# Patient Record
Sex: FEMALE | Race: BLACK OR AFRICAN AMERICAN | Employment: FULL TIME | URBAN - METROPOLITAN AREA
[De-identification: names, ages, dates, MRNs, and addresses within clinical notes are randomized per-mention and may not be internally consistent; named-entity substitution may affect disease eponyms.]

---

## 2017-05-09 ENCOUNTER — HOSPITAL ENCOUNTER (EMERGENCY)
Age: 32
Discharge: LEFT BEFORE DISCHARGE | End: 2017-05-09
Attending: EMERGENCY MEDICINE
Payer: COMMERCIAL

## 2017-05-09 VITALS
HEART RATE: 105 BPM | OXYGEN SATURATION: 98 % | WEIGHT: 200 LBS | HEIGHT: 62 IN | RESPIRATION RATE: 18 BRPM | DIASTOLIC BLOOD PRESSURE: 92 MMHG | TEMPERATURE: 98.6 F | BODY MASS INDEX: 36.8 KG/M2 | SYSTOLIC BLOOD PRESSURE: 153 MMHG

## 2017-05-09 DIAGNOSIS — M54.50 ACUTE RIGHT-SIDED LOW BACK PAIN WITHOUT SCIATICA: Primary | ICD-10-CM

## 2017-05-09 PROCEDURE — 99281 EMR DPT VST MAYX REQ PHY/QHP: CPT | Performed by: EMERGENCY MEDICINE

## 2017-05-09 NOTE — ED PROVIDER NOTES
HPI Comments: Patient is a  29-year-old female who states that a few days ago she was constipated. She was seen at an urgent care clinic and prescribed some lactulose. She has been taking it for 2 days and now having bowel movements. She reports episodes of sharp pain in her right lower back and into the right abdomen. She denies nausea or vomiting. She denies any fall, injury, or trauma. She states she had a normal menstrual cycle one month ago she denies any vaginal bleeding or discharge. Patient is a 29 y.o. female presenting with back pain. The history is provided by the patient. Back Pain    This is a new problem. The current episode started more than 2 days ago. The problem occurs daily. Patient reports not work related injury. The symptoms are aggravated by certain positions. Pertinent negatives include no fever, no abdominal pain, no bladder incontinence, no dysuria, no paresthesias and no paresis. Past Medical History:   Diagnosis Date    Ill-defined condition     herpes       Past Surgical History:   Procedure Laterality Date    HX OTHER SURGICAL      pilonidal cyst         No family history on file. Social History     Social History    Marital status: SINGLE     Spouse name: N/A    Number of children: N/A    Years of education: N/A     Occupational History    Not on file. Social History Main Topics    Smoking status: Never Smoker    Smokeless tobacco: Not on file    Alcohol use Yes      Comment: occasional    Drug use: No    Sexual activity: Yes     Partners: Male     Other Topics Concern    Not on file     Social History Narrative         ALLERGIES: Review of patient's allergies indicates no known allergies. Review of Systems   Constitutional: Negative. Negative for fever. HENT: Negative. Eyes: Negative. Respiratory: Negative. Cardiovascular: Negative. Gastrointestinal: Negative. Negative for abdominal pain. Endocrine: Negative.     Genitourinary: Negative for bladder incontinence and dysuria. Musculoskeletal: Positive for back pain. Neurological: Negative. Negative for paresthesias. Vitals:    05/09/17 0016   BP: (!) 153/92   Pulse: (!) 105   Resp: 18   Temp: 98.6 °F (37 °C)   SpO2: 98%   Weight: 90.7 kg (200 lb)   Height: 5' 2\" (1.575 m)            Physical Exam   Constitutional: She is oriented to person, place, and time. She appears well-developed and well-nourished. HENT:   Head: Normocephalic. Eyes: Pupils are equal, round, and reactive to light. Neck: Normal range of motion. Neck supple. Cardiovascular: Normal rate and regular rhythm. Pulmonary/Chest: Effort normal and breath sounds normal.   Abdominal: Soft. There is no tenderness. There is no rebound. Musculoskeletal: Normal range of motion. Neurological: She is alert and oriented to person, place, and time. She has normal reflexes. Skin: Skin is warm and dry. Nursing note and vitals reviewed. MDM  Number of Diagnoses or Management Options  Diagnosis management comments: 82-year-old female with several days of constipation now having bowel movements after taking lactulose. States she is having episodes of pain in her right back and right abdomen for a few days unrelieved with over-the-counter pain medications. Differential diagnosis includes gallbladder disease, appendicitis, colitis, colonic spasm, constipation, urinary tract infection, pregnancy, kidney stone       Amount and/or Complexity of Data Reviewed  Clinical lab tests: reviewed      ED Course   1:24 AM  Nurse went to the room to get the year patient's urine specimen for testing and she has eloped. Her evaluation was not completed and she did not receive formal  Discharge paperwork.     Procedures

## 2017-05-09 NOTE — ED NOTES
Went into patient room to collect urine to find that the patient eloped. Both bathrooms were checked at this time. MD mendez.

## 2017-05-16 ENCOUNTER — APPOINTMENT (OUTPATIENT)
Dept: CT IMAGING | Age: 32
DRG: 853 | End: 2017-05-16
Attending: EMERGENCY MEDICINE
Payer: COMMERCIAL

## 2017-05-16 ENCOUNTER — APPOINTMENT (OUTPATIENT)
Dept: GENERAL RADIOLOGY | Age: 32
DRG: 853 | End: 2017-05-16
Attending: EMERGENCY MEDICINE
Payer: COMMERCIAL

## 2017-05-16 ENCOUNTER — HOSPITAL ENCOUNTER (INPATIENT)
Age: 32
LOS: 20 days | Discharge: NURSING, REHAB OR LONG-TERM CARE | DRG: 853 | End: 2017-06-05
Attending: EMERGENCY MEDICINE | Admitting: INTERNAL MEDICINE
Payer: COMMERCIAL

## 2017-05-16 DIAGNOSIS — N17.9 ACUTE RENAL FAILURE, UNSPECIFIED ACUTE RENAL FAILURE TYPE (HCC): ICD-10-CM

## 2017-05-16 DIAGNOSIS — E87.20 LACTIC ACIDOSIS: ICD-10-CM

## 2017-05-16 DIAGNOSIS — K61.1 PERIRECTAL ABSCESS: ICD-10-CM

## 2017-05-16 DIAGNOSIS — R65.10 SIRS (SYSTEMIC INFLAMMATORY RESPONSE SYNDROME) (HCC): ICD-10-CM

## 2017-05-16 DIAGNOSIS — B35.6 TINEA CRURIS: ICD-10-CM

## 2017-05-16 DIAGNOSIS — K85.20 ALCOHOL-INDUCED ACUTE PANCREATITIS WITHOUT INFECTION OR NECROSIS: ICD-10-CM

## 2017-05-16 DIAGNOSIS — M79.89 NECROTIZING SOFT TISSUE INFECTION: ICD-10-CM

## 2017-05-16 DIAGNOSIS — C49.6: ICD-10-CM

## 2017-05-16 DIAGNOSIS — R65.21 SEPTIC SHOCK (HCC): ICD-10-CM

## 2017-05-16 DIAGNOSIS — D72.829 LEUKOCYTOSIS, UNSPECIFIED TYPE: ICD-10-CM

## 2017-05-16 DIAGNOSIS — K85.90 ACUTE PANCREATITIS, UNSPECIFIED COMPLICATION STATUS, UNSPECIFIED PANCREATITIS TYPE: ICD-10-CM

## 2017-05-16 DIAGNOSIS — A41.9 SEPTIC SHOCK (HCC): ICD-10-CM

## 2017-05-16 DIAGNOSIS — A60.04 HERPES SIMPLEX VULVOVAGINITIS: ICD-10-CM

## 2017-05-16 DIAGNOSIS — E13.10 DIABETIC KETOACIDOSIS WITHOUT COMA ASSOCIATED WITH OTHER SPECIFIED DIABETES MELLITUS (HCC): Primary | ICD-10-CM

## 2017-05-16 PROBLEM — E11.10 DKA (DIABETIC KETOACIDOSES): Status: ACTIVE | Noted: 2017-05-16

## 2017-05-16 PROBLEM — E87.5 HYPERKALEMIA: Status: ACTIVE | Noted: 2017-05-16

## 2017-05-16 PROBLEM — A74.9 CHLAMYDIA INFECTION: Status: ACTIVE | Noted: 2017-05-16

## 2017-05-16 PROBLEM — E87.1 HYPONATREMIA: Status: ACTIVE | Noted: 2017-05-16

## 2017-05-16 LAB
ADMINISTERED INITIALS, ADMINIT: NORMAL
ADMINISTERED INITIALS, ADMINIT: NORMAL
ALBUMIN SERPL BCP-MCNC: 1.3 G/DL (ref 3.5–5)
ALBUMIN/GLOB SERPL: 0.2 {RATIO} (ref 1.2–3.5)
ALP SERPL-CCNC: 224 U/L (ref 50–136)
ALT SERPL-CCNC: 49 U/L (ref 12–65)
ANION GAP BLD CALC-SCNC: 22 MMOL/L (ref 7–16)
ANION GAP BLD CALC-SCNC: 27 MMOL/L (ref 7–16)
ARTERIAL PATENCY WRIST A: POSITIVE
AST SERPL W P-5'-P-CCNC: 178 U/L (ref 15–37)
BACTERIA SPEC CULT: NORMAL
BASE DEFICIT BLDA-SCNC: 17.2 MMOL/L (ref 0–2)
BASOPHILS NFR BLD MANUAL: 1 % (ref 0–2)
BDY SITE: ABNORMAL
BILIRUB SERPL-MCNC: 0.6 MG/DL (ref 0.2–1.1)
BLASTS NFR BLD: 1 %
BUN SERPL-MCNC: 54 MG/DL (ref 6–23)
BUN SERPL-MCNC: 56 MG/DL (ref 6–23)
CALCIUM SERPL-MCNC: 7.3 MG/DL (ref 8.3–10.4)
CALCIUM SERPL-MCNC: 8.6 MG/DL (ref 8.3–10.4)
CHLORIDE SERPL-SCNC: 79 MMOL/L (ref 98–107)
CHLORIDE SERPL-SCNC: 94 MMOL/L (ref 98–107)
CK MB CFR SERPL CALC: 0.3 %
CK MB SERPL-MCNC: 15.6 NG/ML (ref 0.5–3.6)
CK SERPL-CCNC: 4621 U/L (ref 21–215)
CO2 SERPL-SCNC: 11 MMOL/L (ref 21–32)
CO2 SERPL-SCNC: 9 MMOL/L (ref 21–32)
COHGB MFR BLD: 0.3 % (ref 0.5–1.5)
CREAT SERPL-MCNC: 2.08 MG/DL (ref 0.6–1)
CREAT SERPL-MCNC: 2.51 MG/DL (ref 0.6–1)
D50 ADMINISTERED, D50ADM: 0 ML
D50 ADMINISTERED, D50ADM: 0 ML
D50 ORDER, D50ORD: 0 ML
D50 ORDER, D50ORD: 0 ML
DIFFERENTIAL METHOD BLD: ABNORMAL
DO-HGB BLD-MCNC: 3 % (ref 0–5)
ERYTHROCYTE [DISTWIDTH] IN BLOOD BY AUTOMATED COUNT: 17.4 % (ref 11.9–14.6)
EST. AVERAGE GLUCOSE BLD GHB EST-MCNC: 355 MG/DL
ETHANOL SERPL-MCNC: <3 MG/DL
GLOBULIN SER CALC-MCNC: 6.1 G/DL (ref 2.3–3.5)
GLSCOM COMMENTS: NORMAL
GLSCOM COMMENTS: NORMAL
GLUCOSE BLD STRIP.AUTO-MCNC: >600 MG/DL (ref 65–100)
GLUCOSE SERPL-MCNC: 765 MG/DL (ref 65–100)
GLUCOSE SERPL-MCNC: 890 MG/DL (ref 65–100)
GLUCOSE SERPL-MCNC: >500 MG/DL (ref 65–100)
GLUCOSE, GLC: 601 MG/DL
GLUCOSE, GLC: 601 MG/DL
HBA1C MFR BLD: 14 % (ref 4.8–6)
HCG SERPL QL: NEGATIVE
HCO3 BLDA-SCNC: 9 MMOL/L (ref 22–26)
HCT VFR BLD AUTO: 43.2 % (ref 35.8–46.3)
HGB BLD-MCNC: 10.8 G/DL (ref 11.7–15.4)
HGB BLDMV-MCNC: 10.2 GM/DL (ref 11.7–15)
HIGH TARGET, HITG: 180 MG/DL
HIGH TARGET, HITG: 180 MG/DL
INSULIN ADMINSTERED, INSADM: 10.8 UNITS/HOUR
INSULIN ADMINSTERED, INSADM: 16.2 UNITS/HOUR
INSULIN ORDER, INSORD: 10.8 UNITS/HOUR
INSULIN ORDER, INSORD: 16.2 UNITS/HOUR
LACTATE BLD-SCNC: 5.8 MMOL/L (ref 0.5–1.9)
LACTATE BLD-SCNC: 7.4 MMOL/L (ref 0.5–1.9)
LACTATE SERPL-SCNC: 4.9 MMOL/L (ref 0.4–2)
LACTATE SERPL-SCNC: 7.1 MMOL/L (ref 0.4–2)
LIPASE SERPL-CCNC: 5440 U/L (ref 73–393)
LIPASE SERPL-CCNC: >1500 U/L (ref 73–393)
LOW TARGET, LOT: 140 MG/DL
LOW TARGET, LOT: 140 MG/DL
LYMPHOCYTES NFR BLD MANUAL: 8 % (ref 16–44)
MAGNESIUM SERPL-MCNC: 2.5 MG/DL (ref 1.8–2.4)
MAGNESIUM SERPL-MCNC: 3 MG/DL (ref 1.8–2.4)
MCH RBC QN AUTO: 28.4 PG (ref 26.1–32.9)
MCHC RBC AUTO-ENTMCNC: 25 G/DL (ref 31.4–35)
MCV RBC AUTO: 113.7 FL (ref 79.6–97.8)
METHGB MFR BLD: 0.4 % (ref 0–1.5)
MINUTES UNTIL NEXT BG, NBG: 60 MIN
MINUTES UNTIL NEXT BG, NBG: 60 MIN
MONOCYTES NFR BLD MANUAL: 4 % (ref 3–9)
MULTIPLIER, MUL: 0.02
MULTIPLIER, MUL: 0.03
MYELOCYTES NFR BLD MANUAL: 1 %
NEUTS SEG NFR BLD MANUAL: 84 % (ref 47–75)
ORDER INITIALS, ORDINIT: NORMAL
ORDER INITIALS, ORDINIT: NORMAL
OXYHGB MFR BLDA: 96 % (ref 94–97)
PCO2 BLDA: 22 MMHG (ref 35–45)
PH BLDA: 7.22 [PH] (ref 7.35–7.45)
PHOSPHATE SERPL-MCNC: 5.5 MG/DL (ref 2.5–4.5)
PHOSPHATE SERPL-MCNC: 6.9 MG/DL (ref 2.5–4.5)
PLATELET # BLD AUTO: 473 K/UL (ref 150–450)
PLATELET COMMENTS,PCOM: ADEQUATE
PMV BLD AUTO: 13.4 FL (ref 10.8–14.1)
PO2 BLDA: 103 MMHG (ref 75–100)
POTASSIUM SERPL-SCNC: 5.2 MMOL/L (ref 3.5–5.1)
POTASSIUM SERPL-SCNC: 6 MMOL/L (ref 3.5–5.1)
PROCALCITONIN SERPL-MCNC: 78.7 NG/ML
PROMYELOCYTES NFR BLD MANUAL: 1 %
PROT SERPL-MCNC: 7.4 G/DL (ref 6.3–8.2)
RBC # BLD AUTO: 3.8 M/UL (ref 4.05–5.25)
RBC MORPH BLD: ABNORMAL
SAO2 % BLD: 97 % (ref 92–98.5)
SERVICE CMNT-IMP: ABNORMAL
SERVICE CMNT-IMP: NORMAL
SODIUM SERPL-SCNC: 115 MMOL/L (ref 136–145)
SODIUM SERPL-SCNC: 127 MMOL/L (ref 136–145)
VENTILATION MODE VENT: ABNORMAL
WBC # BLD AUTO: 45.6 K/UL (ref 4.3–11.1)
WBC MORPH BLD: ABNORMAL

## 2017-05-16 PROCEDURE — 82803 BLOOD GASES ANY COMBINATION: CPT

## 2017-05-16 PROCEDURE — 74011250636 HC RX REV CODE- 250/636: Performed by: INTERNAL MEDICINE

## 2017-05-16 PROCEDURE — 80053 COMPREHEN METABOLIC PANEL: CPT | Performed by: EMERGENCY MEDICINE

## 2017-05-16 PROCEDURE — 83036 HEMOGLOBIN GLYCOSYLATED A1C: CPT | Performed by: EMERGENCY MEDICINE

## 2017-05-16 PROCEDURE — 74176 CT ABD & PELVIS W/O CONTRAST: CPT

## 2017-05-16 PROCEDURE — 87040 BLOOD CULTURE FOR BACTERIA: CPT | Performed by: EMERGENCY MEDICINE

## 2017-05-16 PROCEDURE — 83605 ASSAY OF LACTIC ACID: CPT | Performed by: INTERNAL MEDICINE

## 2017-05-16 PROCEDURE — 84100 ASSAY OF PHOSPHORUS: CPT | Performed by: INTERNAL MEDICINE

## 2017-05-16 PROCEDURE — 80307 DRUG TEST PRSMV CHEM ANLYZR: CPT | Performed by: INTERNAL MEDICINE

## 2017-05-16 PROCEDURE — 74011250637 HC RX REV CODE- 250/637: Performed by: INTERNAL MEDICINE

## 2017-05-16 PROCEDURE — C1751 CATH, INF, PER/CENT/MIDLINE: HCPCS

## 2017-05-16 PROCEDURE — 36600 WITHDRAWAL OF ARTERIAL BLOOD: CPT

## 2017-05-16 PROCEDURE — 83735 ASSAY OF MAGNESIUM: CPT | Performed by: EMERGENCY MEDICINE

## 2017-05-16 PROCEDURE — 96361 HYDRATE IV INFUSION ADD-ON: CPT | Performed by: EMERGENCY MEDICINE

## 2017-05-16 PROCEDURE — 51702 INSERT TEMP BLADDER CATH: CPT | Performed by: EMERGENCY MEDICINE

## 2017-05-16 PROCEDURE — 74011250636 HC RX REV CODE- 250/636: Performed by: EMERGENCY MEDICINE

## 2017-05-16 PROCEDURE — 84100 ASSAY OF PHOSPHORUS: CPT | Performed by: EMERGENCY MEDICINE

## 2017-05-16 PROCEDURE — 83605 ASSAY OF LACTIC ACID: CPT

## 2017-05-16 PROCEDURE — 74011000258 HC RX REV CODE- 258: Performed by: EMERGENCY MEDICINE

## 2017-05-16 PROCEDURE — 65610000001 HC ROOM ICU GENERAL

## 2017-05-16 PROCEDURE — 80048 BASIC METABOLIC PNL TOTAL CA: CPT | Performed by: INTERNAL MEDICINE

## 2017-05-16 PROCEDURE — 84703 CHORIONIC GONADOTROPIN ASSAY: CPT | Performed by: EMERGENCY MEDICINE

## 2017-05-16 PROCEDURE — 36556 INSERT NON-TUNNEL CV CATH: CPT | Performed by: INTERNAL MEDICINE

## 2017-05-16 PROCEDURE — 87641 MR-STAPH DNA AMP PROBE: CPT | Performed by: INTERNAL MEDICINE

## 2017-05-16 PROCEDURE — 82550 ASSAY OF CK (CPK): CPT | Performed by: EMERGENCY MEDICINE

## 2017-05-16 PROCEDURE — 71010 XR CHEST PORT: CPT

## 2017-05-16 PROCEDURE — 83735 ASSAY OF MAGNESIUM: CPT | Performed by: INTERNAL MEDICINE

## 2017-05-16 PROCEDURE — 96375 TX/PRO/DX INJ NEW DRUG ADDON: CPT | Performed by: EMERGENCY MEDICINE

## 2017-05-16 PROCEDURE — 96365 THER/PROPH/DIAG IV INF INIT: CPT | Performed by: EMERGENCY MEDICINE

## 2017-05-16 PROCEDURE — 77030005515 HC CATH URETH FOL14 BARD -B

## 2017-05-16 PROCEDURE — 83690 ASSAY OF LIPASE: CPT | Performed by: EMERGENCY MEDICINE

## 2017-05-16 PROCEDURE — 99285 EMERGENCY DEPT VISIT HI MDM: CPT | Performed by: EMERGENCY MEDICINE

## 2017-05-16 PROCEDURE — 82962 GLUCOSE BLOOD TEST: CPT

## 2017-05-16 PROCEDURE — 93005 ELECTROCARDIOGRAM TRACING: CPT | Performed by: EMERGENCY MEDICINE

## 2017-05-16 PROCEDURE — 99223 1ST HOSP IP/OBS HIGH 75: CPT | Performed by: INTERNAL MEDICINE

## 2017-05-16 PROCEDURE — 84145 PROCALCITONIN (PCT): CPT | Performed by: EMERGENCY MEDICINE

## 2017-05-16 PROCEDURE — 82947 ASSAY GLUCOSE BLOOD QUANT: CPT

## 2017-05-16 PROCEDURE — 36415 COLL VENOUS BLD VENIPUNCTURE: CPT | Performed by: INTERNAL MEDICINE

## 2017-05-16 PROCEDURE — 85025 COMPLETE CBC W/AUTO DIFF WBC: CPT | Performed by: EMERGENCY MEDICINE

## 2017-05-16 PROCEDURE — 87205 SMEAR GRAM STAIN: CPT | Performed by: EMERGENCY MEDICINE

## 2017-05-16 PROCEDURE — 96367 TX/PROPH/DG ADDL SEQ IV INF: CPT | Performed by: EMERGENCY MEDICINE

## 2017-05-16 PROCEDURE — 74011636637 HC RX REV CODE- 636/637: Performed by: EMERGENCY MEDICINE

## 2017-05-16 PROCEDURE — 83690 ASSAY OF LIPASE: CPT | Performed by: INTERNAL MEDICINE

## 2017-05-16 RX ORDER — PROCHLORPERAZINE EDISYLATE 5 MG/ML
10 INJECTION INTRAMUSCULAR; INTRAVENOUS
Status: COMPLETED | OUTPATIENT
Start: 2017-05-16 | End: 2017-05-16

## 2017-05-16 RX ORDER — SODIUM CHLORIDE 0.9 % (FLUSH) 0.9 %
5-10 SYRINGE (ML) INJECTION AS NEEDED
Status: DISCONTINUED | OUTPATIENT
Start: 2017-05-16 | End: 2017-06-05 | Stop reason: HOSPADM

## 2017-05-16 RX ORDER — DEXTROSE 50 % IN WATER (D50W) INTRAVENOUS SYRINGE
25-50 AS NEEDED
Status: DISCONTINUED | OUTPATIENT
Start: 2017-05-16 | End: 2017-06-05 | Stop reason: HOSPADM

## 2017-05-16 RX ORDER — DOXYCYCLINE 100 MG/1
100 CAPSULE ORAL EVERY 12 HOURS
Status: DISCONTINUED | OUTPATIENT
Start: 2017-05-16 | End: 2017-05-17 | Stop reason: SDUPTHER

## 2017-05-16 RX ORDER — ONDANSETRON 2 MG/ML
4 INJECTION INTRAMUSCULAR; INTRAVENOUS
Status: DISCONTINUED | OUTPATIENT
Start: 2017-05-16 | End: 2017-06-05 | Stop reason: HOSPADM

## 2017-05-16 RX ORDER — HYDROCODONE BITARTRATE AND ACETAMINOPHEN 5; 325 MG/1; MG/1
2 TABLET ORAL
Status: DISCONTINUED | OUTPATIENT
Start: 2017-05-16 | End: 2017-06-03 | Stop reason: ALTCHOICE

## 2017-05-16 RX ORDER — VANCOMYCIN/0.9 % SOD CHLORIDE 1.5G/250ML
1500 PLASTIC BAG, INJECTION (ML) INTRAVENOUS EVERY 24 HOURS
Status: DISCONTINUED | OUTPATIENT
Start: 2017-05-17 | End: 2017-05-17

## 2017-05-16 RX ORDER — DEXTROSE 40 %
15 GEL (GRAM) ORAL AS NEEDED
Status: DISCONTINUED | OUTPATIENT
Start: 2017-05-16 | End: 2017-06-05 | Stop reason: HOSPADM

## 2017-05-16 RX ORDER — BISACODYL 5 MG
5 TABLET, DELAYED RELEASE (ENTERIC COATED) ORAL DAILY PRN
Status: DISCONTINUED | OUTPATIENT
Start: 2017-05-16 | End: 2017-06-05 | Stop reason: HOSPADM

## 2017-05-16 RX ORDER — HEPARIN SODIUM 5000 [USP'U]/ML
5000 INJECTION, SOLUTION INTRAVENOUS; SUBCUTANEOUS EVERY 8 HOURS
Status: DISCONTINUED | OUTPATIENT
Start: 2017-05-16 | End: 2017-06-05 | Stop reason: HOSPADM

## 2017-05-16 RX ORDER — SODIUM CHLORIDE 0.9 % (FLUSH) 0.9 %
5-10 SYRINGE (ML) INJECTION EVERY 8 HOURS
Status: DISCONTINUED | OUTPATIENT
Start: 2017-05-16 | End: 2017-05-19

## 2017-05-16 RX ORDER — ONDANSETRON 2 MG/ML
4 INJECTION INTRAMUSCULAR; INTRAVENOUS
Status: COMPLETED | OUTPATIENT
Start: 2017-05-16 | End: 2017-05-16

## 2017-05-16 RX ORDER — MORPHINE SULFATE 4 MG/ML
4 INJECTION, SOLUTION INTRAMUSCULAR; INTRAVENOUS
Status: COMPLETED | OUTPATIENT
Start: 2017-05-16 | End: 2017-05-16

## 2017-05-16 RX ORDER — SODIUM CHLORIDE 9 MG/ML
150 INJECTION, SOLUTION INTRAVENOUS CONTINUOUS
Status: DISCONTINUED | OUTPATIENT
Start: 2017-05-16 | End: 2017-05-17

## 2017-05-16 RX ORDER — SODIUM CHLORIDE, SODIUM LACTATE, POTASSIUM CHLORIDE, CALCIUM CHLORIDE 600; 310; 30; 20 MG/100ML; MG/100ML; MG/100ML; MG/100ML
150 INJECTION, SOLUTION INTRAVENOUS CONTINUOUS
Status: DISCONTINUED | OUTPATIENT
Start: 2017-05-16 | End: 2017-05-16

## 2017-05-16 RX ORDER — ACETAMINOPHEN 325 MG/1
650 TABLET ORAL
Status: DISCONTINUED | OUTPATIENT
Start: 2017-05-16 | End: 2017-06-05 | Stop reason: HOSPADM

## 2017-05-16 RX ORDER — VANCOMYCIN/0.9 % SOD CHLORIDE 1.5G/250ML
1500 PLASTIC BAG, INJECTION (ML) INTRAVENOUS ONCE
Status: COMPLETED | OUTPATIENT
Start: 2017-05-16 | End: 2017-05-16

## 2017-05-16 RX ADMIN — ONDANSETRON 4 MG: 2 INJECTION INTRAMUSCULAR; INTRAVENOUS at 16:20

## 2017-05-16 RX ADMIN — SODIUM CHLORIDE 150 ML/HR: 900 INJECTION, SOLUTION INTRAVENOUS at 20:10

## 2017-05-16 RX ADMIN — HEPARIN SODIUM 5000 UNITS: 5000 INJECTION, SOLUTION INTRAVENOUS; SUBCUTANEOUS at 22:07

## 2017-05-16 RX ADMIN — VANCOMYCIN HYDROCHLORIDE 1500 MG: 10 INJECTION, POWDER, LYOPHILIZED, FOR SOLUTION INTRAVENOUS at 20:10

## 2017-05-16 RX ADMIN — PIPERACILLIN SODIUM,TAZOBACTAM SODIUM 4.5 G: 4; .5 INJECTION, POWDER, FOR SOLUTION INTRAVENOUS at 17:08

## 2017-05-16 RX ADMIN — SODIUM CHLORIDE 1000 ML: 900 INJECTION, SOLUTION INTRAVENOUS at 18:04

## 2017-05-16 RX ADMIN — SODIUM CHLORIDE 1000 ML: 900 INJECTION, SOLUTION INTRAVENOUS at 20:10

## 2017-05-16 RX ADMIN — MORPHINE SULFATE 4 MG: 4 INJECTION, SOLUTION INTRAMUSCULAR; INTRAVENOUS at 16:45

## 2017-05-16 RX ADMIN — SODIUM CHLORIDE 1000 ML: 900 INJECTION, SOLUTION INTRAVENOUS at 17:47

## 2017-05-16 RX ADMIN — Medication 10 ML: at 22:01

## 2017-05-16 RX ADMIN — SODIUM CHLORIDE 10 UNITS/HR: 900 INJECTION, SOLUTION INTRAVENOUS at 17:25

## 2017-05-16 RX ADMIN — DOXYCYCLINE HYCLATE 100 MG: 100 CAPSULE ORAL at 21:58

## 2017-05-16 RX ADMIN — SODIUM CHLORIDE 1000 ML: 900 INJECTION, SOLUTION INTRAVENOUS at 17:27

## 2017-05-16 RX ADMIN — SODIUM CHLORIDE 1000 ML: 900 INJECTION, SOLUTION INTRAVENOUS at 15:27

## 2017-05-16 RX ADMIN — PROCHLORPERAZINE EDISYLATE 10 MG: 5 INJECTION INTRAMUSCULAR; INTRAVENOUS at 17:46

## 2017-05-16 NOTE — ED NOTES
TRANSFER - OUT REPORT:    Verbal report given to [facility] SANDRA RODRIGUEZ RN(name) on Katt Rodriguez  being transferred to ICU(unit) for routine progression of care       Report consisted of patients Situation, Background, Assessment and   Recommendations(SBAR). Information from the following report(s) SBAR, ED Summary, [facility] and Recent Results was reviewed with the receiving nurse. Lines:   Peripheral IV 05/16/17 Left Antecubital (Active)   Site Assessment Clean, dry, & intact 5/16/2017  7:30 PM   Phlebitis Assessment 0 5/16/2017  7:30 PM   Infiltration Assessment 0 5/16/2017  7:30 PM   Dressing Status Clean, dry, & intact 5/16/2017  7:30 PM       Peripheral IV 05/16/17 Right Antecubital (Active)   Site Assessment Clean, dry, & intact 5/16/2017  7:31 PM   Phlebitis Assessment 0 5/16/2017  7:31 PM   Infiltration Assessment 0 5/16/2017  7:31 PM   Dressing Status Clean, dry, & intact 5/16/2017  7:31 PM        Opportunity for questions and clarification was provided.       Patient transported with:   Registered Nurse

## 2017-05-16 NOTE — H&P
HOSPITALIST H&P/CONSULT  NAME:  Linh Xiong   Age:  29 y.o.  :   1985   MRN:   [id number]  PCP: Birdie Pallas, MD  Consulting MD:  Treatment Team: Primary Nurse: Jefferson Babcock; Primary Nurse: Carlota Reza RN  HPI:   32yo F with no significant PMH presented with worsening Rt Flank pain, nausea, multiple episodes of vomiting and weakness. She was seen here in ER 1 week ago with back pain and diagnosed with Sciatica and given NSAIDs which she took without much relief. Yesterday her pain worsened and she took extra pain meds with about 4 glasses of Wine per . She then started having vomiting and abd discomfort that continued today and she came to ER. Found to be in DKA in ER with Na 115, K 6.0, LA 7.4, Bicarb 9, Ph 7.22, Cr 2.51 and Ck > 4000, WBC >40k, Hgb 10.8 with elevated LFTs Per  they took a trip to [place] by car and pt has been complaining for rt flank pain since they returned last week. Also she was seen 3 days ago at GYN office and blood work in care everywhere showing +ve serology for HSV 1& 2 as well as Chlamydia. Per GYN note she had worrisome excoriated lesions around her vagina/Vulva. She was started on Insulin gtt and Abx, cultures were sent in ER and Hospitalist asked to admit. Pt is slepy but arousable, denies abd pain or nausea, still has pain in her Rt Flank. No SOB, cough, fever, CP, diarrhea reported. Denies having her periods at this time. 10 point ROS done and is negative except as noted in HPI. Past Medical History:   Diagnosis Date    Ill-defined condition     herpes      Past Surgical History:   Procedure Laterality Date    HX OTHER SURGICAL      pilonidal cyst      Prior to Admission Medications   Prescriptions Last Dose Informant Patient Reported? Taking?   acyclovir (ZOVIRAX) 200 mg capsule   No No   Sig: Take 4 Caps by mouth two (2) times a day. Facility-Administered Medications: None     Home meds reconciled.   No Known Allergies   Social History   Substance Use Topics    Smoking status: Never Smoker    Smokeless tobacco: Not on file    Alcohol use Yes      Comment: occasional      History reviewed. No pertinent family history. There is no immunization history for the selected administration types on file for this patient. Objective:     Visit Vitals    /52    Pulse (!) 129    Temp 98 °F (36.7 °C)    Resp 18    Ht 5' 2\" (1.575 m)    Wt 90.7 kg (200 lb)    LMP 2017    SpO2 100%    BMI 36.58 kg/m2      Temp (24hrs), Av °F (36.7 °C), Min:98 °F (36.7 °C), Max:98 °F (36.7 °C)    Oxygen Therapy  O2 Sat (%): 100 % (17)  Pulse via Oximetry: 129 beats per minute (17 190)  O2 Device: Room air (17 1454)  Physical Exam:  General:    Drowsy, cooperative, moderate distress   Head:   NCAT. No obvious deformity  Nose:  Nares normal. No drainage  Lungs:   CTABL. No wheezing/rhonchi/rales  Heart:   RRR. No m/r/g. tachycardia  Abdomen:   S/nt/nd. Bowel sounds normal.   Extremities: No cyanosis. :     Large area of excoriations around her vagina and perineum. Neurologic: Moves all extremities.   no gross focal deficits      Data Review:   Recent Results (from the past 24 hour(s))   CBC WITH AUTOMATED DIFF    Collection Time: 17  3:10 PM   Result Value Ref Range    WBC 45.6 (H) 4.3 - 11.1 K/uL    RBC 3.80 (L) 4.05 - 5.25 M/uL    HGB 10.8 (L) 11.7 - 15.4 g/dL    HCT 43.2 35.8 - 46.3 %    .7 (H) 79.6 - 97.8 FL    MCH 28.4 26.1 - 32.9 PG    MCHC 25.0 (L) 31.4 - 35.0 g/dL    RDW 17.4 (H) 11.9 - 14.6 %    PLATELET 672 (H) 718 - 450 K/uL    MPV 13.4 10.8 - 14.1 FL    NEUTROPHILS 84 (H) 47 - 75 %    LYMPHOCYTES 8 (L) 16 - 44 %    MONOCYTES 4 3 - 9 %    BASOPHILS 1 0 - 2 %    MYELOCYTES 1 %    PROMYELOCYTES 1 %    BLASTS 1 %    RBC COMMENTS SLIGHT  ANISOCYTOSIS + POIKILOCYTOSIS        WBC COMMENTS OCCASIONAL      PLATELET COMMENTS ADEQUATE      DF AUTOMATED     LIPASE    Collection Time: 17 4:25 PM   Result Value Ref Range    Lipase >1500 (H) 73 - 393 U/L   MAGNESIUM    Collection Time: 05/16/17  4:25 PM   Result Value Ref Range    Magnesium 3.0 (H) 1.8 - 2.4 mg/dL   METABOLIC PANEL, COMPREHENSIVE    Collection Time: 05/16/17  4:25 PM   Result Value Ref Range    Sodium 115 (LL) 136 - 145 mmol/L    Potassium 6.0 (H) 3.5 - 5.1 mmol/L    Chloride 79 (L) 98 - 107 mmol/L    CO2 9 (LL) 21 - 32 mmol/L    Anion gap 27 (H) 7 - 16 mmol/L    Glucose >500 (HH) 65 - 100 mg/dL    BUN 54 (H) 6 - 23 MG/DL    Creatinine 2.51 (H) 0.6 - 1.0 MG/DL    GFR est AA 29 (L) >60 ml/min/1.73m2    GFR est non-AA 24 (L) >60 ml/min/1.73m2    Calcium 8.6 8.3 - 10.4 MG/DL    Bilirubin, total 0.6 0.2 - 1.1 MG/DL    ALT (SGPT) 49 12 - 65 U/L    AST (SGOT) 178 (H) 15 - 37 U/L    Alk.  phosphatase 224 (H) 50 - 136 U/L    Protein, total 7.4 6.3 - 8.2 g/dL    Albumin 1.3 (L) 3.5 - 5.0 g/dL    Globulin 6.1 (H) 2.3 - 3.5 g/dL    A-G Ratio 0.2 (L) 1.2 - 3.5     POC LACTIC ACID    Collection Time: 05/16/17  4:58 PM   Result Value Ref Range    Lactic Acid (POC) 7.4 (H) 0.5 - 1.9 mmol/L   CK WITH MB    Collection Time: 05/16/17  5:36 PM   Result Value Ref Range    CK 4621 (H) 21 - 215 U/L    CK - MB 15.6 (H) 0.5 - 3.6 ng/ml    CK-MB Index 0.3 <2.5 %   PROCALCITONIN    Collection Time: 05/16/17  5:36 PM   Result Value Ref Range    Procalcitonin 78.7 ng/mL   PHOSPHORUS    Collection Time: 05/16/17  5:36 PM   Result Value Ref Range    Phosphorus 6.9 (H) 2.5 - 4.5 MG/DL   BLOOD GAS, ARTERIAL    Collection Time: 05/16/17  5:40 PM   Result Value Ref Range    pH 7.22 (LL) 7.35 - 7.45      PCO2 22 (L) 35.0 - 45.0 mmHg    PO2 103 (H) 75.0 - 100.0 mmHg    BICARBONATE 9 (L) 22.0 - 26.0 mmol/L    BASE DEFICIT 17.2 (H) 0 - 2 mmol/L    TOTAL HEMOGLOBIN 10.2 (L) 11.7 - 15.0 GM/DL    O2 SAT 97 92.0 - 98.5 %    ARTERIAL O2 HGB 96.0 94.0 - 97.0 %    CARBOXYHEMOGLOBIN 0.3 (L) 0.5 - 1.5 %    METHEMOGLOBIN 0.4 0.0 - 1.5 %    DEOXYHEMOGLOBIN 3 0.0 - 5.0 %    SITE LR ALLENS TEST POSITIVE      MODE RA     Respiratory comment: Dr Arianna Johnson at 5 16 2017 5 46 24 PM. Read back. GLUCOSE, POC    Collection Time: 05/16/17  6:43 PM   Result Value Ref Range    Glucose (POC) >600 (HH) 65 - 100 mg/dL     Imaging /Procedures /Studies:  I personally reviewed all labs, imaging, and other studies this admission:  CXR Results  (Last 48 hours)               05/16/17 1802  XR CHEST PORT Final result    Impression:  Impression: Unremarkable portable chest radiograph                           Narrative:  Portable chest:        History: Shortness of breath, fatigue x1 week       Comparison: None       Findings: A single view of the chest was obtained at 1748 hours. The patient is   slightly rotated towards the left. The cardiac and mediastinal silhouette are normal in size and configuration. The   lungs and pleural spaces are clear. The pulmonary vascularity is within normal   limits. CT Results  (Last 48 hours)    None          Assessment and Plan: Active Hospital Problems    Diagnosis Date Noted    DKA (diabetic ketoacidoses) ([facility] [address] 05/16/2017    Acute alcoholic pancreatitis 83/22/8816    Hyponatremia 05/16/2017     Pseudohyponatremia      Hyperkalemia 05/16/2017    Acute renal failure (ARF) ([facility] [address] 05/16/2017    Rhabdomyosarcoma of flank ([facility] [address] 05/16/2017    Sepsis ([facility] [address] 05/16/2017    Chlamydia infection 05/16/2017    Herpes simplex vulvovaginitis 05/16/2017       PLAN  · Admit to inpt ICU  · DKA: Started on Insulin gtt glucose stabilizer with q4h BMP. S/p 3lns Bolus, c/w NS infusion. Will need d5 wit KCL once BS < 200   · Pancreatitis: Trend Lipase, LFTs, NPO, IV Fluids, prn zofran and pain meds. · Pseudohyponatremia: due to Hyperglycemia, Trend Na  · NALDO: Likely pre renal and recent NSAID use in the setting of Rhabdo. On IVFluids, Pino placed, trend Cr. · Rhabdomyolysis: Trend CK on IVFluids  · Sepsis with +ve Chlamydia serology and HSV serology.  Will start On Vanc, Zosyn and Doxy. Consult ID and GYN. FEN:  NPO, IV Fluids  DVT ppx:  hsq  Code status: full code   Estimated LOS:  2-3 days  Risk assessment:  High risk due to DKA, Sepsis, NALDO and rhabdo. Plan of care discussed with: patient and  at bedside    Critical care time spent in admission process was 45 mins.     Signed By: Manuel Umanzor MD     May 16, 2017

## 2017-05-16 NOTE — ED NOTES
Patient reports she has been feeling sick for over a week now, today she began vomiting and with nausea. Also complains of back pain with difficulty walking.

## 2017-05-16 NOTE — ED PROVIDER NOTES
HPI Comments: Patient presents to the ER complaining of right lower back pain, body aches and fatigue. Patient states symptoms have been present for over a week. She was seen here initially for symptoms and diagnosed with sciatica. States she was prescribed ibuprofen without much relief. She reports since then she has started developing nausea and vomiting. Denies any fevers or chills    Patient is a 29 y.o. female presenting with back pain. The history is provided by the patient. Back Pain    This is a recurrent problem. The current episode started more than 1 week ago. The problem has not changed since onset. The pain is associated with no known injury. The pain is present in the right side. The quality of the pain is described as stabbing. Radiates to: right hip. The pain is at a severity of 5/10. The pain is moderate. Pertinent negatives include no fever, no numbness, no weight loss, no abdominal pain, no abdominal swelling, no bowel incontinence, no dysuria, no pelvic pain, no paresthesias and no paresis. She has tried nothing for the symptoms. Past Medical History:   Diagnosis Date    Ill-defined condition     herpes       Past Surgical History:   Procedure Laterality Date    HX OTHER SURGICAL      pilonidal cyst         History reviewed. No pertinent family history. Social History     Social History    Marital status: SINGLE     Spouse name: N/A    Number of children: N/A    Years of education: N/A     Occupational History    Not on file. Social History Main Topics    Smoking status: Never Smoker    Smokeless tobacco: Not on file    Alcohol use Yes      Comment: occasional    Drug use: No    Sexual activity: Yes     Partners: Male     Other Topics Concern    Not on file     Social History Narrative         ALLERGIES: Review of patient's allergies indicates no known allergies. Review of Systems   Constitutional: Negative for diaphoresis, fever and weight loss.    HENT: Negative for congestion, dental problem, trouble swallowing and voice change. Eyes: Negative for photophobia, redness and visual disturbance. Respiratory: Negative for chest tightness and shortness of breath. Cardiovascular: Negative for leg swelling. Gastrointestinal: Positive for nausea and vomiting. Negative for abdominal pain and bowel incontinence. Endocrine: Negative for polydipsia, polyphagia and polyuria. Genitourinary: Negative for dysuria, frequency and pelvic pain. Musculoskeletal: Positive for back pain. Negative for joint swelling, myalgias and neck pain. Skin: Negative for pallor. Allergic/Immunologic: Negative for food allergies and immunocompromised state. Neurological: Negative for syncope, speech difficulty, numbness and paresthesias. Hematological: Negative for adenopathy. Does not bruise/bleed easily. Psychiatric/Behavioral: Negative for behavioral problems and confusion. All other systems reviewed and are negative. Vitals:    05/16/17 1454 05/16/17 1459 05/16/17 1500   BP:   (!) 195/156   Pulse: (!) 108     Resp: 18     Temp: 98 °F (36.7 °C)     SpO2:  93% 100%   Weight: 90.7 kg (200 lb)     Height: 5' 2\" (1.575 m)              Physical Exam   Constitutional: She is oriented to person, place, and time. She appears well-developed and well-nourished. She has a sickly appearance. HENT:   Head: Normocephalic and atraumatic. Mouth/Throat: Oropharynx is clear and moist.   Eyes: Conjunctivae and EOM are normal. Pupils are equal, round, and reactive to light. No scleral icterus. Neck: Normal range of motion. Neck supple. No JVD present. No tracheal deviation present. No thyromegaly present. Cardiovascular: Regular rhythm, normal heart sounds and intact distal pulses. Tachycardia present. Exam reveals no gallop and no friction rub. No murmur heard. Pulmonary/Chest: Effort normal and breath sounds normal. No respiratory distress. She has no wheezes. Abdominal: Soft. Bowel sounds are normal. She exhibits no distension. There is no tenderness. Musculoskeletal: Normal range of motion. She exhibits no edema, tenderness or deformity. Back:    Neurological: She is alert and oriented to person, place, and time. She has normal reflexes. No cranial nerve deficit. Coordination normal.   Skin: Skin is warm and dry. No rash noted. No erythema. Nursing note and vitals reviewed. MDM  Number of Diagnoses or Management Options  Diagnosis management comments: DDx: Sciatica/Kidney stone/ Pyelonephritis    4:53 PM  WBC is 45K  Awaiting metabolic panel    1:98 PM  Lipase greater than 1500, also appears to be in DKA.   Once again no abdominal tenderness on exam  Blood cultures, abx and insulin gtt started       Amount and/or Complexity of Data Reviewed  Clinical lab tests: ordered and reviewed  Tests in the radiology section of CPT®: ordered and reviewed    Risk of Complications, Morbidity, and/or Mortality  Presenting problems: high  Diagnostic procedures: high  Management options: high    Critical Care  Total time providing critical care:  minutes (Critical Care Time 90 Minutes: Excluding all billable procedures, time spent at the bedside directing patients resuscitation, updating family, and coordinating care with consultants  )    Patient Progress  Patient progress: stable    ED Course       Procedures

## 2017-05-17 ENCOUNTER — APPOINTMENT (OUTPATIENT)
Dept: CT IMAGING | Age: 32
DRG: 853 | End: 2017-05-17
Attending: INTERNAL MEDICINE
Payer: COMMERCIAL

## 2017-05-17 ENCOUNTER — APPOINTMENT (OUTPATIENT)
Dept: GENERAL RADIOLOGY | Age: 32
DRG: 853 | End: 2017-05-17
Attending: INTERNAL MEDICINE
Payer: COMMERCIAL

## 2017-05-17 PROBLEM — R65.21 SEPTIC SHOCK (HCC): Status: ACTIVE | Noted: 2017-05-17

## 2017-05-17 PROBLEM — A41.9 SEPTIC SHOCK (HCC): Status: ACTIVE | Noted: 2017-05-17

## 2017-05-17 PROBLEM — R19.00 PELVIC MASS: Status: ACTIVE | Noted: 2017-05-17

## 2017-05-17 LAB
ADMINISTERED INITIALS, ADMINIT: NORMAL
ALBUMIN SERPL BCP-MCNC: 0.6 G/DL (ref 3.5–5)
ALBUMIN/GLOB SERPL: 0.2 {RATIO} (ref 1.2–3.5)
ALP SERPL-CCNC: 90 U/L (ref 50–136)
ALT SERPL-CCNC: 29 U/L (ref 12–65)
AMPHET UR QL SCN: NEGATIVE
ANION GAP BLD CALC-SCNC: 12 MMOL/L (ref 7–16)
ANION GAP BLD CALC-SCNC: 13 MMOL/L (ref 7–16)
ANION GAP BLD CALC-SCNC: 15 MMOL/L (ref 7–16)
ANION GAP BLD CALC-SCNC: 19 MMOL/L (ref 7–16)
AST SERPL W P-5'-P-CCNC: 82 U/L (ref 15–37)
ATRIAL RATE: 134 BPM
BARBITURATES UR QL SCN: NEGATIVE
BENZODIAZ UR QL: NEGATIVE
BILIRUB DIRECT SERPL-MCNC: 0.1 MG/DL
BILIRUB SERPL-MCNC: 0.2 MG/DL (ref 0.2–1.1)
BUN SERPL-MCNC: 43 MG/DL (ref 6–23)
BUN SERPL-MCNC: 45 MG/DL (ref 6–23)
BUN SERPL-MCNC: 55 MG/DL (ref 6–23)
BUN SERPL-MCNC: 58 MG/DL (ref 6–23)
CA-I BLD-MCNC: 4.48 MG/DL (ref 4–5.2)
CALCIUM SERPL-MCNC: 5.6 MG/DL (ref 8.3–10.4)
CALCIUM SERPL-MCNC: 7.2 MG/DL (ref 8.3–10.4)
CALCIUM SERPL-MCNC: 7.3 MG/DL (ref 8.3–10.4)
CALCIUM SERPL-MCNC: 7.6 MG/DL (ref 8.3–10.4)
CALCULATED P AXIS, ECG09: 68 DEGREES
CALCULATED R AXIS, ECG10: 28 DEGREES
CALCULATED T AXIS, ECG11: 16 DEGREES
CANNABINOIDS UR QL SCN: NEGATIVE
CHLORIDE SERPL-SCNC: 105 MMOL/L (ref 98–107)
CHLORIDE SERPL-SCNC: 108 MMOL/L (ref 98–107)
CHLORIDE SERPL-SCNC: 115 MMOL/L (ref 98–107)
CHLORIDE SERPL-SCNC: 120 MMOL/L (ref 98–107)
CHOLEST SERPL-MCNC: <50 MG/DL
CK SERPL-CCNC: 2538 U/L (ref 21–215)
CO2 SERPL-SCNC: 11 MMOL/L (ref 21–32)
CO2 SERPL-SCNC: 15 MMOL/L (ref 21–32)
CO2 SERPL-SCNC: 16 MMOL/L (ref 21–32)
CO2 SERPL-SCNC: 18 MMOL/L (ref 21–32)
COCAINE UR QL SCN: NEGATIVE
CREAT SERPL-MCNC: 0.79 MG/DL (ref 0.6–1)
CREAT SERPL-MCNC: 0.93 MG/DL (ref 0.6–1)
CREAT SERPL-MCNC: 1.59 MG/DL (ref 0.6–1)
CREAT SERPL-MCNC: 1.65 MG/DL (ref 0.6–1)
D50 ADMINISTERED, D50ADM: 0 ML
D50 ORDER, D50ORD: 0 ML
DIAGNOSIS, 93000: NORMAL
DIFFERENTIAL METHOD BLD: ABNORMAL
ERYTHROCYTE [DISTWIDTH] IN BLOOD BY AUTOMATED COUNT: 13.7 % (ref 11.9–14.6)
FERRITIN SERPL-MCNC: 1727 NG/ML (ref 8–388)
FOLATE SERPL-MCNC: 1.9 NG/ML (ref 3.1–17.5)
GLOBULIN SER CALC-MCNC: 3 G/DL (ref 2.3–3.5)
GLSCOM COMMENTS: NORMAL
GLUCOSE BLD STRIP.AUTO-MCNC: 156 MG/DL (ref 65–100)
GLUCOSE BLD STRIP.AUTO-MCNC: 179 MG/DL (ref 65–100)
GLUCOSE BLD STRIP.AUTO-MCNC: 180 MG/DL (ref 65–100)
GLUCOSE BLD STRIP.AUTO-MCNC: 199 MG/DL (ref 65–100)
GLUCOSE BLD STRIP.AUTO-MCNC: 222 MG/DL (ref 65–100)
GLUCOSE BLD STRIP.AUTO-MCNC: 227 MG/DL (ref 65–100)
GLUCOSE BLD STRIP.AUTO-MCNC: 289 MG/DL (ref 65–100)
GLUCOSE BLD STRIP.AUTO-MCNC: 299 MG/DL (ref 65–100)
GLUCOSE BLD STRIP.AUTO-MCNC: 359 MG/DL (ref 65–100)
GLUCOSE BLD STRIP.AUTO-MCNC: 360 MG/DL (ref 65–100)
GLUCOSE BLD STRIP.AUTO-MCNC: 367 MG/DL (ref 65–100)
GLUCOSE BLD STRIP.AUTO-MCNC: 458 MG/DL (ref 65–100)
GLUCOSE BLD STRIP.AUTO-MCNC: 578 MG/DL (ref 65–100)
GLUCOSE SERPL-MCNC: 172 MG/DL (ref 65–100)
GLUCOSE SERPL-MCNC: 291 MG/DL (ref 65–100)
GLUCOSE SERPL-MCNC: 330 MG/DL (ref 65–100)
GLUCOSE SERPL-MCNC: 424 MG/DL (ref 65–100)
GLUCOSE, GLC: 156 MG/DL
GLUCOSE, GLC: 179 MG/DL
GLUCOSE, GLC: 199 MG/DL
GLUCOSE, GLC: 222 MG/DL
GLUCOSE, GLC: 227 MG/DL
GLUCOSE, GLC: 289 MG/DL
GLUCOSE, GLC: 359 MG/DL
GLUCOSE, GLC: 458 MG/DL
GLUCOSE, GLC: 578 MG/DL
HCT VFR BLD AUTO: 22.7 % (ref 35.8–46.3)
HDLC SERPL-MCNC: 8 MG/DL (ref 40–60)
HDLC SERPL: ABNORMAL {RATIO}
HGB BLD-MCNC: 8.2 G/DL (ref 11.7–15.4)
HIGH TARGET, HITG: 180 MG/DL
INSULIN ADMINSTERED, INSADM: 10 UNITS/HOUR
INSULIN ADMINSTERED, INSADM: 11.1 UNITS/HOUR
INSULIN ADMINSTERED, INSADM: 11.3 UNITS/HOUR
INSULIN ADMINSTERED, INSADM: 11.5 UNITS/HOUR
INSULIN ADMINSTERED, INSADM: 12 UNITS/HOUR
INSULIN ADMINSTERED, INSADM: 15.9 UNITS/HOUR
INSULIN ADMINSTERED, INSADM: 20.7 UNITS/HOUR
INSULIN ADMINSTERED, INSADM: 7.7 UNITS/HOUR
INSULIN ADMINSTERED, INSADM: 9.5 UNITS/HOUR
INSULIN ORDER, INSORD: 10 UNITS/HOUR
INSULIN ORDER, INSORD: 11.1 UNITS/HOUR
INSULIN ORDER, INSORD: 11.3 UNITS/HOUR
INSULIN ORDER, INSORD: 11.5 UNITS/HOUR
INSULIN ORDER, INSORD: 12 UNITS/HOUR
INSULIN ORDER, INSORD: 15.9 UNITS/HOUR
INSULIN ORDER, INSORD: 20.7 UNITS/HOUR
INSULIN ORDER, INSORD: 7.7 UNITS/HOUR
INSULIN ORDER, INSORD: 9.5 UNITS/HOUR
IRON SATN MFR SERPL: 46 %
IRON SERPL-MCNC: 30 UG/DL (ref 35–150)
IRON SERPL-MCNC: 46 UG/DL (ref 35–150)
LACTATE SERPL-SCNC: 2 MMOL/L (ref 0.4–2)
LACTATE SERPL-SCNC: 2.1 MMOL/L (ref 0.4–2)
LACTATE SERPL-SCNC: 2.2 MMOL/L (ref 0.4–2)
LACTATE SERPL-SCNC: 2.5 MMOL/L (ref 0.4–2)
LACTATE SERPL-SCNC: 2.8 MMOL/L (ref 0.4–2)
LACTATE SERPL-SCNC: 3.9 MMOL/L (ref 0.4–2)
LACTATE SERPL-SCNC: 4.8 MMOL/L (ref 0.4–2)
LACTATE SERPL-SCNC: 5.4 MMOL/L (ref 0.4–2)
LDLC SERPL CALC-MCNC: ABNORMAL MG/DL
LIPASE SERPL-CCNC: 1877 U/L (ref 73–393)
LIPID PROFILE,FLP: ABNORMAL
LOW TARGET, LOT: 140 MG/DL
LYMPHOCYTES # BLD: 2.6 K/UL (ref 0.5–4.6)
LYMPHOCYTES NFR BLD MANUAL: 8 % (ref 16–44)
MAGNESIUM SERPL-MCNC: 1.5 MG/DL (ref 1.8–2.4)
MAGNESIUM SERPL-MCNC: 2.1 MG/DL (ref 1.8–2.4)
MAGNESIUM SERPL-MCNC: 2.1 MG/DL (ref 1.8–2.4)
MAGNESIUM SERPL-MCNC: 2.3 MG/DL (ref 1.8–2.4)
MCH RBC QN AUTO: 27.5 PG (ref 26.1–32.9)
MCHC RBC AUTO-ENTMCNC: 36.1 G/DL (ref 31.4–35)
MCV RBC AUTO: 76.2 FL (ref 79.6–97.8)
METAMYELOCYTES NFR BLD MANUAL: 1 %
METHADONE UR QL: NEGATIVE
MINUTES UNTIL NEXT BG, NBG: 60 MIN
MONOCYTES # BLD: 1.6 K/UL (ref 0.1–1.3)
MONOCYTES NFR BLD MANUAL: 5 % (ref 3–9)
MULTIPLIER, MUL: 0.04
MULTIPLIER, MUL: 0.05
MULTIPLIER, MUL: 0.06
MULTIPLIER, MUL: 0.07
MULTIPLIER, MUL: 0.08
MYELOCYTES NFR BLD MANUAL: 1 %
NEUTS SEG # BLD: 28.5 K/UL (ref 1.7–8.2)
NEUTS SEG NFR BLD MANUAL: 85 % (ref 47–75)
OPIATES UR QL: POSITIVE
ORDER INITIALS, ORDINIT: NORMAL
P-R INTERVAL, ECG05: 112 MS
PCP UR QL: NEGATIVE
PHOSPHATE SERPL-MCNC: 2 MG/DL (ref 2.5–4.5)
PLATELET # BLD AUTO: 304 K/UL (ref 150–450)
PLATELET COMMENTS,PCOM: ADEQUATE
PMV BLD AUTO: 9.6 FL (ref 10.8–14.1)
POTASSIUM SERPL-SCNC: 3.5 MMOL/L (ref 3.5–5.1)
POTASSIUM SERPL-SCNC: 4.2 MMOL/L (ref 3.5–5.1)
POTASSIUM SERPL-SCNC: 4.3 MMOL/L (ref 3.5–5.1)
POTASSIUM SERPL-SCNC: 4.4 MMOL/L (ref 3.5–5.1)
PROT SERPL-MCNC: 3.6 G/DL (ref 6.3–8.2)
Q-T INTERVAL, ECG07: 314 MS
QRS DURATION, ECG06: 80 MS
QTC CALCULATION (BEZET), ECG08: 468 MS
RBC # BLD AUTO: 2.98 M/UL (ref 4.05–5.25)
RBC MORPH BLD: ABNORMAL
SODIUM SERPL-SCNC: 136 MMOL/L (ref 136–145)
SODIUM SERPL-SCNC: 139 MMOL/L (ref 136–145)
SODIUM SERPL-SCNC: 145 MMOL/L (ref 136–145)
SODIUM SERPL-SCNC: 147 MMOL/L (ref 136–145)
TIBC SERPL-MCNC: 99 UG/DL (ref 250–450)
TRIGL SERPL-MCNC: 138 MG/DL (ref 35–150)
VENTRICULAR RATE, ECG03: 134 BPM
VIT B12 SERPL-MCNC: 1849 PG/ML (ref 193–986)
VLDLC SERPL CALC-MCNC: 27.6 MG/DL (ref 6–23)
WBC # BLD AUTO: 32.7 K/UL (ref 4.3–11.1)
WBC MORPH BLD: ABNORMAL

## 2017-05-17 PROCEDURE — B548ZZA ULTRASONOGRAPHY OF SUPERIOR VENA CAVA, GUIDANCE: ICD-10-PCS | Performed by: INTERNAL MEDICINE

## 2017-05-17 PROCEDURE — 80076 HEPATIC FUNCTION PANEL: CPT | Performed by: INTERNAL MEDICINE

## 2017-05-17 PROCEDURE — 65610000001 HC ROOM ICU GENERAL

## 2017-05-17 PROCEDURE — 82746 ASSAY OF FOLIC ACID SERUM: CPT | Performed by: INTERNAL MEDICINE

## 2017-05-17 PROCEDURE — 77030019605

## 2017-05-17 PROCEDURE — 82962 GLUCOSE BLOOD TEST: CPT

## 2017-05-17 PROCEDURE — 77030013794 HC KT TRNSDUC BLD EDWD -B

## 2017-05-17 PROCEDURE — 83540 ASSAY OF IRON: CPT | Performed by: INTERNAL MEDICINE

## 2017-05-17 PROCEDURE — 74011636637 HC RX REV CODE- 636/637: Performed by: EMERGENCY MEDICINE

## 2017-05-17 PROCEDURE — 83735 ASSAY OF MAGNESIUM: CPT | Performed by: INTERNAL MEDICINE

## 2017-05-17 PROCEDURE — 82550 ASSAY OF CK (CPK): CPT | Performed by: INTERNAL MEDICINE

## 2017-05-17 PROCEDURE — 83605 ASSAY OF LACTIC ACID: CPT | Performed by: INTERNAL MEDICINE

## 2017-05-17 PROCEDURE — 83735 ASSAY OF MAGNESIUM: CPT | Performed by: HOSPITALIST

## 2017-05-17 PROCEDURE — 82607 VITAMIN B-12: CPT | Performed by: INTERNAL MEDICINE

## 2017-05-17 PROCEDURE — 80048 BASIC METABOLIC PNL TOTAL CA: CPT | Performed by: HOSPITALIST

## 2017-05-17 PROCEDURE — 87255 GENET VIRUS ISOLATE HSV: CPT | Performed by: INTERNAL MEDICINE

## 2017-05-17 PROCEDURE — 74011250636 HC RX REV CODE- 250/636: Performed by: INTERNAL MEDICINE

## 2017-05-17 PROCEDURE — 74011636637 HC RX REV CODE- 636/637: Performed by: INTERNAL MEDICINE

## 2017-05-17 PROCEDURE — 80048 BASIC METABOLIC PNL TOTAL CA: CPT | Performed by: INTERNAL MEDICINE

## 2017-05-17 PROCEDURE — 71010 XR CHEST PORT: CPT

## 2017-05-17 PROCEDURE — 02HV33Z INSERTION OF INFUSION DEVICE INTO SUPERIOR VENA CAVA, PERCUTANEOUS APPROACH: ICD-10-PCS | Performed by: INTERNAL MEDICINE

## 2017-05-17 PROCEDURE — 74011250636 HC RX REV CODE- 250/636: Performed by: OBSTETRICS & GYNECOLOGY

## 2017-05-17 PROCEDURE — 84100 ASSAY OF PHOSPHORUS: CPT | Performed by: HOSPITALIST

## 2017-05-17 PROCEDURE — 85025 COMPLETE CBC W/AUTO DIFF WBC: CPT | Performed by: INTERNAL MEDICINE

## 2017-05-17 PROCEDURE — 74011636637 HC RX REV CODE- 636/637: Performed by: HOSPITALIST

## 2017-05-17 PROCEDURE — 80307 DRUG TEST PRSMV CHEM ANLYZR: CPT | Performed by: INTERNAL MEDICINE

## 2017-05-17 PROCEDURE — 82330 ASSAY OF CALCIUM: CPT | Performed by: INTERNAL MEDICINE

## 2017-05-17 PROCEDURE — 74011000258 HC RX REV CODE- 258: Performed by: INTERNAL MEDICINE

## 2017-05-17 PROCEDURE — 83690 ASSAY OF LIPASE: CPT | Performed by: INTERNAL MEDICINE

## 2017-05-17 PROCEDURE — 80061 LIPID PANEL: CPT | Performed by: INTERNAL MEDICINE

## 2017-05-17 PROCEDURE — 82728 ASSAY OF FERRITIN: CPT | Performed by: INTERNAL MEDICINE

## 2017-05-17 PROCEDURE — 71260 CT THORAX DX C+: CPT

## 2017-05-17 PROCEDURE — 74011000258 HC RX REV CODE- 258: Performed by: NURSE PRACTITIONER

## 2017-05-17 PROCEDURE — 74011250636 HC RX REV CODE- 250/636: Performed by: NURSE PRACTITIONER

## 2017-05-17 PROCEDURE — 74011000258 HC RX REV CODE- 258: Performed by: EMERGENCY MEDICINE

## 2017-05-17 PROCEDURE — 36592 COLLECT BLOOD FROM PICC: CPT

## 2017-05-17 PROCEDURE — 74011000250 HC RX REV CODE- 250: Performed by: INTERNAL MEDICINE

## 2017-05-17 PROCEDURE — 74011250637 HC RX REV CODE- 250/637: Performed by: INTERNAL MEDICINE

## 2017-05-17 PROCEDURE — 74011636320 HC RX REV CODE- 636/320: Performed by: INTERNAL MEDICINE

## 2017-05-17 RX ORDER — FLUCONAZOLE 2 MG/ML
200 INJECTION, SOLUTION INTRAVENOUS EVERY 24 HOURS
Status: COMPLETED | OUTPATIENT
Start: 2017-05-17 | End: 2017-05-22

## 2017-05-17 RX ORDER — DEXTROSE MONOHYDRATE AND SODIUM CHLORIDE 5; .9 G/100ML; G/100ML
150 INJECTION, SOLUTION INTRAVENOUS CONTINUOUS
Status: DISCONTINUED | OUTPATIENT
Start: 2017-05-17 | End: 2017-05-17

## 2017-05-17 RX ORDER — SODIUM CHLORIDE 0.9 % (FLUSH) 0.9 %
10 SYRINGE (ML) INJECTION
Status: COMPLETED | OUTPATIENT
Start: 2017-05-17 | End: 2017-05-17

## 2017-05-17 RX ORDER — MORPHINE SULFATE 2 MG/ML
2 INJECTION, SOLUTION INTRAMUSCULAR; INTRAVENOUS
Status: DISCONTINUED | OUTPATIENT
Start: 2017-05-17 | End: 2017-05-19 | Stop reason: DRUGHIGH

## 2017-05-17 RX ORDER — INSULIN GLARGINE 100 [IU]/ML
20 INJECTION, SOLUTION SUBCUTANEOUS 2 TIMES DAILY
Status: DISCONTINUED | OUTPATIENT
Start: 2017-05-18 | End: 2017-05-18

## 2017-05-17 RX ORDER — INSULIN GLARGINE 100 [IU]/ML
20 INJECTION, SOLUTION SUBCUTANEOUS ONCE
Status: COMPLETED | OUTPATIENT
Start: 2017-05-17 | End: 2017-05-17

## 2017-05-17 RX ORDER — SODIUM CHLORIDE 9 MG/ML
150 INJECTION, SOLUTION INTRAVENOUS CONTINUOUS
Status: DISCONTINUED | OUTPATIENT
Start: 2017-05-17 | End: 2017-05-18

## 2017-05-17 RX ORDER — INSULIN LISPRO 100 [IU]/ML
INJECTION, SOLUTION INTRAVENOUS; SUBCUTANEOUS EVERY 4 HOURS
Status: DISCONTINUED | OUTPATIENT
Start: 2017-05-17 | End: 2017-05-18

## 2017-05-17 RX ORDER — INSULIN GLARGINE 100 [IU]/ML
10 INJECTION, SOLUTION SUBCUTANEOUS DAILY
Status: DISCONTINUED | OUTPATIENT
Start: 2017-05-17 | End: 2017-05-17

## 2017-05-17 RX ADMIN — Medication 2 MG: at 10:16

## 2017-05-17 RX ADMIN — INSULIN LISPRO 15 UNITS: 100 INJECTION, SOLUTION INTRAVENOUS; SUBCUTANEOUS at 20:38

## 2017-05-17 RX ADMIN — Medication 10 ML: at 13:46

## 2017-05-17 RX ADMIN — PIPERACILLIN SODIUM,TAZOBACTAM SODIUM 3.38 G: 3; .375 INJECTION, POWDER, FOR SOLUTION INTRAVENOUS at 00:28

## 2017-05-17 RX ADMIN — ONDANSETRON 4 MG: 2 INJECTION INTRAMUSCULAR; INTRAVENOUS at 08:24

## 2017-05-17 RX ADMIN — IOPAMIDOL 100 ML: 755 INJECTION, SOLUTION INTRAVENOUS at 18:28

## 2017-05-17 RX ADMIN — PIPERACILLIN SODIUM,TAZOBACTAM SODIUM 3.38 G: 3; .375 INJECTION, POWDER, FOR SOLUTION INTRAVENOUS at 08:23

## 2017-05-17 RX ADMIN — PIPERACILLIN SODIUM,TAZOBACTAM SODIUM 3.38 G: 3; .375 INJECTION, POWDER, FOR SOLUTION INTRAVENOUS at 15:59

## 2017-05-17 RX ADMIN — ACYCLOVIR SODIUM 500 MG: 50 INJECTION, SOLUTION INTRAVENOUS at 13:45

## 2017-05-17 RX ADMIN — ONDANSETRON 4 MG: 2 INJECTION INTRAMUSCULAR; INTRAVENOUS at 01:13

## 2017-05-17 RX ADMIN — ACETAMINOPHEN 650 MG: 325 TABLET, FILM COATED ORAL at 20:49

## 2017-05-17 RX ADMIN — HEPARIN SODIUM 5000 UNITS: 5000 INJECTION, SOLUTION INTRAVENOUS; SUBCUTANEOUS at 15:50

## 2017-05-17 RX ADMIN — INSULIN GLARGINE 10 UNITS: 100 INJECTION, SOLUTION SUBCUTANEOUS at 10:38

## 2017-05-17 RX ADMIN — DOXYCYCLINE 100 MG: 100 INJECTION, POWDER, LYOPHILIZED, FOR SOLUTION INTRAVENOUS at 11:48

## 2017-05-17 RX ADMIN — Medication 10 ML: at 05:49

## 2017-05-17 RX ADMIN — Medication 10 ML: at 18:29

## 2017-05-17 RX ADMIN — INSULIN LISPRO 6 UNITS: 100 INJECTION, SOLUTION INTRAVENOUS; SUBCUTANEOUS at 16:06

## 2017-05-17 RX ADMIN — INSULIN GLARGINE 20 UNITS: 100 INJECTION, SOLUTION SUBCUTANEOUS at 21:27

## 2017-05-17 RX ADMIN — INSULIN LISPRO 2 UNITS: 100 INJECTION, SOLUTION INTRAVENOUS; SUBCUTANEOUS at 10:39

## 2017-05-17 RX ADMIN — HEPARIN SODIUM 5000 UNITS: 5000 INJECTION, SOLUTION INTRAVENOUS; SUBCUTANEOUS at 05:49

## 2017-05-17 RX ADMIN — ACYCLOVIR SODIUM 500 MG: 50 INJECTION, SOLUTION INTRAVENOUS at 22:17

## 2017-05-17 RX ADMIN — HEPARIN SODIUM 5000 UNITS: 5000 INJECTION, SOLUTION INTRAVENOUS; SUBCUTANEOUS at 22:18

## 2017-05-17 RX ADMIN — SODIUM CHLORIDE 1000 ML: 900 INJECTION, SOLUTION INTRAVENOUS at 00:43

## 2017-05-17 RX ADMIN — Medication 2 MG: at 17:57

## 2017-05-17 RX ADMIN — SODIUM CHLORIDE 150 ML/HR: 900 INJECTION, SOLUTION INTRAVENOUS at 04:34

## 2017-05-17 RX ADMIN — SODIUM CHLORIDE 150 ML/HR: 900 INJECTION, SOLUTION INTRAVENOUS at 13:20

## 2017-05-17 RX ADMIN — SODIUM CHLORIDE 100 ML: 900 INJECTION, SOLUTION INTRAVENOUS at 18:29

## 2017-05-17 RX ADMIN — CALCIUM GLUCONATE 1 G: 94 INJECTION, SOLUTION INTRAVENOUS at 18:46

## 2017-05-17 RX ADMIN — SODIUM CHLORIDE 150 ML/HR: 900 INJECTION, SOLUTION INTRAVENOUS at 19:39

## 2017-05-17 RX ADMIN — Medication 2 MG: at 13:20

## 2017-05-17 RX ADMIN — FLUCONAZOLE 200 MG: 2 INJECTION INTRAVENOUS at 15:50

## 2017-05-17 RX ADMIN — Medication 10 ML: at 22:14

## 2017-05-17 RX ADMIN — SODIUM CHLORIDE 1000 ML: 900 INJECTION, SOLUTION INTRAVENOUS at 03:00

## 2017-05-17 RX ADMIN — DOXYCYCLINE 100 MG: 100 INJECTION, POWDER, LYOPHILIZED, FOR SOLUTION INTRAVENOUS at 22:16

## 2017-05-17 RX ADMIN — DEXTROSE MONOHYDRATE AND SODIUM CHLORIDE 100 ML/HR: 5; .9 INJECTION, SOLUTION INTRAVENOUS at 05:19

## 2017-05-17 RX ADMIN — INSULIN LISPRO 10 UNITS: 100 INJECTION, SOLUTION INTRAVENOUS; SUBCUTANEOUS at 23:02

## 2017-05-17 RX ADMIN — ONDANSETRON 4 MG: 2 INJECTION INTRAMUSCULAR; INTRAVENOUS at 20:37

## 2017-05-17 NOTE — PROGRESS NOTES
Spiritual Care visit. Initial visit. Patient did not respond, she lay prone on bed.  spoke with a young man who identified himself as her . He was optimistic for her recovery. Spiritual Care Assessment/Progress Notes    Thompson Matson [id number]  [id number]    1985  29 y.o.  female    Patient Telephone Number: [phone] (home)   Restorationist Affiliation: [facility]   Language: English   Extended Emergency Contact Information  Primary Emergency Contact: Ja[address] [place] Debby Final  Mobile Phone: [phone]  Relation: None  Secondary Emergency Contact: Shani Other  Address: [address] [place] [address],[address], [place] [address] [address] [facility] Phone: [phone]  Mobile Phone: [phone]  Relation: Spouse   Patient Active Problem List    Diagnosis Date Noted    Septic shock ([address] 05/17/2017    Pelvic mass 05/17/2017    DKA (diabetic ketoacidoses) ([address] 05/16/2017    Acute alcoholic pancreatitis 17/91/8036    Hyponatremia 05/16/2017    Hyperkalemia 05/16/2017    Acute renal failure (ARF) ([address] 05/16/2017    Rhabdomyosarcoma of flank ([address] 05/16/2017    Sepsis ([facility] [address] 05/16/2017    Chlamydia infection 05/16/2017    Herpes simplex vulvovaginitis 05/16/2017        Date: 5/17/2017       Level of Restorationist/Spiritual Activity:  []         Involved in javad tradition/spiritual practice    []         Not involved in javad tradition/spiritual practice  []         Spiritually oriented    []         Claims no spiritual orientation    []         seeking spiritual identity  []         Feels alienated from Religion practice/tradition  []         Feels angry about Religion practice/tradition  [x]         Spirituality/Religion tradition IS a resource for coping at this time.   []         Not able to assess due to medical condition    Services Provided Today:  []         crisis intervention    []         reading Scriptures  [x]         spiritual assessment    [x] prayer  []         empathic listening/emotional support  []         rites and rituals (cite in comments)  []         life review     []         Oriental orthodox support  []         theological development   []         advocacy  []         ethical dialog     []         blessing  []         bereavement support    [x]         support to family  []         anticipatory grief support   []         help with AMD  []         spiritual guidance    []         meditation      Spiritual Care Needs  []         Emotional Support  []         Spiritual/Moravian Care  []         Loss/Adjustment  []         Advocacy/Referral                /Ethics  []         No needs expressed at               this time  []         Other: (note in               comments)  [address]  []         Follow up visits with               pt/family  []         Provide materials  []         Schedule sacraments  []         Contact Community               Clergy  [x]         Follow up as needed  []         Other: (note in               comments)     Comments: Spiritual Assessment     Advance Directives  Legal Next of Kin remains as decision maker. Category/Code Status Full. Family Support  Yes.   Spiritual Support  [facility]   Visit by Cherri Jara M.Ed., Th.B. ,Staff

## 2017-05-17 NOTE — PROGRESS NOTES
Spoke with Dr. Toño Pedraza via telephone. Discussed consult/patient situation and need for a pelvic exam. MD stated will exam patient this afternoon.

## 2017-05-17 NOTE — PROGRESS NOTES
Dr. Carlee Duverney at bedside. Updated on BMP, anion gap closed. Orders to give 10 units of lantus and transition off insulin gtt to Humalog SS. Fluids changed from D5NS to [email or web address].

## 2017-05-17 NOTE — PROCEDURES
The pt. Was placed supine  The left neck was prepped and drapped. 1% lidocaine was injected. With ultrasound the left ij was accessed with 18 gauge needle.   A quad lumen catheter was then placed using Seldinger technique  Good blood return

## 2017-05-17 NOTE — DIABETES MGMT
Patient admitted with DKA. Patient's blood glucose 890 on admission. HgA1c 14 (eA). Na 115, K+6.0, LA 7.4, Bicarb 9, Ph 7.22, Cr 2.51 and Ck > 4000, WBC >40k, Hgb 10.8 with elevated LFTs. Patient has a pelvic mass. Awaiting lab results for today. Patient was placed on glucostabilizer and an insulin drip. Patient's last blood glucose was 180. Patient has since transitioned to a regimen of Lantus 10 units and Humalog SSI. Patient is too ill for teaching per primary nurse. Will follow along and provide teaching as patient condition allows.

## 2017-05-17 NOTE — PROGRESS NOTES
Pharmacokinetic Consult to Pharmacist    Kimberly Bledsoe is a 29 y.o. female being treated for sepsis with vancomycin and zosyn. Also on Doxycycline for STD. Height: 5' 2\" (157.5 cm)  Weight: 89.6 kg (197 lb 8.5 oz)  Lab Results   Component Value Date/Time    BUN 56 05/16/2017 08:50 PM    Creatinine 2.08 05/16/2017 08:50 PM    WBC 45.6 05/16/2017 03:10 PM    Procalcitonin 78.7 05/16/2017 05:36 PM      Estimated Creatinine Clearance: 41.1 mL/min (based on Cr of 2.08). CULTURES:  5/16: BCx: pending   Nasal swab: negative    Day 1 of vancomycin. Goal trough is 15-20. Vancomycin dose initiated at 1.5g q 24h. Will continue to follow patient.       Thank you,  Rody Booker, PharmD  Clinical Pharmacist

## 2017-05-17 NOTE — PROGRESS NOTES
Dr. Richard Li called about critical calcium level of 5.6. Orders to draw ionized calcium, give 1g calcium gluconate. Reviewed progress notes and recommendations from physician consults today.  Creatinine now 0.79 orders received to  get stat CT of chest, abdomen, and pelvis with contrast.

## 2017-05-17 NOTE — PROGRESS NOTES
LEAP[facility] PROTOCOL NOTE    Anju Speaker  5/17/2017    The patient is currently in the critical care setting managed by Dr. Torrey Gross, Dr. Tonya Albert, Dr. Thomas Vogel with DKA and  R gluteal pain mass. The patient's chart is reviewed and the patient is discussed with the staff. Patient is currently hemodynamically stable. Patient has no needs identified for Intensivist management in the critical care setting at this time. Please notify us if can be of assistance. No charge billed to the patient. Thank you.     Jerome Sabillon MD

## 2017-05-17 NOTE — PROGRESS NOTES
Pt arrived from Er and skin assessment is as follows; pt has tatoos on upper chest, scar to left thigh, bottom is excoriated and has lesions around her perinium, herpes lesions on vaginal area and severe excoriation to labia with brown mucous discharge present.    Skin dual assessed with Isac Redd RN

## 2017-05-17 NOTE — CONSULTS
Gynecology Consult/ History and Physical    Ms. Sridhar Melgar is a 29 y.o.   OB History     No data available      Consulted by Hospitalist Dr Devika Mancilla  to see pt re HSV, Gluteal rash and pelvic mass  Pt states she came to the ER recently with right gluteal pain and was diagnosed with sciatica and discharged, in the interim she was seen by regular GYN for and exam on 5/12/17- completely normal except for non tender gluteal rash, no pelvic pain on bimanual exam, however cultures indicated + chlamydia and HSV1,2 IGG very positive with known history of HSV. Pt states that this rash was not painful and did not feel like her typical HSV outbreaks. She represented to ER with right gluteal pain, (pt denies she had abdominal pain) after pain in right gluteal region  worsened on car ride to [place]. Pt has had pilonidal cyst I and D 4 years ago  Pt states she refuses pelvic exam but has allowed me to exam buttocks  Pt states otherwise normal gyn history with regular periods  Pt states not in any pain now, is lying flat on her stomach because pressure to the gluteus illicits pain   Date Performed Result   PAP 5/12 + chlamydia   Mammogram     Colonoscopy na    Dexa na          No obstetric history on file.         GYN History             Past Medical History:  Past Medical History:   Diagnosis Date    Ill-defined condition     herpes     Past Surgical History:  Past Surgical History:   Procedure Laterality Date    HX OTHER SURGICAL      pilonidal cyst       Allergies:   No Known Allergies    Medication History:  Current Facility-Administered Medications   Medication Dose Route Frequency Provider Last Rate Last Dose    insulin glargine (LANTUS) injection 10 Units  10 Units SubCUTAneous DAILY Jenna Ramos MD   10 Units at 05/17/17 1038    insulin lispro (HUMALOG) injection   SubCUTAneous Q4H Jenna Ramos MD   2 Units at 05/17/17 1039    morphine injection 2 mg  2 mg IntraVENous Q4H PRN Jenna Ramos MD   2 mg at 05/17/17 1320    acyclovir (ZOVIRAX) 500 mg (10mg/kg) in 0.9% sodium chloride 100 mL IVPB  500 mg IntraVENous Q8H Leo Mercado NP        doxycycline (VIBRAMYCIN) 100 mg in 0.9% sodium chloride (MBP/ADV) 100 mL  100 mg IntraVENous Q12H Kun Marie  mL/hr at 05/17/17 1148 100 mg at 05/17/17 1148    0.9% sodium chloride infusion  150 mL/hr IntraVENous CONTINUOUS Imelda Gale  mL/hr at 05/17/17 1320 150 mL/hr at 05/17/17 1320    insulin regular (Pam Cable R, HUMULIN R) 100 Units in 0.9% sodium chloride 100 mL infusion  1-10 Units/hr IntraVENous TITRATE Zeny Harp MD   Stopped at 05/17/17 1045    sodium chloride (NS) flush 5-10 mL  5-10 mL IntraVENous Q8H Imelda Gale MD   10 mL at 05/17/17 0549    sodium chloride (NS) flush 5-10 mL  5-10 mL IntraVENous PRN Imelda Gale MD        dextrose 40% (GLUTOSE) oral gel 1 Tube  15 g Oral PRN Imelda Gale MD        glucagon (GLUCAGEN) injection 1 mg  1 mg IntraMUSCular PRN Imelda Gale MD        dextrose (D50W) injection syrg 12.5-25 g  25-50 mL IntraVENous PRN Imelda Gale MD        acetaminophen (TYLENOL) tablet 650 mg  650 mg Oral Q4H PRN Imelda Gale MD        ondansetron (ZOFRAN) injection 4 mg  4 mg IntraVENous Q4H PRN Imelda Gale MD   4 mg at 05/17/17 0824    bisacodyl (DULCOLAX) tablet 5 mg  5 mg Oral DAILY PRN Imelda Gale MD        heparin (porcine) injection 5,000 Units  5,000 Units SubCUTAneous Sebastian Hurst MD   5,000 Units at 05/17/17 0549    HYDROcodone-acetaminophen (NORCO) 5-325 mg per tablet 2 Tab  2 Tab Oral Q6H PRN Imelda Gale MD        piperacillin-tazobactam (ZOSYN) 3.375 g in 0.9% sodium chloride (MBP/ADV) 100 mL  3.375 g IntraVENous Sebastian Hurst MD 25 mL/hr at 05/17/17 0823 3.375 g at 05/17/17 9717       Social History:  Social History     Social History    Marital status: SINGLE     Spouse name: N/A    Number of children: N/A    Years of education: N/A     Occupational History    Not on file.      Social History Main Topics  Smoking status: Never Smoker    Smokeless tobacco: Not on file    Alcohol use Yes      Comment: occasional    Drug use: No    Sexual activity: Yes     Partners: Male     Other Topics Concern    Not on file     Social History Narrative       Family History:  History reviewed. No pertinent family history. Review of Systems - General ROS: negative except for that discussed in HPI, again- denies abdominal pain or dyspareunia, does not feel like the rash she has feels like previous HSV outbreaks      ROS:  Feeling nauseated, appears sickly. No dyspnea or chest pain on exertion. No abdominal pain, change in bowel habits, black or bloody stools. No urinary tract symptoms. No neurological complaints. Objective:     Visit Vitals    /48    Pulse (!) 128    Temp 98.6 °F (37 °C)    Resp (!) 36    Ht 5' 2\" (1.575 m)    Wt 197 lb 8.5 oz (89.6 kg)    LMP 04/12/2017    SpO2 100%    BMI 36.13 kg/m2     Urine:No results found for this or any previous visit. The patient appears sickly,  Somnolent, oriented x 3, in mild distress    Abdomen: Only palpation of pt from side as she refuses to roll over from the prone position- denies any pain. Extremities show no edema, normal peripheral pulses. Neurological is normal, no focal findings. BREAST EXAM: patient declines to have breast exam    PELVIC EXAM: External genitalia is examined in prone position, erythema, induration and confluence of sloughing skin from gluteal fold to introitus, no distinct ulcers, area is tender but not exquisitely tender as in the case of acute HSV, Cloudy gray discharge weeping from wound? Pt refuses to open legs to examine, ?  Severe tinea crura atypical vs HSV, 4 cm pressure type ulcer- non tender on right mid buttocks- does not appear infected  CT SCAN- reviewed CT scan with radiologist, mass appears to not be continguous with tube ovary or uterus and appears to be pushing these structures anteriorly originating from gluteus muscle, content of mass consistent with blood- ? Hematoma of gluteus muscle? Content does not suggest infectious content ie does not look like pus. H    Assessment/Plan:   1. History of HSV- gluteal rash with sloughing skin no definitive herpes ulcers and pain associated with rash is not consistent with recurrent HSV however with recent + HSV would continue to treat per ID, more consistent with severe yeast of gluteal fold- will add 200 mg diflucan IV until rash subsides  2 chlamydia- treated appropriately with azithromycin  3.  Pelvic mass- mass appears to be blood filled not consistent with pus- pt also not complaining of pelvic pain- all of this suggest not a TOA or PID- would agree with radiology that MRI would help in diagnosis as mass does not appear infectious or malignant  Will await MRI results and if non gyn in origin will sign off    Juan Foster, DO

## 2017-05-17 NOTE — PROGRESS NOTES
Hospitalist Progress Note    2017  Admit Date: 2017  3:14 PM   NAME: Zina Mendoza   :  1985   MRN:  [id number]   Attending: Dinora Roman MD  PCP:  Diamond Natarajan MD    SUBJECTIVE:   34yo F with no significant PMH presented with worsening Rt Flank pain, nausea, multiple episodes of vomiting and weakness. She was seen here in ER 1 week ago with back pain and diagnosed with Sciatica and given NSAIDs which she took without much relief. Yesterday her pain worsened and she took extra pain meds with about 4 glasses of Wine per . She then started having vomiting and abd discomfort that continued today and she came to ER. Found to be in DKA in ER with Na 115, K 6.0, LA 7.4, Bicarb 9, Ph 7.22, Cr 2.51 and Ck > 4000, WBC >40k, Hgb 10.8 with elevated LFTs. CT abdomen showed Pelvic mass concerning for malignancy. : DKA resolved, AG closed, Has had pain in her rt lower back, Vomited X 3. Labs improving. Persistently sinus tach above 120. Nursing notes and chart reviewed. Review of Systems negative with exception of pertinent positives noted above. PHYSICAL EXAM     Visit Vitals    /56    Pulse (!) 135    Temp 98.6 °F (37 °C)    Resp 17    Ht 5' 2\" (1.575 m)    Wt 89.6 kg (197 lb 8.5 oz)    LMP 2017    SpO2 100%    BMI 36.13 kg/m2      Temp (24hrs), Av.3 °F (36.8 °C), Min:98 °F (36.7 °C), Max:98.8 °F (37.1 °C)    Oxygen Therapy  O2 Sat (%): 100 % (17 0816)  Pulse via Oximetry: 134 beats per minute (17 0816)  O2 Device: Room air (175)    Intake/Output Summary (Last 24 hours) at 17 1011  Last data filed at 17 0716   Gross per 24 hour   Intake          2098.12 ml   Output             1200 ml   Net           898.12 ml       General: Mild distress. Alert.    Lungs:  CTABL. Heart:  RRR, no murmur, rub, or gallop, tachycardia  Abdomen: Soft, non-distended, non-tender, +bs  Extremities: No cyanosis or clubbing.   Neurologic:  No focal deficits. Moves all extremities. Musculoskeletal: Tender Rt lower back  :   Large area of excoriations around her vagina and perineum. LABS AND STUDIES:  Personally reviewed all labs, meds, and studies for past 24hrs. 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 · Uncontrolled DM: A1C is 14, will get diabetes educator for new diagnosis, Will need insulin upon discharge. · Pelvic Mass: d/w IR, will get CT with contrast as it would help in getting the mass biopsied as well. Start IV Morphine  · NALDO: Improving, will c/w IVF as she will get contrast.   · Sepsis: Could be 2/2 PID, she has +ve serology for STDs. Blood and urine Cx pending. C/w VAnc and zosyn for now, on Doxy for Chlamydia, ID consulted. LA trending down, check procal in am.  · Vaginal Lesions with Suspected PID: GYN consulted for pelvic exam.   · Rhabdomyolysis: Improving, on IVF  · Acute Pancreatitis: Improving lipase down trending, still vomiting, keep NPO, trend LFTs. · Anemia: Likely chronic, Send Iron panel, B12/Folate. · Hyponatremia and Hyperkalemia: Resolved. Dispo: Monitor in ICU. Has central line and may need pressors. High risk given DKA, Sepsis, Pelvic Mass, NALDO and Rhabdo. DVT ppx:  hsq  Discussed plan with pt and  who is in agreement. All questions answered. Critical care time spent was 40 mins.      Signed By: Carley Zazueta MD     May 17, 2017

## 2017-05-17 NOTE — CONSULTS
Infectious Disease Consult    Today's Date: 5/17/2017   Admit Date: 5/16/2017    Impression:   · Large pelvic mass, will need bx. · Chlamydia, possible PID  · Genital HSV infection  · DKA (A1C 14)  · Pancreatitis  · Rhabdomyolysis   · NALDO    Plan:   ·  Stop Vancomycin. Continue Zosyn/Doxycycline and start IV acyclovir  · GYN consulted to perform pelvic exam  · Hopefully, CT A/P W contrast to better evaluate pelvic mass and further bx. Unsure if this is abscess vs malignancy     Anti-infectives:     Inpat Anti-Infectives     Start     Ordered Stop    05/17/17 2000  vancomycin (VANCOCIN) 1500 mg in  ml infusion  1,500 mg,   IntraVENous,   EVERY 24 HOURS      05/16/17 2251 --    05/17/17 0000  piperacillin-tazobactam (ZOSYN) 3.375 g in 0.9% sodium chloride (MBP/ADV) 100 mL  3.375 g,   IntraVENous,   EVERY 8 HOURS      05/16/17 1958 --    05/16/17 2100  doxycycline (VIBRAMYCIN) capsule 100 mg  100 mg,   Oral,   EVERY 12 HOURS      05/16/17 1906 --        Zithromax 2g po x 1 dose  Subjective:   Date of Consultation:  May 17, 2017  Referring Physician: Tasha Flynn    Patient is a 29 y.o. female admitted on 5/16/17 for DKA, R flank pain, nausea/vomiting, and weakness. In ED, found to have lactic acidosis, NALDO along with rhabdomyolysis, leukocytosis, and pancreatitis. Patient recently traveled from [place] with her  via car. Back pain started during ride. She presented to GYN office on 5/12/17. Chlamydia +, Hepatitis/HIV NR, + HSV 1 and 2. GYN notes external vagina/perinuem is excoriated, yeast vs resolving HSV lesions. Zithromax 2g oral x 1 dose given. -Once admitted, insulin gtt started and she was started on Vanc/Zosyn/Doxcycline. Ct A/P wo Contrast showing large pelvic mass with apparent extension into R gluteal muscles. Vaginal lesion + HSV 1, wet prep negative. She remains afebrile, leukocytosis trending downwards. Ck, lipase, and creatinine have all improved in last 12 hours with hydration. BCx2 NGTD.   in room, patient remains lethargic and continues to sleep during consult. HPI derived from . Reports R buttock/perineum pain for ~8 days so uncomfortable she was \"drinking wine during day and taking pain medications. \" Denies fever, chills, sweats, diarrhea, abdominal pain, SOB. 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 No pertinent family history. Social History   Substance Use Topics    Smoking status: Never Smoker    Smokeless tobacco: Not on file    Alcohol use Yes      Comment: occasional     Past Surgical History:   Procedure Laterality Date    HX OTHER SURGICAL      pilonidal cyst      Prior to Admission medications    Medication Sig Start Date End Date Taking? Authorizing Provider   acyclovir (ZOVIRAX) 200 mg capsule Take 4 Caps by mouth two (2) times a day. 16   Eduardo Dixon MD       No Known Allergies     Review of Systems:  A comprehensive review of systems was negative except for that written in the History of Present Illness. Objective:     Visit Vitals    /56    Pulse (!) 135    Temp 98.6 °F (37 °C)    Resp 17    Ht 5' 2\" (1.575 m)    Wt 89.6 kg (197 lb 8.5 oz)    SpO2 100%    BMI 36.13 kg/m2     Temp (24hrs), Av.3 °F (36.8 °C), Min:98 °F (36.7 °C), Max:98.8 °F (37.1 °C)       Lines:  Central Venous Catheter:            Physical Exam:    General:  lethargic, well noursished, well developed, appears stated age   Eyes:  Sclera anicteric. Pupils equally round and reactive to light.    Mouth/Throat: Mucous membranes normal, oral pharynx clear   Neck: Supple   Lungs:   Clear to auscultation bilaterally, good effort   CV:  Regular rate and rhythm,no murmur, click, rub or gallop   Abdomen:   Soft, non-tender. bowel sounds normal. non-distended   Extremities: No cyanosis or edema   Skin: Skin color, texture, turgor normal. no acute rash or lesions   Lymph nodes: Cervical and supraclavicular normal   Musculoskeletal: No swelling or deformity   Lines/Devices:  Intact, no erythema, drainage or tenderness   :   Excoriation to tamiko gluteal folds and around anus.  Unable to visualize vaginal/inguinal areas       Data Review:     CBC:  Recent Labs      05/17/17   0330  05/16/17   1510   WBC  32.7*  45.6*   GRANS  85*  84*   MONOS  5  4   ANEU  28.5*   --    ABL  2.6   --    HGB  8.2*  10.8*   HCT  22.7*  43.2   PLT  304  473*       BMP:  Recent Labs      05/17/17   0330  05/17/17   0145  05/16/17   2300  05/16/17   2050   CREA  1.59*  1.65*   --   2.08*   BUN  58*  55*   --   56*   NA  139  136   --   127*   K  4.2  4.3   --   5.2*   CL  108*  105   --   94*   CO2  16*  18*   --   11*   AGAP  15  13   --   22*   GLU  291*  424*  765*  890*       LFTS:  Recent Labs      05/16/17   1625   TBILI  0.6   ALT  49   SGOT  178*   AP  224*   TP  7.4   ALB  1.3*       Microbiology:     All Micro Results     Procedure Component Value Units Date/Time    MRSA SCREEN - PCR (NASAL) [[id number]] Collected:  05/16/17 2025    Order Status:  Completed Specimen:  Nasal from Nasal Swab Updated:  05/16/17 2157     Special Requests: NO SPECIAL REQUESTS        Culture result:         MRSA target DNA is not detected (presumptive not colonized with MRSA)    CULTURE, BLOOD [[id number]] Collected:  05/16/17 1736    Order Status:  Completed Specimen:  Blood from Blood Updated:  05/16/17 1806    CULTURE, BLOOD [[id number]] Collected:  05/16/17 1730    Order Status:  Completed Specimen:  Blood from Blood Updated:  05/16/17 1806          Imaging:     CT ABD PELV WO CONT (Final result) Result time: 05/16/17 20:07:05     Final result     Impression:     IMPRESSION:  Large incompletely characterized pelvic mass with apparent  involvement/extension into the right gluteal muscles. The findings presumably  represent a neoplastic process. Pelvic MRI may be beneficial for further  delineation as it relates to other pelvic anatomy.  Preferably this would be done  with IV contrast.         Signed By: Heaven Hopkins NP     May 17, 2017

## 2017-05-17 NOTE — PROGRESS NOTES
Spoke with Dr. Lucie Murillo hospitalist regarding blood sugar and that it had reached 228 and clarified orders for dextrose 5 and ns infusion per protocol. Also updated that patient received a central line by Dr. Brittany Amos for iv access and the results of the ct abdomen and pelvis.   No other orders given

## 2017-05-17 NOTE — PROGRESS NOTES
CVP attached to distal port for monitoring. CVP measured at 2 initially but is now reading 7.   Will continue to monitor

## 2017-05-17 NOTE — PROGRESS NOTES
Pt bp has dropped and mean has been in the upper 50's. Per Dr. Helen Valencia we were able to give ivf boluses as needed to increase bp.

## 2017-05-17 NOTE — H&P
Date of Surgery Update:  Radha Hardy was seen and examined. History and physical has been reviewed. The patient has been examined.  There have been no significant clinical changes since the completion of the originally dated History and Physical.    Signed By: Alfredo Garrido MD     May 17, 2017 12:20 AM

## 2017-05-17 NOTE — CONSULTS
CONSULT NOTE    Sheffield Lakesvitlana Arroyo    5/17/2017    Date of Admission:  5/16/2017    The patient's chart is reviewed and the patient is discussed with the staff. Subjective:     Patient is a 29 y.o.  female seen and evaluated as [employer] consult . She presented with worsening Rt Flank pain, nausea, multiple episodes of vomiting and weakness. She was seen here in ER 1 week ago with back pain and diagnosed with Sciatica and given NSAIDs which she took without much relief. On 5/15  her pain worsened and she took extra pain meds with about 4 glasses of Wine per . She then started having vomiting and abd discomfort and was brought to the er where she was. Found to be in DKA in ER with Na 115, K 6.0, LA 7.4, Bicarb 9, Ph 7.22, Cr 2.51 and Ck > 4000, WBC >40k, Hgb 10.8 with elevated LFTs  She was admitted to icu and has been on insulin drip with glucose stablelizer. She has only 1 iv and nurses are unable to start another or draw blood. She currently moans in response to questions  Review of Systems  Review of systems not obtained due to patient factors. 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 Taking?   acyclovir (ZOVIRAX) 200 mg capsule   No No   Sig: Take 4 Caps by mouth two (2) times a day.       Facility-Administered Medications: None       Past Medical History:   Diagnosis Date    Ill-defined condition     herpes     Past Surgical History:   Procedure Laterality Date    HX OTHER SURGICAL      pilonidal cyst     Social History Social History    Marital status: SINGLE     Spouse name: N/A    Number of children: N/A    Years of education: N/A     Occupational History    Not on file. Social History Main Topics    Smoking status: Never Smoker    Smokeless tobacco: Not on file    Alcohol use Yes      Comment: occasional    Drug use: No    Sexual activity: Yes     Partners: Male     Other Topics Concern    Not on file     Social History Narrative     History reviewed. No pertinent family history.   No Known Allergies    Current Facility-Administered Medications   Medication Dose Route Frequency    insulin regular (NOVOLIN R, HUMULIN R) 100 Units in 0.9% sodium chloride 100 mL infusion  1-10 Units/hr IntraVENous TITRATE    sodium chloride (NS) flush 5-10 mL  5-10 mL IntraVENous Q8H    sodium chloride (NS) flush 5-10 mL  5-10 mL IntraVENous PRN    dextrose 40% (GLUTOSE) oral gel 1 Tube  15 g Oral PRN    glucagon (GLUCAGEN) injection 1 mg  1 mg IntraMUSCular PRN    dextrose (D50W) injection syrg 12.5-25 g  25-50 mL IntraVENous PRN    acetaminophen (TYLENOL) tablet 650 mg  650 mg Oral Q4H PRN    ondansetron (ZOFRAN) injection 4 mg  4 mg IntraVENous Q4H PRN    bisacodyl (DULCOLAX) tablet 5 mg  5 mg Oral DAILY PRN    heparin (porcine) injection 5,000 Units  5,000 Units SubCUTAneous Q8H    doxycycline (VIBRAMYCIN) capsule 100 mg  100 mg Oral Q12H    HYDROcodone-acetaminophen (NORCO) 5-325 mg per tablet 2 Tab  2 Tab Oral Q6H PRN    0.9% sodium chloride infusion  150 mL/hr IntraVENous CONTINUOUS    piperacillin-tazobactam (ZOSYN) 3.375 g in 0.9% sodium chloride (MBP/ADV) 100 mL  3.375 g IntraVENous Q8H    vancomycin (VANCOCIN) 1500 mg in  ml infusion  1,500 mg IntraVENous Q24H         Objective:     Vitals:    05/16/17 2045 05/16/17 2100 05/16/17 2200 05/16/17 2220   BP:       Pulse:  (!) 121 (!) 129 (!) 127   Resp:  (!) 31 (!) 34 (!) 38   Temp: 98 °F (36.7 °C)      SpO2:  100% 98% 99%   Weight: 197 lb 8.5 oz (89.6 kg)      Height:           PHYSICAL EXAM     Constitutional:  the patient is well developed and in no acute distress  EENMT:  Sclera clear, pupils equal, oral mucosa moist  Respiratory:  clear  Cardiovascular:  RRR without M,G,R  Gastrointestinal: soft and non-tender; with positive bowel sounds. Musculoskeletal: warm without cyanosis. There is no lower leg edema.   Skin:  no jaundice or rashes, no wounds   Neurologic: no gross neuro deficits     Psychiatric:  Lethargic , moans to questions    Chest X-ray:        Recent Labs      05/16/17   1510   WBC  45.6*   HGB  10.8*   HCT  43.2   PLT  473*     Recent Labs      05/16/17   2300 05/16/17 2050 05/16/17   1736  05/16/17   1625   NA   --   127*   --   115*   K   --   5.2*   --   6.0*   CL   --   94*   --   79*   GLU  765*  890*   --   >500*   CO2   --   11*   --   9*   BUN   --   56*   --   54*   CREA   --   2.08*   --   2.51*   MG   --   2.5*   --   3.0*   PHOS   --   5.5*  6.9*   --    CA   --   7.3*   --   8.6   ALB   --    --    --   1.3*   TBILI   --    --    --   0.6   ALT   --    --    --   49   SGOT   --    --    --   178*     Recent Labs      05/16/17   1740   PH  7.22*   PCO2  22*   PO2  103*   HCO3  9*     Recent Labs      05/16/17 2300 05/16/17   2050   LAC  4.9*  7.1*       Assessment:  (Medical Decision Making)     Hospital Problems  Date Reviewed: 5/7/2017          Codes Class Noted POA    Septic shock (HCC) ICD-10-CM: A41.9, R65.21  ICD-9-CM: 038.9, 785.52, 995.92  5/17/2017 Unknown        * (Principal)DKA (diabetic ketoacidoses) (HCC) ICD-10-CM: E13.10  ICD-9-CM: 250.10  5/16/2017 Yes        Acute alcoholic pancreatitis Y-26-CT: K85.20  ICD-9-CM: 577.0  5/16/2017 Yes        Hyponatremia ICD-10-CM: E87.1  ICD-9-CM: 276.1  5/16/2017 Yes    Overview Signed 5/16/2017  7:00 PM by Zafar Gross MD     Pseudohyponatremia             Hyperkalemia ICD-10-CM: E87.5  ICD-9-CM: 276.7  5/16/2017 Yes        Acute renal failure (ARF) (HCC) ICD-10-CM: N17.9  ICD-9-CM: 584.9  5/16/2017 Yes        Rhabdomyosarcoma of flank ([facility] ICD-10-CM: C49.6  ICD-9-CM: 171.7  5/16/2017 Yes        Sepsis ([facility] ICD-10-CM: A41.9  ICD-9-CM: 038.9, 995.91  5/16/2017 Yes        Chlamydia infection ICD-10-CM: A74.9  ICD-9-CM: 079.98  5/16/2017 Yes        Herpes simplex vulvovaginitis ICD-10-CM: A60.04  ICD-9-CM: 054.11  5/16/2017 Yes              Plan:  (Medical Decision Making)   1   Place central line  2   Iv antibx-zosyn and vanc  3   Iv fluids  4   May need pressors  --    More than 50% of the time documented was spent in face-to-face contact with the patient and in the care of the patient on the floor/unit where the patient is located. Thank you very much for this referral.  We appreciate the opportunity to participate in this patient's care. Will follow along with above stated plan.     Mayelin Grey MD

## 2017-05-18 ENCOUNTER — APPOINTMENT (OUTPATIENT)
Dept: CT IMAGING | Age: 32
DRG: 853 | End: 2017-05-18
Attending: INTERNAL MEDICINE
Payer: COMMERCIAL

## 2017-05-18 LAB
ADMINISTERED INITIALS, ADMINIT: NORMAL
ALBUMIN SERPL BCP-MCNC: 0.9 G/DL (ref 3.5–5)
ALBUMIN/GLOB SERPL: 0.2 {RATIO} (ref 1.2–3.5)
ALP SERPL-CCNC: 108 U/L (ref 50–136)
ALT SERPL-CCNC: 35 U/L (ref 12–65)
ANION GAP BLD CALC-SCNC: 13 MMOL/L (ref 7–16)
APTT PPP: 31.9 SEC (ref 23.5–31.7)
AST SERPL W P-5'-P-CCNC: 63 U/L (ref 15–37)
BILIRUB SERPL-MCNC: 0.3 MG/DL (ref 0.2–1.1)
BUN SERPL-MCNC: 35 MG/DL (ref 6–23)
CALCIUM SERPL-MCNC: 7.5 MG/DL (ref 8.3–10.4)
CHLORIDE SERPL-SCNC: 119 MMOL/L (ref 98–107)
CK SERPL-CCNC: 530 U/L (ref 21–215)
CO2 SERPL-SCNC: 16 MMOL/L (ref 21–32)
CREAT SERPL-MCNC: 0.84 MG/DL (ref 0.6–1)
D50 ADMINISTERED, D50ADM: 0 ML
D50 ORDER, D50ORD: 0 ML
DIFFERENTIAL METHOD BLD: ABNORMAL
EOSINOPHIL # BLD: 0.4 K/UL (ref 0–0.8)
EOSINOPHIL NFR BLD MANUAL: 1 % (ref 1–8)
ERYTHROCYTE [DISTWIDTH] IN BLOOD BY AUTOMATED COUNT: 14.1 % (ref 11.9–14.6)
GLOBULIN SER CALC-MCNC: 4.6 G/DL (ref 2.3–3.5)
GLSCOM COMMENTS: NORMAL
GLUCOSE BLD STRIP.AUTO-MCNC: 203 MG/DL (ref 65–100)
GLUCOSE BLD STRIP.AUTO-MCNC: 205 MG/DL (ref 65–100)
GLUCOSE BLD STRIP.AUTO-MCNC: 220 MG/DL (ref 65–100)
GLUCOSE BLD STRIP.AUTO-MCNC: 237 MG/DL (ref 65–100)
GLUCOSE BLD STRIP.AUTO-MCNC: 254 MG/DL (ref 65–100)
GLUCOSE BLD STRIP.AUTO-MCNC: 270 MG/DL (ref 65–100)
GLUCOSE BLD STRIP.AUTO-MCNC: 283 MG/DL (ref 65–100)
GLUCOSE BLD STRIP.AUTO-MCNC: 286 MG/DL (ref 65–100)
GLUCOSE BLD STRIP.AUTO-MCNC: 288 MG/DL (ref 65–100)
GLUCOSE BLD STRIP.AUTO-MCNC: 294 MG/DL (ref 65–100)
GLUCOSE BLD STRIP.AUTO-MCNC: 316 MG/DL (ref 65–100)
GLUCOSE BLD STRIP.AUTO-MCNC: 328 MG/DL (ref 65–100)
GLUCOSE BLD STRIP.AUTO-MCNC: 339 MG/DL (ref 65–100)
GLUCOSE SERPL-MCNC: 216 MG/DL (ref 65–100)
GLUCOSE, GLC: 203 MG/DL
GLUCOSE, GLC: 237 MG/DL
GLUCOSE, GLC: 254 MG/DL
GLUCOSE, GLC: 283 MG/DL
GLUCOSE, GLC: 288 MG/DL
GLUCOSE, GLC: 316 MG/DL
HCT VFR BLD AUTO: 17.5 % (ref 35.8–46.3)
HCT VFR BLD AUTO: 17.6 % (ref 35.8–46.3)
HCT VFR BLD AUTO: 18 % (ref 35.8–46.3)
HCT VFR BLD AUTO: 24.1 % (ref 35.8–46.3)
HGB BLD-MCNC: 6.1 G/DL (ref 11.7–15.4)
HGB BLD-MCNC: 6.3 G/DL (ref 11.7–15.4)
HGB BLD-MCNC: 6.5 G/DL (ref 11.7–15.4)
HGB BLD-MCNC: 8.5 G/DL (ref 11.7–15.4)
HIGH TARGET, HITG: 180 MG/DL
INR PPP: 1.2 (ref 0.9–1.2)
INSULIN ADMINSTERED, INSADM: 10 UNITS/HOUR
INSULIN ADMINSTERED, INSADM: 10.6 UNITS/HOUR
INSULIN ADMINSTERED, INSADM: 5.1 UNITS/HOUR
INSULIN ADMINSTERED, INSADM: 6.8 UNITS/HOUR
INSULIN ADMINSTERED, INSADM: 8.9 UNITS/HOUR
INSULIN ADMINSTERED, INSADM: 9.7 UNITS/HOUR
INSULIN ORDER, INSORD: 10 UNITS/HOUR
INSULIN ORDER, INSORD: 10.6 UNITS/HOUR
INSULIN ORDER, INSORD: 5.1 UNITS/HOUR
INSULIN ORDER, INSORD: 6.8 UNITS/HOUR
INSULIN ORDER, INSORD: 8.9 UNITS/HOUR
INSULIN ORDER, INSORD: 9.7 UNITS/HOUR
LIPASE SERPL-CCNC: 285 U/L (ref 73–393)
LOW TARGET, LOT: 140 MG/DL
LYMPHOCYTES # BLD: 5.2 K/UL (ref 0.5–4.6)
LYMPHOCYTES NFR BLD MANUAL: 14 % (ref 16–44)
MAGNESIUM SERPL-MCNC: 2.3 MG/DL (ref 1.8–2.4)
MCH RBC QN AUTO: 27.7 PG (ref 26.1–32.9)
MCHC RBC AUTO-ENTMCNC: 36.1 G/DL (ref 31.4–35)
MCV RBC AUTO: 76.6 FL (ref 79.6–97.8)
MINUTES UNTIL NEXT BG, NBG: 60 MIN
MONOCYTES # BLD: 2.2 K/UL (ref 0.1–1.3)
MONOCYTES NFR BLD MANUAL: 6 % (ref 3–9)
MULTIPLIER, MUL: 0.02
MULTIPLIER, MUL: 0.03
MULTIPLIER, MUL: 0.04
MULTIPLIER, MUL: 0.05
MULTIPLIER, MUL: 0.06
MULTIPLIER, MUL: 0.07
MYELOCYTES NFR BLD MANUAL: 1 %
NEUTS SEG # BLD: 29.2 K/UL (ref 1.7–8.2)
NEUTS SEG NFR BLD MANUAL: 77 % (ref 47–75)
ORDER INITIALS, ORDINIT: NORMAL
PLATELET # BLD AUTO: 253 K/UL (ref 150–450)
PLATELET COMMENTS,PCOM: ADEQUATE
PMV BLD AUTO: 9.7 FL (ref 10.8–14.1)
POTASSIUM SERPL-SCNC: 3.7 MMOL/L (ref 3.5–5.1)
PROCALCITONIN SERPL-MCNC: 27.2 NG/ML
PROMYELOCYTES NFR BLD MANUAL: 1 %
PROT SERPL-MCNC: 5.5 G/DL (ref 6.3–8.2)
PROTHROMBIN TIME: 13.2 SEC (ref 9.6–12)
RBC # BLD AUTO: 2.35 M/UL (ref 4.05–5.25)
RBC MORPH BLD: ABNORMAL
SODIUM SERPL-SCNC: 148 MMOL/L (ref 136–145)
WBC # BLD AUTO: 37 K/UL (ref 4.3–11.1)
WBC MORPH BLD: ABNORMAL

## 2017-05-18 PROCEDURE — 86923 COMPATIBILITY TEST ELECTRIC: CPT | Performed by: INTERNAL MEDICINE

## 2017-05-18 PROCEDURE — 36592 COLLECT BLOOD FROM PICC: CPT

## 2017-05-18 PROCEDURE — P9016 RBC LEUKOCYTES REDUCED: HCPCS | Performed by: INTERNAL MEDICINE

## 2017-05-18 PROCEDURE — 87077 CULTURE AEROBIC IDENTIFY: CPT | Performed by: INTERNAL MEDICINE

## 2017-05-18 PROCEDURE — 65610000001 HC ROOM ICU GENERAL

## 2017-05-18 PROCEDURE — 85018 HEMOGLOBIN: CPT | Performed by: INTERNAL MEDICINE

## 2017-05-18 PROCEDURE — 74011250636 HC RX REV CODE- 250/636: Performed by: NURSE PRACTITIONER

## 2017-05-18 PROCEDURE — 80053 COMPREHEN METABOLIC PANEL: CPT | Performed by: INTERNAL MEDICINE

## 2017-05-18 PROCEDURE — 74011000250 HC RX REV CODE- 250: Performed by: INTERNAL MEDICINE

## 2017-05-18 PROCEDURE — 74011000258 HC RX REV CODE- 258: Performed by: INTERNAL MEDICINE

## 2017-05-18 PROCEDURE — 84145 PROCALCITONIN (PCT): CPT | Performed by: INTERNAL MEDICINE

## 2017-05-18 PROCEDURE — 74011000258 HC RX REV CODE- 258: Performed by: HOSPITALIST

## 2017-05-18 PROCEDURE — 74011250636 HC RX REV CODE- 250/636: Performed by: INTERNAL MEDICINE

## 2017-05-18 PROCEDURE — 74011000250 HC RX REV CODE- 250: Performed by: HOSPITALIST

## 2017-05-18 PROCEDURE — 74011250636 HC RX REV CODE- 250/636

## 2017-05-18 PROCEDURE — 0W9J0ZZ DRAINAGE OF PELVIC CAVITY, OPEN APPROACH: ICD-10-PCS | Performed by: RADIOLOGY

## 2017-05-18 PROCEDURE — 83690 ASSAY OF LIPASE: CPT | Performed by: INTERNAL MEDICINE

## 2017-05-18 PROCEDURE — 74011250636 HC RX REV CODE- 250/636: Performed by: OBSTETRICS & GYNECOLOGY

## 2017-05-18 PROCEDURE — 85730 THROMBOPLASTIN TIME PARTIAL: CPT | Performed by: RADIOLOGY

## 2017-05-18 PROCEDURE — 74011250636 HC RX REV CODE- 250/636: Performed by: HOSPITALIST

## 2017-05-18 PROCEDURE — 85025 COMPLETE CBC W/AUTO DIFF WBC: CPT | Performed by: INTERNAL MEDICINE

## 2017-05-18 PROCEDURE — 86900 BLOOD TYPING SEROLOGIC ABO: CPT | Performed by: INTERNAL MEDICINE

## 2017-05-18 PROCEDURE — 87102 FUNGUS ISOLATION CULTURE: CPT | Performed by: INTERNAL MEDICINE

## 2017-05-18 PROCEDURE — 82550 ASSAY OF CK (CPK): CPT | Performed by: INTERNAL MEDICINE

## 2017-05-18 PROCEDURE — 99152 MOD SED SAME PHYS/QHP 5/>YRS: CPT

## 2017-05-18 PROCEDURE — 86592 SYPHILIS TEST NON-TREP QUAL: CPT | Performed by: NURSE PRACTITIONER

## 2017-05-18 PROCEDURE — 74011636637 HC RX REV CODE- 636/637: Performed by: EMERGENCY MEDICINE

## 2017-05-18 PROCEDURE — 83605 ASSAY OF LACTIC ACID: CPT | Performed by: HOSPITALIST

## 2017-05-18 PROCEDURE — 83735 ASSAY OF MAGNESIUM: CPT | Performed by: INTERNAL MEDICINE

## 2017-05-18 PROCEDURE — 74011000258 HC RX REV CODE- 258: Performed by: NURSE PRACTITIONER

## 2017-05-18 PROCEDURE — 74011250636 HC RX REV CODE- 250/636: Performed by: RADIOLOGY

## 2017-05-18 PROCEDURE — 36430 TRANSFUSION BLD/BLD COMPNT: CPT

## 2017-05-18 PROCEDURE — 82962 GLUCOSE BLOOD TEST: CPT

## 2017-05-18 PROCEDURE — 74011000250 HC RX REV CODE- 250: Performed by: RADIOLOGY

## 2017-05-18 PROCEDURE — 85610 PROTHROMBIN TIME: CPT | Performed by: RADIOLOGY

## 2017-05-18 PROCEDURE — 74011250637 HC RX REV CODE- 250/637: Performed by: INTERNAL MEDICINE

## 2017-05-18 PROCEDURE — 74011000258 HC RX REV CODE- 258: Performed by: EMERGENCY MEDICINE

## 2017-05-18 PROCEDURE — 77030013131 HC IV BLD ST ICUM -A

## 2017-05-18 PROCEDURE — 87205 SMEAR GRAM STAIN: CPT | Performed by: INTERNAL MEDICINE

## 2017-05-18 PROCEDURE — 74011636637 HC RX REV CODE- 636/637: Performed by: INTERNAL MEDICINE

## 2017-05-18 PROCEDURE — C1729 CATH, DRAINAGE: HCPCS

## 2017-05-18 RX ORDER — LIDOCAINE HYDROCHLORIDE 20 MG/ML
40-120 INJECTION, SOLUTION INFILTRATION; PERINEURAL ONCE
Status: COMPLETED | OUTPATIENT
Start: 2017-05-18 | End: 2017-05-18

## 2017-05-18 RX ORDER — SODIUM CHLORIDE 9 MG/ML
250 INJECTION, SOLUTION INTRAVENOUS AS NEEDED
Status: DISCONTINUED | OUTPATIENT
Start: 2017-05-18 | End: 2017-06-01

## 2017-05-18 RX ORDER — MIDAZOLAM HYDROCHLORIDE 1 MG/ML
.25-2 INJECTION, SOLUTION INTRAMUSCULAR; INTRAVENOUS
Status: DISCONTINUED | OUTPATIENT
Start: 2017-05-18 | End: 2017-05-19 | Stop reason: HOSPADM

## 2017-05-18 RX ORDER — INSULIN LISPRO 100 [IU]/ML
INJECTION, SOLUTION INTRAVENOUS; SUBCUTANEOUS
Status: DISCONTINUED | OUTPATIENT
Start: 2017-05-18 | End: 2017-05-18

## 2017-05-18 RX ORDER — DEXTROSE MONOHYDRATE AND SODIUM CHLORIDE 5; .9 G/100ML; G/100ML
150 INJECTION, SOLUTION INTRAVENOUS CONTINUOUS
Status: DISCONTINUED | OUTPATIENT
Start: 2017-05-18 | End: 2017-05-18

## 2017-05-18 RX ORDER — FENTANYL CITRATE 50 UG/ML
12.5-1 INJECTION, SOLUTION INTRAMUSCULAR; INTRAVENOUS
Status: DISCONTINUED | OUTPATIENT
Start: 2017-05-18 | End: 2017-05-19 | Stop reason: HOSPADM

## 2017-05-18 RX ORDER — SODIUM CHLORIDE 450 MG/100ML
100 INJECTION, SOLUTION INTRAVENOUS CONTINUOUS
Status: DISCONTINUED | OUTPATIENT
Start: 2017-05-18 | End: 2017-05-24

## 2017-05-18 RX ORDER — DEXTROSE, SODIUM CHLORIDE, AND POTASSIUM CHLORIDE 5; .45; .3 G/100ML; G/100ML; G/100ML
INJECTION INTRAVENOUS CONTINUOUS
Status: DISCONTINUED | OUTPATIENT
Start: 2017-05-18 | End: 2017-05-18

## 2017-05-18 RX ORDER — INSULIN LISPRO 100 [IU]/ML
INJECTION, SOLUTION INTRAVENOUS; SUBCUTANEOUS
Status: DISCONTINUED | OUTPATIENT
Start: 2017-05-18 | End: 2017-05-19

## 2017-05-18 RX ORDER — INSULIN GLARGINE 100 [IU]/ML
20 INJECTION, SOLUTION SUBCUTANEOUS DAILY
Status: DISCONTINUED | OUTPATIENT
Start: 2017-05-18 | End: 2017-05-20

## 2017-05-18 RX ORDER — DIPHENHYDRAMINE HYDROCHLORIDE 50 MG/ML
12.5-5 INJECTION, SOLUTION INTRAMUSCULAR; INTRAVENOUS ONCE
Status: COMPLETED | OUTPATIENT
Start: 2017-05-18 | End: 2017-05-18

## 2017-05-18 RX ADMIN — DEXTROSE MONOHYDRATE AND SODIUM CHLORIDE 150 ML/HR: 5; .9 INJECTION, SOLUTION INTRAVENOUS at 06:44

## 2017-05-18 RX ADMIN — LIDOCAINE HYDROCHLORIDE 70 MG: 20 INJECTION, SOLUTION INFILTRATION; PERINEURAL at 16:39

## 2017-05-18 RX ADMIN — HEPARIN SODIUM 5000 UNITS: 5000 INJECTION, SOLUTION INTRAVENOUS; SUBCUTANEOUS at 22:04

## 2017-05-18 RX ADMIN — DEXTROSE MONOHYDRATE, SODIUM CHLORIDE, AND POTASSIUM CHLORIDE: 50; 4.5; 2.98 INJECTION, SOLUTION INTRAVENOUS at 08:42

## 2017-05-18 RX ADMIN — DOXYCYCLINE 100 MG: 100 INJECTION, POWDER, LYOPHILIZED, FOR SOLUTION INTRAVENOUS at 22:46

## 2017-05-18 RX ADMIN — INSULIN LISPRO 4 UNITS: 100 INJECTION, SOLUTION INTRAVENOUS; SUBCUTANEOUS at 08:41

## 2017-05-18 RX ADMIN — SODIUM CHLORIDE 1000 ML: 900 INJECTION, SOLUTION INTRAVENOUS at 00:09

## 2017-05-18 RX ADMIN — INSULIN LISPRO 8 UNITS: 100 INJECTION, SOLUTION INTRAVENOUS; SUBCUTANEOUS at 14:34

## 2017-05-18 RX ADMIN — SODIUM CHLORIDE 100 ML/HR: 450 INJECTION, SOLUTION INTRAVENOUS at 15:22

## 2017-05-18 RX ADMIN — Medication 10 ML: at 22:04

## 2017-05-18 RX ADMIN — INSULIN GLARGINE 20 UNITS: 100 INJECTION, SOLUTION SUBCUTANEOUS at 08:42

## 2017-05-18 RX ADMIN — DIPHENHYDRAMINE HYDROCHLORIDE 50 MG: 50 INJECTION, SOLUTION INTRAMUSCULAR; INTRAVENOUS at 16:25

## 2017-05-18 RX ADMIN — INSULIN LISPRO 9 UNITS: 100 INJECTION, SOLUTION INTRAVENOUS; SUBCUTANEOUS at 23:31

## 2017-05-18 RX ADMIN — SODIUM CHLORIDE 5.1 UNITS/HR: 900 INJECTION, SOLUTION INTRAVENOUS at 01:08

## 2017-05-18 RX ADMIN — Medication 10 ML: at 13:29

## 2017-05-18 RX ADMIN — MIDAZOLAM HYDROCHLORIDE 1 MG: 1 INJECTION, SOLUTION INTRAMUSCULAR; INTRAVENOUS at 16:30

## 2017-05-18 RX ADMIN — MIDAZOLAM HYDROCHLORIDE 1 MG: 1 INJECTION, SOLUTION INTRAMUSCULAR; INTRAVENOUS at 16:25

## 2017-05-18 RX ADMIN — INSULIN LISPRO 6 UNITS: 100 INJECTION, SOLUTION INTRAVENOUS; SUBCUTANEOUS at 11:15

## 2017-05-18 RX ADMIN — SODIUM BICARBONATE 1 ML: 0.2 INJECTION, SOLUTION INTRAVENOUS at 16:20

## 2017-05-18 RX ADMIN — HYDROCODONE BITARTRATE AND ACETAMINOPHEN 2 TABLET: 5; 325 TABLET ORAL at 22:04

## 2017-05-18 RX ADMIN — Medication 10 ML: at 05:53

## 2017-05-18 RX ADMIN — INSULIN LISPRO 12 UNITS: 100 INJECTION, SOLUTION INTRAVENOUS; SUBCUTANEOUS at 20:52

## 2017-05-18 RX ADMIN — MIDAZOLAM HYDROCHLORIDE 1 MG: 1 INJECTION, SOLUTION INTRAMUSCULAR; INTRAVENOUS at 16:20

## 2017-05-18 RX ADMIN — FLUCONAZOLE 200 MG: 2 INJECTION INTRAVENOUS at 14:43

## 2017-05-18 RX ADMIN — FENTANYL CITRATE 50 MCG: 50 INJECTION, SOLUTION INTRAMUSCULAR; INTRAVENOUS at 16:26

## 2017-05-18 RX ADMIN — PIPERACILLIN SODIUM,TAZOBACTAM SODIUM 3.38 G: 3; .375 INJECTION, POWDER, FOR SOLUTION INTRAVENOUS at 23:10

## 2017-05-18 RX ADMIN — INSULIN LISPRO 6 UNITS: 100 INJECTION, SOLUTION INTRAVENOUS; SUBCUTANEOUS at 17:28

## 2017-05-18 RX ADMIN — ACYCLOVIR SODIUM 500 MG: 50 INJECTION, SOLUTION INTRAVENOUS at 05:53

## 2017-05-18 RX ADMIN — MIDAZOLAM HYDROCHLORIDE 1 MG: 1 INJECTION, SOLUTION INTRAMUSCULAR; INTRAVENOUS at 16:37

## 2017-05-18 RX ADMIN — SODIUM CHLORIDE 12.5 MG: 9 INJECTION INTRAMUSCULAR; INTRAVENOUS; SUBCUTANEOUS at 00:24

## 2017-05-18 RX ADMIN — FENTANYL CITRATE 50 MCG: 50 INJECTION, SOLUTION INTRAMUSCULAR; INTRAVENOUS at 16:20

## 2017-05-18 RX ADMIN — PIPERACILLIN SODIUM,TAZOBACTAM SODIUM 3.38 G: 3; .375 INJECTION, POWDER, FOR SOLUTION INTRAVENOUS at 08:48

## 2017-05-18 RX ADMIN — PIPERACILLIN SODIUM,TAZOBACTAM SODIUM 3.38 G: 3; .375 INJECTION, POWDER, FOR SOLUTION INTRAVENOUS at 17:23

## 2017-05-18 RX ADMIN — FENTANYL CITRATE 50 MCG: 50 INJECTION, SOLUTION INTRAMUSCULAR; INTRAVENOUS at 16:38

## 2017-05-18 RX ADMIN — ACYCLOVIR SODIUM 500 MG: 50 INJECTION, SOLUTION INTRAVENOUS at 13:29

## 2017-05-18 RX ADMIN — HEPARIN SODIUM 5000 UNITS: 5000 INJECTION, SOLUTION INTRAVENOUS; SUBCUTANEOUS at 05:53

## 2017-05-18 RX ADMIN — DOXYCYCLINE 100 MG: 100 INJECTION, POWDER, LYOPHILIZED, FOR SOLUTION INTRAVENOUS at 11:15

## 2017-05-18 RX ADMIN — PIPERACILLIN SODIUM,TAZOBACTAM SODIUM 3.38 G: 3; .375 INJECTION, POWDER, FOR SOLUTION INTRAVENOUS at 00:24

## 2017-05-18 RX ADMIN — FENTANYL CITRATE 50 MCG: 50 INJECTION, SOLUTION INTRAMUSCULAR; INTRAVENOUS at 16:30

## 2017-05-18 NOTE — INTERDISCIPLINARY ROUNDS
Interdisciplinary team rounds were held 5/18/2017 with the following team members:Nursing, Nurse Practitioner, Nutrition, Palliative Care, Pastoral Care, Pharmacy, Physical Therapy, Physician, Respiratory Therapy, Wound Care and Clinical Coordinator and the patient. Plan of care discussed. See clinical pathway and/or care plan for interventions and desired outcomes.

## 2017-05-18 NOTE — PROCEDURES
Interventional Radiology Brief Procedure Note    Patient: Zina Mendoza MRN: [id number]  SSN: [id number]    YOB: 1985  Age: 29 y.o. Sex: female      Date of Procedure: 5/18/2017    Pre-Procedure Diagnosis: Araceli-rectal abscess w right gluteal extension. Post-Procedure Diagnosis: SAME    Procedure(s): Image Guided Drain Placement    Brief Description of Procedure: 10 [place] APD    Performed By: Frank Pennington MD     Assistants: None    Anesthesia: Moderate Sedation    Estimated Blood Loss: Less than 10ml    Specimens: Thin, dark malodorous fluid    Implants: All Purpose Drain    Findings: 20 cc purulent material.      Complications: None    Recommendations: Suction bulb. Flush q shift. Follow Up: Abscessogram in IR in 2-3 weeks.       Signed By: Frank Pennington MD     May 18, 2017

## 2017-05-18 NOTE — PROGRESS NOTES
Per Dr. Delvis Montenegro will modify pt's sliding scale insulin to resistant sensitivity at this time.

## 2017-05-18 NOTE — PROGRESS NOTES
TRANSFER - OUT REPORT:    Verbal report given to Josephine Chavez RN on Margaret Vazquez  being transferred to  for routine progression of care       Report consisted of patients Situation, Background, Assessment and   Recommendations(SBAR). Information from the following report(s) SBAR, Procedure Summary, Intake/Output and MAR was reviewed with the receiving nurse. Lines:   Quad Lumen 05/16/17 Left Subclavian (Active)   Central Line Being Utilized Yes 5/18/2017  7:25 AM   Criteria for Appropriate Use Hemodynamically unstable, requiring monitoring lines, vasopressors, or volume resuscitation 5/18/2017  7:25 AM   Site Assessment Clean, dry, & intact 5/18/2017  7:25 AM   Infiltration Assessment 0 5/18/2017  7:25 AM   Affected Extremity/Extremities Color distal to insertion site pink (or appropriate for race) 5/18/2017  7:25 AM   Date of Last Dressing Change 05/17/17 5/18/2017 11:15 AM   Dressing Status Clean, dry, & intact 5/18/2017  7:25 AM   Dressing Type Disk with Chlorhexadine gluconate (CHG); Transparent 5/18/2017  7:25 AM   Action Taken Zeroed/Rezeroed 5/18/2017  7:25 AM   Proximal Hub Color/Line Status White; Infusing 5/18/2017  7:25 AM   Positive Blood Return (Medial Site) Yes 5/18/2017  7:25 AM   Medial 1 Hub Color/Line Status Ace Nipple; Infusing 5/18/2017  7:25 AM   Positive Blood Return (Lateral Site) Yes 5/18/2017  7:25 AM   Medial 2 Hub Color/Line Status Blue; Infusing 5/18/2017  7:25 AM   Positive Blood Return (Site #3) Yes 5/18/2017  7:25 AM   Distal Hub Color/Line Status Brown;Capped; Patent 5/18/2017  7:25 AM   Positive Blood Return (Site #4) Yes 5/18/2017  7:25 AM   Alcohol Cap Used No 5/18/2017  7:25 AM       Peripheral IV 05/16/17 Left Antecubital (Active)   Site Assessment Clean, dry, & intact 5/18/2017  7:30 AM   Phlebitis Assessment 0 5/18/2017  7:30 AM   Infiltration Assessment 0 5/18/2017  7:30 AM   Dressing Status Clean, dry, & intact 5/18/2017  7:30 AM   Dressing Type Tape;Transparent 5/18/2017  7:30 AM   Hub Color/Line Status Blue;Capped 5/18/2017  7:30 AM   Alcohol Cap Used No 5/18/2017  7:30 AM        Opportunity for questions and clarification was provided.       Patient transported with:   Monitor  Registered Nurse

## 2017-05-18 NOTE — PROGRESS NOTES
TRANSFER - OUT REPORT:    Verbal report given to Toni Driscoll RN(name) on Linden Nishant  being transferred to ICU 3112(unit) for routine progression of care       Report consisted of patients Situation, Background, Assessment and   Recommendations(SBAR). Information from the following report(s) SBAR, Kardex, Procedure Summary, Intake/Output, MAR and Cardiac Rhythm ST 125BPM was reviewed with the receiving nurse. Lines:   Quad Lumen 05/16/17 Left Subclavian (Active)   Central Line Being Utilized Yes 5/18/2017  7:25 AM   Criteria for Appropriate Use Hemodynamically unstable, requiring monitoring lines, vasopressors, or volume resuscitation 5/18/2017  7:25 AM   Site Assessment Clean, dry, & intact 5/18/2017  7:25 AM   Infiltration Assessment 0 5/18/2017  7:25 AM   Affected Extremity/Extremities Color distal to insertion site pink (or appropriate for race) 5/18/2017  7:25 AM   Date of Last Dressing Change 05/17/17 5/18/2017 11:15 AM   Dressing Status Clean, dry, & intact 5/18/2017  7:25 AM   Dressing Type Disk with Chlorhexadine gluconate (CHG); Transparent 5/18/2017  7:25 AM   Action Taken Zeroed/Rezeroed 5/18/2017  7:25 AM   Proximal Hub Color/Line Status White; Infusing 5/18/2017  7:25 AM   Positive Blood Return (Medial Site) Yes 5/18/2017  7:25 AM   Medial 1 Hub Color/Line Status Hosmer Pepin; Infusing 5/18/2017  7:25 AM   Positive Blood Return (Lateral Site) Yes 5/18/2017  7:25 AM   Medial 2 Hub Color/Line Status Blue; Infusing 5/18/2017  7:25 AM   Positive Blood Return (Site #3) Yes 5/18/2017  7:25 AM   Distal Hub Color/Line Status Brown;Capped; Patent 5/18/2017  7:25 AM   Positive Blood Return (Site #4) Yes 5/18/2017  7:25 AM   Alcohol Cap Used No 5/18/2017  7:25 AM       Peripheral IV 05/16/17 Left Antecubital (Active)   Site Assessment Clean, dry, & intact 5/18/2017  7:30 AM   Phlebitis Assessment 0 5/18/2017  7:30 AM   Infiltration Assessment 0 5/18/2017  7:30 AM   Dressing Status Clean, dry, & intact 5/18/2017  7:30 AM   Dressing Type Tape;Transparent 5/18/2017  7:30 AM   Hub Color/Line Status Blue;Capped 5/18/2017  7:30 AM   Alcohol Cap Used No 5/18/2017  7:30 AM        Opportunity for questions and clarification was provided.       Patient transported with:   Monitor  Registered Nurse

## 2017-05-18 NOTE — PROGRESS NOTES
Jodi Fajardo does not know anyone by pt's 's name. Pt states it's [employer] [facility] [place] now. Called and they said pt's  is not there. Explained to pt that her hgb is lower than this AM and pt continuing to attempt to contact others on handheld phone to talk about blood transfusion.

## 2017-05-18 NOTE — PROGRESS NOTES
TRANSFER - IN REPORT:    Verbal report received from [address], RN on Sahara Lee  being received from IR for routine progression of care      Report consisted of patients Situation, Background, Assessment and   Recommendations(SBAR). Information from the following report(s) Procedure Summary was reviewed with the receiving nurse. Opportunity for questions and clarification was provided. Assessment completed upon patients arrival to unit and care assumed.

## 2017-05-18 NOTE — DIABETES MGMT
Patient's blood glucose ranged 156-367 after insulin drip stopped yesterday with patient receiving Lantus 30 units and Humalog 33 units. Patient was restarted on insulin drip which was discontinued this morning. Patient has transitioned to SQ insulin regimen of Lantus 20 units every day and Humalog SSI. Most recent blood glucose 294. Patient's Hgb is lower at 6.5 today. Patient has agreed to PRBC transfusion. Patient also to have drain placed for pelvic mass. Patient was too drowsy for teaching. Family at bedside. Left \"Survival Skills for Diabetes Management\" at bedside for patient to review at her convenience. Family reports there is a strong family history for diabetes. Plan to provide teaching as patient condition allows.

## 2017-05-18 NOTE — PROGRESS NOTES
Infectious Disease Consult    Today's Date: 2017   Admit Date: 2017    Impression:   · Large pelvic abscess; awaiting drainage  · Anaerobic GPR bacteremia  · Chlamydia cervicitis  · Genital HSV  · Severe inguinal yeast infection  · DKA (A1C 14)  · Pancreatitis  · NALDO    After a better exam today, this area is her groin does look like severe yeast infection. Plan:   · Continue Zosyn/Doxycycline  · I will discontinue acyclovir unless the HSV culture is positive  · Continue fluconazole  · Check RPR  · IR drainage of pelvic abscess today    Anti-infectives:     Piperacillin/tazobactam  Doxycycline  Fluconazole  Acyclovir    Subjective:   Date of Consultation:  May 18, 2017  Referring Physician: Dahlia Harley    Ms. Alma Young is lying on her back today. She still has a lot of perineal and perianal pain. She struggles with the expression of symptoms and doesn't really voice that pain isolates to her pelvis. Patient Active Problem List   Diagnosis Code    DKA (diabetic ketoacidoses) ([address] E13.10    Acute alcoholic pancreatitis W37.55    Hyponatremia E87.1    Hyperkalemia E87.5    [facility] failure [facility]F) (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 Objective:     Visit Vitals    /41    Pulse (!) 130    Temp 98.2 °F (36.8 °C)    Resp 22    Ht 5' 2\" (1.575 m)    Wt 89.6 kg (197 lb 8.5 oz)    SpO2 100%    BMI 36.13 kg/m2     Temp (24hrs), Av.3 °F (37.4 °C), Min:98.2 °F (36.8 °C), Max:100.5 °F (38.1 °C)       Lines:  Central Venous Catheter:            Physical Exam:    General:  lethargic, well nourished, well developed, appears stated age   Eyes:  Sclera anicteric.  Pupils equally round and reactive to light. Mouth/Throat: Mucous membranes normal, oral pharynx clear   Neck: Supple   Lungs:   Clear to auscultation bilaterally, good effort   CV:  Regular rate and rhythm,no murmur, click, rub or gallop   Abdomen:   Soft, non-tender.  bowel sounds normal. non-distended   Extremities: No cyanosis or edema   Skin: Skin color, texture, turgor normal. no acute rash or lesions   Lymph nodes: Cervical and supraclavicular normal   Musculoskeletal: No swelling or deformity   Lines/Devices:  Intact, no erythema, drainage or tenderness   : Yeast with fissures in bilateral inguinal intertriginous areas       Data Review:     CBC:  Recent Labs      05/18/17   1042  05/18/17   0745  05/18/17   0536  05/17/17   0330  05/16/17   1510   WBC   --    --   37.0*  32.7*  45.6*   GRANS   --    --   77*  85*  84*   MONOS   --    --   6  5  4   EOS   --    --   1   --    --    ANEU   --    --   29.2*  28.5*   --    ABL   --    --   5.2*  2.6   --    HGB  6.3*  6.1*  6.5*  8.2*  10.8*   HCT  17.6*  17.5*  18.0*  22.7*  43.2   PLT   --    --   253  304  473*       BMP:  Recent Labs      05/18/17   0536  05/17/17   2300  05/17/17   1145   CREA  0.84  0.93  0.79   BUN  35*  45*  43*   NA  148*  145  147*   K  3.7  4.4  3.5   CL  119*  115*  120*   CO2  16*  11*  15*   AGAP  13  19*  12   GLU  216*  330*  172*       LFTS:  Recent Labs      05/18/17   0536  05/17/17   1145  05/16/17   1625   TBILI  0.3  0.2  0.6   ALT  35  29  49   SGOT  63*  82*  178*   AP  108  90  224*   TP  5.5*  3.6*  7.4   ALB  0.9*  0.6*  1.3*       Microbiology:     All Micro Results     Procedure Component Value Units Date/Time    CULTURE, BLOOD [[id number]] Collected:  05/16/17 1730    Order Status:  Completed Specimen:  Blood from Blood Updated:  05/18/17 1035     Special Requests: LEFT ANTECUBITAL        GRAM STAIN GRAM POSITIVE RODS         ANAEROBIC BOTTLE POSITIVE               RESULTS VERIFIED, PHONED TO AND READ BACK BY  RUSS Sam Ojeda RN @ [id number] BY SP       Culture result:         CULTURE IN PROGRESS,FURTHER UPDATES TO FOLLOW    CULTURE, BLOOD [[id number]] Collected:  05/16/17 1736    Order Status:  Completed Specimen:  Blood from Blood Updated:  05/18/17 1017     Special Requests: RIGHT ANTECUBITAL        Culture result: NO GROWTH 2 DAYS       CULTURE, HSV W/ TYPING [[id number]] Collected:  05/17/17 1710    Order Status:  Completed Updated:  05/17/17 1722    CULTURE, HSV W/ TYPING [[id number]]     Order Status:  Canceled     MRSA SCREEN - PCR (NASAL) [[id number]] Collected:  05/16/17 2025    Order Status:  Completed Specimen:  Nasal from Nasal Swab Updated:  05/16/17 2157     Special Requests: NO SPECIAL REQUESTS        Culture result:         MRSA target DNA is not detected (presumptive not colonized with MRSA)          Imaging:     CT ABD PELV WO CONT (Final result) Result time: 05/16/17 20:07:05     Final result     Impression:     IMPRESSION:  Large incompletely characterized pelvic mass with apparent  involvement/extension into the right gluteal muscles. The findings presumably  represent a neoplastic process. Pelvic MRI may be beneficial for further  delineation as it relates to other pelvic anatomy. Preferably this would be done  with IV contrast.     CT abd/pelv with contrast (5/17/17)  IMPRESSION: Indeterminate perirectal mass with extension of heterogeneous,  low-attenuation soft tissue into the right buttock an asymmetric edema and  muscular swelling and the right hemipelvis. Right inguinal adenopathy is  present. This may be a perirectal abscess or necrotic tumor with extrapelvic  extension.     Signed By: Myrna Hooker MD     May 18, 2017

## 2017-05-18 NOTE — PROGRESS NOTES
Called numbers in chart for pt's mother and .  number does not work and a woman picked up the other number and said she was not Raymond Carte her mother. Pt states to call Edy Nicole to get in contact with her  at this time.

## 2017-05-18 NOTE — PROGRESS NOTES
Patient transferred to bed and transported back to [facility] [phone] for follow up care  And drain management. Patient tolerating procedures well.

## 2017-05-18 NOTE — PROGRESS NOTES
Pt hgb low this AM, explained to pt. Wants RN to call her mother about receiving blood. Called by night shift RN and voicemail left at this time for her to call the hospital back. Will continue to monitor.

## 2017-05-18 NOTE — PROGRESS NOTES
2030 sqbs 367. Dr. Sandro Hall notified. Orders received for 20 units lantus now and 15 units humalog. Additional order received to modify lantus order to 20 units BID.    2300 repeat sqbs 360. VSS. Dr. Sandro Hall notified, reviewed recent temperature elevation, current antibiotics, and BP trends. Orders received to administer 10 units humalog per SSI order and re-check BMP, mag, and phosphorous labs.

## 2017-05-18 NOTE — CONSULTS
Department of Interventional Radiology  [phone]        Consult Note     Patient: Sandie Dodge MRN: [id number]  SSN: [id number]    YOB: 1985  Age: 29 y.o. Sex: female      Referring Physician: Dr Salome Rodríguez Date: 5/18/2017     Subjective:     Chief Complaint: Dr. Ayla Delacruz    History of Present Illness: Sandie Dodge is a 29 y.o. female who is seen in consultation for Pelvic/Retroperitoneal/Gluteal mass/hematoma/abscess. I have reviewed the CT scans, EMR, and spoken with the patient and her mother. Leukocytosis. CT 5/16/17:  High density within an ill defined fluid collection could represent hemorrhage/hematoma. CT 5/17/17:  I believe that there is an abscess, originally latha-rectal in origin, that has crossed tissue planes and now involves the soft tissues of the right buttocks. There is no definite fallopian tube/uterus involvement. Of course, other diagnoses include a large hematoma or mass. Past Medical History:   Diagnosis Date    Ill-defined condition     herpes     Past Surgical History:   Procedure Laterality Date    HX OTHER SURGICAL      pilonidal cyst      History reviewed. No pertinent family history.   Social History   Substance Use Topics    Smoking status: Never Smoker    Smokeless tobacco: Not on file    Alcohol use Yes      Comment: occasional      No Known Allergies  Current Facility-Administered Medications   Medication Dose Route Frequency    promethazine (PHENERGAN) with saline injection 12.5 mg  12.5 mg IntraVENous Q6H PRN    dextrose 5% - 0.45% NaCl with KCl 40 mEq/L infusion   IntraVENous CONTINUOUS    insulin glargine (LANTUS) injection 20 Units  20 Units SubCUTAneous DAILY    insulin lispro (HUMALOG) injection   SubCUTAneous Q3H    0.9% sodium chloride infusion 250 mL  250 mL IntraVENous PRN    morphine injection 2 mg  2 mg IntraVENous Q4H PRN    acyclovir (ZOVIRAX) 500 mg (10mg/kg) in 0.9% sodium chloride 100 mL IVPB  500 mg IntraVENous Q8H    doxycycline (VIBRAMYCIN) 100 mg in 0.9% sodium chloride (MBP/ADV) 100 mL  100 mg IntraVENous Q12H    fluconazole (DIFLUCAN) 200mg/100 mL IVPB (premix)  200 mg IntraVENous Q24H    insulin regular (NOVOLIN R, HUMULIN R) 100 Units in 0.9% sodium chloride 100 mL infusion  1-10 Units/hr IntraVENous TITRATE    sodium chloride (NS) flush 5-10 mL  5-10 mL IntraVENous Q8H    sodium chloride (NS) flush 5-10 mL  5-10 mL IntraVENous PRN    dextrose 40% (GLUTOSE) oral gel 1 Tube  15 g Oral PRN    glucagon (GLUCAGEN) injection 1 mg  1 mg IntraMUSCular PRN    dextrose (D50W) injection syrg 12.5-25 g  25-50 mL IntraVENous PRN    acetaminophen (TYLENOL) tablet 650 mg  650 mg Oral Q4H PRN    ondansetron (ZOFRAN) injection 4 mg  4 mg IntraVENous Q4H PRN    bisacodyl (DULCOLAX) tablet 5 mg  5 mg Oral DAILY PRN    heparin (porcine) injection 5,000 Units  5,000 Units SubCUTAneous Q8H    HYDROcodone-acetaminophen (NORCO) 5-325 mg per tablet 2 Tab  2 Tab Oral Q6H PRN    piperacillin-tazobactam (ZOSYN) 3.375 g in 0.9% sodium chloride (MBP/ADV) 100 mL  3.375 g IntraVENous Q8H      Objective:     Physical Exam:  Visit Vitals    /41    Pulse (!) 130    Temp 98.2 °F (36.8 °C)    Resp 22    Ht 5' 2\" (1.575 m)    Wt 89.6 kg (197 lb 8.5 oz)    LMP 04/12/2017    SpO2 100%    BMI 36.13 kg/m2      Lab/Data Review:  BMP:   Lab Results   Component Value Date/Time     (H) 05/18/2017 05:36 AM    K 3.7 05/18/2017 05:36 AM     (H) 05/18/2017 05:36 AM    CO2 16 (L) 05/18/2017 05:36 AM    AGAP 13 05/18/2017 05:36 AM     (H) 05/18/2017 05:36 AM    BUN 35 (H) 05/18/2017 05:36 AM    CREA 0.84 05/18/2017 05:36 AM    GFRAA >60 05/18/2017 05:36 AM    GFRNA >60 05/18/2017 05:36 AM     CMP:   Lab Results   Component Value Date/Time     (H) 05/18/2017 05:36 AM    K 3.7 05/18/2017 05:36 AM     (H) 05/18/2017 05:36 AM    CO2 16 (L) 05/18/2017 05:36 AM    AGAP 13 05/18/2017 05:36 AM    GLU 216 (H) 05/18/2017 05:36 AM    BUN 35 (H) 05/18/2017 05:36 AM    CREA 0.84 05/18/2017 05:36 AM    GFRAA >60 05/18/2017 05:36 AM    GFRNA >60 05/18/2017 05:36 AM    CA 7.5 (L) 05/18/2017 05:36 AM    MG 2.3 05/18/2017 05:36 AM    PHOS 2.0 (L) 05/17/2017 11:00 PM    ALB 0.9 (L) 05/18/2017 05:36 AM    TP 5.5 (L) 05/18/2017 05:36 AM    GLOB 4.6 (H) 05/18/2017 05:36 AM    AGRAT 0.2 (L) 05/18/2017 05:36 AM    SGOT 63 (H) 05/18/2017 05:36 AM    ALT 35 05/18/2017 05:36 AM     CBC:   Lab Results   Component Value Date/Time    WBC 37.0 (H) 05/18/2017 05:36 AM    HGB 6.1 (LL) 05/18/2017 07:45 AM    HCT 17.5 (LL) 05/18/2017 07:45 AM     05/18/2017 05:36 AM     COAGS: No results found for: APTT, PTP, INR      Assessment/Plan:     Principal Problem:    DKA (diabetic ketoacidoses) ([address] (5/16/2017)    Active Problems:    Acute alcoholic pancreatitis (9/32/0510)      Hyponatremia (5/16/2017)      Overview: Pseudohyponatremia      Hyperkalemia (5/16/2017)      Acute renal failure (ARF) ([address] (5/16/2017)      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 Leukocytosis. Right buttock/flank pain. Recommend:  General Surgery consultation. Plan:  CT guided aspiration/drain placement; biopsy if solid mass is encountered.       Junior Lao MD

## 2017-05-18 NOTE — PROGRESS NOTES
Spoke with Dr. Arthur Mi, radiologist concerning last night's abd CT. States that he believes pt could have an abscess or tumor and unsure how treatment team will want to manage.  Will update hospitalist.

## 2017-05-18 NOTE — PROGRESS NOTES
Hospitalist Progress Note    2017  Admit Date: 2017  3:14 PM   NAME: Tanner Casarez   :  1985   MRN:  [id number]   Attending: Yas Brannon MD  PCP:  Roosevelt Hillman MD    SUBJECTIVE:   32yo F with no significant PMH presented with worsening Rt Flank pain, nausea, multiple episodes of vomiting and weakness. She was seen here in ER 1 week ago with back pain and diagnosed with Sciatica and given NSAIDs which she took without much relief. Yesterday her pain worsened and she took extra pain meds with about 4 glasses of Wine per . She then started having vomiting and abd discomfort that continued today and she came to ER. Found to be in DKA in ER with Na 115, K 6.0, LA 7.4, Bicarb 9, Ph 7.22, Cr 2.51 and Ck > 4000, WBC >40k, Hgb 10.8 with elevated LFTs. CT abdomen showed Pelvic mass concerning for malignancy. : Opened Gap again, required Insulin gtt overnight and now off of it, AG closed again, Hgb dropped to 6.1. Persistently sinus tach above 120. Feels better though. Nursing notes and chart reviewed. Review of Systems negative with exception of pertinent positives noted above. PHYSICAL EXAM     Visit Vitals    /43    Pulse (!) 130    Temp 98.6 °F (37 °C)    Resp 29    Ht 5' 2\" (1.575 m)    Wt 89.6 kg (197 lb 8.5 oz)    LMP 2017    SpO2 100%    BMI 36.13 kg/m2      Temp (24hrs), Av.5 °F (37.5 °C), Min:98.6 °F (37 °C), Max:100.5 °F (38.1 °C)    Oxygen Therapy  O2 Sat (%): 100 % (17 0700)  Pulse via Oximetry: 131 beats per minute (17 0700)  O2 Device: Room air (17 2330)    Intake/Output Summary (Last 24 hours) at 17 0746  Last data filed at 17 0603   Gross per 24 hour   Intake           2957.5 ml   Output             2175 ml   Net            782.5 ml       General: Mild distress. Alert.    Lungs:  CTABL.    Heart:  RRR, no murmur, rub, or gallop, tachycardia  Abdomen: Soft, non-distended, non-tender, +bs  Extremities: No cyanosis or clubbing. Neurologic:  No focal deficits. Moves all extremities. Musculoskeletal: Tender Rt lower back  :   Large area of excoriations around her vagina and perineum. LABS AND STUDIES:  Personally reviewed all labs, meds, and studies for past 24hrs. ASSESSMENT      Active Hospital Problems    Diagnosis Date Noted    Septic shock ([address] 05/17/2017    Pelvic mass 05/17/2017    DKA (diabetic ketoacidoses) ([address] 05/16/2017    Acute alcoholic pancreatitis 60/20/7163    Hyponatremia 05/16/2017     Pseudohyponatremia      Hyperkalemia 05/16/2017    Acute renal failure (ARF) ([address] 05/16/2017    Rhabdomyosarcoma of flank ([facility] 05/16/2017    Sepsis ([facility] 05/16/2017    Chlamydia infection 05/16/2017    Herpes simplex vulvovaginitis 05/16/2017           PLAN:    · DKA: Resolved, AG closed, Increase lantus to 20U, ISS q3h and DC Insulin gtt. · Uncontrolled DM: A1C is 14, will get diabetes educator for new diagnosis, Will need insulin upon discharge. · Pelvic Mass/Abscess: Will d/w IR about hematoma vs Abscess, on IV Morphine  · NALDO: Resolved, will c/w IVF   · Sepsis: Could be 2/2 ? PID (she has +ve serology for STDs). Blood and urine Cx pending. C/w VAnc and zosyn for now, on Doxy for Chlamydia, ID consulted and started on Acyclovir, Gyn started Fluconazole. LA normalized and procal down trending. · Vaginal Lesions with Suspected PID: GYN consulted. Appreciate recs  · Rhabdomyolysis: Improving, on IVF  · Acute Pancreatitis: Improving lipase down trending, still vomiting, wants to try oral feeds. · Anemia: Acute likely 2/2 hematoma. Transfuse 2 U PRBCs. · Hyponatremia and Hyperkalemia: Resolved. Dispo: Monitor in ICU. Has central line and may need pressors. High risk given DKA, Sepsis, Pelvic Mass, NALDO and Rhabdo. DVT ppx:  hsq  Discussed plan with pt and  who is in agreement. All questions answered. Critical care time spent was 35 mins.      Signed By: Puma Polanco MD May 18, 2017

## 2017-05-18 NOTE — PROGRESS NOTES
Pt glucose 205. Per hospitalist will stop insulin gtt now and check glucose q3hrs and cover with sliding scale. Last gap is 13. Rechecking H&H as well.

## 2017-05-18 NOTE — PROGRESS NOTES
Pt's glucose risen since this AM. Now 339. Pt has D5 1/2NS with 40K gtt currently. Spoke with Dr. Juan Daniel Tnioco. Orders to D/C current gtt and start 0.45NS gtt @ 100ml/hr. Pt currently in IR. Spoke with Haydee Starr RN, will order and send bag of fluid now. Will recheck glucose upon return from IR and re-call Dr. Juan Daniel Tinoco.

## 2017-05-18 NOTE — CONSULTS
[facility]  H&P/Consult Note    Sameera Marie  MRN: [id number]  NAB:[birth year]  Age:32 y.o.    HPI: Sameera Marie is a 29 y.o.  female who presented to the ED on 5/16 with c/o worsening R flank pain, N/V and lethargy. She reports feeling poorly for 4-5 days prior. She was evaluated 2x at Urgent Care and by her GYN. She was found to have +HSV and Chlamydia for which she was treated. She continued to feel poorly and came to the ED. She reports that she has noted increased latha-rectal pain and pressure over the past week. Possibly had some drainage but she is not sure. Associated constipation -- LBM was PTA (she can't remember date) -- denies painful defication, rectal bleeding or melena. Reports urinating normally. On arrival to ED she was found to be septic with DKA (BS >600), Rhabdo (CK >4000), NALDO (Cr 2.51) and pancreatitis (Lipase >5000). WBCs 45K. Lactic 7.4. PCT 78.7. She was admitted by the Hospitalist to the ICU for the above. She underwent CTAP which revealed: Large incompletely characterized pelvic mass with apparent involvement/extension into the right gluteal muscles. GYN was consulted for evaluation and felt that this was not her typical HSV and that she had yeast. ID was also consulted. She has been receiving IV acyclovir, doxy, diflucan and zosyn. Labs have started to improve. She was taken for repeat CTCAP yesterday evening which reported a perirectal mass -- abscess vs tumor. General Surgery was consulted for evaluation. Of note: Hgb today down to 6.1 and she is to be given PRBCs. CT abdomen and pelvis without contrast -- 05/16/2017  History: Moderate abdominal pain, elevated lipase. Nausea and vomiting x1 day  Technique: Helically acquired images were obtained from the upper abdomen to the  ischial tuberosities reconstructed at 5 mmmm intervals without oral or IV  contrast. Intravenous contrast was not administered due to elevated creatinine.   Coronal reformatted images were submitted. Radiation dose reduction techniques were used for this study: Our CT scanners  use one or all of the following: Automated exposure control, adjustment of the  mA and/or kVp according to patient's size, iterative reconstruction. Comparison: None  CT abdomen: There is mild respiratory motion artifact occurring throughout the  exam. The liver, spleen, pancreas, adrenal glands and kidneys are grossly  unremarkable on this noncontrast exam as well as allowing for motion artifact. No adenopathy or ascites is present. There is mild to moderate subcutaneous  stranding within the right lower flank posteriorly. There is no bowel  obstruction. CT PELVIS: There is a complex pelvic mass like structure with the margins are  ill-defined in the absence of oral and IV contrast. This is felt to measure at  least 7.4 x 7.6 cm. A Pino catheter is present within a collapsed urinary  bladder. There is abnormal soft tissue density extending into the right ischio  rectal fossa. There is asymmetric enlargement of the right gluteal muscles. No  definite free fluid is present. Several small right inguinal lymph nodes are  present. IMPRESSION: Large incompletely characterized pelvic mass with apparent  involvement/extension into the right gluteal muscles. The findings presumably  represent a neoplastic process. Pelvic MRI may be beneficial for further  delineation as it relates to other pelvic anatomy. Preferably this would be done  with IV contrast.    CT CHEST ABDOMEN AND PELVIS WITH CONTRAST -- 5/17/2017  HISTORY: Critical calcium level of 5.6. Pelvic mass with gluteal involvement  TECHNIQUE: The patient received oral contrast and 100 mL Isovue-370 nonionic IV  contrast. Axial images were obtained through the chest, abdomen and pelvis. Coronal reformatted images were generated.  All CT scans at this facility used  dose modulation, interactive reconstruction and/or weight based dosing when  appropriate to reduce radiation dose to as low as reasonably achievable. COMPARISON: May 16, 2017  FINDINGS:  CHEST: There is no thoracic adenopathy. There is no lobar consolidation, pleural  effusions or pulmonary masses. ABDOMEN: The gallbladder, liver, pancreas, spleen, adrenal glands, and kidneys  are normal in appearance. PELVIS: The uterus is present. A Pino balloon is present within a collapsed  bladder. There is a poorly defined right perirectal low attenuation collection  or mass measuring 6.6 cm AP by approximately 5.3 cm transverse (image 100). This  extends cephalad along the lateral aspect of the uterus and along the right  lateral wall of the bladder. An incompletely characterized low-attenuation mass  along the left fundal portion of the uterus (image 88) may be a exophytic  fibroid. This is 2.5 cm in diameter. Right inguinal adenopathy is present and there is a heterogeneous lobulated  structure extending into the fat of the right buttock (image 124) which is  continuous with the perirectal lesion. In addition there is asymmetric edema in  the soft tissues of the right hemipelvis and right gluteus muscles which are  asymmetrically enlarged. There is edema throughout the soft tissues of the right  buttock and right hemipelvis. There are no aggressive osseous changes or  erosive/lytic osseous lesions. IMPRESSION: Indeterminate perirectal mass with extension of heterogeneous,  low-attenuation soft tissue into the right buttock an asymmetric edema and  muscular swelling and the right hemipelvis. Right inguinal adenopathy is  present. This may be a perirectal abscess or necrotic tumor with extrapelvic  extension.     Past Medical History:   Diagnosis Date    Ill-defined condition     herpes     Past Surgical History:   Procedure Laterality Date    HX OTHER SURGICAL      pilonidal cyst     Current Facility-Administered Medications   Medication Dose Route Frequency    promethazine (PHENERGAN) with saline injection 12.5 mg  12.5 mg IntraVENous Q6H PRN    dextrose 5% - 0.45% NaCl with KCl 40 mEq/L infusion   IntraVENous CONTINUOUS    insulin glargine (LANTUS) injection 20 Units  20 Units SubCUTAneous DAILY    insulin lispro (HUMALOG) injection   SubCUTAneous Q3H    0.9% sodium chloride infusion 250 mL  250 mL IntraVENous PRN    morphine injection 2 mg  2 mg IntraVENous Q4H PRN    acyclovir (ZOVIRAX) 500 mg (10mg/kg) in 0.9% sodium chloride 100 mL IVPB  500 mg IntraVENous Q8H    doxycycline (VIBRAMYCIN) 100 mg in 0.9% sodium chloride (MBP/ADV) 100 mL  100 mg IntraVENous Q12H    fluconazole (DIFLUCAN) 200mg/100 mL IVPB (premix)  200 mg IntraVENous Q24H    insulin regular (NOVOLIN R, HUMULIN R) 100 Units in 0.9% sodium chloride 100 mL infusion  1-10 Units/hr IntraVENous TITRATE    sodium chloride (NS) flush 5-10 mL  5-10 mL IntraVENous Q8H    sodium chloride (NS) flush 5-10 mL  5-10 mL IntraVENous PRN    dextrose 40% (GLUTOSE) oral gel 1 Tube  15 g Oral PRN    glucagon (GLUCAGEN) injection 1 mg  1 mg IntraMUSCular PRN    dextrose (D50W) injection syrg 12.5-25 g  25-50 mL IntraVENous PRN    acetaminophen (TYLENOL) tablet 650 mg  650 mg Oral Q4H PRN    ondansetron (ZOFRAN) injection 4 mg  4 mg IntraVENous Q4H PRN    bisacodyl (DULCOLAX) tablet 5 mg  5 mg Oral DAILY PRN    heparin (porcine) injection 5,000 Units  5,000 Units SubCUTAneous Q8H    HYDROcodone-acetaminophen (NORCO) 5-325 mg per tablet 2 Tab  2 Tab Oral Q6H PRN    piperacillin-tazobactam (ZOSYN) 3.375 g in 0.9% sodium chloride (MBP/ADV) 100 mL  3.375 g IntraVENous Q8H     Review of patient's allergies indicates no known allergies.   Social History     Social History    Marital status: SINGLE     Spouse name: N/A    Number of children: N/A    Years of education: N/A     Social History Main Topics    Smoking status: Never Smoker    Smokeless tobacco: None    Alcohol use Yes      Comment: occasional    Drug use: No    Sexual activity: Yes     Partners: Male     Other Topics Concern    None     Social History Narrative     History   Smoking Status    Never Smoker   Smokeless Tobacco    Not on file     History reviewed. No pertinent family history. ROS: The patient has no difficulty with chest pain or shortness of breath. +fever, no chills or sweats. Comprehensive review of systems was otherwise unremarkable except as noted above. Physical Exam:   Visit Vitals    /41    Pulse (!) 130    Temp 98.2 °F (36.8 °C)    Resp 22    Ht 5' 2\" (1.575 m)    Wt 89.6 kg (197 lb 8.5 oz)    LMP 04/12/2017    SpO2 100%    BMI 36.13 kg/m2     Constitutional: Alert, oriented, cooperative patient; appears stated age, ill-appearing   Eyes: Sclera are clear. EOMs intact  ENMT: No external lesions, gross hearing normal; no obvious neck masses, no ear or lip lesions, nares normal  CV: RRR, S1S2, ST -  on monitor. Normal perfusion, pulses palpable  Resp: No JVD. Breathing is non-labored; no audible wheezing. BBS clear, on RA  GI: Obese, soft and non-distended, +BS, non-tender, no R/G     : Pino -- patent -- clear urine  Musculoskeletal: Unremarkable with normal function. No embolic signs or cyanosis.    Neuro: Alert, oriented; moves all 4; no focal deficits  Psychiatric: Appropriate affect and mood, no memory impairment, calm  Genitalia: Declined examination  Rectal: Declined examination    Recent vitals (if inpt):  Patient Vitals for the past 24 hrs:   BP Temp Pulse Resp SpO2   05/18/17 0900 104/41 - (!) 130 22 100 %   05/18/17 0831 - - (!) 130 (!) 7 100 %   05/18/17 0830 105/42 - - - -   05/18/17 0800 100/42 - (!) 131 (!) 0 100 %   05/18/17 0745 104/45 - (!) 131 (!) 5 100 %   05/18/17 0730 101/41 98.2 °F (36.8 °C) (!) 132 23 100 %   05/18/17 0700 101/43 - (!) 130 29 100 %   05/18/17 0630 111/43 - - - -   05/18/17 0600 101/48 - (!) 131 (!) 42 -   05/18/17 0530 (!) 81/64 - (!) 131 (!) 36 100 %   05/18/17 0500 105/48 - (!) 130 (!) 44 100 %   05/18/17 0451 - 98.6 °F (37 °C) - - -   05/18/17 0430 113/44 - (!) 126 (!) 6 100 %   05/18/17 0329 100/44 - (!) 131 15 100 %   05/18/17 0259 104/44 - (!) 130 12 100 %   05/18/17 0229 105/43 - (!) 131 10 100 %   05/18/17 0159 106/50 - (!) 128 (!) 0 100 %   05/18/17 0129 98/51 - (!) 128 14 100 %   05/18/17 0059 92/43 - (!) 129 (!) 4 100 %   05/18/17 0029 97/40 - (!) 133 17 100 %   05/17/17 2359 114/40 99 °F (37.2 °C) (!) 136 (!) 34 100 %   05/17/17 2329 (!) 105/39 - (!) 133 26 100 %   05/17/17 2259 111/42 - (!) 137 13 100 %   05/17/17 2229 117/43 - (!) 130 16 100 %   05/17/17 2212 107/43 - - - -   05/17/17 2200 - 99.2 °F (37.3 °C) - - -   05/17/17 2159 99/43 - (!) 138 21 100 %   05/17/17 2129 102/40 - (!) 137 (!) 36 100 %   05/17/17 2059 103/47 - (!) 138 (!) 39 100 %   05/17/17 2042 111/41 (!) 100.5 °F (38.1 °C) (!) 137 30 100 %   05/17/17 2029 111/41 - (!) 137 (!) 31 100 %   05/17/17 1959 100/46 - (!) 133 24 100 %   05/17/17 1939 90/42 - (!) 134 (!) 32 100 %   05/17/17 1902 108/48 - (!) 130 (!) 33 96 %   05/17/17 1842 103/49 - (!) 133 25 100 %   05/17/17 1700 107/46 - (!) 140 (!) 35 100 %   05/17/17 1632 114/50 100.2 °F (37.9 °C) (!) 135 (!) 36 99 %       Labs:  Recent Labs      05/18/17   1042   05/18/17   0536   05/17/17   1145   05/16/17   1740   WBC   --    --   37.0*   --    --    < >   --    HGB  6.3*   < >  6.5*   --    --    < >   --    PLT   --    --   253   --    --    < >   --    NA   --    --   148*   < >  147*   < >   --    K   --    --   3.7   < >  3.5   < >   --    CL   --    --   119*   < >  120*   < >   --    CO2   --    --   16*   < >  15*   < >   --    BUN   --    --   35*   < >  43*   < >   --    CREA   --    --   0.84   < >  0.79   < >   --    GLU   --    --   216*   < >  172*   < >   --    TBILI   --    --   0.3   --   0.2   --    --    CBIL   --    --    --    --   0.1   --    --    SGOT   --    --   63*   --   82*   --    --    ALT   --    --   35   --   29   --    --    AP   -- --   108   --   90   --    --    LPSE   --    --   285   --    --    < >   --    PCO2   --    --    --    --    --    --   22*   PO2   --    --    --    --    --    --   103*   HCO3   --    --    --    --    --    --   9*    < > = values in this interval not displayed. Lab Results   Component Value Date/Time    WBC 37.0 05/18/2017 05:36 AM    HGB 6.3 05/18/2017 10:42 AM    PLATELET 560 33/73/6125 05:36 AM    Sodium 148 05/18/2017 05:36 AM    Potassium 3.7 05/18/2017 05:36 AM    Chloride 119 05/18/2017 05:36 AM    CO2 16 05/18/2017 05:36 AM    BUN 35 05/18/2017 05:36 AM    Creatinine 0.84 05/18/2017 05:36 AM    Glucose 216 05/18/2017 05:36 AM    Bilirubin, total 0.3 05/18/2017 05:36 AM    Bilirubin, direct 0.1 05/17/2017 11:45 AM    AST (SGOT) 63 05/18/2017 05:36 AM    ALT (SGPT) 35 05/18/2017 05:36 AM    Alk.  phosphatase 108 05/18/2017 05:36 AM    Lipase 285 05/18/2017 05:36 AM        Admission date (for inpatients): 5/16/2017   * No surgery found *  * No surgery found *    ASSESSMENT/PLAN:  Problem List  Date Reviewed: 5/17/2017          Codes Class Noted    Septic shock ([email or web address] ICD-10-CM: A41.9, R65.21  ICD-9-CM: 038.9, 785.52, 995.92  5/17/2017        Pelvic mass ICD-10-CM: R19.00  ICD-9-CM: 789.30  5/17/2017        * (Principal)DKA (diabetic ketoacidoses) ([facility] [email or web address].) ICD-10-CM: E13.10  ICD-9-CM: 250.10  5/16/2017        Acute alcoholic pancreatitis THIEN-22-BI: K85.20  ICD-9-CM: 577.0  5/16/2017        Hyponatremia ICD-10-CM: E87.1  ICD-9-CM: 276.1  5/16/2017    Overview Signed 5/16/2017  7:00 PM by Artie Calvo MD     Pseudohyponatremia             Hyperkalemia ICD-10-CM: E87.5  ICD-9-CM: 276.7  5/16/2017        Acute renal failure (ARF) ([facility] [email or web address].) ICD-10-CM: N17.9  ICD-9-CM: 584.9  5/16/2017        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  ICD-9-CM: 171.7  5/16/2017        Sepsis ([facility] [email or web address].) ICD-10-CM: A41.9  ICD-9-CM: 038.9, 995.91  5/16/2017        Chlamydia infection ICD-10-CM: A74.9  ICD-9-CM: 079.98  5/16/2017 Herpes simplex vulvovaginitis ICD-10-CM: A60.04  ICD-9-CM: 054.11  5/16/2017            Principal Problem:    DKA (diabetic ketoacidoses) ([address] (5/16/2017)    Active Problems:    Acute alcoholic pancreatitis (2/04/5479)      Hyponatremia (5/16/2017)      Overview: Pseudohyponatremia      Hyperkalemia (5/16/2017)      Acute renal failure (ARF) ([address] (5/16/2017)      Rhabdomyosarcoma of flank ([address] (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 Emily Santos -- he will come evaluate her later today  May need premedication prior to exam due to severe pain and she is declining pelvic/rectal/vaginal exams at this time      Signed:  Hung Germain, NP-C

## 2017-05-18 NOTE — PROGRESS NOTES
Report received from Saad, [facility]. Pt's lab data came back, hospitalist paged. Orders received to restart glucostabilizer and insulin gtt. NS bolus and more nausea medicine. Pt does not speak much, very weak. Sinus tach, hypotensive some.  at bedside. Will continue to monitor and treat.

## 2017-05-18 NOTE — PROGRESS NOTES
Reviewed CT with contrast with Dr. Haas Cons. Mass / abscess contiguous with gluteal fold potentially originating from ano-rectal area. Suggest general surgery involvement.

## 2017-05-18 NOTE — PROGRESS NOTES
Hgb low, notifying MD momentarily. Asked pt if she is willing to have a blood transfusion. She states that she wants to talk to a family member first. I attempted to call her  and her mother. I received voicemail for both of them and left a message.

## 2017-05-18 NOTE — PROGRESS NOTES
Pt returned from IR, in NAD, VSS. Family brought back to see pt at this time then left and will be back later. Pt has ALEXANDER bulb to R buttock, draining cloudy discharge type drainage. 1st unit of PRBC completed, will begin 2nd unit now. SCBS checked upon arrival, has decreased from 390's to 286 since fluids switched. Due for sliding scale insulin @ 1800 for coverage. Follow up appointment for drain removal has been placed in pt's hard chart outside room.

## 2017-05-18 NOTE — PROGRESS NOTES
Spoke with Dr. Giovani Hernandez about radiology conversation, at bedside with pt.  is here now. All parties agree to blood transfusion. Verbal orders for type and screen followed by 2 units PRBC. Blood consent signed now. SCBS now 220, covering with sliding scale humalog and 20 units lantus at this time.

## 2017-05-19 ENCOUNTER — ANESTHESIA (OUTPATIENT)
Dept: SURGERY | Age: 32
DRG: 853 | End: 2017-05-19
Payer: COMMERCIAL

## 2017-05-19 ENCOUNTER — ANESTHESIA EVENT (OUTPATIENT)
Dept: SURGERY | Age: 32
DRG: 853 | End: 2017-05-19
Payer: COMMERCIAL

## 2017-05-19 ENCOUNTER — APPOINTMENT (OUTPATIENT)
Dept: MRI IMAGING | Age: 32
DRG: 853 | End: 2017-05-19
Attending: COLON & RECTAL SURGERY
Payer: COMMERCIAL

## 2017-05-19 LAB
ALBUMIN SERPL BCP-MCNC: 1 G/DL (ref 3.5–5)
ALBUMIN/GLOB SERPL: 0.2 {RATIO} (ref 1.2–3.5)
ALP SERPL-CCNC: 136 U/L (ref 50–136)
ALT SERPL-CCNC: 32 U/L (ref 12–65)
ANION GAP BLD CALC-SCNC: 11 MMOL/L (ref 7–16)
APPEARANCE UR: ABNORMAL
AST SERPL W P-5'-P-CCNC: 50 U/L (ref 15–37)
BACTERIA URNS QL MICRO: ABNORMAL /HPF
BILIRUB SERPL-MCNC: 0.4 MG/DL (ref 0.2–1.1)
BILIRUB UR QL: NEGATIVE
BUN SERPL-MCNC: 23 MG/DL (ref 6–23)
CALCIUM SERPL-MCNC: 7.9 MG/DL (ref 8.3–10.4)
CASTS URNS QL MICRO: 0 /LPF
CHLORIDE SERPL-SCNC: 119 MMOL/L (ref 98–107)
CK SERPL-CCNC: 406 U/L (ref 21–215)
CO2 SERPL-SCNC: 19 MMOL/L (ref 21–32)
COLOR UR: YELLOW
CREAT SERPL-MCNC: 0.66 MG/DL (ref 0.6–1)
CRYSTALS URNS QL MICRO: 0 /LPF
DIFFERENTIAL METHOD BLD: ABNORMAL
EPI CELLS #/AREA URNS HPF: ABNORMAL /HPF
ERYTHROCYTE [DISTWIDTH] IN BLOOD BY AUTOMATED COUNT: 15 % (ref 11.9–14.6)
GLOBULIN SER CALC-MCNC: 5 G/DL (ref 2.3–3.5)
GLUCOSE BLD STRIP.AUTO-MCNC: 137 MG/DL (ref 65–100)
GLUCOSE BLD STRIP.AUTO-MCNC: 160 MG/DL (ref 65–100)
GLUCOSE BLD STRIP.AUTO-MCNC: 165 MG/DL (ref 65–100)
GLUCOSE BLD STRIP.AUTO-MCNC: 171 MG/DL (ref 65–100)
GLUCOSE BLD STRIP.AUTO-MCNC: 207 MG/DL (ref 65–100)
GLUCOSE SERPL-MCNC: 202 MG/DL (ref 65–100)
GLUCOSE UR STRIP.AUTO-MCNC: NEGATIVE MG/DL
HCG UR QL: NEGATIVE
HCT VFR BLD AUTO: 23.8 % (ref 35.8–46.3)
HCT VFR BLD AUTO: 23.9 % (ref 35.8–46.3)
HCT VFR BLD AUTO: 24.7 % (ref 35.8–46.3)
HGB BLD-MCNC: 8.4 G/DL (ref 11.7–15.4)
HGB BLD-MCNC: 8.5 G/DL (ref 11.7–15.4)
HGB BLD-MCNC: 8.8 G/DL (ref 11.7–15.4)
HGB UR QL STRIP: ABNORMAL
KETONES UR QL STRIP.AUTO: ABNORMAL MG/DL
LACTATE SERPL-SCNC: 2.3 MMOL/L (ref 0.4–2)
LACTATE SERPL-SCNC: 2.5 MMOL/L (ref 0.4–2)
LEUKOCYTE ESTERASE UR QL STRIP.AUTO: NEGATIVE
LIPASE SERPL-CCNC: 227 U/L (ref 73–393)
LYMPHOCYTES # BLD: 4 K/UL (ref 0.5–4.6)
LYMPHOCYTES NFR BLD MANUAL: 11 % (ref 16–44)
MCH RBC QN AUTO: 28.4 PG (ref 26.1–32.9)
MCHC RBC AUTO-ENTMCNC: 35.7 G/DL (ref 31.4–35)
MCV RBC AUTO: 79.6 FL (ref 79.6–97.8)
METAMYELOCYTES NFR BLD MANUAL: 1 %
MONOCYTES # BLD: 0.7 K/UL (ref 0.1–1.3)
MONOCYTES NFR BLD MANUAL: 2 % (ref 3–9)
MUCOUS THREADS URNS QL MICRO: 0 /LPF
MYELOCYTES NFR BLD MANUAL: 1 %
NEUTS SEG # BLD: 31.8 K/UL (ref 1.7–8.2)
NEUTS SEG NFR BLD MANUAL: 85 % (ref 47–75)
NITRITE UR QL STRIP.AUTO: NEGATIVE
NRBC BLD-RTO: 1 PER 100 WBC
OTHER OBSERVATIONS,UCOM: ABNORMAL
PH UR STRIP: 6 [PH] (ref 5–9)
PLATELET # BLD AUTO: 184 K/UL (ref 150–450)
PLATELET COMMENTS,PCOM: ADEQUATE
PMV BLD AUTO: 9.6 FL (ref 10.8–14.1)
POTASSIUM SERPL-SCNC: 3.8 MMOL/L (ref 3.5–5.1)
PROT SERPL-MCNC: 6 G/DL (ref 6.3–8.2)
PROT UR STRIP-MCNC: 100 MG/DL
RBC # BLD AUTO: 2.99 M/UL (ref 4.05–5.25)
RBC #/AREA URNS HPF: >100 /HPF
RBC MORPH BLD: ABNORMAL
RBC MORPH BLD: ABNORMAL
SODIUM SERPL-SCNC: 149 MMOL/L (ref 136–145)
SP GR UR REFRACTOMETRY: 1.04 (ref 1–1.02)
UROBILINOGEN UR QL STRIP.AUTO: 1 EU/DL (ref 0.2–1)
WBC # BLD AUTO: 36.5 K/UL (ref 4.3–11.1)
WBC MORPH BLD: ABNORMAL
WBC URNS QL MICRO: ABNORMAL /HPF
YEAST URNS QL MICRO: ABNORMAL

## 2017-05-19 PROCEDURE — 77030026045 HC HNDPC IRR PULS STRY -C: Performed by: COLON & RECTAL SURGERY

## 2017-05-19 PROCEDURE — 80053 COMPREHEN METABOLIC PANEL: CPT | Performed by: INTERNAL MEDICINE

## 2017-05-19 PROCEDURE — 87081 CULTURE SCREEN ONLY: CPT | Performed by: INTERNAL MEDICINE

## 2017-05-19 PROCEDURE — 88305 TISSUE EXAM BY PATHOLOGIST: CPT | Performed by: COLON & RECTAL SURGERY

## 2017-05-19 PROCEDURE — 83690 ASSAY OF LIPASE: CPT | Performed by: INTERNAL MEDICINE

## 2017-05-19 PROCEDURE — 74011250636 HC RX REV CODE- 250/636

## 2017-05-19 PROCEDURE — 76010000153 HC OR TIME 1.5 TO 2 HR: Performed by: COLON & RECTAL SURGERY

## 2017-05-19 PROCEDURE — 74011000250 HC RX REV CODE- 250: Performed by: INTERNAL MEDICINE

## 2017-05-19 PROCEDURE — 87176 TISSUE HOMOGENIZATION CULTR: CPT | Performed by: INTERNAL MEDICINE

## 2017-05-19 PROCEDURE — 36592 COLLECT BLOOD FROM PICC: CPT

## 2017-05-19 PROCEDURE — 74011000258 HC RX REV CODE- 258: Performed by: INTERNAL MEDICINE

## 2017-05-19 PROCEDURE — 77030008703 HC TU ET UNCUF COVD -A: Performed by: ANESTHESIOLOGY

## 2017-05-19 PROCEDURE — 87116 MYCOBACTERIA CULTURE: CPT | Performed by: INTERNAL MEDICINE

## 2017-05-19 PROCEDURE — 77030020782 HC GWN BAIR PAWS FLX 3M -B: Performed by: ANESTHESIOLOGY

## 2017-05-19 PROCEDURE — 87102 FUNGUS ISOLATION CULTURE: CPT | Performed by: INTERNAL MEDICINE

## 2017-05-19 PROCEDURE — 65610000001 HC ROOM ICU GENERAL

## 2017-05-19 PROCEDURE — 74011250636 HC RX REV CODE- 250/636: Performed by: INTERNAL MEDICINE

## 2017-05-19 PROCEDURE — 82962 GLUCOSE BLOOD TEST: CPT

## 2017-05-19 PROCEDURE — 77030018836 HC SOL IRR NACL ICUM -A: Performed by: COLON & RECTAL SURGERY

## 2017-05-19 PROCEDURE — 74011636637 HC RX REV CODE- 636/637: Performed by: INTERNAL MEDICINE

## 2017-05-19 PROCEDURE — 87205 SMEAR GRAM STAIN: CPT | Performed by: INTERNAL MEDICINE

## 2017-05-19 PROCEDURE — 87110 CHLAMYDIA CULTURE: CPT | Performed by: INTERNAL MEDICINE

## 2017-05-19 PROCEDURE — 81025 URINE PREGNANCY TEST: CPT

## 2017-05-19 PROCEDURE — 81001 URINALYSIS AUTO W/SCOPE: CPT | Performed by: INTERNAL MEDICINE

## 2017-05-19 PROCEDURE — 77030008477 HC STYL SATN SLP COVD -A: Performed by: ANESTHESIOLOGY

## 2017-05-19 PROCEDURE — A9577 INJ MULTIHANCE: HCPCS | Performed by: INTERNAL MEDICINE

## 2017-05-19 PROCEDURE — 85018 HEMOGLOBIN: CPT | Performed by: INTERNAL MEDICINE

## 2017-05-19 PROCEDURE — 76060000035 HC ANESTHESIA 2 TO 2.5 HR: Performed by: COLON & RECTAL SURGERY

## 2017-05-19 PROCEDURE — 74011250636 HC RX REV CODE- 250/636: Performed by: COLON & RECTAL SURGERY

## 2017-05-19 PROCEDURE — 82550 ASSAY OF CK (CPK): CPT | Performed by: INTERNAL MEDICINE

## 2017-05-19 PROCEDURE — 74011000250 HC RX REV CODE- 250

## 2017-05-19 PROCEDURE — 87040 BLOOD CULTURE FOR BACTERIA: CPT | Performed by: INTERNAL MEDICINE

## 2017-05-19 PROCEDURE — 72197 MRI PELVIS W/O & W/DYE: CPT

## 2017-05-19 PROCEDURE — 87491 CHLMYD TRACH DNA AMP PROBE: CPT | Performed by: INTERNAL MEDICINE

## 2017-05-19 PROCEDURE — 0DJD8ZZ INSPECTION OF LOWER INTESTINAL TRACT, VIA NATURAL OR ARTIFICIAL OPENING ENDOSCOPIC: ICD-10-PCS | Performed by: COLON & RECTAL SURGERY

## 2017-05-19 PROCEDURE — 85025 COMPLETE CBC W/AUTO DIFF WBC: CPT | Performed by: INTERNAL MEDICINE

## 2017-05-19 PROCEDURE — 83605 ASSAY OF LACTIC ACID: CPT | Performed by: INTERNAL MEDICINE

## 2017-05-19 PROCEDURE — 74011250636 HC RX REV CODE- 250/636: Performed by: OBSTETRICS & GYNECOLOGY

## 2017-05-19 PROCEDURE — 77030011640 HC PAD GRND REM COVD -A: Performed by: COLON & RECTAL SURGERY

## 2017-05-19 PROCEDURE — 87106 FUNGI IDENTIFICATION YEAST: CPT | Performed by: INTERNAL MEDICINE

## 2017-05-19 PROCEDURE — 87077 CULTURE AEROBIC IDENTIFY: CPT | Performed by: INTERNAL MEDICINE

## 2017-05-19 PROCEDURE — 74011250636 HC RX REV CODE- 250/636: Performed by: ANESTHESIOLOGY

## 2017-05-19 PROCEDURE — 87086 URINE CULTURE/COLONY COUNT: CPT | Performed by: INTERNAL MEDICINE

## 2017-05-19 PROCEDURE — 0KBN0ZZ EXCISION OF RIGHT HIP MUSCLE, OPEN APPROACH: ICD-10-PCS | Performed by: COLON & RECTAL SURGERY

## 2017-05-19 PROCEDURE — 81015 MICROSCOPIC EXAM OF URINE: CPT | Performed by: INTERNAL MEDICINE

## 2017-05-19 PROCEDURE — 87076 CULTURE ANAEROBE IDENT EACH: CPT | Performed by: INTERNAL MEDICINE

## 2017-05-19 PROCEDURE — 87186 SC STD MICRODIL/AGAR DIL: CPT | Performed by: INTERNAL MEDICINE

## 2017-05-19 PROCEDURE — 74011000250 HC RX REV CODE- 250: Performed by: COLON & RECTAL SURGERY

## 2017-05-19 PROCEDURE — 36415 COLL VENOUS BLD VENIPUNCTURE: CPT | Performed by: INTERNAL MEDICINE

## 2017-05-19 PROCEDURE — 87075 CULTR BACTERIA EXCEPT BLOOD: CPT | Performed by: INTERNAL MEDICINE

## 2017-05-19 PROCEDURE — 76210000016 HC OR PH I REC 1 TO 1.5 HR: Performed by: COLON & RECTAL SURGERY

## 2017-05-19 RX ORDER — MORPHINE SULFATE 10 MG/ML
5 INJECTION, SOLUTION INTRAMUSCULAR; INTRAVENOUS
Status: DISCONTINUED | OUTPATIENT
Start: 2017-05-19 | End: 2017-06-05 | Stop reason: HOSPADM

## 2017-05-19 RX ORDER — FENTANYL CITRATE 50 UG/ML
INJECTION, SOLUTION INTRAMUSCULAR; INTRAVENOUS AS NEEDED
Status: DISCONTINUED | OUTPATIENT
Start: 2017-05-19 | End: 2017-05-19 | Stop reason: HOSPADM

## 2017-05-19 RX ORDER — DEXAMETHASONE SODIUM PHOSPHATE 4 MG/ML
INJECTION, SOLUTION INTRA-ARTICULAR; INTRALESIONAL; INTRAMUSCULAR; INTRAVENOUS; SOFT TISSUE AS NEEDED
Status: DISCONTINUED | OUTPATIENT
Start: 2017-05-19 | End: 2017-05-19 | Stop reason: HOSPADM

## 2017-05-19 RX ORDER — HYDROMORPHONE HYDROCHLORIDE 2 MG/ML
0.5 INJECTION, SOLUTION INTRAMUSCULAR; INTRAVENOUS; SUBCUTANEOUS
Status: DISCONTINUED | OUTPATIENT
Start: 2017-05-19 | End: 2017-05-19 | Stop reason: CLARIF

## 2017-05-19 RX ORDER — PROPOFOL 10 MG/ML
INJECTION, EMULSION INTRAVENOUS AS NEEDED
Status: DISCONTINUED | OUTPATIENT
Start: 2017-05-19 | End: 2017-05-19 | Stop reason: HOSPADM

## 2017-05-19 RX ORDER — ONDANSETRON 2 MG/ML
INJECTION INTRAMUSCULAR; INTRAVENOUS AS NEEDED
Status: DISCONTINUED | OUTPATIENT
Start: 2017-05-19 | End: 2017-05-19 | Stop reason: HOSPADM

## 2017-05-19 RX ORDER — HYDROMORPHONE HYDROCHLORIDE 2 MG/ML
0.5 INJECTION, SOLUTION INTRAMUSCULAR; INTRAVENOUS; SUBCUTANEOUS
Status: DISCONTINUED | OUTPATIENT
Start: 2017-05-19 | End: 2017-05-19 | Stop reason: HOSPADM

## 2017-05-19 RX ORDER — INSULIN LISPRO 100 [IU]/ML
INJECTION, SOLUTION INTRAVENOUS; SUBCUTANEOUS EVERY 6 HOURS
Status: DISCONTINUED | OUTPATIENT
Start: 2017-05-19 | End: 2017-05-20

## 2017-05-19 RX ORDER — SODIUM CHLORIDE 0.9 % (FLUSH) 0.9 %
10 SYRINGE (ML) INJECTION
Status: COMPLETED | OUTPATIENT
Start: 2017-05-19 | End: 2017-05-19

## 2017-05-19 RX ORDER — SUCCINYLCHOLINE CHLORIDE 20 MG/ML
INJECTION INTRAMUSCULAR; INTRAVENOUS AS NEEDED
Status: DISCONTINUED | OUTPATIENT
Start: 2017-05-19 | End: 2017-05-19 | Stop reason: HOSPADM

## 2017-05-19 RX ORDER — SODIUM CHLORIDE, SODIUM LACTATE, POTASSIUM CHLORIDE, CALCIUM CHLORIDE 600; 310; 30; 20 MG/100ML; MG/100ML; MG/100ML; MG/100ML
25 INJECTION, SOLUTION INTRAVENOUS CONTINUOUS
Status: DISCONTINUED | OUTPATIENT
Start: 2017-05-19 | End: 2017-05-19 | Stop reason: HOSPADM

## 2017-05-19 RX ORDER — ROCURONIUM BROMIDE 10 MG/ML
INJECTION, SOLUTION INTRAVENOUS AS NEEDED
Status: DISCONTINUED | OUTPATIENT
Start: 2017-05-19 | End: 2017-05-19 | Stop reason: HOSPADM

## 2017-05-19 RX ADMIN — HYDROMORPHONE HYDROCHLORIDE 0.5 MG: 2 INJECTION, SOLUTION INTRAMUSCULAR; INTRAVENOUS; SUBCUTANEOUS at 21:24

## 2017-05-19 RX ADMIN — HEPARIN SODIUM 5000 UNITS: 5000 INJECTION, SOLUTION INTRAVENOUS; SUBCUTANEOUS at 14:33

## 2017-05-19 RX ADMIN — INSULIN LISPRO 3 UNITS: 100 INJECTION, SOLUTION INTRAVENOUS; SUBCUTANEOUS at 06:15

## 2017-05-19 RX ADMIN — INSULIN LISPRO 3 UNITS: 100 INJECTION, SOLUTION INTRAVENOUS; SUBCUTANEOUS at 10:03

## 2017-05-19 RX ADMIN — PROPOFOL 50 MG: 10 INJECTION, EMULSION INTRAVENOUS at 20:07

## 2017-05-19 RX ADMIN — ROCURONIUM BROMIDE 5 MG: 10 INJECTION, SOLUTION INTRAVENOUS at 18:48

## 2017-05-19 RX ADMIN — HEPARIN SODIUM 5000 UNITS: 5000 INJECTION, SOLUTION INTRAVENOUS; SUBCUTANEOUS at 22:06

## 2017-05-19 RX ADMIN — FENTANYL CITRATE 100 MCG: 50 INJECTION, SOLUTION INTRAMUSCULAR; INTRAVENOUS at 18:48

## 2017-05-19 RX ADMIN — INSULIN LISPRO 9 UNITS: 100 INJECTION, SOLUTION INTRAVENOUS; SUBCUTANEOUS at 23:58

## 2017-05-19 RX ADMIN — FENTANYL CITRATE 50 MCG: 50 INJECTION, SOLUTION INTRAMUSCULAR; INTRAVENOUS at 19:09

## 2017-05-19 RX ADMIN — FLUCONAZOLE 200 MG: 2 INJECTION INTRAVENOUS at 14:33

## 2017-05-19 RX ADMIN — PIPERACILLIN SODIUM,TAZOBACTAM SODIUM 3.38 G: 3; .375 INJECTION, POWDER, FOR SOLUTION INTRAVENOUS at 23:48

## 2017-05-19 RX ADMIN — HEPARIN SODIUM 5000 UNITS: 5000 INJECTION, SOLUTION INTRAVENOUS; SUBCUTANEOUS at 05:50

## 2017-05-19 RX ADMIN — DEXAMETHASONE SODIUM PHOSPHATE 10 MG: 4 INJECTION, SOLUTION INTRA-ARTICULAR; INTRALESIONAL; INTRAMUSCULAR; INTRAVENOUS; SOFT TISSUE at 19:02

## 2017-05-19 RX ADMIN — Medication 10 ML: at 14:33

## 2017-05-19 RX ADMIN — Medication 10 ML: at 22:02

## 2017-05-19 RX ADMIN — DOXYCYCLINE 100 MG: 100 INJECTION, POWDER, LYOPHILIZED, FOR SOLUTION INTRAVENOUS at 22:06

## 2017-05-19 RX ADMIN — GADOBENATE DIMEGLUMINE 10 ML: 529 INJECTION, SOLUTION INTRAVENOUS at 09:30

## 2017-05-19 RX ADMIN — PIPERACILLIN SODIUM,TAZOBACTAM SODIUM 3.38 G: 3; .375 INJECTION, POWDER, FOR SOLUTION INTRAVENOUS at 16:21

## 2017-05-19 RX ADMIN — DOXYCYCLINE 100 MG: 100 INJECTION, POWDER, LYOPHILIZED, FOR SOLUTION INTRAVENOUS at 11:10

## 2017-05-19 RX ADMIN — SUCCINYLCHOLINE CHLORIDE 140 MG: 20 INJECTION INTRAMUSCULAR; INTRAVENOUS at 18:49

## 2017-05-19 RX ADMIN — SODIUM CHLORIDE 100 ML/HR: 450 INJECTION, SOLUTION INTRAVENOUS at 00:04

## 2017-05-19 RX ADMIN — INSULIN GLARGINE 20 UNITS: 100 INJECTION, SOLUTION SUBCUTANEOUS at 10:03

## 2017-05-19 RX ADMIN — SODIUM CHLORIDE, SODIUM LACTATE, POTASSIUM CHLORIDE, AND CALCIUM CHLORIDE: 600; 310; 30; 20 INJECTION, SOLUTION INTRAVENOUS at 20:10

## 2017-05-19 RX ADMIN — Medication 10 ML: at 09:30

## 2017-05-19 RX ADMIN — ONDANSETRON 4 MG: 2 INJECTION INTRAMUSCULAR; INTRAVENOUS at 19:47

## 2017-05-19 RX ADMIN — PIPERACILLIN SODIUM,TAZOBACTAM SODIUM 3.38 G: 3; .375 INJECTION, POWDER, FOR SOLUTION INTRAVENOUS at 08:00

## 2017-05-19 RX ADMIN — PROPOFOL 150 MG: 10 INJECTION, EMULSION INTRAVENOUS at 18:48

## 2017-05-19 RX ADMIN — INSULIN LISPRO 6 UNITS: 100 INJECTION, SOLUTION INTRAVENOUS; SUBCUTANEOUS at 03:35

## 2017-05-19 RX ADMIN — Medication 10 ML: at 05:50

## 2017-05-19 RX ADMIN — FENTANYL CITRATE 50 MCG: 50 INJECTION, SOLUTION INTRAMUSCULAR; INTRAVENOUS at 19:45

## 2017-05-19 RX ADMIN — SODIUM CHLORIDE 100 ML/HR: 450 INJECTION, SOLUTION INTRAVENOUS at 10:06

## 2017-05-19 RX ADMIN — SODIUM CHLORIDE, SODIUM LACTATE, POTASSIUM CHLORIDE, AND CALCIUM CHLORIDE 25 ML/HR: 600; 310; 30; 20 INJECTION, SOLUTION INTRAVENOUS at 17:51

## 2017-05-19 NOTE — PROGRESS NOTES
Spoke with Dr. Dae Victor regarding patients MRI. Orders to obtain consent for rectal exam with anesthesia with possible abscess drainage. Dr Juan Zaragoza notified of procedure. Dr. Juan Zaragoza stated he would like for patient to remain in ICU after procedure for close monitoring.

## 2017-05-19 NOTE — PROGRESS NOTES
Dr. Jcarlos Henson notified of Lactic Acid of 2.5, orders received to continue current treatment and to recheck 6 hours from now. Order placed.

## 2017-05-19 NOTE — PERIOP NOTES
Consent obtained on the floor, mother signed. On arrival to pre-op pt lethargic, responsive to verbal stimuli and able to confirm plans for procedure. Pt states okay for mother to sign consents.

## 2017-05-19 NOTE — PROGRESS NOTES
Hospitalist Progress Note    2017  Admit Date: 2017  3:14 PM   NAME: Zina Mendoza   :  1985   MRN:  [id number]   Attending: Dinora Roman MD  PCP:  Diamond Natarajan MD    SUBJECTIVE:   34yo F with no significant PMH presented with worsening Rt Flank pain, nausea, multiple episodes of vomiting and weakness. She was seen here in ER 1 week ago with back pain and diagnosed with Sciatica and given NSAIDs which she took without much relief. Yesterday her pain worsened and she took extra pain meds with about 4 glasses of Wine per . She then started having vomiting and abd discomfort that continued today and she came to ER. Found to be in DKA in ER with Na 115, K 6.0, LA 7.4, Bicarb 9, Ph 7.22, Cr 2.51 and Ck > 4000, WBC >40k, Hgb 10.8 with elevated LFTs. CT abdomen showed Pelvic mass concerning for malignancy. : Opened Gap again, required Insulin gtt overnight and now off of it, AG closed again, Hgb dropped to 6.1. Persistently sinus tach above 120. Feels better though. , Feels better, less pain, Sugars better, off insulin gtt, Labs improving. Nursing notes and chart reviewed. Review of Systems negative with exception of pertinent positives noted above.     PHYSICAL EXAM     Visit Vitals    /64    Pulse (!) 118    Temp 98.6 °F (37 °C)    Resp 18    Ht 5' 2\" (1.575 m)    Wt 89.6 kg (197 lb 8.5 oz)    LMP 2017    SpO2 100%    BMI 36.13 kg/m2      Temp (24hrs), Av.1 °F (37.3 °C), Min:98.6 °F (37 °C), Max:99.5 °F (37.5 °C)    Oxygen Therapy  O2 Sat (%): 100 % (17 0602)  Pulse via Oximetry: 118 beats per minute (17 0602)  O2 Device: Room air (17 1943)  O2 Flow Rate (L/min): 3 l/min (17 1545)  ETCO2 (mmHg): 12 mmHg (17 1645)    Intake/Output Summary (Last 24 hours) at 17 1105  Last data filed at 17 0603   Gross per 24 hour   Intake          3905.03 ml   Output             1425 ml   Net          2480.03 ml General: Mild distress. Alert.    Lungs:  CTABL. Heart:  RRR, no murmur, rub, or gallop, tachycardia  Abdomen: Soft, non-distended, non-tender, +bs  Extremities: No cyanosis or clubbing. Neurologic:  No focal deficits. Moves all extremities. Musculoskeletal: Tender Rt lower back  :   Large area of excoriations around her vagina and perineum. LABS AND STUDIES:  Personally reviewed all labs, meds, and studies for past 24hrs. 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 · Pelvic Mass/Abscess: s/p IR drain, going for Surgery tonight, on IV Morphine  · NALDO: Resolved, will c/w IVF   · Sepsis: Likely due to perirectal/Gluteal Abscess vs ? PID (she has +ve serology for STDs). Blood and urine Cx pending. C/w VAnc and zosyn for now, on Doxy for Chlamydia, ID consulted and started on Acyclovir, Gyn started Fluconazole. LA normalized and procal down trending. · Vaginal Lesions with Suspected PID: GYN consulted. Appreciate recs  · Rhabdomyolysis: Improving, on IVF  · Acute Pancreatitis: resolved, start feeds post Op. · Anemia: Acute likely 2/2 hematoma. Transfused 2 U PRBCs. H&H stable. · Hyponatremia and Hyperkalemia: Resolved. Dispo: Monitor in ICU. Has central line and may need pressors. High risk given DKA, Sepsis, Pelvic Mass, NALDO and Rhabdo.      DVT ppx: hsq  Discussed plan with pt and  who is in agreement. All questions answered. Critical care time spent was 35 mins.      Signed By: Kvng Ackerman MD     May 19, 2017

## 2017-05-19 NOTE — INTERDISCIPLINARY ROUNDS
Interdisciplinary team rounds were held 5/19/2017 with the following team members:Care Management, Nursing, Nurse Practitioner, Nutrition, Palliative Care, Pastoral Care, Pharmacy, Physical Therapy, Physician, Respiratory Therapy and Clinical Coordinator and the patient. Plan of care discussed. See clinical pathway and/or care plan for interventions and desired outcomes.

## 2017-05-19 NOTE — PROGRESS NOTES
[facility] OB/GYN    MRI reviewed do not see GYN source of abscess + chlamydia has been treated with current medication, do not know that we have anything to add to patient's care at this time. Please notify if any questions or concerns.

## 2017-05-19 NOTE — PROGRESS NOTES
Reviewed MRI but the official read is not back yet. Intra-pelvic fluid has been drained by IR drain. Drain not seen (?). There remains a pocket in the right gluteus, with extensive soft tissue edema and inflammation. Tracking into right gluteus muscle? I discussed with patient and mother. I recommend EUA with ultrasound evaluation of buttock. Will plan probable I&D of buttock collection, debridement of soft leathery eschar, and exam internally for any pathology. She and mother understand that today's exam may be unrevealing and not answer all of our questions regarding etiology, etc.  Risks include bleeding, damage to sphincter, incomplete treatment of pathology, need for further procedures.     Lida Solorio MD

## 2017-05-19 NOTE — PROGRESS NOTES
Infectious Disease Consult    Today's Date: 2017   Admit Date: 2017    Impression:   · Large pelvic abscess; 20 cc purulent fluid drained 17  · Anaerobic GPR bacteremia  · Chlamydia cervicitis  · Severe inguinal yeast infection  · DKA (A1C 14), resolved  · Pancreatitis, resolved        Plan:   · Continue Zosyn/Doxycycline  · Continue fluconazole  · RPR pending  · Followup abscess culture result  · Followup blood culture ID/sens  · Repeat blood culture today    Anti-infectives:     Piperacillin/tazobactam  Doxycycline  Fluconazole  Acyclovir    Subjective:   Date of Consultation:  May 19, 2017  Referring Physician: Di Franklin    She had drainage of pelvic abscess yesterday. Culture is pending. Pelvic pain is improved. She has no new complaints. Nausea and vomiting has resolved. 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 Objective:     Visit Vitals    /64    Pulse (!) 118    Temp 98.6 °F (37 °C)    Resp 18    Ht 5' 2\" (1.575 m)    Wt 89.6 kg (197 lb 8.5 oz)    SpO2 100%    BMI 36.13 kg/m2     Temp (24hrs), Av.1 °F (37.3 °C), Min:98.6 °F (37 °C), Max:99.5 °F (37.5 °C)       Lines:  Central Venous Catheter:            Physical Exam:    General:  Awake, alert; appears more comfortable today. Eyes:  Sclera anicteric. Pupils equally round and reactive to light.    Mouth/Throat: Mucous membranes normal, oral pharynx clear   Neck: Supple   Lungs:   Clear to auscultation bilaterally, good effort   CV:  Regular rate and rhythm,no murmur, click, rub or gallop   Abdomen:   Soft, non-tender. bowel sounds normal. non-distended; Right posterior pelvis drainage catheter with small serous drainage in bulb syringe   Extremities: No cyanosis or edema   Skin: Skin color, texture, turgor normal. no acute rash or lesions   Lymph nodes: Cervical and supraclavicular normal   Musculoskeletal: No swelling or deformity   Lines/Devices:  Intact, no erythema, drainage or tenderness   : Yeast with fissures in bilateral inguinal intertriginous areas       Data Review:     CBC:  Recent Labs      05/19/17 0325 05/18/17   2045  05/18/17   1042   05/18/17   0536  05/17/17   0330   WBC  36.5*   --    --    --   37.0*  32.7*   GRANS  85*   --    --    --   77*  85*   MONOS  2*   --    --    --   6  5   EOS   --    --    --    --   1   --    ANEU  31.8*   --    --    --   29.2*  28.5*   ABL  4.0   --    --    --   5.2*  2.6   HGB  8.5*  8.5*  6.3*   < >  6.5*  8.2*   HCT  23.8*  24.1*  17.6*   < >  18.0*  22.7*   PLT  184   --    --    --   253  304    < > = values in this interval not displayed.        BMP:  Recent Labs      05/19/17 0325 05/18/17 0536 05/17/17   2300   CREA  0.66  0.84  0.93   BUN  23  35*  45*   NA  149*  148*  145   K  3.8  3.7  4.4   CL  119*  119*  115*   CO2  19*  16*  11*   AGAP  11  13  19*   GLU  202*  216*  330*       LFTS:  Recent Labs      05/19/17 0325 05/18/17   0536 05/17/17   1145   TBILI  0.4  0.3  0.2   ALT  32  35  29   SGOT  50*  63*  82*   AP  136  108  90   TP  6.0*  5.5*  3.6*   ALB  1.0*  0.9*  0.6*       Microbiology:     All Micro Results     Procedure Component Value Units Date/Time    CULTURE, BODY FLUID Ronnald Jong STAIN [[id number]] Collected:  05/18/17 1640    Order Status:  Completed Updated:  05/18/17 1817    FUNGUS CULTURE AND SMEAR [[id number]] Collected:  05/18/17 1640    Order Status:  Completed Updated:  05/18/17 1815    CULTURE, BLOOD [[id number]] Collected:  05/16/17 1736    Order Status:  Completed Specimen:  Blood from Blood Updated:  05/18/17 1313     Special Requests: RIGHT ANTECUBITAL        GRAM STAIN GRAM POSITIVE RODS         ANAEROBIC BOTTLE POSITIVE                 CRITICAL RESULT NOT CALLED DUE TO PREVIOUS NOTIFICATION OF CRITICAL RESULT WITHIN THE LAST 24 HOURS. Culture result:         CULTURE IN PROGRESS,FURTHER UPDATES TO FOLLOW    CULTURE, BLOOD [[id number]] Collected:  05/16/17 1730    Order Status:  Completed Specimen:  Blood from Blood Updated:  05/18/17 1035     Special Requests: LEFT ANTECUBITAL        GRAM STAIN GRAM POSITIVE RODS         ANAEROBIC BOTTLE POSITIVE               RESULTS VERIFIED, PHONED TO AND READ BACK BY  Fazal Amato RN @ [id number] BY SP       Culture result:         CULTURE IN PROGRESS,FURTHER UPDATES TO FOLLOW    CULTURE, HSV W/ TYPING [[id number]] Collected:  05/17/17 1710    Order Status:  Completed Updated:  05/17/17 1722    CULTURE, HSV W/ TYPING [[id number]]     Order Status:  Canceled     MRSA SCREEN - PCR (NASAL) [[id number]] Collected:  05/16/17 2025    Order Status:  Completed Specimen:  Nasal from Nasal Swab Updated:  05/16/17 2157     Special Requests: NO SPECIAL REQUESTS        Culture result:         MRSA target DNA is not detected (presumptive not colonized with MRSA)          Imaging:     CT ABD PELV WO CONT (Final result) Result time: 05/16/17 20:07:05     Final result     Impression:     IMPRESSION:  Large incompletely characterized pelvic mass with apparent  involvement/extension into the right gluteal muscles. The findings presumably  represent a neoplastic process. Pelvic MRI may be beneficial for further  delineation as it relates to other pelvic anatomy.  Preferably this would be done  with IV contrast.     CT abd/pelv with contrast (5/17/17)  IMPRESSION: Indeterminate perirectal mass with extension of heterogeneous,  low-attenuation soft tissue into the right buttock an asymmetric edema and  muscular swelling and the right hemipelvis. Right inguinal adenopathy is  present. This may be a perirectal abscess or necrotic tumor with extrapelvic  extension.     Signed By: Otoniel Ferrara MD     May 19, 2017

## 2017-05-19 NOTE — PROGRESS NOTES
TRANSFER - OUT REPORT:    Verbal report given to RN(name) on Zina Mendoza  being transferred to Pre OP(unit) for routine progression of care       Report consisted of patients Situation, Background, Assessment and   Recommendations(SBAR). Information from the following report(s) SBAR, Kardex, ED Summary, Intake/Output, MAR and Recent Results was reviewed with the receiving nurse. Lines:   Quad Lumen 05/16/17 Left Subclavian (Active)   Central Line Being Utilized Yes 5/19/2017  8:02 AM   Criteria for Appropriate Use Hemodynamically unstable, requiring monitoring lines, vasopressors, or volume resuscitation 5/19/2017  8:02 AM   Site Assessment Clean, dry, & intact 5/19/2017  8:02 AM   Infiltration Assessment 0 5/19/2017  8:02 AM   Affected Extremity/Extremities Color distal to insertion site pink (or appropriate for race); Pulses palpable;Range of motion performed 5/19/2017  8:02 AM   Date of Last Dressing Change 05/17/17 5/19/2017  8:02 AM   Dressing Status Clean, dry, & intact 5/19/2017  8:02 AM   Dressing Type Disk with Chlorhexadine gluconate (CHG); Transparent 5/19/2017  8:02 AM   Action Taken Zeroed/Rezeroed 5/18/2017  7:25 AM   Proximal Hub Color/Line Status White;Patent; Flushed; Infusing 5/19/2017  8:02 AM   Positive Blood Return (Medial Site) Yes 5/19/2017  8:02 AM   Medial 1 Hub Color/Line Status Gray;Patent; Flushed; Infusing 5/19/2017  8:02 AM   Positive Blood Return (Lateral Site) Yes 5/19/2017  8:02 AM   Medial 2 Hub Color/Line Status Blue;Patent; Flushed 5/19/2017  8:02 AM   Positive Blood Return (Site #3) Yes 5/19/2017  8:02 AM   Distal Hub Color/Line Status Brown;Patent; Flushed; Infusing 5/19/2017  8:02 AM   Positive Blood Return (Site #4) Yes 5/19/2017  8:02 AM   Alcohol Cap Used No 5/19/2017  8:02 AM       Peripheral IV 05/16/17 Left Antecubital (Active)   Site Assessment Clean, dry, & intact 5/19/2017  8:02 AM   Phlebitis Assessment 0 5/19/2017  8:02 AM   Infiltration Assessment 0 5/19/2017 8:02 AM   Dressing Status Clean, dry, & intact 5/19/2017  8:02 AM   Dressing Type Transparent;Tape 5/19/2017  8:02 AM   Hub Color/Line Status Patent; Flushed 5/19/2017  8:02 AM   Alcohol Cap Used No 5/19/2017  8:02 AM        Opportunity for questions and clarification was provided.       Patient transported with:   Monitor  Registered Nurse

## 2017-05-19 NOTE — ANESTHESIA PREPROCEDURE EVALUATION
Anesthetic History   No history of anesthetic complications            Review of Systems / Medical History  Patient summary reviewed and pertinent labs reviewed    Pulmonary  Within defined limits                 Neuro/Psych   Within defined limits           Cardiovascular  Within defined limits                Exercise tolerance: >4 METS  Comments: No cardiac history   GI/Hepatic/Renal  Within defined limits              Endo/Other    Diabetes: poorly controlled, using insulin         Other Findings   Comments: DKA resolved  Rhabdomyosarcoma  Sepsis  Chlamydia  Pelvic Mass           Physical Exam    Airway  Mallampati: III  TM Distance: 4 - 6 cm  Neck ROM: normal range of motion        Cardiovascular    Rhythm: regular  Rate: normal         Dental    Dentition: Poor dentition     Pulmonary  Breath sounds clear to auscultation               Abdominal  GI exam deferred       Other Findings            Anesthetic Plan    ASA: 3  Anesthesia type: general          Induction: Intravenous  Anesthetic plan and risks discussed with: Patient

## 2017-05-19 NOTE — PROGRESS NOTES
Problem: Nutrition Deficit  Goal: *Optimize nutritional status  Nutrition  Reason for assessment: NPO/CLQ Day 3. Assessment:   Diet order(s): 5/17 NPO; 5/18 CLQ  Food/Nutrition Patient History:  Pt with DKA, rhabdomyolysis, NALDO, pancreatitis, and sepsis s/p CT 5/17 indicating pelvic mass as necrotic tumor or abscess, s/p CT guided IR drain placed into pelvic fluid collection. RN reports awaiting confirmation from Dr. Alma Espino regarding potential EUA tomorrow pending MRI results today. Pt reports feeling hungry with xerostomia, and has not yet received CLQ meal tray d/t awaiting procedure. Pt and her mother in the room stated that they briefly looked at diabetes education materials left earlier by Diabetes Educator, and that they did not have any questions regarding materials. Anthropometrics:Height: 5' 2\" (157.5 cm),  Weight: 89.6 kg (197 lb 8.5 oz), Source not specified, Body mass index is 36.13 kg/(m^2). BMI class of obesity class II. Macronutrient needs:  EER:  1602-0657 kcal /day (15-20 kcal/kg BW) (expect this should produce wt loss)  EPR:  60-75 grams protein/day (1.2-1.5 grams/kg IBW)  Intake/Comparative Standards: No intake since admission. NPO/CLQ do not provide adequate energy and protein to meet needs. Nutrition Diagnosis: Inadequate oral intake r/t decreased ability to consume sufficient oral intake as evidenced by planned procedures requiring prior NPO status. Intervention:  Meals and snacks: Continue current diet. Advance as tolerated or per MD recommendations. Nutrition Supplement Therapy: Ensure Clear TID while pt remains on CLQ   Coordination of Nutrition Care: AM ICU Interdisciplinary rounds; Collaboration with KEVIN Echeverria  Requested mouth swabs for alleviation of xerostomia. Encouraged pt and her mother to read through diabetes education materials and write down any questions for Diabetes Educator prior to further education attempts next week.  Emphasized need for controlled BG for reduced risks of complications secondary to uncontrolled-DM.      Joey Etienne MS (Dietetic Intern)

## 2017-05-19 NOTE — PERIOP NOTES
TRANSFER - IN REPORT:    Verbal report received from Carol Arteaga RN(name) on Shey Hobbsh  being received from ICU(unit) for ordered procedure      Report consisted of patients Situation, Background, Assessment and   Recommendations(SBAR). Information from the following report(s) SBAR, Kardex, [facility] and Recent Results was reviewed with the receiving nurse. Opportunity for questions and clarification was provided. Assessment completed upon patients arrival to unit and care assumed.

## 2017-05-19 NOTE — DIABETES MGMT
Pt admitted 5/16/17 with DKA, rhabdomyolysis, NALDO, pancreatitis, lactic acidosis, and sepsis is seen by RN BERNARDAE for diabetes education. A1C 14.0. Was initially on insulin gtt per Amado Jose, but is now transitioned to sub Q insulin regimen. S/p image guided drain placement for pelvic abscess yesterday. Receiving antibiotics. Pt and mother at bedside deny any previous h/o hyperglycemia or diabetes, although glucose lab result from [address] in 2014 was 420. Reviewed with pt and mother current insulin regimen of Lantus 20 units daily and Humalog per sliding scale (insulin resistant scale) changed now from q3h to q 6h. Pt does not make eye contact during conversation, but is looking at cell phone. Does not engage in conversation unless directly asked a question. Pt's mother verbalizes understanding. Blood glucose ranged 205-339 yesterday with total of 65 units insulin given (Lantus 20 units; Humalog 45 units) and 165-207 so far today. Pt's mother had been given educational material, \" Survival Skills for Diabetes Management\", but she had taken information home to read. Pt given another copy of educational material, \"Survival Skills for Diabetes Management\", which RN CDE attempted to review with pt. Explained basic physiology of diabetes and described the effects of poor glycemic control on the development of long-term complications such as renal, eye, nerve, and cardiovascular disease. Also explained the relationship between hyperglycemia and infection. Discussed target goals for blood glucose and A1C. Pt is not participating in education at this time, but pt's mother verbalizes understanding. Pt will require glucometer and insulin instruction, but declines to participate in both at this time. Plan is for continued diabetes education next week, as pt is willing to participate. Pt and her mother have no questions at this time. Nursing will continue to monitor blood glucose.

## 2017-05-19 NOTE — PROGRESS NOTES
Andres Patel M.D. Colon and Rectal Surgeon  [address], [address] [place] [address], [facility]   Phone: [phone] Fax: [phone]      Thompson Matson  [id number]    SUBJECTIVE:  29year old female admitted with DKA, rhabdomyolysis, NALDO, pancreatitis, lactic acidosis, leukocytosis, and sepsis. On CT, she was found to have a large pelvic process (mass vs abscess) with tracking into right buttock. She has improved since admit with supportive care, fluid resuscitation, and antibiotics. She had an IR drain placed into her pelvic fluid collection. ROS:  General--no fevers, chills  Respiratory--no shortness of breath, wheezing  Cardiovascular--no chest pain, palpitations  GI--no nausea/emesis. No hematochezia/melena.   No pain    PHYSICAL EXAM:   Patient Vitals for the past 24 hrs:   BP Temp Pulse Resp SpO2   05/19/17 0602 115/64 - (!) 118 - 100 %   05/19/17 0532 108/66 - (!) 118 18 100 %   05/19/17 0502 106/58 - (!) 116 - 100 %   05/19/17 0432 108/56 - (!) 118 - 100 %   05/19/17 0402 106/55 - (!) 120 - 100 %   05/19/17 0332 108/54 - (!) 119 - 100 %   05/19/17 0330 122/59 98.6 °F (37 °C) (!) 121 23 100 %   05/19/17 0303 122/59 - (!) 121 23 100 %   05/19/17 0232 113/57 - (!) 120 25 100 %   05/19/17 0203 130/68 - (!) 119 25 99 %   05/19/17 0132 105/52 - (!) 116 26 99 %   05/19/17 0102 109/57 - (!) 113 20 99 %   05/19/17 0032 105/55 - (!) 122 - 99 %   05/19/17 0031 - - - 16 -   05/19/17 0001 106/51 - (!) 124 - 99 %   05/18/17 2344 103/57 99 °F (37.2 °C) (!) 127 18 100 %   05/18/17 2333 - - - 18 -   05/18/17 2332 103/57 - (!) 127 - 100 %   05/18/17 2301 101/54 - (!) 124 - 100 %   05/18/17 2254 108/55 - (!) 126 - 100 %   05/18/17 2247 108/65 - (!) 126 - 100 %   05/18/17 2232 112/63 - (!) 123 - 100 %   05/18/17 2217 108/57 - (!) 124 - 100 %   05/18/17 2201 115/54 - (!) 124 - 100 %   05/18/17 2147 115/58 - (!) 125 14 100 %   05/18/17 2132 114/57 - (!) 127 - 100 %   05/18/17 2117 131/57 - (!) 128 - 100 %   05/18/17 2116 - - - 21 -   05/18/17 2102 - - - 19 -   05/18/17 2101 112/57 - (!) 125 - 100 %   05/18/17 2047 112/56 - (!) 126 - 100 %   05/18/17 2032 103/54 - (!) 128 - 100 %   05/18/17 2017 110/58 - (!) 124 - 100 %   05/18/17 2001 115/59 - (!) 126 - 100 %   05/18/17 1943 114/59 98.7 °F (37.1 °C) (!) 126 26 100 %   05/18/17 1932 113/58 - (!) 127 - 100 %   05/18/17 1917 116/57 - (!) 126 21 100 %   05/18/17 1901 108/55 - (!) 126 - 100 %   05/18/17 1832 107/52 - (!) 127 (!) 0 100 %   05/18/17 1817 116/55 - (!) 127 (!) 0 100 %   05/18/17 1802 130/73 - (!) 128 22 100 %   05/18/17 1759 112/57 99.3 °F (37.4 °C) (!) 125 9 100 %   05/18/17 1747 111/56 - (!) 126 14 100 %   05/18/17 1741 123/59 99.1 °F (37.3 °C) (!) 127 22 100 %   05/18/17 1736 123/58 - (!) 126 18 100 %   05/18/17 1645 109/57 - (!) 125 20 100 %   05/18/17 1640 111/58 - (!) 125 20 100 %   05/18/17 1635 107/56 - (!) 125 20 100 %   05/18/17 1630 114/59 - (!) 125 20 100 %   05/18/17 1625 118/59 - (!) 129 20 100 %   05/18/17 1620 119/58 - (!) 132 22 100 %   05/18/17 1545 125/55 - (!) 135 22 100 %   05/18/17 1516 111/55 99.1 °F (37.3 °C) (!) 130 22 100 %   05/18/17 1435 111/44 99.5 °F (37.5 °C) (!) 130 28 100 %   05/18/17 1430 111/44 - (!) 130 25 100 %   05/18/17 1409 (!) 107/38 99.3 °F (37.4 °C) (!) 132 16 100 %   05/18/17 1330 108/52 - (!) 130 13 100 %   05/18/17 1230 113/52 - (!) 130 19 100 %   05/18/17 1205 114/52 98.9 °F (37.2 °C) (!) 130 (!) 32 100 %   05/18/17 1130 98/47 - (!) 132 (!) 36 100 %   05/18/17 1100 107/49 - (!) 130 16 100 %   05/18/17 1030 118/53 - (!) 135 (!) 39 100 %   05/18/17 1000 111/44 - (!) 128 25 100 %   05/18/17 0900 104/41 - (!) 130 22 100 %   05/18/17 0831 - - (!) 130 (!) 7 100 %   05/18/17 0830 105/42 - - - -   05/18/17 0800 100/42 - (!) 131 (!) 0 100 %   05/18/17 0745 104/45 - (!) 131 (!) 5 100 %   05/18/17 0730 101/41 98.2 °F (36.8 °C) (!) 132 23 100 %   05/18/17 0700 101/43 - (!) 130 29 100 % General--AAO, NAD, answers all questions, appears comfortable  Abdomen--soft, nontender, nondistended. No peritoneal signs, no rebound, no guarding  Rectal: moving the patient into the LLD position is slow and uncomfortable to her. There is a dry, leathery eschar on the right buttock. It appears stable in size. T here is extensive perianal maceration, moisture, excoriation, and skin breakdown. All the skin is tender to touch, as expected with this appearance. Outside of the leathery eschar, all tissues are viable and there is no evidence of marleni's type process. The left buttock/ischiorectal fossa is benign. There is tenderness over the right side, and some induration. I cannot feel fluctuance. I performed JOSE. JOSE itself is nontender, only manipulation of the perianal skin. I feel no distal rectal masses or lesions. Several solid stool balls present. No blood on my exam finger.       UOP: 600/200/550  ALEXANDER: nr/35/40--brownish thin fluid  BM: no    Recent Labs      05/19/17   0325  05/18/17   2045  05/18/17   1042   05/18/17   0536  05/17/17   0330   WBC  36.5*   --    --    --   37.0*  32.7*   HGB  8.5*  8.5*  6.3*   < >  6.5*  8.2*   HCT  23.8*  24.1*  17.6*   < >  18.0*  22.7*   PLT  184   --    --    --   253  304    < > = values in this interval not displayed.        Recent Labs      05/19/17   0325  05/18/17   0536  05/17/17   2300  05/17/17   1145   05/16/17   2050  05/16/17   1736   NA  149*  148*  145  147*   < >  127*   --    K  3.8  3.7  4.4  3.5   < >  5.2*   --    CL  119*  119*  115*  120*   < >  94*   --    CO2  19*  16*  11*  15*   < >  11*   --    BUN  23  35*  45*  43*   < >  56*   --    CREA  0.66  0.84  0.93  0.79   < >  2.08*   --    TBILI  0.4  0.3   --   0.2   --    --    --    SGOT  50*  63*   --   82*   --    --    --    ALT  32  35   --   29   --    --    --    AP  136  108   --   90   --    --    --    MG   --   2.3  2.3  1.5*   < >  2.5*   --    PHOS   --    --   2.0*   -- --   5.5*  6.9*    < > = values in this interval not displayed. Recent Labs      17   0325  17   0536  17   0330   LPSE  227  285  1877*       Recent Labs      17   1154   PTP  13.2*   INR  1.2   APTT  31.9*     Last lactate 2.3  Last procalcitonin 27.2  Last   RPR pending  Pelvic fluid culture 17--pending  HSV culture 17--pending  Blood cultures 17--GPR x2 bottles  Above labs reviewed by me. [id number]--CT abd/pelvis: \"IMPRESSION: Large incompletely characterized pelvic mass with apparent involvement/extension into the right gluteal muscles. The findings presumably represent a neoplastic process. Pelvic MRI may be beneficial for further delineation as it relates to other pelvic anatomy. Preferably this would be done with IV contrast.\"  17--CT chest/abd/pelvis: \"IMPRESSION: Indeterminate perirectal mass with extension of heterogeneous, low-attenuation soft tissue into the right buttock an asymmetric edema and muscular swelling and the right hemipelvis. Right inguinal adenopathy is present. This may be a perirectal abscess or necrotic tumor with extrapelvic extension. \"  17--CT guided IR drain placement:     ASSESSMENT:   Pelvic mass, possible abscess vs necrotic tumor  Sepsis, due to above  Leukocytosis, due to above  DKA--improved  Rhabdomyolysis--resolved  NALDO--resolved  Pancreatitis--resolved  Anemia--   S/p transfusion 2 units PRBC 17  Hypernatremia  Elevated LFTs  Hypophosphatemia    PLAN:  This remains a very confusing picture. Source of this process remains unclear. GI malignancy would be very uncommon in a young person such as her. By report, she saw her GYN as an outpatient within the last week and did not have suggestion of malignant pathology seen on that exam. Benign process such as abscess would be statistically more likely, but a supralevator abscess of anorectal origin is very uncommon.  Dr Iwona Juan felt that [facility] or [facility] less likely. If this is an abscess, it is also not clear if this is pelvic pathology tracking down into gluteus, or perianal/gluteal process tracking up into the pelvis. Further confusing the picture is that the fluid drained by IR is relatively serous in consistency. It is not thick opaque purulence as I would expect from an anorectal abscess. Follow up pelvic MRI. She may also ultimately benefit from EUA to evaluate anus, rectum, and vagina for possible inciting cause. Continue supportive/conservative care. Will follow.     Maryana Hathaway MD

## 2017-05-20 LAB
ALBUMIN SERPL BCP-MCNC: 0.9 G/DL (ref 3.5–5)
ALBUMIN/GLOB SERPL: 0.2 {RATIO} (ref 1.2–3.5)
ALP SERPL-CCNC: 141 U/L (ref 50–136)
ALT SERPL-CCNC: 30 U/L (ref 12–65)
ANION GAP BLD CALC-SCNC: 11 MMOL/L (ref 7–16)
AST SERPL W P-5'-P-CCNC: 42 U/L (ref 15–37)
BILIRUB SERPL-MCNC: 0.4 MG/DL (ref 0.2–1.1)
BUN SERPL-MCNC: 21 MG/DL (ref 6–23)
CALCIUM SERPL-MCNC: 7.3 MG/DL (ref 8.3–10.4)
CHLORIDE SERPL-SCNC: 116 MMOL/L (ref 98–107)
CK SERPL-CCNC: 361 U/L (ref 21–215)
CO2 SERPL-SCNC: 19 MMOL/L (ref 21–32)
CREAT SERPL-MCNC: 0.69 MG/DL (ref 0.6–1)
DIFFERENTIAL METHOD BLD: ABNORMAL
ERYTHROCYTE [DISTWIDTH] IN BLOOD BY AUTOMATED COUNT: 15.4 % (ref 11.9–14.6)
GLOBULIN SER CALC-MCNC: 4.8 G/DL (ref 2.3–3.5)
GLUCOSE BLD STRIP.AUTO-MCNC: 292 MG/DL (ref 65–100)
GLUCOSE BLD STRIP.AUTO-MCNC: 298 MG/DL (ref 65–100)
GLUCOSE BLD STRIP.AUTO-MCNC: 310 MG/DL (ref 65–100)
GLUCOSE BLD STRIP.AUTO-MCNC: 314 MG/DL (ref 65–100)
GLUCOSE BLD STRIP.AUTO-MCNC: 332 MG/DL (ref 65–100)
GLUCOSE BLD STRIP.AUTO-MCNC: 343 MG/DL (ref 65–100)
GLUCOSE BLD STRIP.AUTO-MCNC: 343 MG/DL (ref 65–100)
GLUCOSE BLD STRIP.AUTO-MCNC: 347 MG/DL (ref 65–100)
GLUCOSE SERPL-MCNC: 318 MG/DL (ref 65–100)
HCT VFR BLD AUTO: 22 % (ref 35.8–46.3)
HCT VFR BLD AUTO: 22.7 % (ref 35.8–46.3)
HCT VFR BLD AUTO: 23.8 % (ref 35.8–46.3)
HGB BLD-MCNC: 7.5 G/DL (ref 11.7–15.4)
HGB BLD-MCNC: 7.8 G/DL (ref 11.7–15.4)
HGB BLD-MCNC: 8.2 G/DL (ref 11.7–15.4)
LYMPHOCYTES # BLD: 1.6 K/UL (ref 0.5–4.6)
LYMPHOCYTES NFR BLD MANUAL: 6 % (ref 16–44)
MAGNESIUM SERPL-MCNC: 2.1 MG/DL (ref 1.8–2.4)
MCH RBC QN AUTO: 28.2 PG (ref 26.1–32.9)
MCHC RBC AUTO-ENTMCNC: 34.5 G/DL (ref 31.4–35)
MCV RBC AUTO: 81.8 FL (ref 79.6–97.8)
MONOCYTES # BLD: 0.5 K/UL (ref 0.1–1.3)
MONOCYTES NFR BLD MANUAL: 2 % (ref 3–9)
NEUTS SEG # BLD: 24.6 K/UL (ref 1.7–8.2)
NEUTS SEG NFR BLD MANUAL: 92 % (ref 47–75)
NRBC BLD-RTO: 2 PER 100 WBC
PHOSPHATE SERPL-MCNC: 2.2 MG/DL (ref 2.5–4.5)
PLATELET # BLD AUTO: 196 K/UL (ref 150–450)
PLATELET COMMENTS,PCOM: ADEQUATE
PMV BLD AUTO: 9.9 FL (ref 10.8–14.1)
POTASSIUM SERPL-SCNC: 4.5 MMOL/L (ref 3.5–5.1)
PROT SERPL-MCNC: 5.7 G/DL (ref 6.3–8.2)
RBC # BLD AUTO: 2.91 M/UL (ref 4.05–5.25)
RBC MORPH BLD: ABNORMAL
RPR SER QL: NONREACTIVE
SODIUM SERPL-SCNC: 146 MMOL/L (ref 136–145)
WBC # BLD AUTO: 26.7 K/UL (ref 4.3–11.1)
WBC MORPH BLD: ABNORMAL

## 2017-05-20 PROCEDURE — 84100 ASSAY OF PHOSPHORUS: CPT | Performed by: COLON & RECTAL SURGERY

## 2017-05-20 PROCEDURE — 85025 COMPLETE CBC W/AUTO DIFF WBC: CPT | Performed by: COLON & RECTAL SURGERY

## 2017-05-20 PROCEDURE — P9047 ALBUMIN (HUMAN), 25%, 50ML: HCPCS | Performed by: INTERNAL MEDICINE

## 2017-05-20 PROCEDURE — 74011250636 HC RX REV CODE- 250/636: Performed by: INTERNAL MEDICINE

## 2017-05-20 PROCEDURE — 74011636637 HC RX REV CODE- 636/637: Performed by: INTERNAL MEDICINE

## 2017-05-20 PROCEDURE — 74011000258 HC RX REV CODE- 258: Performed by: INTERNAL MEDICINE

## 2017-05-20 PROCEDURE — 65660000000 HC RM CCU STEPDOWN

## 2017-05-20 PROCEDURE — 74011250636 HC RX REV CODE- 250/636: Performed by: OBSTETRICS & GYNECOLOGY

## 2017-05-20 PROCEDURE — 82550 ASSAY OF CK (CPK): CPT | Performed by: COLON & RECTAL SURGERY

## 2017-05-20 PROCEDURE — 80053 COMPREHEN METABOLIC PANEL: CPT | Performed by: COLON & RECTAL SURGERY

## 2017-05-20 PROCEDURE — 83735 ASSAY OF MAGNESIUM: CPT | Performed by: COLON & RECTAL SURGERY

## 2017-05-20 PROCEDURE — 85018 HEMOGLOBIN: CPT | Performed by: COLON & RECTAL SURGERY

## 2017-05-20 PROCEDURE — 74011000250 HC RX REV CODE- 250: Performed by: INTERNAL MEDICINE

## 2017-05-20 RX ORDER — INSULIN LISPRO 100 [IU]/ML
INJECTION, SOLUTION INTRAVENOUS; SUBCUTANEOUS EVERY 4 HOURS
Status: DISCONTINUED | OUTPATIENT
Start: 2017-05-20 | End: 2017-05-20

## 2017-05-20 RX ORDER — INSULIN GLARGINE 100 [IU]/ML
30 INJECTION, SOLUTION SUBCUTANEOUS DAILY
Status: DISCONTINUED | OUTPATIENT
Start: 2017-05-21 | End: 2017-05-30

## 2017-05-20 RX ORDER — ALBUMIN HUMAN 250 G/1000ML
25 SOLUTION INTRAVENOUS EVERY 6 HOURS
Status: COMPLETED | OUTPATIENT
Start: 2017-05-20 | End: 2017-05-21

## 2017-05-20 RX ORDER — INSULIN LISPRO 100 [IU]/ML
INJECTION, SOLUTION INTRAVENOUS; SUBCUTANEOUS EVERY 4 HOURS
Status: DISCONTINUED | OUTPATIENT
Start: 2017-05-20 | End: 2017-05-21

## 2017-05-20 RX ORDER — INSULIN GLARGINE 100 [IU]/ML
10 INJECTION, SOLUTION SUBCUTANEOUS ONCE
Status: COMPLETED | OUTPATIENT
Start: 2017-05-20 | End: 2017-05-20

## 2017-05-20 RX ORDER — INSULIN LISPRO 100 [IU]/ML
5 INJECTION, SOLUTION INTRAVENOUS; SUBCUTANEOUS
Status: DISCONTINUED | OUTPATIENT
Start: 2017-05-20 | End: 2017-05-26

## 2017-05-20 RX ORDER — INSULIN LISPRO 100 [IU]/ML
5 INJECTION, SOLUTION INTRAVENOUS; SUBCUTANEOUS
Status: DISCONTINUED | OUTPATIENT
Start: 2017-05-20 | End: 2017-05-20

## 2017-05-20 RX ADMIN — INSULIN LISPRO 12 UNITS: 100 INJECTION, SOLUTION INTRAVENOUS; SUBCUTANEOUS at 06:07

## 2017-05-20 RX ADMIN — INSULIN LISPRO 5 UNITS: 100 INJECTION, SOLUTION INTRAVENOUS; SUBCUTANEOUS at 11:23

## 2017-05-20 RX ADMIN — HEPARIN SODIUM 5000 UNITS: 5000 INJECTION, SOLUTION INTRAVENOUS; SUBCUTANEOUS at 06:07

## 2017-05-20 RX ADMIN — INSULIN GLARGINE 20 UNITS: 100 INJECTION, SOLUTION SUBCUTANEOUS at 08:29

## 2017-05-20 RX ADMIN — INSULIN LISPRO 12 UNITS: 100 INJECTION, SOLUTION INTRAVENOUS; SUBCUTANEOUS at 17:58

## 2017-05-20 RX ADMIN — SODIUM CHLORIDE 100 ML/HR: 450 INJECTION, SOLUTION INTRAVENOUS at 01:25

## 2017-05-20 RX ADMIN — INSULIN LISPRO 12 UNITS: 100 INJECTION, SOLUTION INTRAVENOUS; SUBCUTANEOUS at 12:53

## 2017-05-20 RX ADMIN — INSULIN LISPRO 12 UNITS: 100 INJECTION, SOLUTION INTRAVENOUS; SUBCUTANEOUS at 22:00

## 2017-05-20 RX ADMIN — INSULIN GLARGINE 10 UNITS: 100 INJECTION, SOLUTION SUBCUTANEOUS at 11:23

## 2017-05-20 RX ADMIN — ALBUMIN (HUMAN) 25 G: 0.25 INJECTION, SOLUTION INTRAVENOUS at 17:58

## 2017-05-20 RX ADMIN — HEPARIN SODIUM 5000 UNITS: 5000 INJECTION, SOLUTION INTRAVENOUS; SUBCUTANEOUS at 14:40

## 2017-05-20 RX ADMIN — INSULIN LISPRO 5 UNITS: 100 INJECTION, SOLUTION INTRAVENOUS; SUBCUTANEOUS at 16:22

## 2017-05-20 RX ADMIN — DOXYCYCLINE 100 MG: 100 INJECTION, POWDER, LYOPHILIZED, FOR SOLUTION INTRAVENOUS at 11:29

## 2017-05-20 RX ADMIN — FLUCONAZOLE 200 MG: 2 INJECTION INTRAVENOUS at 14:40

## 2017-05-20 RX ADMIN — PIPERACILLIN SODIUM,TAZOBACTAM SODIUM 3.38 G: 3; .375 INJECTION, POWDER, FOR SOLUTION INTRAVENOUS at 16:21

## 2017-05-20 RX ADMIN — HEPARIN SODIUM 5000 UNITS: 5000 INJECTION, SOLUTION INTRAVENOUS; SUBCUTANEOUS at 22:56

## 2017-05-20 RX ADMIN — ALBUMIN (HUMAN) 25 G: 0.25 INJECTION, SOLUTION INTRAVENOUS at 12:58

## 2017-05-20 RX ADMIN — PIPERACILLIN SODIUM,TAZOBACTAM SODIUM 3.38 G: 3; .375 INJECTION, POWDER, FOR SOLUTION INTRAVENOUS at 08:29

## 2017-05-20 RX ADMIN — SODIUM CHLORIDE 100 ML/HR: 450 INJECTION, SOLUTION INTRAVENOUS at 13:02

## 2017-05-20 NOTE — PROGRESS NOTES
Bedside shift report from [address]. Patient resting on room air. Sinus tach on monitor. Dressing dry and intact. ALXEANDER drain unable to stay charged. Patient complains of no pain at this time. Will continue to monitor.

## 2017-05-20 NOTE — PROGRESS NOTES
Patient returned to [id number] [id number] from PACU. Placed on room air, 02sat 99%, Sinus Tach on monitor. No pain stated. Dressing to R buttock C/D/I, no drainage noted. ALEXANDER drain in place. H&H sent to lab. Family brought to bedside.

## 2017-05-20 NOTE — PROGRESS NOTES
TRANSFER - IN REPORT:    Verbal report received from [facility], RN(name) on Mo Roth  being received from ICU(unit) for routine progression of care      Report consisted of patients Situation, Background, Assessment and   Recommendations(SBAR). Information from the following report(s) SBAR was reviewed with the receiving nurse. Opportunity for questions and clarification was provided. Assessment completed upon patients arrival to unit and care assumed. Report given to Jalen Stinson RN as pt has not arrived to the unit yet.

## 2017-05-20 NOTE — OP NOTES
[address]   OPERATIVE REPORT       Name:  Jackson Hugo   MR#:  [id number]   :  1985   Account #:  [de-identified]   Date of Adm:  2017       PREOPERATIVE DIAGNOSIS: Buttock abscess. POSTOPERATIVE DIAGNOSIS: Necrotizing soft tissue infection of   the buttocks and extraperitoneal pelvis. PROCEDURES PERFORMED:   1. Rectal exam under anesthesia. 2. Incision, drainage, and excisional debridement of necrotizing soft   tissue infection of the buttocks, including skin, soft tissue,   fascia, and muscle. 3. Drainage of pelvic abscess. 4. Rigid proctoscopy. 5. Culture swab of the vagina for gonorrhea and chlamydia. SURGEON: Angel Matamoros MD.    ASSISTANT: None. ANESTHESIA: General.    BLOOD LOSS: 50 mL. SPECIMENS:   1. Right buttock tissue fresh for culture. 2. Right buttock tissue for Pathology. 3. Chlamydia culture from vaginal swab. 4. GC culture from vaginal swab. COMPLICATIONS: None. BRIEF DESCRIPTION OF THE FINDINGS:   Necrotic fat in the right buttock, extending all the way up   into the low pelvic pocket that had been previously drained by   Interventional Radiology. The pocket was full of thick,   gelatinous, black fluid with a foul smell. Things were debrided   back to healthy tissue and irrigated with over 6 liters of   sterile saline with bacitracin. BRIEF HISTORY: Ms. Jose Allen is a 59-year-old female who presented   to the hospital a few days ago complaining of buttock pain. CT   scan showed a complex pelvic process of uncertain etiology. There were several fluid collections. She had a repeat scan   performed with contrast which did not lend itself to further   explanation. She was taken to Interventional Radiology yesterday   for interventional drainage placement into the low pelvic   component fluid collection.  I evaluated her at the request of   the primary team. The picture was unclear in terms of the source   of the infectious process. She did have some physiologic   improvement in the hospital with medical support, but continued   to have significant amount of pain. I therefore recommended exam   under anesthesia and proceed as indicated. The patient was   evaluated in the preoperative holding area. We discussed the   planned operation, risks, benefits, and alternatives. DESCRIPTION OF PROCEDURE: She was brought to the operating room   and general anesthesia was administered. She was placed prone on   the OR table. All pressure points were padded. The buttocks,   vagina, and perineum were prepped and draped in a sterile   fashion. A time-out was performed. I began by sharply excising the yellow, leathery tissue sore on the   right buttock. On doing this, I immediately appreciated that the   subcutaneous fat below this was dead. I therefore continued my   dissection deeper into the right buttocks, realizing that the   majority of the right buttock tissue was involved in this   process and was necrotic. A large, approximately 8-10 cm mass of   dead and necrotic fat was removed. As I did this, I entered a   pocket of black, gelatinous fluid in tissue consistent   with liquefactive necrosis of the tissues. I bluntly debrided this   bluntly using a ring forceps, and also sharply debrided in an excisional   fashion with cautery. Only dead tissue was debrided and I debrided back to viable   tissues. I then reinspected the wound. It seemed to track even deeper   into the buttocks and up towards the pelvis. I gently explored   this with my finger and found myself tracking all the way up   into the extraperitoneal pelvis. I was able to feel and identify   the interventional radiology drain that had been placed   yesterday, confirming that this pocket was connected to the   buttock tissue. I gently probed in the pelvic cavity and could   not find any further tracking outside of this area.     I then copiously irrigated with 6 liters of sterile saline with   bacitracin infusion using a pulse lavage system. This nicely   cleaned up of the necrotic and gray/black tissue and fluid. All of   the necrotic tissue could not be completely removed, but I   debrided back to healthy tissue in 80-90% of the areas. The   deeper component up in the lower pelvis could not be well   visualized from this, but I carefully debrided bluntly with my   finger and with the ring forceps. I reinspected the buttock wound. Hemostasis was good in the   wound. I obtained vaginal cultures for GC and chlamydia, given   her history; these will be sent separately. I performed rigid   proctoscopy to 20 cm. There was yellow stool in the rectum. The   rectal mucosa was viable and healthy. There was no evidence of   necrosis of the rectum. Insufflation of the rectum revealed no   bubbling in the right wound, suggesting there was no connection   between the rectum and this process. Source of this infective   process is still unclear to me. I reinspected the wound one more   time for hemostasis. Gentle debridement of the tissues was again   performed using the ring forceps. The wound was then very   aggressively packed with a Kerlix gauze up into the low pelvic   component, as well as other tracking areas and into the buttock   component as well. Dressings were applied. The patient was placed back supine, awakened, extubated, and   taken to the intensive care unit in stable condition. Sponge,   instrument, and needle counts were correct.         Gildardo Carr MD      [facility] / [place]   D:  05/19/2017   20:53   T:  05/20/2017   12:51   Job #:  [id number]

## 2017-05-20 NOTE — PROGRESS NOTES
Hospitalist Progress Note    2017  Admit Date: 2017  3:14 PM   NAME: Nadine Jacob   :  1985   MRN:  [id number]   Attending: Oma Mistry MD  PCP:  Serena Stanley MD    SUBJECTIVE:   32yo F with no significant PMH presented with worsening Rt Flank pain, nausea, multiple episodes of vomiting and weakness. She was seen here in ER 1 week ago with back pain and diagnosed with Sciatica and given NSAIDs which she took without much relief. Yesterday her pain worsened and she took extra pain meds with about 4 glasses of Wine per . She then started having vomiting and abd discomfort that continued today and she came to ER. Found to be in DKA in ER with Na 115, K 6.0, LA 7.4, Bicarb 9, Ph 7.22, Cr 2.51 and Ck > 4000, WBC >40k, Hgb 10.8 with elevated LFTs. CT abdomen showed Pelvic mass concerning for malignancy. MRI showed anorectal abscess tracking up in the Gluteal region. S/p IR drain placement and Surgical Debridement by Dr. Dae Victor.     : s/p I&D by surgery, feels better, sugars are high, denies pain or SOB. Nursing notes and chart reviewed. Review of Systems negative with exception of pertinent positives noted above. PHYSICAL EXAM     Visit Vitals    /55    Pulse (!) 115    Temp 98.5 °F (36.9 °C)    Resp 25    Ht 5' 2\" (1.575 m)    Wt 89.6 kg (197 lb 8.5 oz)    LMP 2017    SpO2 100%    BMI 36.13 kg/m2      Temp (24hrs), Av.6 °F (37 °C), Min:98.2 °F (36.8 °C), Max:99 °F (37.2 °C)    Oxygen Therapy  O2 Sat (%): 100 % (17 1004)  Pulse via Oximetry: 115 beats per minute (17 1004)  O2 Device: Room air (17 0802)  O2 Flow Rate (L/min): 2 l/min (17 2134)  ETCO2 (mmHg): 12 mmHg (17 1645)    Intake/Output Summary (Last 24 hours) at 17 1025  Last data filed at 17 0559   Gross per 24 hour   Intake             4149 ml   Output             1175 ml   Net             2974 ml       General: Mild distress. Alert.    Lungs:  CTABL. Heart:  RRR, no murmur, rub, or gallop, tachycardia  Abdomen: Soft, non-distended, non-tender, +bs  Extremities: No cyanosis or clubbing. Neurologic:  No focal deficits. Moves all extremities. Musculoskeletal: Tender Rt lower back  :   Large area of excoriations around her vagina and perineum. LABS AND STUDIES:  Personally reviewed all labs, meds, and studies for past 24hrs. 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 · Uncontrolled DM: A1C is 14, has seen diabetes educator for new diagnosis, Will need insulin upon discharge. · Pelvic Mass/Abscess: s/p IR drain, and Surgical I&D, on IV Morphine. MAy need repeat I&D. · NALDO: Resolved, will c/w IVF   · Sepsis: Likely due to perirectal/Gluteal Abscess vs ? PID (she has +ve serology for STDs). Blood and urine Cx pending. C/w VAnc and zosyn for now, on Doxy for Chlamydia, ID consulted and started on Acyclovir, Gyn started Fluconazole. LA normalized and procal down trending. · Vaginal Lesions with Suspected PID: GYN consulted. Appreciate recs  · Rhabdomyolysis: Improving, on IVF  · Acute Pancreatitis: resolved, started feeds post Op. · Anemia: Acute likely 2/2 hematoma. Transfused 2 U PRBCs. H&H stable. · Hyponatremia and Hyperkalemia: Resolved. Dispo: Transfer out of ICU to remote tele. High risk given DKA, Sepsis, Pelvic Mass, NALDO and Rhabdo.      DVT ppx:  hsq  Discussed plan with pt and  who is in agreement. All questions answered. Critical care time spent was 35 mins.      Signed By: Chris Ruiz MD     May 20, 2017

## 2017-05-20 NOTE — PERIOP NOTES
TRANSFER - OUT REPORT:    Verbal report given to González RN(name) on Roni Norwood  being transferred to ICU(unit) for routine post - op       Report consisted of patients Situation, Background, Assessment and   Recommendations(SBAR). Information from the following report(s) SBAR, Kardex, OR Summary, Intake/Output and MAR was reviewed with the receiving nurse. Lines:   Quad Lumen 05/16/17 Left Subclavian (Active)   Central Line Being Utilized Yes 5/19/2017  6:00 PM   Criteria for Appropriate Use Hemodynamically unstable, requiring monitoring lines, vasopressors, or volume resuscitation 5/19/2017  8:02 AM   Site Assessment Clean, dry, & intact 5/19/2017  6:00 PM   Infiltration Assessment 0 5/19/2017  6:00 PM   Affected Extremity/Extremities Color distal to insertion site pink (or appropriate for race); Pulses palpable;Range of motion performed 5/19/2017  8:02 AM   Date of Last Dressing Change 05/17/17 5/19/2017  8:02 AM   Dressing Status Clean, dry, & intact 5/19/2017  6:00 PM   Dressing Type Disk with Chlorhexadine gluconate (CHG) 5/19/2017  6:00 PM   Action Taken Zeroed/Rezeroed 5/18/2017  7:25 AM   Proximal Hub Color/Line Status White;Flushed;Patent 5/19/2017  6:00 PM   Positive Blood Return (Medial Site) Yes 5/19/2017  6:00 PM   Medial 1 Hub Color/Line Status Alleen Uriel; Infusing;Flushed;Patent 5/19/2017  6:00 PM   Positive Blood Return (Lateral Site) Yes 5/19/2017  6:00 PM   Medial 2 Hub Color/Line Status Blue;Patent; Flushed 5/19/2017  6:00 PM   Positive Blood Return (Site #3) Yes 5/19/2017  6:00 PM   Distal Hub Color/Line Status Brown;Patent; Flushed 5/19/2017  6:00 PM   Positive Blood Return (Site #4) Yes 5/19/2017  6:00 PM   Alcohol Cap Used No 5/19/2017  8:02 AM       Peripheral IV 05/16/17 Left Antecubital (Active)   Site Assessment Clean, dry, & intact 5/19/2017  8:02 AM   Phlebitis Assessment 0 5/19/2017  8:02 AM   Infiltration Assessment 0 5/19/2017  8:02 AM   Dressing Status Clean, dry, & intact 5/19/2017  8:02 AM   Dressing Type Transparent;Tape 5/19/2017  8:02 AM   Hub Color/Line Status Patent; Flushed 5/19/2017  8:02 AM   Alcohol Cap Used No 5/19/2017  8:02 AM        Opportunity for questions and clarification was provided. Patient transported with:   O2 @ 2 liters    VTE prophylaxis orders have been written for [place] [facility]. Patient and family given floor number and nurses name. Family updated re: pt status after security code verified.

## 2017-05-20 NOTE — PROGRESS NOTES
Informed in report from PACU that ALEXANDER drain would potentially not stay charged due to surgery. ALEXANDER drain has not stayed charged since patient's return from PACU. Patient has not complained of any pain, however sore with movement. Dressing remains clean, dry, intact. Patient tolerating clear liquid PO intake with no nausea.

## 2017-05-20 NOTE — PROGRESS NOTES
TRANSFER - IN REPORT:    Verbal report received from Dillon forest, RN(name) on Dania Roa  being received from PACU(unit) for routine post - op      Report consisted of patients Situation, Background, Assessment and   Recommendations(SBAR). Information from the following report(s) OR Summary, Intake/Output, MAR, Recent Results and Med Rec Status was reviewed with the receiving nurse. Opportunity for questions and clarification was provided. Assessment completed upon patients arrival to unit and care assumed.

## 2017-05-20 NOTE — PROGRESS NOTES
Shift report received from [facility]. Patient currently off unit for scheduled procedure.

## 2017-05-20 NOTE — ANESTHESIA POSTPROCEDURE EVALUATION
Post-Anesthesia Evaluation and Assessment    Patient: Piyush Lewis MRN: [id number]  SSN: [id number]    YOB: 1985  Age: 29 y.o. Sex: female       Cardiovascular Function/Vital Signs  Visit Vitals    /64    Pulse (!) 119    Temp 37.2 °C (98.9 °F)    Resp 25    Ht 5' 2\" (1.575 m)    Wt 89.6 kg (197 lb 8.5 oz)    SpO2 100%    BMI 36.13 kg/m2       Patient is status post general anesthesia for Procedure(s):  RECTAL EXAM UNDER ANESTHESIA  INCISION AND DRAINAGE OF ABSCESS, DEPRIDEMENT OF RIGHT BUTTOCK  ABSCESS. .    Nausea/Vomiting: None    Postoperative hydration reviewed and adequate. Pain:  Pain Scale 1: Numeric (0 - 10) (05/19/17 1722)  Pain Intensity 1: 0 (05/19/17 1722)   Managed    Neurological Status:   Neuro  Neurologic State: Drowsy; Eyes open to voice; Alert (05/19/17 6270)  Orientation Level: Oriented X4 (05/19/17 0802)  Cognition: Appropriate decision making; Appropriate for age attention/concentration; Appropriate safety awareness; Follows commands (05/19/17 0802)  Speech: Clear (05/19/17 0802)  Assessment L Pupil: Brisk;Round (05/19/17 0802)  Size L Pupil (mm): 3 (05/19/17 0802)  Assessment R Pupil: Brisk;Round (05/19/17 0802)  Size R Pupil (mm): 3 (05/19/17 0802)  LUE Motor Response: Purposeful (05/19/17 0802)  LLE Motor Response: Purposeful (05/19/17 0802)  RUE Motor Response: Purposeful (05/19/17 0802)  RLE Motor Response: Purposeful (05/19/17 0802)   At baseline    Mental Status and Level of Consciousness: Arousable    Pulmonary Status:   O2 Device: Ventimask (05/19/17 2042)   Adequate oxygenation and airway patent    Complications related to anesthesia: None    Post-anesthesia assessment completed.  No concerns    Signed By: Abhay Maria MD     May 19, 2017

## 2017-05-20 NOTE — BRIEF OP NOTE
BRIEF OPERATIVE NOTE    Date of Procedure: 5/19/2017   Preoperative Diagnosis: Abscess  Postoperative Diagnosis: Necrotizing soft tissue infection involving buttocks and extending up into pelvis  Procedure(s):  RECTAL EXAM UNDER ANESTHESIA    INCISION AND DRAINAGE AND DEBRIDEMENT OF NECROTIZING SKIN, SOFT TISSUE, FASCIA, AND MUSCLE  DRAINAGE OF BUTTOCK AND PELVIC ABSCESS  Culture swabs of vagina for GC, chlamydia    Surgeon(s) and Role:     * Marlin Daily MD - Primary         Assistant Staff:     Surgical Staff:  Circ-1: Kayode Brown  Circ-Relief: Mortimer Bellows, RN  Scrub Tech-1: Sushil [facility]  Event Time In   Incision Start 1908   Incision Close 2018     Anesthesia: General   Estimated Blood Loss: 50  Specimens:   ID Type Source Tests Collected by Time Destination   1 : RIGHT BUTTOCK TISSUE Fresh Tissue  Marlin Daily MD 5/19/2017 2009 Pathology   1 : Right Buttock tissue Tissue  CULTURE, ANAEROBIC, CULTURE, TISSUE W GRAM STAIN, GRAM STAIN, CULTURE &amp; SMEAR, AFB, CULTURE, FUNGUS Marlin Daily MD 5/19/2017 1915 Microbiology   2 : Chlamydia culture  VAGINAL SWAB CULTURE, [address] Finn MD 5/19/2017 1955 Microbiology   3 : GC Culture  VAGINAL SWAB [address] CULTURE ONLY Marlin Daily MD 5/19/2017 2002 Microbiology   4 : GC & Chlamydia culture  VAGINAL SWAB CHLAMYDIA / Alpesh Angel MD 5/19/2017 2002 Microbiology      Findings: necrotic fat in right buttock. Tracking up into pocket that had IR drain in place. Thick, black, foul fluid.   Irrigated with >4G  Complications: None intraop  Implants: * No implants in log *

## 2017-05-20 NOTE — PROGRESS NOTES
PLAN:  Care Management per Hospitalist  General Surgery  --Dressing change/ wound assessment by Surgeon 5/21/17  --will follow    ASSESSMENT:  Admit Date: 5/16/2017   1 Day Post-Op  Procedure(s):  RECTAL EXAM UNDER ANESTHESIA  INCISION AND DRAINAGE OF ABSCESS, DEPRIDEMENT OF RIGHT BUTTOCK  ABSCESS. Principal Problem:    DKA (diabetic ketoacidoses) ([address] (5/16/2017)    Active Problems:    Acute alcoholic pancreatitis (2/70/4157)      Hyponatremia (5/16/2017)      Overview: Pseudohyponatremia      Hyperkalemia (5/16/2017)      Acute renal failure (ARF) ([address] (5/16/2017)      Rhabdomyosarcoma of flank ([address] (5/16/2017)      Sepsis ([address] (5/16/2017)      Chlamydia infection (5/16/2017)      Herpes simplex vulvovaginitis (5/16/2017)      Septic shock ([address] (5/17/2017)      Pelvic mass (5/17/2017)         SUBJECTIVE:  Pt resting in bed. No new complaints. Pino patent - 650cc out last 24 hours. AF, VSS. OBJECTIVE:  Constitutional: Alert, cooperative, no acute distress; appears stated age   Visit Vitals    BP 98/55    Pulse (!) 108    Temp 98.5 °F (36.9 °C)    Resp (!) 34    Ht 5' 2\" (1.575 m)    Wt 197 lb 8.5 oz (89.6 kg)    LMP 04/12/2017    SpO2 100%    BMI 36.13 kg/m2     Eyes:Sclera are clear. ENMT: no external lesions gross hearing normal; no obvious neck masses, no ear or lip lesions  CV: RRR. Normal perfusion  Resp: No JVD. Breathing is  non-labored; no audible wheezing. GI: soft, non-tender, non-distended     Skin: Right Buttocks - dressing intact. ALEXANDER drain will not stay charged. Musculoskeletal: unremarkable with normal function. No embolic signs or cyanosis.    Neuro:  Oriented; moves all 4; no focal deficits  Psychiatric: normal affect and mood, no memory impairment      Patient Vitals for the past 24 hrs:   BP Temp Pulse Resp SpO2 Height Weight   05/20/17 1202 98/55 - (!) 108 (!) 34 100 % - -   05/20/17 1004 105/55 - (!) 115 25 100 % - -   05/20/17 0902 106/55 - (!) 113 (!) 32 100 % - - 05/20/17 0802 110/52 98.5 °F (36.9 °C) (!) 116 26 100 % - -   05/20/17 0602 98/52 - (!) 116 - 100 % - -   05/20/17 0502 103/59 - (!) 114 - 100 % - -   05/20/17 0402 103/55 - (!) 115 - 100 % - -   05/20/17 0340 105/52 99 °F (37.2 °C) (!) 118 22 100 % - -   05/20/17 0302 105/52 - (!) 118 - 100 % - -   05/20/17 0206 - - - 21 - - -   05/20/17 0201 100/51 - (!) 122 - 100 % - -   05/20/17 0147 99/54 - (!) 122 23 100 % - -   05/20/17 0132 101/53 - (!) 123 - 100 % - -   05/20/17 0117 102/55 - (!) 124 - 100 % - -   05/20/17 0101 104/54 - (!) 123 - 100 % - -   05/20/17 0047 108/59 - (!) 120 - 100 % - -   05/20/17 0042 - - - 21 - - -   05/20/17 0032 112/56 - (!) 121 19 100 % - -   05/20/17 0017 112/57 - (!) 119 - 100 % - -   05/20/17 0001 108/59 - (!) 122 - 100 % - -   05/19/17 2355 - - - - 100 % - -   05/19/17 2347 111/56 - (!) 122 - 100 % - -   05/19/17 2332 113/55 - (!) 118 - 100 % - -   05/19/17 2317 110/56 - (!) 120 - 100 % - -   05/19/17 2301 116/56 - (!) 119 - 99 % - -   05/19/17 2247 113/57 - (!) 115 - 99 % - -   05/19/17 2232 115/57 - (!) 117 26 100 % - -   05/19/17 2217 117/59 - (!) 119 (!) 33 100 % - -   05/19/17 2201 113/55 - (!) 118 27 100 % - -   05/19/17 2158 119/58 98.4 °F (36.9 °C) (!) 118 25 99 % - -   05/19/17 2134 135/63 98.7 °F (37.1 °C) (!) 120 21 100 % - -   05/19/17 2129 136/63 - (!) 120 - 100 % - -   05/19/17 2122 - - (!) 120 15 100 % - -   05/19/17 2119 146/64 - (!) 119 - 100 % - -   05/19/17 2118 - - (!) 122 25 100 % - -   05/19/17 2114 145/64 - (!) 119 23 100 % - -   05/19/17 2109 141/66 - (!) 120 - 100 % - -   05/19/17 2108 - - (!) 121 27 100 % - -   05/19/17 2105 - - (!) 120 22 100 % - -   05/19/17 2104 138/63 - (!) 121 - 100 % - -   05/19/17 2059 145/63 - (!) 123 - 100 % - -   05/19/17 2054 144/62 - (!) 124 - 100 % - -   05/19/17 2052 - - (!) 124 18 100 % - -   05/19/17 2049 145/64 - (!) 130 23 100 % - -   05/19/17 2044 140/61 - (!) 130 20 100 % - -   05/19/17 2042 154/68 98.9 °F (37.2 °C) (!) 128 24 100 % - -   05/19/17 2041 - - (!) 125 20 - - -   05/19/17 2040 - - (!) 127 - 100 % - -   05/19/17 2039 154/68 - - - - - -   05/19/17 1722 110/55 98.8 °F (37.1 °C) (!) 125 (!) 40 100 % 5' 2\" (1.575 m) 197 lb 8.5 oz (89.6 kg)   05/19/17 1502 101/52 - (!) 119 30 100 % - -   05/19/17 1402 - - (!) 121 30 100 % - -     Labs:  Recent Labs      05/20/17   1215  05/20/17   0345   05/19/17   0325   05/18/17   1154   WBC   --   26.7*   --   36.5*   --    --    HGB  7.8*  8.2*   < >  8.5*   < >   --    PLT   --   196   --   184   --    --    NA   --   146*   --   149*   --    --    K   --   4.5   --   3.8   --    --    CL   --   116*   --   119*   --    --    CO2   --   19*   --   19*   --    --    BUN   --   21   --   23   --    --    CREA   --   0.69   --   0.66   --    --    GLU   --   318*   --   202*   --    --    PTP   --    --    --    --    --   13.2*   INR   --    --    --    --    --   1.2   APTT   --    --    --    --    --   31.9*   TBILI   --   0.4   --   0.4   --    --    SGOT   --   42*   --   50*   --    --    ALT   --   30   --   32   --    --    AP   --   141*   --   136   --    --    LPSE   --    --    --   227   --    --     < > = values in this interval not displayed. Codie Bearden, [facility]    The patient was seen in conjunction with Dr. King Smith who independently evaluated the patient, reviewed the chart and agreed with the assessment and plan. I have seen and examined the patient with the Nurse Practitioner and agree with the above assessment and plan. Kaye Wang.  King Smith MD

## 2017-05-21 LAB
ALBUMIN SERPL BCP-MCNC: 1.6 G/DL (ref 3.5–5)
ALBUMIN/GLOB SERPL: 0.4 {RATIO} (ref 1.2–3.5)
ALP SERPL-CCNC: 152 U/L (ref 50–136)
ALT SERPL-CCNC: 25 U/L (ref 12–65)
ANION GAP BLD CALC-SCNC: 11 MMOL/L (ref 7–16)
AST SERPL W P-5'-P-CCNC: 26 U/L (ref 15–37)
BILIRUB SERPL-MCNC: 0.4 MG/DL (ref 0.2–1.1)
BUN SERPL-MCNC: 16 MG/DL (ref 6–23)
CALCIUM SERPL-MCNC: 7.2 MG/DL (ref 8.3–10.4)
CHLORIDE SERPL-SCNC: 112 MMOL/L (ref 98–107)
CK SERPL-CCNC: 191 U/L (ref 21–215)
CO2 SERPL-SCNC: 22 MMOL/L (ref 21–32)
CREAT SERPL-MCNC: 0.57 MG/DL (ref 0.6–1)
DIFFERENTIAL METHOD BLD: ABNORMAL
ERYTHROCYTE [DISTWIDTH] IN BLOOD BY AUTOMATED COUNT: 15.3 % (ref 11.9–14.6)
GLOBULIN SER CALC-MCNC: 4.4 G/DL (ref 2.3–3.5)
GLUCOSE BLD STRIP.AUTO-MCNC: 104 MG/DL (ref 65–100)
GLUCOSE BLD STRIP.AUTO-MCNC: 126 MG/DL (ref 65–100)
GLUCOSE BLD STRIP.AUTO-MCNC: 132 MG/DL (ref 65–100)
GLUCOSE BLD STRIP.AUTO-MCNC: 133 MG/DL (ref 65–100)
GLUCOSE BLD STRIP.AUTO-MCNC: 133 MG/DL (ref 65–100)
GLUCOSE BLD STRIP.AUTO-MCNC: 155 MG/DL (ref 65–100)
GLUCOSE BLD STRIP.AUTO-MCNC: 166 MG/DL (ref 65–100)
GLUCOSE BLD STRIP.AUTO-MCNC: 256 MG/DL (ref 65–100)
GLUCOSE SERPL-MCNC: 153 MG/DL (ref 65–100)
HAPTOGLOB SERPL-MCNC: 344 MG/DL (ref 30–200)
HCT VFR BLD AUTO: 21 % (ref 35.8–46.3)
HCT VFR BLD AUTO: 21.7 % (ref 35.8–46.3)
HGB BLD-MCNC: 7.1 G/DL (ref 11.7–15.4)
HGB BLD-MCNC: 7.4 G/DL (ref 11.7–15.4)
HGB RETIC QN AUTO: 30 PG (ref 29–35)
HSV SPEC CULT: NORMAL
IMM RETICS NFR: 44.8 % (ref 3–15.9)
LDH SERPL L TO P-CCNC: 286 U/L (ref 100–190)
LYMPHOCYTES # BLD: 2.6 K/UL (ref 0.5–4.6)
LYMPHOCYTES NFR BLD MANUAL: 12 % (ref 16–44)
MCH RBC QN AUTO: 28.4 PG (ref 26.1–32.9)
MCHC RBC AUTO-ENTMCNC: 34.1 G/DL (ref 31.4–35)
MCV RBC AUTO: 83.1 FL (ref 79.6–97.8)
MONOCYTES # BLD: 0.4 K/UL (ref 0.1–1.3)
MONOCYTES NFR BLD MANUAL: 2 % (ref 3–9)
MYELOCYTES NFR BLD MANUAL: 3 %
NEUTS SEG # BLD: 18.9 K/UL (ref 1.7–8.2)
NEUTS SEG NFR BLD MANUAL: 81 % (ref 47–75)
PLATELET # BLD AUTO: 216 K/UL (ref 150–450)
PLATELET COMMENTS,PCOM: ADEQUATE
PMV BLD AUTO: 10.1 FL (ref 10.8–14.1)
POTASSIUM SERPL-SCNC: 3.8 MMOL/L (ref 3.5–5.1)
PROMYELOCYTES NFR BLD MANUAL: 2 %
PROT SERPL-MCNC: 6 G/DL (ref 6.3–8.2)
RBC # BLD AUTO: 2.61 M/UL (ref 4.05–5.25)
RBC MORPH BLD: ABNORMAL
RBC MORPH BLD: ABNORMAL
RETICS # AUTO: 0.09 M/UL (ref 0.02–0.08)
RETICS/RBC NFR AUTO: 3.2 % (ref 0.3–2)
SODIUM SERPL-SCNC: 145 MMOL/L (ref 136–145)
SPECIMEN SOURCE: NORMAL
WBC # BLD AUTO: 21.9 K/UL (ref 4.3–11.1)

## 2017-05-21 PROCEDURE — 74011250636 HC RX REV CODE- 250/636: Performed by: INTERNAL MEDICINE

## 2017-05-21 PROCEDURE — 74011250636 HC RX REV CODE- 250/636: Performed by: COLON & RECTAL SURGERY

## 2017-05-21 PROCEDURE — 74011636637 HC RX REV CODE- 636/637: Performed by: INTERNAL MEDICINE

## 2017-05-21 PROCEDURE — 74011000250 HC RX REV CODE- 250: Performed by: INTERNAL MEDICINE

## 2017-05-21 PROCEDURE — 82550 ASSAY OF CK (CPK): CPT | Performed by: COLON & RECTAL SURGERY

## 2017-05-21 PROCEDURE — 65660000000 HC RM CCU STEPDOWN

## 2017-05-21 PROCEDURE — P9016 RBC LEUKOCYTES REDUCED: HCPCS | Performed by: INTERNAL MEDICINE

## 2017-05-21 PROCEDURE — 83615 LACTATE (LD) (LDH) ENZYME: CPT | Performed by: INTERNAL MEDICINE

## 2017-05-21 PROCEDURE — 82962 GLUCOSE BLOOD TEST: CPT

## 2017-05-21 PROCEDURE — 85025 COMPLETE CBC W/AUTO DIFF WBC: CPT | Performed by: COLON & RECTAL SURGERY

## 2017-05-21 PROCEDURE — 74011250636 HC RX REV CODE- 250/636: Performed by: OBSTETRICS & GYNECOLOGY

## 2017-05-21 PROCEDURE — 77030013131 HC IV BLD ST ICUM -A

## 2017-05-21 PROCEDURE — 85018 HEMOGLOBIN: CPT | Performed by: COLON & RECTAL SURGERY

## 2017-05-21 PROCEDURE — 83010 ASSAY OF HAPTOGLOBIN QUANT: CPT | Performed by: INTERNAL MEDICINE

## 2017-05-21 PROCEDURE — 74011000258 HC RX REV CODE- 258: Performed by: INTERNAL MEDICINE

## 2017-05-21 PROCEDURE — 36430 TRANSFUSION BLD/BLD COMPNT: CPT

## 2017-05-21 PROCEDURE — 85046 RETICYTE/HGB CONCENTRATE: CPT | Performed by: INTERNAL MEDICINE

## 2017-05-21 PROCEDURE — P9047 ALBUMIN (HUMAN), 25%, 50ML: HCPCS | Performed by: INTERNAL MEDICINE

## 2017-05-21 PROCEDURE — 80053 COMPREHEN METABOLIC PANEL: CPT | Performed by: COLON & RECTAL SURGERY

## 2017-05-21 RX ORDER — SODIUM CHLORIDE 9 MG/ML
250 INJECTION, SOLUTION INTRAVENOUS AS NEEDED
Status: DISCONTINUED | OUTPATIENT
Start: 2017-05-21 | End: 2017-05-25

## 2017-05-21 RX ORDER — INSULIN LISPRO 100 [IU]/ML
INJECTION, SOLUTION INTRAVENOUS; SUBCUTANEOUS
Status: DISCONTINUED | OUTPATIENT
Start: 2017-05-21 | End: 2017-06-05 | Stop reason: HOSPADM

## 2017-05-21 RX ADMIN — INSULIN LISPRO 5 UNITS: 100 INJECTION, SOLUTION INTRAVENOUS; SUBCUTANEOUS at 16:30

## 2017-05-21 RX ADMIN — Medication 10 ML: at 14:59

## 2017-05-21 RX ADMIN — INSULIN LISPRO 9 UNITS: 100 INJECTION, SOLUTION INTRAVENOUS; SUBCUTANEOUS at 02:00

## 2017-05-21 RX ADMIN — DOXYCYCLINE 100 MG: 100 INJECTION, POWDER, LYOPHILIZED, FOR SOLUTION INTRAVENOUS at 00:16

## 2017-05-21 RX ADMIN — ALBUMIN (HUMAN) 25 G: 0.25 INJECTION, SOLUTION INTRAVENOUS at 06:46

## 2017-05-21 RX ADMIN — INSULIN LISPRO 3 UNITS: 100 INJECTION, SOLUTION INTRAVENOUS; SUBCUTANEOUS at 06:00

## 2017-05-21 RX ADMIN — DOXYCYCLINE 100 MG: 100 INJECTION, POWDER, LYOPHILIZED, FOR SOLUTION INTRAVENOUS at 11:50

## 2017-05-21 RX ADMIN — SODIUM CHLORIDE 100 ML/HR: 450 INJECTION, SOLUTION INTRAVENOUS at 14:53

## 2017-05-21 RX ADMIN — INSULIN LISPRO 5 UNITS: 100 INJECTION, SOLUTION INTRAVENOUS; SUBCUTANEOUS at 11:49

## 2017-05-21 RX ADMIN — SODIUM CHLORIDE 100 ML/HR: 450 INJECTION, SOLUTION INTRAVENOUS at 00:15

## 2017-05-21 RX ADMIN — Medication 10 ML: at 06:47

## 2017-05-21 RX ADMIN — PIPERACILLIN SODIUM,TAZOBACTAM SODIUM 3.38 G: 3; .375 INJECTION, POWDER, FOR SOLUTION INTRAVENOUS at 17:22

## 2017-05-21 RX ADMIN — PIPERACILLIN SODIUM,TAZOBACTAM SODIUM 3.38 G: 3; .375 INJECTION, POWDER, FOR SOLUTION INTRAVENOUS at 01:05

## 2017-05-21 RX ADMIN — HEPARIN SODIUM 5000 UNITS: 5000 INJECTION, SOLUTION INTRAVENOUS; SUBCUTANEOUS at 14:56

## 2017-05-21 RX ADMIN — ALBUMIN (HUMAN) 25 G: 0.25 INJECTION, SOLUTION INTRAVENOUS at 00:19

## 2017-05-21 RX ADMIN — PIPERACILLIN SODIUM,TAZOBACTAM SODIUM 3.38 G: 3; .375 INJECTION, POWDER, FOR SOLUTION INTRAVENOUS at 08:27

## 2017-05-21 RX ADMIN — FLUCONAZOLE 200 MG: 2 INJECTION INTRAVENOUS at 14:59

## 2017-05-21 RX ADMIN — MORPHINE SULFATE 5 MG: 10 INJECTION INTRAMUSCULAR; INTRAVENOUS; SUBCUTANEOUS at 09:37

## 2017-05-21 RX ADMIN — INSULIN GLARGINE 30 UNITS: 100 INJECTION, SOLUTION SUBCUTANEOUS at 08:20

## 2017-05-21 RX ADMIN — INSULIN LISPRO 5 UNITS: 100 INJECTION, SOLUTION INTRAVENOUS; SUBCUTANEOUS at 08:22

## 2017-05-21 RX ADMIN — Medication 10 ML: at 15:00

## 2017-05-21 RX ADMIN — HEPARIN SODIUM 5000 UNITS: 5000 INJECTION, SOLUTION INTRAVENOUS; SUBCUTANEOUS at 06:45

## 2017-05-21 NOTE — PROGRESS NOTES
Date of Outreach Update:  Lorri Ball was seen and assessed. MEWS Score: 4 (05/20/17 1602)  Vitals:    05/20/17 1502 05/20/17 1602 05/20/17 1955 05/21/17 0011   BP: 99/57 104/61 113/65 115/58   Pulse: (!) 120 (!) 114 (!) 115 (!) 114   Resp: (!) 33 28 28 30   Temp:  98.4 °F (36.9 °C) 98.3 °F (36.8 °C) 98.4 °F (36.9 °C)   SpO2: 100% 100% 99% 98%   Weight:       Height:             Pain Assessment  Pain Intensity 1: 0 (05/20/17 1602)  Pain Location 1: Buttocks  Pain Intervention(s) 1: Medication (see MAR)  Patient Stated Pain Goal: 0      Previous Outreach assessment has been reviewed. There have been no significant clinical changes since the completion of the last dated Outreach assessment. Will continue to follow up per outreach protocol.     Signed By:   Luis Alberto Bush RN    May 21, 2017 1:20 AM

## 2017-05-21 NOTE — PROGRESS NOTES
Hospitalist Progress Note    2017  Admit Date: 2017  3:14 PM   NAME: Zina Mendoza   :  1985   MRN:  [id number]   Attending: Dinora Roman MD  PCP:  Diamond Natarajan MD    SUBJECTIVE:   32yo F with no significant PMH presented with worsening Rt Flank pain, nausea, multiple episodes of vomiting and weakness. She was seen here in ER 1 week ago with back pain and diagnosed with Sciatica and given NSAIDs which she took without much relief. Yesterday her pain worsened and she took extra pain meds with about 4 glasses of Wine per . She then started having vomiting and abd discomfort that continued today and she came to ER. Found to be in DKA in ER with Na 115, K 6.0, LA 7.4, Bicarb 9, Ph 7.22, Cr 2.51 and Ck > 4000, WBC >40k, Hgb 10.8 with elevated LFTs. CT abdomen showed Pelvic mass concerning for malignancy. MRI showed anorectal abscess tracking up in the Gluteal region. S/p IR drain placement and Surgical Debridement by Dr. Thomas Magaña.     : s/p I&D by surgery, feels better, sugars are high, denies pain or SOB. Nursing notes and chart reviewed. Review of Systems negative with exception of pertinent positives noted above.     PHYSICAL EXAM     Visit Vitals    /43 (BP 1 Location: Left arm, BP Patient Position: At rest)    Pulse (!) 118    Temp 98.2 °F (36.8 °C)    Resp 20    Ht 5' 2\" (1.575 m)    Wt 89.6 kg (197 lb 8.5 oz)    LMP 2017    SpO2 99%    BMI 36.13 kg/m2      Temp (24hrs), Av.4 °F (36.9 °C), Min:98.2 °F (36.8 °C), Max:98.5 °F (36.9 °C)    Oxygen Therapy  O2 Sat (%): 99 % (17 1108)  Pulse via Oximetry: 115 beats per minute (17 1602)  O2 Device: Room air (17 0802)  O2 Flow Rate (L/min): 2 l/min (17 2134)  ETCO2 (mmHg): 12 mmHg (17 1645)    Intake/Output Summary (Last 24 hours) at 17 1557  Last data filed at 17 0540   Gross per 24 hour   Intake          1508.33 ml   Output             1575 ml   Net           -66.67 ml General: Mild distress. Alert.    Lungs:  CTABL. Heart:  RRR, no murmur, rub, or gallop, tachycardia  Abdomen: Soft, non-distended, non-tender, +bs  Extremities: No cyanosis or clubbing. Neurologic:  No focal deficits. Moves all extremities. Musculoskeletal: Tender Rt lower back  :   Large area of excoriations around her vagina and perineum. LABS AND STUDIES:  Personally reviewed all labs, meds, and studies for past 24hrs. 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 · Uncontrolled DM: A1C is 14, has seen diabetes educator for new diagnosis, Will need insulin upon discharge. · Pelvic Mass/Abscess: s/p IR drain, and Surgical I&D, on IV Morphine. May need repeat I&D. · NALDO: Resolved, will c/w IVF   · Sepsis: Likely due to perirectal/Gluteal Abscess vs ? PID (she has +ve serology for STDs). Blood Cx -ve and urine Cx drowing yeast 100k, C/w zosyn for now, on Doxy for Chlamydia, ID consulted, Gyn started Fluconazole. LA normalized and procal down trending. · Vaginal Lesions with Suspected PID: GYN consulted. Appreciate recs  · Rhabdomyolysis: Resolved  · Acute Pancreatitis: resolved, started feeds post Op. · Anemia: Acute ? likely 2/2 hematoma. Transfused 2 U PRBCs. H&H slowly drifting down on IV Fluids likely dilutional. Monitor. · Hyponatremia and Hyperkalemia: Resolved. Dispo: Pending improvement.      High risk given DKA, Sepsis, Pelvic Mass, NALDO and Rhabdo. DVT ppx:  hsq  Discussed plan with pt and  who is in agreement. All questions answered.       Signed By: Jany Olivo MD     May 21, 2017

## 2017-05-21 NOTE — PROGRESS NOTES
PL[facility]:  [facility]  --Dressing change/ wound assessment by Dr. Lauren Palacios 5/21/17  --NPO after midnight - possible back to OR tomorrow for wound debridement  --will follow    ASSESSMENT:  Admit Date: 5/16/2017   2 Days Post-Op  Procedure(s):  RECTAL EXAM UNDER ANESTHESIA  INCISION AND DRAINAGE OF ABSCESS, DEPRIDEMENT OF RIGHT BUTTOCK  ABSCESS. Principal Problem:    DKA (diabetic ketoacidoses) ([address] (5/16/2017)    Active Problems:    Acute alcoholic pancreatitis (1/18/7159)      Hyponatremia (5/16/2017)      Overview: Pseudohyponatremia      Hyperkalemia (5/16/2017)      Acute renal failure (ARF) ([address] (5/16/2017)      Rhabdomyosarcoma of flank ([address] (5/16/2017)      Sepsis ([address] (5/16/2017)      Chlamydia infection (5/16/2017)      Herpes simplex vulvovaginitis (5/16/2017)      Septic shock ([address] (5/17/2017)      Pelvic mass (5/17/2017)         SUBJECTIVE:  Pt awake in bed. No new complaints. Pino patent - 1000cc out last 24 hours. AF, VSS. OBJECTIVE:  Constitutional: Alert, cooperative, no acute distress; appears stated age   Visit Vitals    /43 (BP 1 Location: Left arm, BP Patient Position: At rest)    Pulse (!) 118    Temp 98.2 °F (36.8 °C)    Resp 20    Ht 5' 2\" (1.575 m)    Wt 197 lb 8.5 oz (89.6 kg)    LMP 04/12/2017    SpO2 99%    BMI 36.13 kg/m2     Eyes:Sclera are clear. ENMT: no external lesions gross hearing normal; no obvious neck masses, no ear or lip lesions  CV: RRR. Normal perfusion  Resp: No JVD. Breathing is non-labored; no audible wheezing. GI: soft, non-tender, non-distended   Skin: Right Buttocks - dressing changed by Dr. Lauren Palacios. ALEXANDER drain will not stay charged. Musculoskeletal: unremarkable with normal function. No embolic signs or cyanosis.    Neuro: Oriented; moves all 4; no focal deficits  Psychiatric: normal affect and mood, no memory impairment    Patient Vitals for the past 24 hrs:   BP Temp Pulse Resp SpO2   05/21/17 1108 100/43 98.2 °F (36.8 °C) (!) 118 20 99 %   05/21/17 0730 109/60 98.3 °F (36.8 °C) (!) 108 14 100 %   05/21/17 0429 108/59 98.5 °F (36.9 °C) (!) 114 30 100 %   05/21/17 0011 115/58 98.4 °F (36.9 °C) (!) 114 30 98 %   05/20/17 1955 113/65 98.3 °F (36.8 °C) (!) 115 28 99 %     Labs:  Recent Labs      05/21/17   0630   05/19/17   0325   WBC  21.9*   < >  36.5*   HGB  7.4*   < >  8.5*   PLT  216   < >  184   NA  145   < >  149*   K  3.8   < >  3.8   CL  112*   < >  119*   CO2  22   < >  19*   BUN  16   < >  23   CREA  0.57*   < >  0.66   GLU  153*   < >  202*   TBILI  0.4   < >  0.4   SGOT  26   < >  50*   ALT  25   < >  32   AP  152*   < >  136   LPSE   --    --   227    < > = values in this interval not displayed. Sherry Johnson, FNP-BC    The patient was seen in conjunction with Dr. Angela Osei who independently evaluated the patient, reviewed the chart and agreed with the assessment and plan. I have seen and examined the patient with the Nurse Practitioner and agree with the above assessment and plan. Mike Haro.  Angela Osei MD

## 2017-05-21 NOTE — PROGRESS NOTES
[facility] CRITICAL CARE OUTREACH NURSE PROGRESS REPORT      SUBJECTIVE: Called to assess patient secondary to transfer from ICU. MEWS Score: 4 (05/20/17 1602)  Vitals:    05/20/17 1402 05/20/17 1502 05/20/17 1602 05/20/17 1955   BP: 99/51 99/57 104/61 113/65   Pulse: (!) 114 (!) 120 (!) 114 (!) 115   Resp: (!) 37 (!) 33 28 28   Temp:   98.4 °F (36.9 °C) 98.3 °F (36.8 °C)   SpO2: 100% 100% 100% 99%   Weight:       Height:          LAB DATA:    Recent Labs      05/20/17 0345 05/19/17 0325 05/18/17   0536  05/17/17   2300   NA  146*  149*  148*  145   K  4.5  3.8  3.7  4.4   CL  116*  119*  119*  115*   CO2  19*  19*  16*  11*   AGAP  11  11  13  19*   GLU  318*  202*  216*  330*   BUN  21  23  35*  45*   CREA  0.69  0.66  0.84  0.93   GFRAA  >60  >60  >60  >60   GFRNA  >60  >60  >60  >60   CA  7.3*  7.9*  7.5*  7.6*   MG  2.1   --   2.3  2.3   PHOS  2.2*   --    --   2.0*   ALB  0.9*  1.0*  0.9*   --    TP  5.7*  6.0*  5.5*   --    GLOB  4.8*  5.0*  4.6*   --    AGRAT  0.2*  0.2*  0.2*   --    ALT  30  32  35   --         Recent Labs      05/20/17   1215  05/20/17   0345 05/19/17   2215 05/19/17 0325 05/18/17   0536   WBC   --   26.7*   --    --   36.5*   --   37.0*   HGB  7.8*  8.2*  8.8*   < >  8.5*   < >  6.5*   HCT  22.7*  23.8*  24.7*   < >  23.8*   < >  18.0*   PLT   --   196   --    --   184   --   253    < > = values in this interval not displayed. OBJECTIVE: On arrival to room, I found patient to be resting in bed, family at bedside. ASSESSMENT:  Pt resting in bed NAD. Respirations even and unlabored. Pt with no complaints at this time. Pt concerned about having BM with bandages in place. Primary nurse aware and has discussed this with pt. PLAN:  Continue to follow per outreach protocol.

## 2017-05-22 ENCOUNTER — ANESTHESIA EVENT (OUTPATIENT)
Dept: SURGERY | Age: 32
DRG: 853 | End: 2017-05-22
Payer: COMMERCIAL

## 2017-05-22 ENCOUNTER — ANESTHESIA (OUTPATIENT)
Dept: SURGERY | Age: 32
DRG: 853 | End: 2017-05-22
Payer: COMMERCIAL

## 2017-05-22 LAB
ALBUMIN SERPL BCP-MCNC: 1.5 G/DL (ref 3.5–5)
ALBUMIN/GLOB SERPL: 0.4 {RATIO} (ref 1.2–3.5)
ALP SERPL-CCNC: 134 U/L (ref 50–136)
ALT SERPL-CCNC: 26 U/L (ref 12–65)
ANION GAP BLD CALC-SCNC: 11 MMOL/L (ref 7–16)
AST SERPL W P-5'-P-CCNC: 38 U/L (ref 15–37)
BACTERIA SPEC CULT: ABNORMAL
BACTERIA SPEC CULT: ABNORMAL
BILIRUB SERPL-MCNC: 0.5 MG/DL (ref 0.2–1.1)
BUN SERPL-MCNC: 11 MG/DL (ref 6–23)
CALCIUM SERPL-MCNC: 7.3 MG/DL (ref 8.3–10.4)
CHLORIDE SERPL-SCNC: 111 MMOL/L (ref 98–107)
CO2 SERPL-SCNC: 22 MMOL/L (ref 21–32)
CREAT SERPL-MCNC: 0.4 MG/DL (ref 0.6–1)
ERYTHROCYTE [DISTWIDTH] IN BLOOD BY AUTOMATED COUNT: 15.3 % (ref 11.9–14.6)
GLOBULIN SER CALC-MCNC: 3.9 G/DL (ref 2.3–3.5)
GLUCOSE BLD STRIP.AUTO-MCNC: 104 MG/DL (ref 65–100)
GLUCOSE BLD STRIP.AUTO-MCNC: 118 MG/DL (ref 65–100)
GLUCOSE BLD STRIP.AUTO-MCNC: 134 MG/DL (ref 65–100)
GLUCOSE BLD STRIP.AUTO-MCNC: 67 MG/DL (ref 65–100)
GLUCOSE BLD STRIP.AUTO-MCNC: 77 MG/DL (ref 65–100)
GLUCOSE BLD STRIP.AUTO-MCNC: 90 MG/DL (ref 65–100)
GLUCOSE SERPL-MCNC: 115 MG/DL (ref 65–100)
GRAM STN SPEC: ABNORMAL
HCT VFR BLD AUTO: 27 % (ref 35.8–46.3)
HGB BLD-MCNC: 9 G/DL (ref 11.7–15.4)
LACTATE SERPL-SCNC: 1.4 MMOL/L (ref 0.4–2)
MCH RBC QN AUTO: 28.1 PG (ref 26.1–32.9)
MCHC RBC AUTO-ENTMCNC: 33.3 G/DL (ref 31.4–35)
MCV RBC AUTO: 84.4 FL (ref 79.6–97.8)
PHOSPHATE SERPL-MCNC: 2.6 MG/DL (ref 2.5–4.5)
PLATELET # BLD AUTO: 268 K/UL (ref 150–450)
PMV BLD AUTO: 9.7 FL (ref 10.8–14.1)
POTASSIUM SERPL-SCNC: 4 MMOL/L (ref 3.5–5.1)
PROT SERPL-MCNC: 5.4 G/DL (ref 6.3–8.2)
RBC # BLD AUTO: 3.2 M/UL (ref 4.05–5.25)
SERVICE CMNT-IMP: ABNORMAL
SODIUM SERPL-SCNC: 144 MMOL/L (ref 136–145)
WBC # BLD AUTO: 25.7 K/UL (ref 4.3–11.1)

## 2017-05-22 PROCEDURE — 77030008703 HC TU ET UNCUF COVD -A: Performed by: ANESTHESIOLOGY

## 2017-05-22 PROCEDURE — 65660000000 HC RM CCU STEPDOWN

## 2017-05-22 PROCEDURE — 84238 ASSAY NONENDOCRINE RECEPTOR: CPT | Performed by: NURSE PRACTITIONER

## 2017-05-22 PROCEDURE — 74011636637 HC RX REV CODE- 636/637: Performed by: INTERNAL MEDICINE

## 2017-05-22 PROCEDURE — 77030033269 HC SLV COMPR SCD KNE2 CARD -B: Performed by: COLON & RECTAL SURGERY

## 2017-05-22 PROCEDURE — 77030011640 HC PAD GRND REM COVD -A: Performed by: COLON & RECTAL SURGERY

## 2017-05-22 PROCEDURE — 76060000035 HC ANESTHESIA 2 TO 2.5 HR: Performed by: COLON & RECTAL SURGERY

## 2017-05-22 PROCEDURE — 74011000250 HC RX REV CODE- 250

## 2017-05-22 PROCEDURE — 74011000258 HC RX REV CODE- 258: Performed by: INTERNAL MEDICINE

## 2017-05-22 PROCEDURE — 74011250636 HC RX REV CODE- 250/636

## 2017-05-22 PROCEDURE — P9047 ALBUMIN (HUMAN), 25%, 50ML: HCPCS | Performed by: INTERNAL MEDICINE

## 2017-05-22 PROCEDURE — 74011000250 HC RX REV CODE- 250: Performed by: INTERNAL MEDICINE

## 2017-05-22 PROCEDURE — 85027 COMPLETE CBC AUTOMATED: CPT | Performed by: INTERNAL MEDICINE

## 2017-05-22 PROCEDURE — 0KBN0ZZ EXCISION OF RIGHT HIP MUSCLE, OPEN APPROACH: ICD-10-PCS | Performed by: COLON & RECTAL SURGERY

## 2017-05-22 PROCEDURE — 82962 GLUCOSE BLOOD TEST: CPT

## 2017-05-22 PROCEDURE — 83605 ASSAY OF LACTIC ACID: CPT | Performed by: INTERNAL MEDICINE

## 2017-05-22 PROCEDURE — 77030008477 HC STYL SATN SLP COVD -A: Performed by: ANESTHESIOLOGY

## 2017-05-22 PROCEDURE — 80053 COMPREHEN METABOLIC PANEL: CPT | Performed by: COLON & RECTAL SURGERY

## 2017-05-22 PROCEDURE — 76210000006 HC OR PH I REC 0.5 TO 1 HR: Performed by: COLON & RECTAL SURGERY

## 2017-05-22 PROCEDURE — 74011250636 HC RX REV CODE- 250/636: Performed by: INTERNAL MEDICINE

## 2017-05-22 PROCEDURE — 77030018836 HC SOL IRR NACL ICUM -A: Performed by: COLON & RECTAL SURGERY

## 2017-05-22 PROCEDURE — 36415 COLL VENOUS BLD VENIPUNCTURE: CPT | Performed by: COLON & RECTAL SURGERY

## 2017-05-22 PROCEDURE — 76010000153 HC OR TIME 1.5 TO 2 HR: Performed by: COLON & RECTAL SURGERY

## 2017-05-22 PROCEDURE — 84100 ASSAY OF PHOSPHORUS: CPT | Performed by: COLON & RECTAL SURGERY

## 2017-05-22 RX ORDER — ONDANSETRON 2 MG/ML
INJECTION INTRAMUSCULAR; INTRAVENOUS AS NEEDED
Status: DISCONTINUED | OUTPATIENT
Start: 2017-05-22 | End: 2017-05-22 | Stop reason: HOSPADM

## 2017-05-22 RX ORDER — SODIUM CHLORIDE, SODIUM LACTATE, POTASSIUM CHLORIDE, CALCIUM CHLORIDE 600; 310; 30; 20 MG/100ML; MG/100ML; MG/100ML; MG/100ML
INJECTION, SOLUTION INTRAVENOUS
Status: DISCONTINUED | OUTPATIENT
Start: 2017-05-22 | End: 2017-05-22 | Stop reason: HOSPADM

## 2017-05-22 RX ORDER — PROPOFOL 10 MG/ML
INJECTION, EMULSION INTRAVENOUS AS NEEDED
Status: DISCONTINUED | OUTPATIENT
Start: 2017-05-22 | End: 2017-05-22 | Stop reason: HOSPADM

## 2017-05-22 RX ORDER — FENTANYL CITRATE 50 UG/ML
INJECTION, SOLUTION INTRAMUSCULAR; INTRAVENOUS AS NEEDED
Status: DISCONTINUED | OUTPATIENT
Start: 2017-05-22 | End: 2017-05-22 | Stop reason: HOSPADM

## 2017-05-22 RX ORDER — SUCCINYLCHOLINE CHLORIDE 20 MG/ML
INJECTION INTRAMUSCULAR; INTRAVENOUS AS NEEDED
Status: DISCONTINUED | OUTPATIENT
Start: 2017-05-22 | End: 2017-05-22 | Stop reason: HOSPADM

## 2017-05-22 RX ORDER — DEXAMETHASONE SODIUM PHOSPHATE 4 MG/ML
INJECTION, SOLUTION INTRA-ARTICULAR; INTRALESIONAL; INTRAMUSCULAR; INTRAVENOUS; SOFT TISSUE AS NEEDED
Status: DISCONTINUED | OUTPATIENT
Start: 2017-05-22 | End: 2017-05-22 | Stop reason: HOSPADM

## 2017-05-22 RX ORDER — ROCURONIUM BROMIDE 10 MG/ML
INJECTION, SOLUTION INTRAVENOUS AS NEEDED
Status: DISCONTINUED | OUTPATIENT
Start: 2017-05-22 | End: 2017-05-22 | Stop reason: HOSPADM

## 2017-05-22 RX ORDER — FLUCONAZOLE 100 MG/1
400 TABLET ORAL DAILY
Status: DISCONTINUED | OUTPATIENT
Start: 2017-05-23 | End: 2017-06-02

## 2017-05-22 RX ORDER — ALBUMIN HUMAN 250 G/1000ML
25 SOLUTION INTRAVENOUS EVERY 6 HOURS
Status: DISPENSED | OUTPATIENT
Start: 2017-05-22 | End: 2017-05-23

## 2017-05-22 RX ORDER — LIDOCAINE HYDROCHLORIDE 20 MG/ML
INJECTION, SOLUTION EPIDURAL; INFILTRATION; INTRACAUDAL; PERINEURAL AS NEEDED
Status: DISCONTINUED | OUTPATIENT
Start: 2017-05-22 | End: 2017-05-22 | Stop reason: HOSPADM

## 2017-05-22 RX ADMIN — SUCCINYLCHOLINE CHLORIDE 160 MG: 20 INJECTION INTRAMUSCULAR; INTRAVENOUS at 18:16

## 2017-05-22 RX ADMIN — SODIUM CHLORIDE, SODIUM LACTATE, POTASSIUM CHLORIDE, CALCIUM CHLORIDE: 600; 310; 30; 20 INJECTION, SOLUTION INTRAVENOUS at 18:08

## 2017-05-22 RX ADMIN — PIPERACILLIN SODIUM,TAZOBACTAM SODIUM 3.38 G: 3; .375 INJECTION, POWDER, FOR SOLUTION INTRAVENOUS at 18:00

## 2017-05-22 RX ADMIN — FLUCONAZOLE 200 MG: 2 INJECTION INTRAVENOUS at 14:45

## 2017-05-22 RX ADMIN — DOXYCYCLINE 100 MG: 100 INJECTION, POWDER, LYOPHILIZED, FOR SOLUTION INTRAVENOUS at 00:07

## 2017-05-22 RX ADMIN — ONDANSETRON 4 MG: 2 INJECTION INTRAMUSCULAR; INTRAVENOUS at 19:42

## 2017-05-22 RX ADMIN — FENTANYL CITRATE 100 MCG: 50 INJECTION, SOLUTION INTRAMUSCULAR; INTRAVENOUS at 18:14

## 2017-05-22 RX ADMIN — PROPOFOL 200 MG: 10 INJECTION, EMULSION INTRAVENOUS at 18:15

## 2017-05-22 RX ADMIN — ALBUMIN (HUMAN) 25 G: 0.25 INJECTION, SOLUTION INTRAVENOUS at 22:36

## 2017-05-22 RX ADMIN — DEXAMETHASONE SODIUM PHOSPHATE 10 MG: 4 INJECTION, SOLUTION INTRA-ARTICULAR; INTRALESIONAL; INTRAMUSCULAR; INTRAVENOUS; SOFT TISSUE at 18:30

## 2017-05-22 RX ADMIN — DEXTROSE 12.25 G: 50 INJECTION, SOLUTION INTRAVENOUS at 18:01

## 2017-05-22 RX ADMIN — SODIUM CHLORIDE, SODIUM LACTATE, POTASSIUM CHLORIDE, CALCIUM CHLORIDE: 600; 310; 30; 20 INJECTION, SOLUTION INTRAVENOUS at 19:35

## 2017-05-22 RX ADMIN — PIPERACILLIN SODIUM,TAZOBACTAM SODIUM 3.38 G: 3; .375 INJECTION, POWDER, FOR SOLUTION INTRAVENOUS at 01:10

## 2017-05-22 RX ADMIN — LIDOCAINE HYDROCHLORIDE 100 MG: 20 INJECTION, SOLUTION EPIDURAL; INFILTRATION; INTRACAUDAL; PERINEURAL at 18:15

## 2017-05-22 RX ADMIN — DOXYCYCLINE 100 MG: 100 INJECTION, POWDER, LYOPHILIZED, FOR SOLUTION INTRAVENOUS at 11:12

## 2017-05-22 RX ADMIN — SODIUM CHLORIDE 100 ML/HR: 450 INJECTION, SOLUTION INTRAVENOUS at 14:25

## 2017-05-22 RX ADMIN — SODIUM CHLORIDE 100 ML/HR: 450 INJECTION, SOLUTION INTRAVENOUS at 22:33

## 2017-05-22 RX ADMIN — HEPARIN SODIUM 5000 UNITS: 5000 INJECTION, SOLUTION INTRAVENOUS; SUBCUTANEOUS at 22:28

## 2017-05-22 RX ADMIN — ROCURONIUM BROMIDE 5 MG: 10 INJECTION, SOLUTION INTRAVENOUS at 18:15

## 2017-05-22 RX ADMIN — FENTANYL CITRATE 50 MCG: 50 INJECTION, SOLUTION INTRAMUSCULAR; INTRAVENOUS at 18:40

## 2017-05-22 RX ADMIN — INSULIN GLARGINE 30 UNITS: 100 INJECTION, SOLUTION SUBCUTANEOUS at 09:00

## 2017-05-22 RX ADMIN — FENTANYL CITRATE 50 MCG: 50 INJECTION, SOLUTION INTRAMUSCULAR; INTRAVENOUS at 18:37

## 2017-05-22 RX ADMIN — PIPERACILLIN SODIUM,TAZOBACTAM SODIUM 3.38 G: 3; .375 INJECTION, POWDER, FOR SOLUTION INTRAVENOUS at 08:41

## 2017-05-22 NOTE — PROGRESS NOTES
Infectious Disease Progress Note    Today's Date: 2017   Admit Date: 2017    Impression:   · Large pelvic abscess; 20 cc purulent fluid drained 17, cultures with NSF; I&D , yeast and GNRs on stain, alpha strep, beta strep and GNRs in culture, pending  · Anaerobic GPR bacteremia (), pending; repeat culture  NGTD  · Chlamydia cervicitis  · Severe inguinal yeast infection  · DKA (A1C 14), resolved  · Pancreatitis, resolved    Plan:   · Continue Zosyn/Doxycycline  · Continue fluconazole  · RPR negative. · Follow pending cultures. Anti-infectives:     Piperacillin/tazobactam  Doxycycline  Fluconazole  Acyclovir    Subjective:   Date of Examination:  May 22, 2017  Referring Physician: Karie Cadena with leukocytosis. Denies pain, denies nausea, vomiting, diarrhea, fevers, chills or sweats. Patient Active Problem List   Diagnosis Code    DKA (diabetic ketoacidoses) ([facility] E13.10    Acute alcoholic pancreatitis J97.41    Hyponatremia E87.1    Hyperkalemia E87.5    [facility] renal failure ([facility]F) [facility] N17.9    Rhabdomyosarcoma of flank ([facility] C49.6    Sepsis ([facility] A41.9    Chlamydia infection A74.9    Herpes simplex vulvovaginitis A60.04    Septic shock ([facility]) A41.9, R65.21    Pelvic mass R19.00       No Known Allergies     Review of Systems:  A comprehensive review of systems was negative except for that written in the History of Present Illness. Objective:     Visit Vitals    /62 (BP 1 Location: Right arm, BP Patient Position: At rest)    Pulse (!) 106    Temp 98.7 °F (37.1 °C)    Resp 18    Ht 5' 2\" (1.575 m)    Wt 89.6 kg (197 lb 8.5 oz)    SpO2 99%    BMI 36.13 kg/m2     Temp (24hrs), Av.9 °F (37.2 °C), Min:98.2 °F (36.8 °C), Max:100.2 °F (37.9 °C)       Lines:  Central Venous Catheter:  L chest, non tender        Physical Exam:    General:  Awake, alert, no acute distress, obese   Eyes:  Sclera anicteric. Pupils equally round and reactive to light. Mouth/Throat: Mucous membranes normal, oral pharynx clear   Neck: Supple   Lungs:   Clear to auscultation bilaterally, good effort   CV:  Regular rate and rhythm, no audible murmur, click, rub or gallop   Abdomen:   Soft, non-tender. bowel sounds normal. non-distended; buttock dressing in place   Extremities: No cyanosis or edema   Skin: Skin color, texture, turgor normal. no acute rash or lesions   Lymph nodes: Not examined today   Musculoskeletal: No swelling or deformity   Lines/Devices:  Intact, no erythema, drainage or tenderness   : Yeast with fissures in bilateral inguinal intertriginous areas       Data Review:     CBC:  Recent Labs      05/22/17   0800  05/21/17   1850  05/21/17 0630 05/20/17 0345   WBC  25.7*   --   21.9*   --   26.7*   GRANS   --    --   81*   --   92*   MONOS   --    --   2*   --   2*   ANEU   --    --   18.9*   --   24.6*   ABL   --    --   2.6   --   1.6   HGB  9.0*  7.1*  7.4*   < >  8.2*   HCT  27.0*  21.0*  21.7*   < >  23.8*   PLT  268   --   216   --   196    < > = values in this interval not displayed. BMP:  Recent Labs      05/22/17   0800 05/21/17   0630 05/20/17 0345   CREA  0.40*  0.57*  0.69   BUN  11  16  21   NA  144  145  146*   K  4.0  3.8  4.5   CL  111*  112*  116*   CO2  22  22  19*   AGAP  11  11  11   GLU  115*  153*  318*       LFTS:  Recent Labs      05/22/17   0800  05/21/17   0630  05/20/17   0345   TBILI  0.5  0.4  0.4   ALT  26  25  30   SGOT  38*  26  42*   AP  134  152*  141*   TP  5.4*  6.0*  5.7*   ALB  1.5*  1.6*  0.9*       Microbiology:     All Micro Results     Procedure Component Value Units Date/Time    CULTURE, ANAEROBIC [[id number]] Collected:  05/19/17 1915    Order Status:  Completed Specimen:  Buttock Updated:  05/22/17 0845     Special Requests: R BUTTOCK TISSUE     Culture result:         SUBCULTURE IS NECESSARY TO DETERMINE PRESENCE OR ABSENCE OF ANAEROBIC BACTERIA IN THIS CULTURE.   FURTHER REPORT TO FOLLOW AFTER INCUBATION OF SUBCULTURE. CULTURE, TISSUE Quillian Chill STAIN [[id number]]  (Abnormal) Collected:  05/19/17 1915    Order Status:  Completed Specimen:  Buttock Updated:  05/22/17 0816     Special Requests: RIGHT        GRAM STAIN 0 TO 2  WBCS SEEN        MODERATE  GRAM VARIABLE RODS        Culture result: MODERATE  ALPHA STREPTOCOCCUS   (A)             SCANT  GRAM NEGATIVE RODS  IDENTIFICATION AND SUSCEPTIBILITY TO FOLLOW   (A)      SCANT  STREPTOCOCCI, BETA HEMOLYTIC   (A)     CULTURE, GONORRHOEAE ONLY [[id number]] Collected:  05/19/17 1946    Order Status:  Completed Specimen:  Cervical/vaginal swab Updated:  05/22/17 0810     Special Requests: NO SPECIAL REQUESTS        GRAM STAIN NO WBCS SEEN         FEW  YEAST         FEW  GRAM NEGATIVE RODS        Culture result:         NO NEISSERIA GONORRHOEAE ISOLATED TO DATE              CULTURE IN [address]  UPDATES TO FOLLOW    CULTURE, URINE [[id number]]  (Abnormal) Collected:  05/19/17 1117    Order Status:  Completed Specimen:  Urine from Cath Urine Updated:  05/22/17 0710     Special Requests: NO SPECIAL REQUESTS        Culture result:       >100,000  COLONIES/mL  YEAST  IDENTIFICATION TO FOLLOW   (A)    CULTURE, BLOOD [[id number]] Collected:  05/19/17 1033    Order Status:  Completed Specimen:  Blood from Blood Updated:  05/22/17 0651     Special Requests: LEFT HAND        Culture result: NO GROWTH 3 DAYS       CULTURE, BLOOD [[id number]] Collected:  05/19/17 1020    Order Status:  Completed Specimen:  Blood from Blood Updated:  05/22/17 0651     Special Requests: RIGHT ANTECUBITAL        Culture result: NO GROWTH 3 DAYS       CULTURE, HSV W/ TYPING [[id number]] Collected:  05/17/17 1710    Order Status:  Completed Specimen:  Miscellaneous sample Updated:  05/21/17 1241     Source PERINEUM     HSV culture w typing Comment         (NOTE)  Negative  No Herpes simplex virus isolated.   Performed At: [address] [id number]  Dave Arita MD WE:[id number]         AFB CULTURE + SMEAR W/RFLX ID FROM CULTURE [[id number]] Collected:  05/19/17 1915    Order Status:  Completed Specimen:  Miscellaneous sample Updated:  05/21/17 1036     Source BUTTOCK        AFB Specimen processing Concentration     Acid Fast Smear NEGATIVE          (NOTE)  Performed At: [address] [id number]  Jean-Pierre Gold MD SR:[id number]          Acid Fast Culture PENDING    CULTURE, BODY FLUID Gennette Balk STAIN [[id number]] Collected:  05/18/17 1640    Order Status:  Completed Specimen:  Abscess Updated:  05/20/17 1501     Special Requests: PERIRECTAL     GRAM STAIN MANY  GRAM POSITIVE RODS         RARE  GRAM NEGATIVE RODS         10 TO 20  WBCS SEEN  PER OIF       Culture result:         NORMAL SKIN DEE ISOLATED    FUNGUS CULTURE AND SMEAR [[id number]] Collected:  05/18/17 1640    Order Status:  Completed Specimen:  Miscellaneous sample Updated:  05/20/17 1437     Source PERIRECTAL     Fungus stain Direct Inoculation     Fungus (Mycology) Culture Other source received      (NOTE)  Performed At: [address] [id number]  Jean-Pierre Gold MD [id number]         CULTURE, BLOOD [[id number]] Collected:  05/16/17 1730    Order Status:  Completed Specimen:  Blood from Blood Updated:  05/20/17 0703     Special Requests: LEFT ANTECUBITAL        GRAM STAIN GRAM POSITIVE RODS         ANAEROBIC BOTTLE POSITIVE               RESULTS VERIFIED, PHONED TO AND READ BACK BY  Fartun Layne RN @ [phone] BY SP       Culture result:         CULTURE IN [place]ol[address] UPDATES TO FOLLOW    FUNGUS CULTURE AND Jeanella Fuel [[id number]] Collected:  05/19/17 1915    Order Status:  Completed Updated:  05/19/17 2125    CULTURE, Meriam Hint [[id number]] Collected:  05/19/17 1946    Order Status:  Completed Updated:  05/19/17 2117    CULTURE, BLOOD [[id number]] Collected:  05/16/17 1736    Order Status:  Completed Specimen:  Blood from Blood Updated:  05/18/17 1313     Special Requests: RIGHT ANTECUBITAL        GRAM STAIN GRAM POSITIVE RODS         ANAEROBIC BOTTLE POSITIVE                 CRITICAL RESULT NOT CALLED DUE TO PREVIOUS NOTIFICATION OF CRITICAL RESULT WITHIN THE LAST 24 HOURS. Culture result:         CULTURE IN PROGRESS,FURTHER UPDATES TO FOLLOW    CULTURE, HSV W/ TYPING [[id number]]     Order Status:  Canceled     MRSA SCREEN - PCR (NASAL) [[id number]] Collected:  05/16/17 2025    Order Status:  Completed Specimen:  Nasal from Nasal Swab Updated:  05/16/17 2157     Special Requests: NO SPECIAL REQUESTS        Culture result:         MRSA target DNA is not detected (presumptive not colonized with MRSA)          Imaging:     CT ABD PELV WO CONT (Final result) Result time: 05/16/17 20:07:05     Final result     Impression:     IMPRESSION:  Large incompletely characterized pelvic mass with apparent  involvement/extension into the right gluteal muscles. The findings presumably  represent a neoplastic process. Pelvic MRI may be beneficial for further  delineation as it relates to other pelvic anatomy. Preferably this would be done  with IV contrast.     CT abd/pelv with contrast (5/17/17)  IMPRESSION: Indeterminate perirectal mass with extension of heterogeneous,  low-attenuation soft tissue into the right buttock an asymmetric edema and  muscular swelling and the right hemipelvis. Right inguinal adenopathy is  present. This may be a perirectal abscess or necrotic tumor with extrapelvic  extension.     Signed By: Abbey Ma NP     May 22, 2017

## 2017-05-22 NOTE — DIABETES MGMT
Patient seen for continued diabetes education. Mother at bedside. Pt is currently NPO awaiting transfer to OR. . Blood glucose range today 115-104. Reviewed with pt and mother current insulin regimen: Lantus 30 units daily, Humalog 5 units 3x/day ac and humalog sliding scale coverage 4x/day ac and hs, including type of insulin, timing with meals, onset, peak of action, and duration of effect. Pt verbalizes understanding. Discussed target goals for blood glucose. Discussed signs, symptoms and treatments for hypoglycemia. Pt will need blood glucose meter instruction and insulin instruction, but prefers to wait until tomorrow. Stressed the need for follow up care for diabetes management with PCP. Pt states that she does not have a PCP. Discussed with  who is assisting with discharge planning. Plan is to continue diabetes education when pt is able to participate. Pt would likely benefit from continued outpatient diabetes education. Referral has been initiated to [facility]. Pt is agreeable to plan. Pt and mother have no further questions at this time.

## 2017-05-22 NOTE — CONSULTS
[facility] Hematology & Oncology        Inpatient Hematology / Oncology Consult Note    Reason for Consult:  DKA (diabetic ketoacidoses) ([facility]  Abscess  WOUND RIGHT BUTTOCK  Referring Physician:  Yari Nicolas MD    History of Present Illness:  Ms. Deysi Moore is a 29 y.o. female admitted on 5/16/2017 . The primary encounter diagnosis was Diabetic ketoacidosis without coma associated with other specified diabetes mellitus ([address] Diagnoses of Leukocytosis, unspecified type, Lactic acidosis, Acute pancreatitis, unspecified complication status, unspecified pancreatitis type, SIRS (systemic inflammatory response syndrome) ([address] Acute renal failure, unspecified acute renal failure type ([address] Herpes simplex vulvovaginitis, Rhabdomyosarcoma of flank ([address] Septic shock ([address] Alcohol-induced acute pancreatitis without infection or necrosis, Tinea cruris, Perirectal abscess, and Necrotizing soft tissue infection ([address] were also pertinent to this visit. .  She presented to ED c/o worsening right flank pain with nausea and vomiting. She was found to be in DKA with new diagnosis of DM. A1C 14, Na 115, K 6, LA 7.4, Bicarb 9, pH 7.22, Cr 2.51, CK >4000, WBC >40k, Hgb 10.8, elevated LFTs. CT A/P & MRI showed perianal/rectal abscess that was drained and sent for pathology, along with surgical debridement. She is also being treated for chlamydia cervicitis and severe inguinal yeast infx. We were consulted for anemia without obvious blood loss. Hgb ranging from 6.1 - 9.0 during admission, requiring blood transfusion. Retic count 3.2, TIBC 99, Iron 30, Ferritin 1727, Feti 46, Haptoglobin 344. Review of Systems:  Constitutional +fatigue Denies fever, chills, weight loss, appetite changes, night sweats. HEENT Denies trauma, blurry vision, hearing loss, ear pain, nosebleeds, sore throat, neck pain and ear discharge. Skin Denies lesions or rashes. Lungs Denies dyspnea, cough, sputum production or hemoptysis. Cardiovascular Denies chest pain, palpitations, or lower extremity edema. Gastrointestinal Denies nausea, vomiting, changes in bowel habits, bloody or black stools, abdominal pain.  Denies dysuria, frequency or hesitancy of urination. Neuro Denies headaches, visual changes or ataxia. Denies dizziness, tingling, tremors, sensory change, speech change, focal weakness or headaches. Hematology Denies easy bruising or bleeding, denies gingival bleeding or epistaxis. Endo +DM. Denies heat/cold intolerance, denies thyroid abnormalities. MSK Denies back pain, arthralgias, myalgias or frequent falls. Psychiatric/Behavioral Denies depression and substance abuse. The patient is not nervous/anxious. No Known Allergies  Past Medical History:   Diagnosis Date    Ill-defined condition     herpes     Past Surgical History:   Procedure Laterality Date    HX OTHER SURGICAL      pilonidal cyst     History reviewed. No pertinent family history. Social History     Social History    Marital status: SINGLE     Spouse name: N/A    Number of children: N/A    Years of education: N/A     Occupational History    Not on file.      Social History Main Topics    Smoking status: Never Smoker    Smokeless tobacco: Not on file    Alcohol use Yes      Comment: occasional    Drug use: No    Sexual activity: Yes     Partners: Male     Other Topics Concern    Not on file     Social History Narrative     Current Facility-Administered Medications   Medication Dose Route Frequency Provider Last Rate Last Dose    insulin lispro (HUMALOG) injection   SubCUTAneous AC&HS Nina Mcgrath MD   Stopped at 05/21/17 1630    0.9% sodium chloride infusion 250 mL  250 mL IntraVENous PRN Nina Mcgrath MD        insulin glargine (LANTUS) injection 30 Units  30 Units SubCUTAneous DAILY Nina Mcgrath MD   30 Units at 05/22/17 0900    insulin lispro (HUMALOG) injection 5 Units  5 Units SubCUTAneous Giselle Corbin MD   Stopped at 05/22/17 0730    morphine injection 5 mg  5 mg IntraVENous Q4H PRN Wu White MD   5 mg at 05/21/17 8480    promethazine (PHENERGAN) with saline injection 12.5 mg  12.5 mg IntraVENous Q6H PRN Yoselin Giraldo MD   12.5 mg at 05/18/17 0024    0.9% sodium chloride infusion 250 mL  250 mL IntraVENous PRN Carley Zazueta MD        0.45% sodium chloride infusion  100 mL/hr IntraVENous CONTINUOUS Carley Zazueta  mL/hr at 05/21/17 1453 100 mL/hr at 05/21/17 1453    doxycycline (VIBRAMYCIN) 100 mg in 0.9% sodium chloride (MBP/ADV) 100 mL  100 mg IntraVENous Q12H Kun Farris  mL/hr at 05/22/17 1112 100 mg at 05/22/17 1112    fluconazole (DIFLUCAN) 200mg/100 mL IVPB (premix)  200 mg IntraVENous Q24H Kendal Hall  mL/hr at 05/21/17 1459 200 mg at 05/21/17 1459    sodium chloride (NS) flush 5-10 mL  5-10 mL IntraVENous PRN Carley Zazueta MD   10 mL at 05/21/17 1500    dextrose 40% (GLUTOSE) oral gel 1 Tube  15 g Oral PRN Carley Zazueta MD        glucagon (GLUCAGEN) injection 1 mg  1 mg IntraMUSCular PRN Carley Zazueta MD        dextrose (D50W) injection syrg 12.5-25 g  25-50 mL IntraVENous PRN Carley Zazueta MD        acetaminophen (TYLENOL) tablet 650 mg  650 mg Oral Q4H PRN Carley Zazueta MD   650 mg at 05/17/17 2049    ondansetron (ZOFRAN) injection 4 mg  4 mg IntraVENous Q4H PRN Carley Zazueta MD   4 mg at 05/17/17 2037    bisacodyl (DULCOLAX) tablet 5 mg  5 mg Oral DAILY PRN Carley Zazueta MD        heparin (porcine) injection 5,000 Units  5,000 Units SubCUTAneous Paige Chu MD   Stopped at 05/21/17 2200    HYDROcodone-acetaminophen (NORCO) 5-325 mg per tablet 2 Tab  2 Tab Oral Q6H PRN Carley Zazueta MD   2 Tab at 05/18/17 2204    piperacillin-tazobactam (ZOSYN) 3.375 g in 0.9% sodium chloride (MBP/ADV) 100 mL  3.375 g IntraVENous Paige Chu MD 25 mL/hr at 05/22/17 0841 3.375 g at 05/22/17 0841       OBJECTIVE:  Patient Vitals for the past 8 hrs:   BP Temp Pulse Resp SpO2 17 1046 110/53 98.6 °F (37 °C) (!) 109 18 100 %   17 0723 119/62 98.7 °F (37.1 °C) (!) 106 18 99 %   17 0421 113/57 98.8 °F (37.1 °C) (!) 109 18 100 %     Temp (24hrs), Av.9 °F (37.2 °C), Min:98.5 °F (36.9 °C), Max:100.2 °F (37.9 °C)         Physical Exam:  Constitutional: Well developed, well nourished female in no acute distress, lying comfortably in the hospital bed. Family at bedside   HEENT: Normocephalic and atraumatic. Oropharynx is clear, mucous membranes are moist. Sclerae anicteric. Neck supple without JVD. Lymph node   Deferred   Skin Warm and dry. No bruising and no rash noted. No erythema. No pallor. Respiratory Lungs are clear to auscultation bilaterally without wheezes, rales or rhonchi, normal air exchange without accessory muscle use. CVS Tachycardic rate, regular rhythm and normal S1 and S2. No murmurs, gallops, or rubs. Abdomen Soft, nontender and nondistended, normoactive bowel sounds. No palpable mass. No hepatosplenomegaly. Neuro Grossly nonfocal with no obvious sensory or motor deficits. MSK Normal range of motion in general.  No edema and no tenderness. Psych Appropriate mood and affect.         Labs:    Recent Results (from the past 24 hour(s))   GLUCOSE, POC    Collection Time: 17  2:04 PM   Result Value Ref Range    Glucose (POC) 126 (H) 65 - 100 mg/dL   GLUCOSE, POC    Collection Time: 17  4:51 PM   Result Value Ref Range    Glucose (POC) 104 (H) 65 - 100 mg/dL   HGB & HCT    Collection Time: 17  6:50 PM   Result Value Ref Range    HGB 7.1 (L) 11.7 - 15.4 g/dL    HCT 21.0 (LL) 35.8 - 46.3 %   LD    Collection Time: 17  8:35 PM   Result Value Ref Range     (H) 100 - 190 U/L   HAPTOGLOBIN    Collection Time: 17  8:35 PM   Result Value Ref Range    Haptoglobin 344 (H) 30 - 200 mg/dL   RETICULOCYTE COUNT    Collection Time: 17  8:35 PM   Result Value Ref Range    Reticulocyte count 3.2 (H) 0.3 - 2.0 % Absolute Retic Cnt. 0.0901 (H) 0.0164 - 0.0776 M/ul    Immature Retic Fraction 44.8 (H) 3.0 - 15.9 %    Retic Hgb Conc. 30 29 - 35 pg   GLUCOSE, POC    Collection Time: 05/21/17  8:54 PM   Result Value Ref Range    Glucose (POC) 132 (H) 65 - 100 mg/dL   GLUCOSE, POC    Collection Time: 05/22/17  7:49 AM   Result Value Ref Range    Glucose (POC) 118 (H) 65 - 100 mg/dL   CBC W/O DIFF    Collection Time: 05/22/17  8:00 AM   Result Value Ref Range    WBC 25.7 (H) 4.3 - 11.1 K/uL    RBC 3.20 (L) 4.05 - 5.25 M/uL    HGB 9.0 (L) 11.7 - 15.4 g/dL    HCT 27.0 (L) 35.8 - 46.3 %    MCV 84.4 79.6 - 97.8 FL    MCH 28.1 26.1 - 32.9 PG    MCHC 33.3 31.4 - 35.0 g/dL    RDW 15.3 (H) 11.9 - 14.6 %    PLATELET 859 668 - 645 K/uL    MPV 9.7 (L) 10.8 - 39.5 FL   METABOLIC PANEL, COMPREHENSIVE    Collection Time: 05/22/17  8:00 AM   Result Value Ref Range    Sodium 144 136 - 145 mmol/L    Potassium 4.0 3.5 - 5.1 mmol/L    Chloride 111 (H) 98 - 107 mmol/L    CO2 22 21 - 32 mmol/L    Anion gap 11 7 - 16 mmol/L    Glucose 115 (H) 65 - 100 mg/dL    BUN 11 6 - 23 MG/DL    Creatinine 0.40 (L) 0.6 - 1.0 MG/DL    GFR est AA >60 >60 ml/min/1.73m2    GFR est non-AA >60 >60 ml/min/1.73m2    Calcium 7.3 (L) 8.3 - 10.4 MG/DL    Bilirubin, total 0.5 0.2 - 1.1 MG/DL    ALT (SGPT) 26 12 - 65 U/L    AST (SGOT) 38 (H) 15 - 37 U/L    Alk.  phosphatase 134 50 - 136 U/L    Protein, total 5.4 (L) 6.3 - 8.2 g/dL    Albumin 1.5 (L) 3.5 - 5.0 g/dL    Globulin 3.9 (H) 2.3 - 3.5 g/dL    A-G Ratio 0.4 (L) 1.2 - 3.5     PHOSPHORUS    Collection Time: 05/22/17  8:00 AM   Result Value Ref Range    Phosphorus 2.6 2.5 - 4.5 MG/DL   LACTIC ACID, PLASMA    Collection Time: 05/22/17  8:00 AM   Result Value Ref Range    Lactic acid 1.4 0.4 - 2.0 MMOL/L   GLUCOSE, POC    Collection Time: 05/22/17 11:15 AM   Result Value Ref Range    Glucose (POC) 104 (H) 65 - 100 mg/dL       Imaging:  Portable chest: 5/16/2017     History: Shortness of breath, fatigue x1 week     Comparison: None     Findings: A single view of the chest was obtained at 1748 hours. The patient is  slightly rotated towards the left.      The cardiac and mediastinal silhouette are normal in size and configuration. The  lungs and pleural spaces are clear. The pulmonary vascularity is within normal  limits.     IMPRESSION  Impression: Unremarkable portable chest radiograph    CT abdomen and pelvis without contrast 05/16/2017     History: Moderate abdominal pain, elevated lipase. Nausea and vomiting x1 day     Technique: Helically acquired images were obtained from the upper abdomen to the  ischial tuberosities reconstructed at 5 mmmm intervals without oral or IV  contrast. Intravenous contrast was not administered due to elevated creatinine. Coronal reformatted images were submitted.     Radiation dose reduction techniques were used for this study: Our CT scanners  use one or all of the following: Automated exposure control, adjustment of the  mA and/or kVp according to patient's size, iterative reconstruction.     Comparison: None     CT abdomen: There is mild respiratory motion artifact occurring throughout the  exam. The liver, spleen, pancreas, adrenal glands and kidneys are grossly  unremarkable on this noncontrast exam as well as allowing for motion artifact. No adenopathy or ascites is present. There is mild to moderate subcutaneous  stranding within the right lower flank posteriorly. There is no bowel  obstruction.     CT PELVIS: There is a complex pelvic mass like structure with the margins are  ill-defined in the absence of oral and IV contrast. This is felt to measure at  least 7.4 x 7.6 cm. A Pino catheter is present within a collapsed urinary  bladder. There is abnormal soft tissue density extending into the right ischio  rectal fossa. There is asymmetric enlargement of the right gluteal muscles. No  definite free fluid is present.  Several small right inguinal lymph nodes are  present.     IMPRESSION: Large incompletely characterized pelvic mass with apparent  involvement/extension into the right gluteal muscles. The findings presumably  represent a neoplastic process. Pelvic MRI may be beneficial for further  delineation as it relates to other pelvic anatomy. Preferably this would be done  with IV contrast.    CT CHEST ABDOMEN AND PELVIS WITH CONTRAST 5/17/2017     HISTORY: Critical calcium level of 5.6. Pelvic mass with gluteal involvement     TECHNIQUE: The patient received oral contrast and 100 mL Isovue-370 nonionic IV  contrast. Axial images were obtained through the chest, abdomen and pelvis. Coronal reformatted images were generated. All CT scans at this facility used  dose modulation, interactive reconstruction and/or weight based dosing when  appropriate to reduce radiation dose to as low as reasonably achievable.     COMPARISON: May 16, 2017     FINDINGS:     CHEST: There is no thoracic adenopathy. There is no lobar consolidation, pleural  effusions or pulmonary masses.     ABDOMEN: The gallbladder, liver, pancreas, spleen, adrenal glands, and kidneys  are normal in appearance.     PELVIS: The uterus is present. A Pino balloon is present within a collapsed  bladder. There is a poorly defined right perirectal low attenuation collection  or mass measuring 6.6 cm AP by approximately 5.3 cm transverse (image 100). This  extends cephalad along the lateral aspect of the uterus and along the right  lateral wall of the bladder. An incompletely characterized low-attenuation mass  along the left fundal portion of the uterus (image 88) may be a exophytic  fibroid. This is 2.5 cm in diameter.     Right inguinal adenopathy is present and there is a heterogeneous lobulated  structure extending into the fat of the right buttock (image 124) which is  continuous with the perirectal lesion.  In addition there is asymmetric edema in  the soft tissues of the right hemipelvis and right gluteus muscles which are  asymmetrically enlarged. There is edema throughout the soft tissues of the right  buttock and right hemipelvis. There are no aggressive osseous changes or  erosive/lytic osseous lesions.     IMPRESSION: Indeterminate perirectal mass with extension of heterogeneous,  low-attenuation soft tissue into the right buttock an asymmetric edema and  muscular swelling and the right hemipelvis. Right inguinal adenopathy is  present. This may be a perirectal abscess or necrotic tumor with extrapelvic  Extension. MRI pelvis without and with contrast. 5/19/2017     INDICATION: 29year-old with 2 weeks of severe right buttocks pain with  perirectal abscess, status post drain placement into the right perirectal region  yesterday.     COMPARISON: Pelvis CT studies 5/16/2017 - 5/18/2017.     TECHNIQUE: Sagittal axial and coronal T2, precontrast and postcontrast  multiplanar T1 sequences. 10 mL MultiHance IV gadolinium contrast.     FINDINGS: Percutaneous drainage catheter was placed yesterday to the right  perirectal fascia space for drainage. This is just barely evident on the  postcontrast axial T1 sequence in position-this tubing is difficult to visualize  on MRI. Compared to the study of 2017 there is clearly less mass effect on the  rectum by the abscess collection compatible with interval drainage history. Otherwise, this imaging confirms a large right-sided perianal nonenhancing  abscess collection extending throughout the ischiorectal fossa directly  communicating with adjacent right perirectal fascial spaces and continuing to  extend up cephalad within the mesocolon fat, and also on word cephalad into the  lateral pelvic wall fascia about the deep iliac vessels-its most cephalad  extension is located deep to the common iliac bifurcation and adjacent iliacus  muscle.  There is also direct extension through the sciatic notch about the  sciatic nerve with extensive nonenhancing abscess infiltrating through all of  the gluteus muscles and extending about the enlarged and enhancing sciatic nerve  into the lateral aspect of the posterior compartment hamstring muscles. All  about this is considerable enhancement both of surrounding fascial tissues, and  the deep muscular structures compatible with myofasciitis changes and there is  asymmetric thickening/edema in the more superficial soft tissues compatible with  associated reactive cellulitis. I do not see findings of osteomyelitis on this exam. There is no right hip joint  effusion. The deep vascular structures at the groin appear patent. The bladder is collapsed around a Pino catheter. There probably is some mild proctitis changes adjacent to the abscess. The rest  of the pelvic bowel loops appear unremarkable. There is no evidence of extension  of abscess outside of the pelvis.     IMPRESSION: Status post right perianal/rectal abscess drainage. The extensive  remaining abscess findings are detailed above.     ASSESSMENT:  Problem List  Date Reviewed: 5/17/2017          Codes Class Noted    Septic shock ([facility] ICD-10-CM: A41.9, R65.21  ICD-9-CM: 038.9, 785.52, 995.92  5/17/2017        Pelvic mass ICD-10-CM: R19.00  ICD-9-CM: 789.30  5/17/2017        * (Principal)DKA (diabetic ketoacidoses) (H[facility]) ICD-10-CM: E13.10  ICD-9-CM: 250.10  5/16/2017        Acute alcoholic pancreatitis IEF-38-CQ: K85.20  ICD-9-CM: 577.0  5/16/2017        Hyponatremia ICD-10-CM: E87.1  ICD-9-CM: 276.1  5/16/2017    Overview Signed 5/16/2017  7:00 PM by Eron Noe MD     Pseudohyponatremia             Hyperkalemia ICD-10-CM: E87.5  ICD-9-CM: 276.7  5/16/2017        Acute renal failure (ARF) ([facility] [address] ICD-10-CM: N17.9  ICD-9-CM: 584.9  5/16/2017        Rhabdomyosarcoma of flank ([facility] [address] ICD-10-CM: C49.6  ICD-9-CM: 171.7  5/16/2017        Sepsis ([facility] ICD-10-CM: A41.9  ICD-9-CM: 038.9, 995.91  5/16/2017        Chlamydia infection ICD-10-CM: A74.9  ICD-9-CM: 079.98  5/16/2017        Herpes simplex vulvovaginitis ICD-10-CM: A60.04  ICD-9-CM: 054.11  5/16/2017                RECOMMENDATIONS:  Anemia most likely related to a combination of things: 1) acute blood loss from perianal/rectal abscess that was drained along with surgical debridement, 2) infection/sepsis (as well as the NSAIDs she was placed on after the ED visit last week for sciatica) can cause a degree of myelosuppression, and 3) decreased kidney function (Cr 2.51) can affect erythropoietin production. Her ferritin was elevated, however this can be elevated from infection or inflammation, so we will check a soluble transferrin receptor and peripheral smear review. Lab studies and imaging studies (CT/CXR/MRI) were personally reviewed. Pertinent old records were reviewed. Case discussed with inpatient team.    Thank you for allowing us to participate in the care of Ms. Stefan Xavier. We will continue to follow along.          [facility]  [address] [address]  Office : [phone]  Fax : [phone]

## 2017-05-22 NOTE — PROGRESS NOTES
Unable to obtain adequate blood return from central line. Phlebotomist to draw AM labs peripherally.

## 2017-05-22 NOTE — PROGRESS NOTES
Call to pre-op regarding surgery scheduled for 1415 today. Pt is on wait list, unknown time of surgery today.

## 2017-05-22 NOTE — PROGRESS NOTES
Jane Miguel M.D. Colon and Rectal Surgeon  [facility], [address] [facility], [facility] [address]  Phone: [phone] Fax: [phone]      Giselle Molina  [id number]    SUBJECTIVE:  29year old female admitted with DKA, rhabdomyolysis, NALDO, pancreatitis, lactic acidosis, leukocytosis, and sepsis. On CT, she was found to have a large pelvic process (mass vs abscess) with tracking into right buttock. She improved some with supportive care, fluid resuscitation, and antibiotics. She had an IR drain placed into her pelvic fluid collection. She was taken to the OR 5/19/17 for I&D of necrotic buttock fat. I found tracking into the extraperitoneal pelvic fluid collection. There was no connection to rectum. Overall picture regarding source remains unclear. She feels better this AM.   Less pain. No n/v. No abdominal pain. Not OOB. Tolerated a diet over weekend. ROS:  General--no fevers, chills  Respiratory--no shortness of breath, wheezing  Cardiovascular--no chest pain, palpitations  GI--no nausea/emesis. No hematochezia/melena. No pain    PHYSICAL EXAM:   Patient Vitals for the past 24 hrs:   BP Temp Pulse Resp SpO2   05/22/17 0421 113/57 98.8 °F (37.1 °C) (!) 109 18 100 %   05/21/17 2336 111/48 98.5 °F (36.9 °C) (!) 113 18 100 %   05/21/17 2232 122/58 98.8 °F (37.1 °C) (!) 110 24 -   05/21/17 2217 110/54 98.6 °F (37 °C) (!) 112 28 99 %   05/21/17 2019 106/45 100.2 °F (37.9 °C) (!) 116 (!) 32 99 %   05/21/17 1652 100/42 99.2 °F (37.3 °C) (!) 118 22 99 %   05/21/17 1108 100/43 98.2 °F (36.8 °C) (!) 118 20 99 %   05/21/17 0730 109/60 98.3 °F (36.8 °C) (!) 108 14 100 %       General--AAO, NAD, answers all questions, appears comfortable  Abdomen--soft, nontender, nondistended. No peritoneal signs, no rebound, no guarding  Buttock--ALEXANDER from IR is in place, not holding suction as it connects to the wound. There is a clean, dry dressing on her buttock.   It was not removed today. UOP: 550/850  ALEXANDER: none  BM: nr    Recent Labs      17   1850  17   0630  17   2307   17   0345   WBC   --   21.9*   --    --   26.7*   HGB  7.1*  7.4*  7.5*   < >  8.2*   HCT  21.0*  21.7*  22.0*   < >  23.8*   PLT   --   216   --    --   196    < > = values in this interval not displayed. Recent Labs      17   0630  17   0345   NA  145  146*   K  3.8  4.5   CL  112*  116*   CO2  22  19*   BUN  16  21   CREA  0.57*  0.69   TBILI  0.4  0.4   SGOT  26  42*   ALT  25  30   AP  152*  141*   MG   --   2.1   PHOS   --   2.2*       No results for input(s): AML, LPSE in the last 72 hours. No results for input(s): PTP, INR, APTT in the last 72 hours. No lab exists for component: INREXT, INREXT  Last lactate 2.3  Last procalcitonin 27.2  Last   RPR NR  Pelvic fluid culture from IR 17--GPR, GNR  AFB smear 17--negative  HSV culture 17--negative  Blood cultures 17--GPR x2 bottles  Blood cultures 17--no growth x 2 days  UA 17--1+ bacteria, neg LE/nitr--cultures with 100k yeast  OR tissue culture 17--alpha hemolytic strep, GNRs  Above labs reviewed by me. [id number]--CT abd/pelvis: \"IMPRESSION: Large incompletely characterized pelvic mass with apparent involvement/extension into the right gluteal muscles. The findings presumably represent a neoplastic process. Pelvic MRI may be beneficial for further delineation as it relates to other pelvic anatomy. Preferably this would be done with IV contrast.\"  17--CT chest/abd/pelvis: \"IMPRESSION: Indeterminate perirectal mass with extension of heterogeneous, low-attenuation soft tissue into the right buttock an asymmetric edema and muscular swelling and the right hemipelvis. Right inguinal adenopathy is present. This may be a perirectal abscess or necrotic tumor with extrapelvic extension. \"  17--CT guided IR drain placement:   17--MRI pelvis--\"IMPRESSION: Status post right perianal/rectal abscess drainage. The extensive remaining abscess findings are detailed above. \"    ASSESSMENT:   Pelvic abscess   S/p IR drain 5/18/17   S/p operative I&D, debridement of necrotic tissue 5/19/17  Sepsis, due to above--improved  Leukocytosis, due to above--improved  DKA--improved  Rhabdomyolysis--resolved  NALDO--resolved  Pancreatitis--resolved  Anemia--   S/p transfusion 2 units PRBC 5/18/17  Hypernatremia  Elevated LFTs  Hypophosphatemia    PLAN:  --continue IV pain medications  --OOB, IS, mobilize  --No CV issues. Tachycardia is compensatory response to sepsis  --NPO for now, diet after OR today. Will recheck wound, further debridement if necessary. Maybe Instill-VAC if available?  --follow UOP, creat  --transfuse for <7  --continue broad spectrum antibiotics. Source remains unclear but with culture results coming back, we can hopefully target/narrow our spectrum  --based on operative findings, this seems more like a pelvic process tracking down into gluteus, as opposed to the other way around.       Nicki Spicer MD

## 2017-05-22 NOTE — PROGRESS NOTES
Critical Care Outreach Nurse Progress Report:    Subjective: In to assess pt secondary to discharge from critical care area. MEWS Score: 5 (05/21/17 1652)    Vitals:    05/21/17 0730 05/21/17 1108 05/21/17 1652 05/21/17 2019   BP: 109/60 100/43 100/42 106/45   Pulse: (!) 108 (!) 118 (!) 118 (!) 116   Resp: 14 20 22 (!) 32   Temp: 98.3 °F (36.8 °C) 98.2 °F (36.8 °C) 99.2 °F (37.3 °C) 100.2 °F (37.9 °C)   SpO2: 100% 99% 99% 99%   Weight:       Height:            Objective:   Pain Intensity 1: 0 (05/21/17 1928)  Pain Location 1: Buttocks  Pain Intervention(s) 1: Medication (see MAR)  Patient Stated Pain Goal: 0    Assessment: Patient is alert and oriented x4. Lung sounds are clear. At rest, respiratory rate is 32. Patient denies feeling short of breath. Oxygen saturation 99% on room air. . Pulse regular. BP marginal at 106/45 (65) but an improvement from previous reading. Temp 100.2 axillary. Patient denies pain or discomfort at this time. Plan: Placed patient on 2 L nasal cannula. Primary RN to transfuse one unit of blood per order and reassess vitals and follow temps per protocol. Continue following per outreach protocol.

## 2017-05-23 LAB
ABO + RH BLD: NORMAL
ALBUMIN SERPL BCP-MCNC: 2.1 G/DL (ref 3.5–5)
ANION GAP BLD CALC-SCNC: 10 MMOL/L (ref 7–16)
BACTERIA SPEC CULT: ABNORMAL
BACTERIA SPEC CULT: NORMAL
BASOPHILS # BLD AUTO: 0 K/UL (ref 0–0.2)
BASOPHILS # BLD: 0 % (ref 0–2)
BLD PROD TYP BPU: NORMAL
BLOOD GROUP ANTIBODIES SERPL: NORMAL
BPU ID: NORMAL
BUN SERPL-MCNC: 10 MG/DL (ref 6–23)
C TRACH RRNA SPEC QL NAA+PROBE: POSITIVE
CALCIUM SERPL-MCNC: 7.4 MG/DL (ref 8.3–10.4)
CHLORIDE SERPL-SCNC: 111 MMOL/L (ref 98–107)
CO2 SERPL-SCNC: 21 MMOL/L (ref 21–32)
CREAT SERPL-MCNC: 0.5 MG/DL (ref 0.6–1)
CROSSMATCH RESULT,%XM: NORMAL
DIFFERENTIAL METHOD BLD: ABNORMAL
EOSINOPHIL # BLD: 0 K/UL (ref 0–0.8)
EOSINOPHIL NFR BLD: 0 % (ref 0.5–7.8)
ERYTHROCYTE [DISTWIDTH] IN BLOOD BY AUTOMATED COUNT: 16 % (ref 11.9–14.6)
GLUCOSE BLD STRIP.AUTO-MCNC: 168 MG/DL (ref 65–100)
GLUCOSE BLD STRIP.AUTO-MCNC: 189 MG/DL (ref 65–100)
GLUCOSE BLD STRIP.AUTO-MCNC: 266 MG/DL (ref 65–100)
GLUCOSE BLD STRIP.AUTO-MCNC: 309 MG/DL (ref 65–100)
GLUCOSE SERPL-MCNC: 327 MG/DL (ref 65–100)
GRAM STN SPEC: ABNORMAL
GRAM STN SPEC: ABNORMAL
GRAM STN SPEC: NORMAL
HCT VFR BLD AUTO: 25.2 % (ref 35.8–46.3)
HCT VFR BLD AUTO: 26.7 % (ref 35.8–46.3)
HGB BLD-MCNC: 8.6 G/DL (ref 11.7–15.4)
HGB BLD-MCNC: 9.1 G/DL (ref 11.7–15.4)
IMM GRANULOCYTES # BLD: 0.2 K/UL (ref 0–0.5)
IMM GRANULOCYTES NFR BLD AUTO: 0.6 % (ref 0–5)
LYMPHOCYTES # BLD AUTO: 5 % (ref 13–44)
LYMPHOCYTES # BLD: 1.4 K/UL (ref 0.5–4.6)
MAGNESIUM SERPL-MCNC: 1.7 MG/DL (ref 1.8–2.4)
MCH RBC QN AUTO: 28.6 PG (ref 26.1–32.9)
MCHC RBC AUTO-ENTMCNC: 34.1 G/DL (ref 31.4–35)
MCV RBC AUTO: 83.7 FL (ref 79.6–97.8)
MONOCYTES # BLD: 0.4 K/UL (ref 0.1–1.3)
MONOCYTES NFR BLD AUTO: 1 % (ref 4–12)
N GONORRHOEA RRNA SPEC QL NAA+PROBE: NEGATIVE
NEUTS SEG # BLD: 28.3 K/UL (ref 1.7–8.2)
NEUTS SEG NFR BLD AUTO: 93 % (ref 43–78)
PLATELET # BLD AUTO: 389 K/UL (ref 150–450)
PMV BLD AUTO: 10.1 FL (ref 10.8–14.1)
POTASSIUM SERPL-SCNC: 4.2 MMOL/L (ref 3.5–5.1)
PREALB SERPL-MCNC: 7 MG/DL (ref 18–35.7)
RBC # BLD AUTO: 3.01 M/UL (ref 4.05–5.25)
SERVICE CMNT-IMP: ABNORMAL
SERVICE CMNT-IMP: ABNORMAL
SERVICE CMNT-IMP: NORMAL
SODIUM SERPL-SCNC: 142 MMOL/L (ref 136–145)
SPECIMEN EXP DATE BLD: NORMAL
SPECIMEN SOURCE: ABNORMAL
STATUS OF UNIT,%ST: NORMAL
TRANSFERRIN SERPL-SCNC: 17.5 NMOL/L (ref 12.2–27.3)
UNIT DIVISION, %UDIV: 0
WBC # BLD AUTO: 30.3 K/UL (ref 4.3–11.1)

## 2017-05-23 PROCEDURE — 74011250636 HC RX REV CODE- 250/636: Performed by: INTERNAL MEDICINE

## 2017-05-23 PROCEDURE — 74011636637 HC RX REV CODE- 636/637: Performed by: INTERNAL MEDICINE

## 2017-05-23 PROCEDURE — P9047 ALBUMIN (HUMAN), 25%, 50ML: HCPCS | Performed by: INTERNAL MEDICINE

## 2017-05-23 PROCEDURE — 74011000258 HC RX REV CODE- 258: Performed by: INTERNAL MEDICINE

## 2017-05-23 PROCEDURE — 36415 COLL VENOUS BLD VENIPUNCTURE: CPT | Performed by: NURSE PRACTITIONER

## 2017-05-23 PROCEDURE — 85025 COMPLETE CBC W/AUTO DIFF WBC: CPT | Performed by: NURSE PRACTITIONER

## 2017-05-23 PROCEDURE — 85018 HEMOGLOBIN: CPT | Performed by: INTERNAL MEDICINE

## 2017-05-23 PROCEDURE — 83735 ASSAY OF MAGNESIUM: CPT | Performed by: NURSE PRACTITIONER

## 2017-05-23 PROCEDURE — 84134 ASSAY OF PREALBUMIN: CPT | Performed by: NURSE PRACTITIONER

## 2017-05-23 PROCEDURE — 74011250637 HC RX REV CODE- 250/637: Performed by: INTERNAL MEDICINE

## 2017-05-23 PROCEDURE — 80048 BASIC METABOLIC PNL TOTAL CA: CPT | Performed by: NURSE PRACTITIONER

## 2017-05-23 PROCEDURE — 82962 GLUCOSE BLOOD TEST: CPT

## 2017-05-23 PROCEDURE — 74011000250 HC RX REV CODE- 250: Performed by: INTERNAL MEDICINE

## 2017-05-23 PROCEDURE — 82040 ASSAY OF SERUM ALBUMIN: CPT | Performed by: NURSE PRACTITIONER

## 2017-05-23 PROCEDURE — 65660000000 HC RM CCU STEPDOWN

## 2017-05-23 RX ADMIN — INSULIN LISPRO 5 UNITS: 100 INJECTION, SOLUTION INTRAVENOUS; SUBCUTANEOUS at 12:30

## 2017-05-23 RX ADMIN — ALBUMIN (HUMAN) 25 G: 0.25 INJECTION, SOLUTION INTRAVENOUS at 06:00

## 2017-05-23 RX ADMIN — WATER 2 MG: 1 INJECTION INTRAMUSCULAR; INTRAVENOUS; SUBCUTANEOUS at 19:44

## 2017-05-23 RX ADMIN — SODIUM CHLORIDE 100 ML/HR: 450 INJECTION, SOLUTION INTRAVENOUS at 12:32

## 2017-05-23 RX ADMIN — DOXYCYCLINE 100 MG: 100 INJECTION, POWDER, LYOPHILIZED, FOR SOLUTION INTRAVENOUS at 00:00

## 2017-05-23 RX ADMIN — DOXYCYCLINE 100 MG: 100 INJECTION, POWDER, LYOPHILIZED, FOR SOLUTION INTRAVENOUS at 12:33

## 2017-05-23 RX ADMIN — FLUCONAZOLE 400 MG: 100 TABLET ORAL at 09:26

## 2017-05-23 RX ADMIN — INSULIN LISPRO 5 UNITS: 100 INJECTION, SOLUTION INTRAVENOUS; SUBCUTANEOUS at 18:43

## 2017-05-23 RX ADMIN — INSULIN GLARGINE 30 UNITS: 100 INJECTION, SOLUTION SUBCUTANEOUS at 09:16

## 2017-05-23 RX ADMIN — INSULIN LISPRO 5 UNITS: 100 INJECTION, SOLUTION INTRAVENOUS; SUBCUTANEOUS at 09:18

## 2017-05-23 RX ADMIN — INSULIN LISPRO 3 UNITS: 100 INJECTION, SOLUTION INTRAVENOUS; SUBCUTANEOUS at 18:43

## 2017-05-23 RX ADMIN — PIPERACILLIN SODIUM,TAZOBACTAM SODIUM 3.38 G: 3; .375 INJECTION, POWDER, FOR SOLUTION INTRAVENOUS at 16:00

## 2017-05-23 RX ADMIN — INSULIN LISPRO 9 UNITS: 100 INJECTION, SOLUTION INTRAVENOUS; SUBCUTANEOUS at 12:30

## 2017-05-23 RX ADMIN — INSULIN LISPRO 12 UNITS: 100 INJECTION, SOLUTION INTRAVENOUS; SUBCUTANEOUS at 09:17

## 2017-05-23 RX ADMIN — ALBUMIN (HUMAN) 25 G: 0.25 INJECTION, SOLUTION INTRAVENOUS at 12:33

## 2017-05-23 RX ADMIN — HEPARIN SODIUM 5000 UNITS: 5000 INJECTION, SOLUTION INTRAVENOUS; SUBCUTANEOUS at 14:33

## 2017-05-23 RX ADMIN — INSULIN LISPRO 3 UNITS: 100 INJECTION, SOLUTION INTRAVENOUS; SUBCUTANEOUS at 22:00

## 2017-05-23 RX ADMIN — HEPARIN SODIUM 5000 UNITS: 5000 INJECTION, SOLUTION INTRAVENOUS; SUBCUTANEOUS at 22:35

## 2017-05-23 RX ADMIN — HEPARIN SODIUM 5000 UNITS: 5000 INJECTION, SOLUTION INTRAVENOUS; SUBCUTANEOUS at 06:00

## 2017-05-23 RX ADMIN — PIPERACILLIN SODIUM,TAZOBACTAM SODIUM 3.38 G: 3; .375 INJECTION, POWDER, FOR SOLUTION INTRAVENOUS at 01:00

## 2017-05-23 NOTE — DIABETES MGMT
Patient seen for continued diabetes education.  at bedside. Blood glucose range today 309-327. Reviewed current insulin regimen: Lantus 30 units daily, Humalog 5 units 3x/day ac and humalog sliding scale coverage 4x/day ac and hs, including type of insulin, timing with meals, onset, peak of action and duration of effect. Pt verbalizes understanding. Pt instructed re: preparation and injection of insulin dose using both vial and syringe method and insulin pen method. Pt return demonstrated proper insulin injection technique using injection model. Nursing will continue to have pt practice insulin self-injection when insulin dose is due. Pt also verbalizes understanding of site rotation, storage of insulin, and proper sharps disposal.  Explained the importance of blood glucose monitoring. Recommended frequency of qid ac, hs, and to record in log book to take to PCP appointment. Provided blood glucose meter, strips, and instructed pt in use of meter. Pt was able to return demonstrate correct use of meter. Discussed target goals for A1C and blood glucose. Discussed the relationship between infection and hyperglycemia. Discussed the importance of following a consistent carbohydrate diet and eating 3 healthy meals 4-5 hours apart. Discussed the plate method for healthy meal planning. Pt states that she drinks sweet tea. Discussed alternative unsweetened beverage choices. Pt verbalizes understanding. Pt states that she would prefer vial and syringes at discharge. Pt will need prescriptions for syringes, blood glucose test strips and lancets at discharge. Pt would likely benefit from continued outpatient diabetes education. Referral has been initiated to [address] [place] [address] [place]. Pt is agreeable to plan.  assisting with discharge planning. Stressed the need for follow up care for diabetes management with PCP. Pt and  have no further questions at this time.

## 2017-05-23 NOTE — BRIEF OP NOTE
BRIEF OPERATIVE NOTE    Date of Procedure: 5/22/2017   Preoperative Diagnosis: WOUND RIGHT BUTTOCK  Postoperative Diagnosis: WOUND RIGHT BUTTOCK / PELVIC ABSCESS    Procedure(s):  IRRIGATION AND DEBRIDEMENT OF SKIN, SUBCUTANEOUS TISSUE, MUSCLE, AND FASCIA, RIGHT BUTTOCKS AND EXTRAPERITONEAL PELVIS    Surgeon(s) and Role:     * Anders Obando MD - Primary  Assistant Staff:    Surgical Staff:  Circ-1: Omar Crowe RN  Scrub Tech-1: Misty Phipps CNA  Scrub Tech-Relief: Mariposa Govea;  Mariza Iyer  Event Time In   Incision Start 1833   Incision Close 1959     Anesthesia: General   Estimated Blood Loss: 50  Specimens: * No specimens in log *   Findings: WOUND MUCH , WITH MILD SLOUGH, NO UNDRAINED POCKETS OF PUS, MOST NECROTIC TISSUE REMOVED  Complications: NONE INTRAOP  Implants: * No implants in log *

## 2017-05-23 NOTE — PROGRESS NOTES
Notified Roma Echeverria from pharmacy that Zosyn not available in either pyxis, frig, or on back counter. And that a message was sent to pharmacy earlier in shift. Due at 9:00 this am. Roma Echeverria stated he would [facility] the medication tubed up\".

## 2017-05-23 NOTE — INTERDISCIPLINARY ROUNDS
Interdisciplinary team rounds were held 5/23/2017 with the following team members:Care Management, Nursing, Pastoral Care, Pharmacy and Physician. Plan of care discussed. See clinical pathway and/or care plan for interventions and desired outcomes.

## 2017-05-23 NOTE — PROGRESS NOTES
Pt resting in bed, family at bedside. No distress noted. ALEXANDER drain charged, gray colored fluid out. Dressing C/D/I. SCD,s placed, fluids infusing.

## 2017-05-23 NOTE — OP NOTES
[facility]   OPERATIVE REPORT       Name:  Clau Zendejas   MR#:  [id number]   :  1985   Account #:  [de-identified]   Date of Adm:  2017       DATE OF SURGERY: 2017    PREOPERATIVE DIAGNOSIS: Buttock and extraperitoneal pelvic   abscess. POSTPROCEDURE DIAGNOSIS: Buttock and extraperitoneal pelvic   abscess. PROCEDURE PERFORMED: Irrigation and sharp excisional debridement of skin,   subcutaneous tissues, muscle, and fascia of right buttock and pelvis. SURGEON: Kwadwo Bills MD.    ASSISTANT: None. ANESTHESIA: General.    BLOOD LOSS: 50 mL. SPECIMENS: None. BRIEF DESCRIPTION OF THE FINDINGS: Wound much  today. Minimal necrotic tissue. All black eschar in the buttock wound   was debrided. I was not able to completely debride all of the   black slough up in the extraperitoneal pelvis, given the small, deep  nature of the wound. Overall, things looked much clearer, ,   and healthier. COMPLICATIONS: None. BRIEF HISTORY: Mrs. Lc Sharma is a 79-year-old female who   presented to the hospital with buttock pain, nausea, vomiting,   pancreatitis, DKA, NALDO, rhabdomyolysis, who was found on CT to   have a complex pelvic process. She had a drain placed in the   extraperitoneal component of the process by Interventional   Radiology and was taken to the operating room 2017 by me. I performed a wide debridement of necrotic tissue and fat in the   right buttock. I planned a staged second look today to assess   and perform further debridement, if necessary. DESCRIPTION OF PROCEDURE: The patient was evaluated in the   preoperative holding area. We discussed the planned operation,   risks, benefits, and alternatives. She was brought to the   operating room and intubated on the stretcher. She was placed   prone. All pressure points were padded. The old dressing was   removed.  The buttocks and wound were prepped and draped in a   sterile fashion with Betadine. A timeout was performed. I began by inspecting the wound. It was much  and   healthier. There were small amounts of necrotic tissue   throughout the upper aspect of the wound. These were sharply excisionally   debrided with a combination of electrocautery and sharp scissors   dissection. I worked my way around the wound, debriding all of   this back to healthy, bleeding tissue. I then worked deeper into   the extraperitoneal pelvic component of this abscess process. I   was able to see much better than I could on Friday. The IR drain   was again identified and moved out of the way. The necrotic   tissue up in this wound was debrided with a combination of sharp excisional   scissors dissection and blunt dissection with a ring forceps. In   the end, I removed a good amount of this necrotic tissue. Because of the   small wounds size and entry into this part of the wound, I was   not able to debride all of the necrotic tissues. Irrigation was performed with 2 liters of sterile saline. The   wound looked very good. It was again palpated with my finger. In   the extraperitoneal pelvis component, I could feel onto the   anterior wall of the sacrum. I could also feel the lateral   aspect of the pelvic sidewall and the ischial spine. Again,   there was no evidence of rectal involvement. The wound was inspected for hemostasis, which was good. The   wound was packed with 2 rolled Kerlix and sterile dressings were   applied. The patient was placed back supine, awakened,   extubated, and taken to recovery in stable condition. Sponge,   instrument, and needle counts were correct.         Micah Machado MD      [facility] GRETEL   D:  05/22/2017   20:09   T:  05/23/2017   11:22   Job #:  [id number]

## 2017-05-23 NOTE — PROGRESS NOTES
Hospitalist Progress Note    2017  Admit Date: 2017  3:14 PM   NAME: Michoacano Hector   :  1985   MRN:  [id number]   Attending: Erika Morel MD  PCP:  Bunny Lanier MD    SUBJECTIVE:   AS Previously documented: \" 34yo F with no significant PMH presented with worsening Rt Flank pain, nausea, multiple episodes of vomiting and weakness. She was seen here in ER 1 week ago with back pain and diagnosed with Sciatica and given NSAIDs which she took without much relief. Yesterday her pain worsened and she took extra pain meds with about 4 glasses of Wine per . She then started having vomiting and abd discomfort that continued today and she came to ER. Found to be in DKA in ER with Na 115, K 6.0, LA 7.4, Bicarb 9, Ph 7.22, Cr 2.51 and Ck > 4000, WBC >40k, Hgb 10.8 with elevated LFTs. CT abdomen showed Pelvic mass concerning for malignancy. MRI showed anorectal abscess tracking up in the Gluteal region. S/p IR drain placement and Surgical Debridement by Dr. Tan Aguila. \"         20`7- seen - complains of difficulty moving the left leg but this is not new- surgery with plans to review the wound sanam- may need surgery again on thursday    Nursing notes and chart reviewed. Review of Systems negative with exception of pertinent positives noted above.     PHYSICAL EXAM     Visit Vitals    /66 (BP 1 Location: Right arm, BP Patient Position: At rest)    Pulse (!) 114    Temp 98 °F (36.7 °C)    Resp 18    Ht 5' 2\" (1.575 m)    Wt 89.6 kg (197 lb 8.5 oz)    LMP 2017    SpO2 99%    BMI 36.13 kg/m2      Temp (24hrs), Av.9 °F (36.6 °C), Min:97.7 °F (36.5 °C), Max:98.1 °F (36.7 °C)    Oxygen Therapy  O2 Sat (%): 99 % (17 1404)  Pulse via Oximetry: 99 beats per minute (17)  O2 Device: Nasal cannula (17)  O2 Flow Rate (L/min): 2 l/min (17)  ETCO2 (mmHg): 12 mmHg (17 1645)    Intake/Output Summary (Last 24 hours) at 17 6647  Last data filed at 05/23/17 1405   Gross per 24 hour   Intake             1000 ml   Output             2930 ml   Net            -1930 ml       General: Mild distress. Alert.    Lungs:  CTABL. Heart:  RRR, no murmur, rub, or gallop, tachycardia  Abdomen: Soft, non-distended, non-tender, +bs  Extremities: No cyanosis or clubbing. Neurologic:  No focal deficits. Moves all extremities. Musculoskeletal: Tender Rt lower back  :   Large area of excoriations around her vagina and perineum. LABS AND STUDIES:  Personally reviewed all labs, meds, and studies for past 24hrs. 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 · Uncontrolled DM: A1C is 14, has seen diabetes educator for new diagnosis, Will need insulin upon discharge. · Pelvic Mass/Abscess: s/p IR drain, and Surgical I&D X 2, on IV Morphine. May need repeat I&D. · NALDO: Resolved, will c/w IVF   · Sepsis: Likely due to perirectal/Gluteal Abscess vs ? PID (she has +ve serology for STDs). ID managing antibiotics. - appreciated  · Vaginal Lesions with Suspected PID: GYN input appreciated  · Rhabdomyolysis: Resolved  · Acute Pancreatitis: resolved  · Anemia: Acute ? likely 2/2 hematoma. Transfused 2 U PRBCs. H&H slowly drifting down on IV Fluids likely dilutional. Will continue   · Hpernatremia and Hyperkalemia: Resolved. Dispo: When OK with Surgery and ID.       DVT ppx:  hsq  Discussed plan with pt and  who is in agreement. All questions answered.       Signed By: Shanda Yang MD     May 23, 2017

## 2017-05-23 NOTE — PROGRESS NOTES
Problem: Nutrition Deficit  Goal: *Optimize nutritional status  Nutrition F/U: Day 7  Assessment:   Diet order(s): 5/17 NPO; 5/18 CLQ; 5/20 [facility]  Food/Nutrition Patient History: Patient is s/p debridement of right buttocks and extraperitoneal pelvis. Patient with ongoing diabetes education via the diabetes educator. Patient reports no questions or concerns regarding diabetes. Patient has been referred to outpatient diabetes education. POC glucoses from 5/22:  and 5/23: 266-309. Anthropometrics:Height: 5' 2\" (157.5 cm), Weight: 89.6 kg (197 lb 8.5 oz), Source not specified, Body mass index is 36.13 kg/(m^2). BMI class of obesity class II. Macronutrient needs:  EER: 4853-8505 kcal /day (15-20 kcal/kg BW) (expect this should produce wt loss)  EPR: 60-75 grams protein/day (1.2-1.5 grams/kg IBW)  Intake/Comparative Standards: Patient has no meal intakes recorded at this time but reports that her appetite is doing well and that she is eating her meals. Intervention:  Meals and snacks: Continue [facility]  Nutrition Supplement Therapy: Rickey Hines.  Radhames Wheatley 87, [address],  [phone]

## 2017-05-23 NOTE — PROGRESS NOTES
Lg Schmitt M.D. Colon and Rectal Surgeon  [address], [address] [place] [address], [facility] Magali Chowdary   Phone: [phone] Fax: [phone]      Sandie Dodge  [id number]    SUBJECTIVE:  29year old female admitted with DKA, rhabdomyolysis, NALDO, pancreatitis, lactic acidosis, leukocytosis, and sepsis. On CT, she was found to have a large pelvic process (mass vs abscess) with tracking into right buttock. She improved some with supportive care, fluid resuscitation, and antibiotics. She had an IR drain placed into her pelvic fluid collection. She was taken to the OR 5/19/17 for I&D of necrotic buttock fat. I found tracking into the extraperitoneal pelvic fluid collection. There was no connection to rectum. She was taken 5/22/17 for repeat debridement with much improved appearance of wound. Slept ok. tolerating diet. No n/v.  Mild pain, mostly sore. Overall picture regarding source remains unclear. No abdominal pain. Not OOB, but PT ordered and patient wants to mobilize. ROS:  General--no fevers, chills  Respiratory--no shortness of breath, wheezing  Cardiovascular--no chest pain, palpitations  GI--no nausea/emesis. No hematochezia/melena.   No pain    PHYSICAL EXAM:   Patient Vitals for the past 24 hrs:   BP Temp Pulse Resp SpO2   05/23/17 0406 104/56 97.7 °F (36.5 °C) 98 18 98 %   05/22/17 2145 119/68 97.8 °F (36.6 °C) 100 22 100 %   05/22/17 2059 120/60 - 96 18 100 %   05/22/17 2054 120/58 - 97 18 98 %   05/22/17 2049 122/58 - 96 18 98 %   05/22/17 2044 122/58 98 °F (36.7 °C) (!) 101 18 100 %   05/22/17 2039 119/54 - (!) 102 16 100 %   05/22/17 2034 121/60 - (!) 103 18 100 %   05/22/17 2029 128/60 - (!) 105 18 100 %   05/22/17 2024 140/72 - (!) 107 16 100 %   05/22/17 2019 138/72 - (!) 108 16 100 %   05/22/17 2014 141/69 - (!) 109 16 100 %   05/22/17 2012 147/70 97.9 °F (36.6 °C) (!) 108 12 100 %   05/22/17 1659 132/66 98.6 °F (37 °C) (!) 108 18 100 % 17 1429 126/66 98.5 °F (36.9 °C) (!) 108 18 100 %   17 1046 110/53 98.6 °F (37 °C) (!) 109 18 100 %   17 0723 119/62 98.7 °F (37.1 °C) (!) 106 18 99 %       General--AAO, NAD, answers all questions, appears comfortable  Abdomen--soft, nontender, nondistended. No peritoneal signs, no rebound, no guarding  Buttock--ALEXANDER from IR is in place, not holding suction as it connects to the wound. There is a clean, dry dressing on her buttock. It was not removed today. UOP: 400/1000+1  ALEXANDER: nr/5  BM: 1    Recent Labs      17   0800  17   1850  17   0630   WBC  25.7*   --   21.9*   HGB  9.0*  7.1*  7.4*   HCT  27.0*  21.0*  21.7*   PLT  268   --   216       Recent Labs      17   0800  17   0630   NA  144  145   K  4.0  3.8   CL  111*  112*   CO2  22  22   BUN  11  16   CREA  0.40*  0.57*   TBILI  0.5  0.4   SGOT  38*  26   ALT  26  25   AP  134  152*   PHOS  2.6   --        No results for input(s): AML, LPSE in the last 72 hours. No results for input(s): PTP, INR, APTT in the last 72 hours. No lab exists for component: INREXT, INREXT  Last lactate 1.4  Last procalcitonin 27.2  Last   RPR NR  Pelvic fluid culture from IR 17--GPR, GNR  AFB smear 17--negative  HSV culture 17--negative  Blood cultures 17--GPR x2 bottles  Blood cultures 17--no growth x 4 days  UA 17--1+ bacteria, neg LE/nitr--cultures with 100k yeast  OR tissue culture 17--alpha hemolytic strep, GNRs  Above labs reviewed by me. IMAGIN17--CT abd/pelvis: \"IMPRESSION: Large incompletely characterized pelvic mass with apparent involvement/extension into the right gluteal muscles. The findings presumably represent a neoplastic process. Pelvic MRI may be beneficial for further delineation as it relates to other pelvic anatomy.  Preferably this would be done with IV contrast.\"  17--CT chest/abd/pelvis: \"IMPRESSION: Indeterminate perirectal mass with extension of heterogeneous, low-attenuation soft tissue into the right buttock an asymmetric edema and muscular swelling and the right hemipelvis. Right inguinal adenopathy is present. This may be a perirectal abscess or necrotic tumor with extrapelvic extension. \"  5/18/17--CT guided IR drain placement:   5/19/17--MRI pelvis--\"IMPRESSION: Status post right perianal/rectal abscess drainage. The extensive remaining abscess findings are detailed above. \"    ASSESSMENT:   Pelvic abscess   S/p IR drain 5/18/17   S/p operative I&D, debridement of necrotic tissue 5/19/17   S/p operative I&D, debridement of necrotic tissue 5/22/17  Sepsis, due to above--improved  Leukocytosis, due to above--improved  DKA--improved  Rhabdomyolysis--resolved  NALDO--resolved  Pancreatitis--resolved  Anemia--   S/p transfusion 2 units PRBC 5/18/17  Hypernatremia  Elevated LFTs  Hypophosphatemia    PLAN:  --continue IV pain medications  --OOB, IS, mobilize  --No CV issues. Tachycardia is compensatory response to sepsis, improving  --regular diabetic diet. --Leave dressing today. I will take down dressing at bedside tomorrow and inspect. Hopefully we can start bedside dressing changes, but if OR evaluation needed again will probably take her Thursday PM  --follow UOP, creat  --transfuse for <7  --continue broad spectrum antibiotics. Source remains unclear but with culture results coming back, we can hopefully target/narrow our spectrum  --based on operative findings, with the more necrotic tissues deeper, this seems more like a pelvic process tracking down into gluteus, as opposed to the other way around.       Francois Colon MD

## 2017-05-23 NOTE — PROGRESS NOTES
Infectious Disease Progress Note    Today's Date: 2017   Admit Date: 2017    Impression:   · Large pelvic abscess; 20 cc purulent fluid drained 17, cultures with NSF; I&D , yeast and GNRs on stain, alpha strep, beta strep and GNRs in culture, pending, repeat I&D   · Anaerobic GPR bacteremia (), pending; repeat culture  NGTD  · Chlamydia cervicitis  · Severe inguinal yeast infection  · DKA (A1C 14), resolved  · Pancreatitis, resolved    Plan:   · Continue Zosyn and fluconazole  · Follow pending cultures. · Repeat debridement yesterday; per the op note, not all necrotic tissue could be fully debrided. Per patient, she may need further debridement, possibly Thursday. Anti-infectives:     Piperacillin/tazobactam  Doxycycline  Fluconazole  Acyclovir    Subjective:   Date of Examination:  May 23, 2017  Referring Physician: Karl Rogers    Persistent leukocytosis. Denies pain, denies nausea, vomiting, diarrhea, fevers, chills or sweats. 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     Objective:     Visit Vitals    /66 (BP 1 Location: Right arm, BP Patient Position: At rest)    Pulse (!) 114    Temp 98 °F (36.7 °C)    Resp 18    Ht 5' 2\" (1.575 m)    Wt 89.6 kg (197 lb 8.5 oz)    SpO2 99%    BMI 36.13 kg/m2     Temp (24hrs), Av °F (36.7 °C), Min:97.7 °F (36.5 °C), Max:98.6 °F (37 °C)       Lines:  Central Venous Catheter:  L chest, non tender        Physical Exam:    General:  Awake, alert, no acute distress, obese   Eyes:  Sclera anicteric. Pupils equally round and reactive to light. Mouth/Throat: Mucous membranes normal, oral pharynx clear   Neck: Supple   Lungs:   Clear to auscultation bilaterally, good effort   CV:  Regular rate and rhythm, soft flow murmur, click, rub or gallop   Abdomen:   Soft, non-tender.  bowel sounds normal. non-distended; buttock dressing in place with ALEXANDER drain with sanguinous drainage   Extremities: No cyanosis or edema   Skin: Very dry peeling skin on upper extremities   Lymph nodes: Not examined today   Musculoskeletal: No swelling or deformity   Lines/Devices:  Intact, no erythema, drainage or tenderness   : Yeast with fissures bilateral inguinal areas, improved       Data Review:     CBC:  Recent Labs      05/23/17   0754  05/22/17   0800  05/21/17   1850  05/21/17   0630   WBC  30.3*  25.7*   --   21.9*   GRANS  93*   --    --   81*   MONOS  1*   --    --   2*   EOS  0*   --    --    --    ANEU  28.3*   --    --   18.9*   ABL  1.4   --    --   2.6   HGB  8.6*  9.0*  7.1*  7.4*   HCT  25.2*  27.0*  21.0*  21.7*   PLT  389  268   --   216       BMP:  Recent Labs      05/23/17   0754  05/22/17   0800  05/21/17   0630   CREA  0.50*  0.40*  0.57*   BUN  10  11  16   NA  142  144  145   K  4.2  4.0  3.8   CL  111*  111*  112*   CO2  21  22  22   AGAP  10  11  11   GLU  327*  115*  153*       LFTS:  Recent Labs      05/23/17   0754  05/22/17   0800  05/21/17   0630   TBILI   --   0.5  0.4   ALT   --   26  25   SGOT   --   38*  26   AP   --   134  152*   TP   --   5.4*  6.0*   ALB  2.1*  1.5*  1.6*       Microbiology:     All Micro Results     Procedure Component Value Units Date/Time    CULTURE, GONORRHOEAE ONLY [[id number]] Collected:  05/19/17 1946    Order Status:  Completed Specimen:  Cervical/vaginal swab Updated:  05/23/17 1249     Special Requests: NO SPECIAL REQUESTS        GRAM STAIN NO WBCS SEEN         FEW  YEAST         FEW  Colton Danny NEGATIVE RODS        Culture result:         NO NEISSERIA GONORRHOEAE ISOLATED    FUNGUS CULTURE AND SMEAR [[id number]] Collected:  05/18/17 1640    Order Status:  Completed Specimen:  Miscellaneous sample Updated:  05/23/17 1238     Source WOUND         PERIRECTAL  Corrected on 05/23 AT 1237: This is a correction to the medical record of the test results previously reported as PERIRECTAL          Fungus stain Direct Inoculation     Fungus (Mycology) Culture Other source received      (NOTE)  Performed At: [address] [id number]  Inocencia Carrasco MD FK:[id number]         CULTURE, TISSUE W William Najera [[id number]]  (Abnormal)  (Susceptibility) Collected:  05/19/17 1915    Order Status:  Completed Specimen:  Buttock Updated:  05/23/17 0835     Special Requests: RIGHT        GRAM STAIN 0 TO 2  WBCS SEEN        MODERATE  GRAM VARIABLE RODS        Culture result: SCANT  ESCHERICHIA COLI   (A)       MODERATE  ALPHA STREPTOCOCCUS   (A)             SCANT  STREPTOCOCCI, BETA HEMOLYTIC GROUP B  THIS ORGANISM WILL BE HELD FOR 7 DAYS.  IF FURTHER TESTING IS REQUIRED PLEASE NOTIFY MICROBIOLOGY   (A)    CULTURE, URINE [[id number]]  (Abnormal) Collected:  05/19/17 1117    Order Status:  Completed Specimen:  Urine from Cath Urine Updated:  05/23/17 0729     Special Requests: NO SPECIAL REQUESTS        Culture result:       >100,000  COLONIES/mL  CANDIDA GLABRATA  IDENTIFICATION TO FOLLOW   (A)      [id number]  COLONIES/mL  CANDIDA ALBICANS   (A)     CULTURE, BLOOD [[id number]] Collected:  05/19/17 1033    Order Status:  Completed Specimen:  Blood from Blood Updated:  05/23/17 0648     Special Requests: LEFT HAND        Culture result: NO GROWTH 4 DAYS       CULTURE, BLOOD [[id number]] Collected:  05/19/17 1020    Order Status:  Completed Specimen:  Blood from Blood Updated:  05/23/17 0648     Special Requests: RIGHT ANTECUBITAL        Culture result: NO GROWTH 4 DAYS       CULTURE, BODY FLUID Shama Fulton STAIN [[id number]]  (Abnormal) Collected:  05/18/17 1640    Order Status:  Completed Specimen:  Abscess Updated:  05/22/17 1406     Special Requests: PERIRECTAL     GRAM STAIN MANY  GRAM POSITIVE RODS         RARE  GRAM NEGATIVE RODS         10 TO 20  WBCS SEEN  PER OIF       Culture result:       LIGHT  STREPTOCOCCI, BETA HEMOLYTIC GROUP B  THIS ORGANISM WILL BE HELD FOR 7 DAYS. IF FURTHER TESTING IS REQUIRED PLEASE NOTIFY MICROBIOLOGY   (A)              NORMAL SKIN DEE ISOLATED    CULTURE, BLOOD [[id number]]  (Abnormal) Collected:  05/16/17 1730    Order Status:  Completed Specimen:  Blood from Blood Updated:  05/22/17 1342     Special Requests: LEFT ANTECUBITAL        GRAM STAIN GRAM POSITIVE RODS         ANAEROBIC BOTTLE POSITIVE               RESULTS VERIFIED, PHONED TO AND READ BACK BY  [id number] CHRISTI Groves RN @ [phone] [id number] BY SP       Culture result: GRAM POSITIVE RODS (A)                 CULTURE IN [place] UPDATES TO FOLLOW    CULTURE, BLOOD [[id number]]  (Abnormal) Collected:  05/16/17 1736    Order Status:  Completed Specimen:  Blood from Blood Updated:  05/22/17 1342     Special Requests: RIGHT ANTECUBITAL        GRAM STAIN GRAM POSITIVE RODS         ANAEROBIC BOTTLE POSITIVE                 CRITICAL RESULT NOT CALLED DUE TO PREVIOUS NOTIFICATION OF CRITICAL RESULT WITHIN THE LAST 24 HOURS.      Culture result: GRAM POSITIVE RODS (A)         ISOLATE FORWARD TO [facility] FOR ID ON 05/22/17      SEE REPORT FROM [facility]  [facility] [id number]       FUNGUS CULTURE AND SMEAR [[id number]] Collected:  05/19/17 1915    Order Status:  Completed Specimen:  Miscellaneous sample Updated:  05/22/17 1242     Source BUTTOCK        Fungus stain Direct Inoculation     Fungus (Mycology) Culture Other source received      (NOTE)  Performed At: [address] [id number]  Peter Townsend MD UC:[id number]         AEROBIC SUSCEPTIBILITY ID [[id number]] Collected:  05/16/17 1730    Order Status: Completed Updated:  05/22/17 1159    CULTURE, ANAEROBIC [[id number]] Collected:  05/19/17 1915    Order Status:  Completed Specimen:  Buttock Updated:  05/22/17 0845     Special Requests: R BUTTOCK TISSUE     Culture result:         SUBCULTURE IS NECESSARY TO DETERMINE PRESENCE OR ABSENCE OF ANAEROBIC BACTERIA IN THIS CULTURE. FURTHER REPORT TO FOLLOW AFTER INCUBATION OF SUBCULTURE. CULTURE, HSV W/ TYPING [[id number]] Collected:  05/17/17 1710    Order Status:  Completed Specimen:  Miscellaneous sample Updated:  05/21/17 1241     Source PERINEUM     HSV culture w typing Comment         (NOTE)  Negative  No Herpes simplex virus isolated. Performed At: [facility]  [place] [address] [place] [id number]  Eve Spurling MD TC:[id number]         AFB CULTURE + SMEAR W/RFLX ID FROM CULTURE [[id number]] Collected:  05/19/17 1915    Order Status:  Completed Specimen:  Miscellaneous sample Updated:  05/21/17 1036     Source BUTTOCK        AFB Specimen processing Concentration     Acid Fast Smear NEGATIVE          (NOTE)  Performed At: [facility]  [place] [address] [place] [id number]  Eve Spurling MD RW:[id number]          Acid Fast Culture PENDING    CULTURE, Gera Alejandra [[id number]] Collected:  05/19/17 1946    Order Status:  Completed Updated:  05/19/17 2117    CULTURE, HSV W/ TYPING [[id number]]     Order Status:  Canceled     MRSA SCREEN - PCR (NASAL) [[id number]] Collected:  05/16/17 2025    Order Status:  Completed Specimen:  Nasal from Nasal Swab Updated:  05/16/17 2157     Special Requests: NO SPECIAL REQUESTS        Culture result:         MRSA target DNA is not detected (presumptive not colonized with MRSA)          Imaging:     CT ABD PELV WO CONT (Final result) Result time: 05/16/17 20:07:05     Final result     Impression:     IMPRESSION:  Large incompletely characterized pelvic mass with apparent  involvement/extension into the right gluteal muscles.  The findings presumably  represent a neoplastic process. Pelvic MRI may be beneficial for further  delineation as it relates to other pelvic anatomy. Preferably this would be done  with IV contrast.     CT abd/pelv with contrast (5/17/17)  IMPRESSION: Indeterminate perirectal mass with extension of heterogeneous,  low-attenuation soft tissue into the right buttock an asymmetric edema and  muscular swelling and the right hemipelvis. Right inguinal adenopathy is  present. This may be a perirectal abscess or necrotic tumor with extrapelvic  extension.     Signed By: Jay Cardozo NP     May 23, 2017

## 2017-05-23 NOTE — ANESTHESIA POSTPROCEDURE EVALUATION
Post-Anesthesia Evaluation and Assessment    Patient: Purnima Canela MRN: [id number]  SSN: [id number]    YOB: 1985  Age: 29 y.o. Sex: female       Cardiovascular Function/Vital Signs  Visit Vitals    /60    Pulse (!) 103    Temp 36.6 °C (97.9 °F)    Resp 18    Ht 5' 2\" (1.575 m)    Wt 89.6 kg (197 lb 8.5 oz)    SpO2 100%    BMI 36.13 kg/m2       Patient is status post general anesthesia for Procedure(s):  DEBRIDEMENT RIGHT BUTTOCKS WITH DRESSING CHANGE. Nausea/Vomiting: None    Postoperative hydration reviewed and adequate. Pain:  Pain Scale 1: Visual (05/22/17 2012)  Pain Intensity 1: 0 (05/22/17 2012)   Managed    Neurological Status:   Neuro (WDL): Exceptions to WDL (05/22/17 2012)  Neuro  Neurologic State: Drowsy (05/22/17 2012)  Orientation Level: Oriented X4 (05/22/17 0733)  Cognition: Appropriate for age attention/concentration (05/22/17 0733)  Speech: Clear (05/22/17 0733)  Assessment L Pupil: Brisk (05/20/17 1931)  Size L Pupil (mm): 3 (05/20/17 0802)  Assessment R Pupil: Brisk (05/20/17 1931)  Size R Pupil (mm): 3 (05/20/17 0802)  LUE Motor Response: Purposeful (05/22/17 2012)  LLE Motor Response: Purposeful (05/22/17 2012)  RUE Motor Response: Purposeful (05/22/17 2012)  RLE Motor Response: Purposeful (05/22/17 2012)   At baseline    Mental Status and Level of Consciousness: Arousable    Pulmonary Status:   O2 Device: Nasal cannula (05/22/17 2012)   Adequate oxygenation and airway patent    Complications related to anesthesia: None    Post-anesthesia assessment completed.  No concerns    Signed By: Devon Monsalve MD     May 22, 2017

## 2017-05-23 NOTE — PROGRESS NOTES
CL dressing changed using sterile technique. Brown and grey ports found to flush but not aspirate blood. Cathflo ordered per protocol. 2 mg should be administered to all lumens at the same time and left to sit with nothing running through them for at least 30 mins.

## 2017-05-24 LAB
ANION GAP BLD CALC-SCNC: 9 MMOL/L (ref 7–16)
BACTERIA SPEC CULT: NORMAL
BACTERIA SPEC CULT: NORMAL
BUN SERPL-MCNC: 8 MG/DL (ref 6–23)
C TRACH SPEC QL CULT: NEGATIVE
CALCIUM SERPL-MCNC: 7.5 MG/DL (ref 8.3–10.4)
CHLORIDE SERPL-SCNC: 110 MMOL/L (ref 98–107)
CO2 SERPL-SCNC: 25 MMOL/L (ref 21–32)
CREAT SERPL-MCNC: 0.41 MG/DL (ref 0.6–1)
ERYTHROCYTE [DISTWIDTH] IN BLOOD BY AUTOMATED COUNT: 16.7 % (ref 11.9–14.6)
GLUCOSE BLD STRIP.AUTO-MCNC: 114 MG/DL (ref 65–100)
GLUCOSE BLD STRIP.AUTO-MCNC: 163 MG/DL (ref 65–100)
GLUCOSE BLD STRIP.AUTO-MCNC: 168 MG/DL (ref 65–100)
GLUCOSE BLD STRIP.AUTO-MCNC: 184 MG/DL (ref 65–100)
GLUCOSE SERPL-MCNC: 174 MG/DL (ref 65–100)
HCT VFR BLD AUTO: 23.9 % (ref 35.8–46.3)
HCT VFR BLD AUTO: 26 % (ref 35.8–46.3)
HGB BLD-MCNC: 7.9 G/DL (ref 11.7–15.4)
HGB BLD-MCNC: 8.6 G/DL (ref 11.7–15.4)
MCH RBC QN AUTO: 27.9 PG (ref 26.1–32.9)
MCHC RBC AUTO-ENTMCNC: 33.1 G/DL (ref 31.4–35)
MCV RBC AUTO: 84.5 FL (ref 79.6–97.8)
PLATELET # BLD AUTO: 544 K/UL (ref 150–450)
PMV BLD AUTO: 9.6 FL (ref 10.8–14.1)
POTASSIUM SERPL-SCNC: 3.8 MMOL/L (ref 3.5–5.1)
RBC # BLD AUTO: 2.83 M/UL (ref 4.05–5.25)
SERVICE CMNT-IMP: NORMAL
SERVICE CMNT-IMP: NORMAL
SODIUM SERPL-SCNC: 144 MMOL/L (ref 136–145)
SPECIMEN SOURCE: NORMAL
WBC # BLD AUTO: 21 K/UL (ref 4.3–11.1)

## 2017-05-24 PROCEDURE — 82962 GLUCOSE BLOOD TEST: CPT

## 2017-05-24 PROCEDURE — 74011636637 HC RX REV CODE- 636/637: Performed by: INTERNAL MEDICINE

## 2017-05-24 PROCEDURE — 74011250636 HC RX REV CODE- 250/636: Performed by: INTERNAL MEDICINE

## 2017-05-24 PROCEDURE — 85018 HEMOGLOBIN: CPT | Performed by: INTERNAL MEDICINE

## 2017-05-24 PROCEDURE — 74011250636 HC RX REV CODE- 250/636: Performed by: COLON & RECTAL SURGERY

## 2017-05-24 PROCEDURE — 74011000258 HC RX REV CODE- 258: Performed by: INTERNAL MEDICINE

## 2017-05-24 PROCEDURE — 97161 PT EVAL LOW COMPLEX 20 MIN: CPT

## 2017-05-24 PROCEDURE — 80048 BASIC METABOLIC PNL TOTAL CA: CPT | Performed by: INTERNAL MEDICINE

## 2017-05-24 PROCEDURE — 85027 COMPLETE CBC AUTOMATED: CPT | Performed by: INTERNAL MEDICINE

## 2017-05-24 PROCEDURE — 87389 HIV-1 AG W/HIV-1&-2 AB AG IA: CPT | Performed by: INTERNAL MEDICINE

## 2017-05-24 PROCEDURE — 36592 COLLECT BLOOD FROM PICC: CPT

## 2017-05-24 PROCEDURE — 74011250637 HC RX REV CODE- 250/637: Performed by: INTERNAL MEDICINE

## 2017-05-24 PROCEDURE — 65660000000 HC RM CCU STEPDOWN

## 2017-05-24 RX ORDER — SODIUM CHLORIDE 9 MG/ML
125 INJECTION, SOLUTION INTRAVENOUS CONTINUOUS
Status: DISCONTINUED | OUTPATIENT
Start: 2017-05-24 | End: 2017-05-25

## 2017-05-24 RX ADMIN — PIPERACILLIN SODIUM,TAZOBACTAM SODIUM 3.38 G: 3; .375 INJECTION, POWDER, FOR SOLUTION INTRAVENOUS at 18:23

## 2017-05-24 RX ADMIN — INSULIN LISPRO 5 UNITS: 100 INJECTION, SOLUTION INTRAVENOUS; SUBCUTANEOUS at 12:52

## 2017-05-24 RX ADMIN — HEPARIN SODIUM 5000 UNITS: 5000 INJECTION, SOLUTION INTRAVENOUS; SUBCUTANEOUS at 05:39

## 2017-05-24 RX ADMIN — INSULIN GLARGINE 30 UNITS: 100 INJECTION, SOLUTION SUBCUTANEOUS at 09:30

## 2017-05-24 RX ADMIN — PIPERACILLIN SODIUM,TAZOBACTAM SODIUM 3.38 G: 3; .375 INJECTION, POWDER, FOR SOLUTION INTRAVENOUS at 01:00

## 2017-05-24 RX ADMIN — INSULIN LISPRO 3 UNITS: 100 INJECTION, SOLUTION INTRAVENOUS; SUBCUTANEOUS at 22:00

## 2017-05-24 RX ADMIN — FLUCONAZOLE 400 MG: 100 TABLET ORAL at 09:14

## 2017-05-24 RX ADMIN — INSULIN LISPRO 5 UNITS: 100 INJECTION, SOLUTION INTRAVENOUS; SUBCUTANEOUS at 09:11

## 2017-05-24 RX ADMIN — INSULIN LISPRO 3 UNITS: 100 INJECTION, SOLUTION INTRAVENOUS; SUBCUTANEOUS at 12:51

## 2017-05-24 RX ADMIN — SODIUM CHLORIDE 125 ML/HR: 900 INJECTION, SOLUTION INTRAVENOUS at 15:00

## 2017-05-24 RX ADMIN — INSULIN LISPRO 5 UNITS: 100 INJECTION, SOLUTION INTRAVENOUS; SUBCUTANEOUS at 18:22

## 2017-05-24 RX ADMIN — MORPHINE SULFATE 5 MG: 10 INJECTION INTRAMUSCULAR; INTRAVENOUS; SUBCUTANEOUS at 09:10

## 2017-05-24 RX ADMIN — PIPERACILLIN SODIUM,TAZOBACTAM SODIUM 3.38 G: 3; .375 INJECTION, POWDER, FOR SOLUTION INTRAVENOUS at 09:14

## 2017-05-24 RX ADMIN — INSULIN LISPRO 3 UNITS: 100 INJECTION, SOLUTION INTRAVENOUS; SUBCUTANEOUS at 09:10

## 2017-05-24 RX ADMIN — HEPARIN SODIUM 5000 UNITS: 5000 INJECTION, SOLUTION INTRAVENOUS; SUBCUTANEOUS at 18:26

## 2017-05-24 NOTE — PROGRESS NOTES
Problem: Mobility Impaired (Adult and Pediatric)  Goal: *Acute Goals and Plan of Care (Insert Text)  STG:  (1.)Ms. Harris Amato will move from supine to sit and sit to supine , scoot up and down and roll side to side with CONTACT GUARD ASSIST within 3 day(s). (2.)Ms. Harris Amato will transfer from bed to chair and chair to bed with STAND BY ASSIST using the least restrictive device within 3 day(s). (3.)Ms. Harris Amato will ambulate with CONTACT GUARD ASSIST for 100 feet with the least restrictive device within 3 day(s). LTG:  (1.)Ms. Harris Amato will move from supine to sit and sit to supine , scoot up and down and roll side to side in bed with SUPERVISION within 7 day(s). (2.)Ms. Harris Amato will transfer from bed to chair and chair to bed with SUPERVISION using the least restrictive device within 7 day(s). (3.)Ms. Harris Amato will ambulate with STAND BY ASSIST for 500+ feet with the least restrictive device within 7 day(s). ________________________________________________________________________________________________      PHYSICAL THERAPY: INITIAL ASSESSMENT, TREATMENT DAY: DAY OF ASSESSMENT, AM 5/24/2017  INPATIENT: Hospital Day: 9  Payor: Martir Awan / Plan: [address] / Product Type: PPO /      NAME/AGE/GENDER: Toby Dillard is a 29 y.o. female    PRIMARY DIAGNOSIS: DKA (diabetic ketoacidoses) ([facility]  Abscess  WOUND RIGHT BUTTOCK DKA (diabetic ketoacidoses) ([facility] DKA (diabetic ketoacidoses) ([facility])  Procedure(s) (LRB):  DEBRIDEMENT RIGHT BUTTOCKS WITH DRESSING CHANGE (Right)  2 Days Post-Op  ICD-10: Treatment Diagnosis:       · Generalized Muscle Weakness (M62.81)  · Difficulty in walking, Not elsewhere classified (R26.2)   Precaution/Allergies:  Review of patient's allergies indicates no known allergies. ASSESSMENT:      Ms. Harris Amato is supine in bed upon contact and agreeable to PT evaluation with family present.  Pt reports living in 2 story home with all needs on 1st floor with her  with no steps to enter. She reports independence with gait and ADLS at baseline. Pt reports driving, 0 falls, and no limitation with gait distance however does report walking with a \"limp\" due to R LE pain. Pt is Alfredo with supine to sit EOB. Pt reports some lightheadedness/dizziness which subsided with sitting. Pt is Alfredo with sit to stand to RW and ambulated 5 ft to bedside chair with RW and CGA. Pt unable to further gait distance this morning. Pt left sitting up in chair with all needs met and within reach. Pt reminded to call for assistance and pt reports understanding. Pt would benefit from HHPT versus rehab dependent on pt progress. Jose Daniel Velazquez will benefit from skilled PT (medically necessary) to address decreased strength, decreased balance, decreased functional tolerance, decreased cardiopulmonary endurance affecting participation in basic ADLs and functional tasks. This section established at most recent assessment   PROBLEM LIST (Impairments causing functional limitations):  1. Decreased Strength  2. Decreased ADL/Functional Activities  3. Decreased Ambulation Ability/Technique  4. Decreased Balance  5. Increased Pain  6. Decreased Activity Tolerance    INTERVENTIONS PLANNED: (Benefits and precautions of physical therapy have been discussed with the patient.)  1. Balance Exercise  2. Bed Mobility  3. Family Education  4. Gait Training  5. Neuromuscular Re-education/Strengthening  6. Therapeutic Activites  7. Therapeutic Exercise/Strengthening  8. Group Therapy      TREATMENT PLAN: Frequency/Duration: 3 times a week for duration of hospital stay  Rehabilitation Potential For Stated Goals: GOOD      RECOMMENDED REHABILITATION/EQUIPMENT: (at time of discharge pending progress): Continue Skilled Therapy and HHPT versus Rehab- dependent on pt progress.                    HISTORY:   History of Present Injury/Illness (Reason for Referral):  See H&P below  34yo F with no significant PMH presented with worsening Rt Flank pain, nausea, multiple episodes of vomiting and weakness. She was seen here in ER 1 week ago with back pain and diagnosed with Sciatica and given NSAIDs which she took without much relief. Yesterday her pain worsened and she took extra pain meds with about 4 glasses of Wine per . She then started having vomiting and abd discomfort that continued today and she came to ER. Found to be in DKA in ER with Na 115, K 6.0, LA 7.4, Bicarb 9, Ph 7.22, Cr 2.51 and Ck > 4000, WBC >40k, Hgb 10.8 with elevated LFTs Per  they took a trip to [place] by car and pt has been complaining for rt flank pain since they returned last week. Also she was seen 3 days ago at GYN office and blood work in care everywhere showing +ve serology for HSV 1& 2 as well as Chlamydia. Per GYN note she had worrisome excoriated lesions around her vagina/Vulva. She was started on Insulin gtt and Abx, cultures were sent in ER and Hospitalist asked to admit. Past Medical History/Comorbidities:   Ms. Elidia Martel  has a past medical history of Ill-defined condition. Ms. Elidia Martel  has a past surgical history that includes other surgical.  Social History/Living Environment:   Home Environment: Private residence  # Steps to Enter: 0  One/Two Story Residence: Two story, live on 1st floor  Living Alone: No  Support Systems: Spouse/Significant Other/Partner  Patient Expects to be Discharged to[de-identified] Private residence  Current DME Used/Available at Home: None  Tub or Shower Type: Tub/Shower combination  Prior Level of Function/Work/Activity:  Lives with , independent with ADLs and gait, drives      Number of Personal Factors/Comorbidities that affect the Plan of Care: 1-2: MODERATE COMPLEXITY   EXAMINATION:   Most Recent Physical Functioning:   Gross Assessment:  AROM: Generally decreased, functional  Strength: Generally decreased, functional  Coordination: Generally decreased, functional  Sensation: Intact               Posture: Balance:  Sitting: Intact; Without support  Standing: Impaired;Pull to stand; With support Bed Mobility:  Supine to Sit: Minimum assistance  Wheelchair Mobility:     Transfers:  Sit to Stand: Minimum assistance  Gait:     Base of Support: Narrowed; Center of gravity altered  Speed/Dina: Slow;Shuffled  Step Length: Left shortened;Right shortened  Stance: Right decreased  Gait Abnormalities: Decreased step clearance;Trunk sway increased; Step to gait  Distance (ft): 5 Feet (ft)  Assistive Device: Walker, rolling  Ambulation - Level of Assistance: Contact guard assistance  Interventions: Safety awareness training; Tactile cues; Verbal cues       Body Structures Involved:  1. Heart  2. Lungs  3. Bones  4. Joints  5. Muscles Body Functions Affected:  1. Sensory/Pain  2. Cardio  3. Respiratory  4. Neuromusculoskeletal  5. Movement Related  6. Skin Related Activities and Participation Affected:  1. General Tasks and Demands  2. Mobility  3. Self Care  4. Domestic Life  5. Interpersonal Interactions and Relationships  6. Community, Social and [place]   Number of elements that affect the Plan of Care: 4+: HIGH COMPLEXITY   CLINICAL PRESENTATION:   Presentation: Stable and uncomplicated: LOW COMPLEXITY   CLINICAL DECISION MAKIN [facility] Inpatient Short Form  How much difficulty does the patient currently have. .. Unable A Lot A Little None   1. Turning over in bed (including adjusting bedclothes, sheets and blankets)? [ ] 1   [ ] 2   [X] 3   [ ] 4   2. Sitting down on and standing up from a chair with arms ( e.g., wheelchair, bedside commode, etc.)   [ ] 1   [ ] 2   [X] 3   [ ] 4   3. Moving from lying on back to sitting on the side of the bed? [ ] 1   [ ] 2   [X] 3   [ ] 4   How much help from another person does the patient currently need. .. Total A Lot A Little None   4. Moving to and from a bed to a chair (including a wheelchair)?    [ ] 1   [ ] 2   [X] 3   [ ] 4 5.  Need to walk in hospital room? [ ] 1   [X] 2   [ ] 3   [ ] 4   6. Climbing 3-5 steps with a railing? [ ] 1   [X] 2   [ ] 3   [ ] 4   © 2007, Trustees of [address] [place] [address] [address] [address], under license to [employer]. All rights reserved    Score:  Initial: 16 Most Recent: X (Date: -- )     Interpretation of Tool:  Represents activities that are increasingly more difficult (i.e. Bed mobility, Transfers, Gait). Score 24 23 22-20 19-15 14-10 9-7 6       Modifier CH CI CJ CK CL CM CN         · Mobility - Walking and Moving Around:               - CURRENT STATUS:    CK - 40%-59% impaired, limited or restricted               - GOAL STATUS:           CJ - 20%-39% impaired, limited or restricted               - D/C STATUS:                       ---------------To be determined---------------  Payor: BLUE CROSS / Plan: [place] BLUE CROSS [facility] / Product Type: PPO /       Medical Necessity:     · Patient is expected to demonstrate progress in strength, balance, coordination and functional technique to decrease assistance required with gait and functional mobility. Reason for Services/Other Comments:  · Patient continues to require skilled intervention due to decreased strength, decreased balance, decreased functional tolerance, decreased cardiopulmonary endurance affecting participation in basic ADLs and functional tasks.    Use of outcome tool(s) and clinical judgement create a POC that gives a: Clear prediction of patient's progress: LOW COMPLEXITY                 TREATMENT:   (In addition to Assessment/Re-Assessment sessions the following treatments were rendered)   Pre-treatment Symptoms/Complaints:  No complaints- a little drowsy from pain medication  Pain: Initial:   Pain Intensity 1: 0  Post Session:  0/10      Assessment/Reassessment only, no treatment provided today     Braces/Orthotics/Lines/Etc:   · IV  · dwyer catheter  Treatment/Session Assessment:    · Response to Treatment:  Pt participated well with PT and motivated to move  · Interdisciplinary Collaboration:  · Physical Therapist  · Registered Nurse  · After treatment position/precautions:  · Up in chair  · Bed/Chair-wheels locked  · Bed in low position  · Call light within reach  · Family at bedside  · Compliance with Program/Exercises: Will assess as treatment progresses. · Recommendations/Intent for next treatment session: \"Next visit will focus on advancements to more challenging activities and reduction in assistance provided\".   Total Treatment Duration:  PT Patient Time In/Time Out  Time In: 6492  Time Out: 12[facility]

## 2017-05-24 NOTE — DIABETES MGMT
Patient seen for continued diabetes education. Mother at bedside. FBS today 174. Blood glucose range today 163-184. Reviewed with pt current insulin regimen: Lantus 30 units daily, Humalog 5 units 3x/day ac and Humalog sliding scale coverage 4x/day ac and hs, including type of insulin, timing with , meals, onset, peak of action, and duration of effect. Pt verbalizes understanding. Patient states that she has practiced insulin self-injection under RN supervision one time. Pt will continue to practice insulin self-injection under RN supervision when insulin dose is due. Pt verbalized understanding of site rotation, storage of insulin and proper sharps disposal.  Stressed the need for follow up care for diabetes management with PCP.  assisting with discharge planning. Pt would likely benefit from continued outpatient diabetes education. Referral has been initiated to [facility]. Pt is agreeable to plan. Pt has no further questions at this time. Adair Mart

## 2017-05-24 NOTE — PROGRESS NOTES
Infectious Disease Progress Note    Today's Date: 2017   Admit Date: 2017    Impression:   · Large pelvic abscess; 20 cc purulent fluid drained 17, cultures with NSF; I&D , yeast and GNRs on stain, alpha strep, beta strep and E coli, repeat I&D   · Anaerobic GPR bacteremia (), pending - [facility]; repeat culture  NGTD  · Chlamydia cervicitis  · Severe inguinal yeast infection  · DKA (A1C 14), resolved  · Pancreatitis, resolved    Plan:   · Continue Zosyn and fluconazole  · Follow pending cultures. · Likely wound vac placement tomorrow    Anti-infectives:     Piperacillin/tazobactam  Doxycycline  Fluconazole  Acyclovir    Subjective:   Date of Examination:  May 24, 2017  Referring Physician: Star Batista  Sitting in a chair, fatigued, buttocks hurt, denies nausea, vomiting. 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 Objective:     Visit Vitals    /62 (BP 1 Location: Right arm, BP Patient Position: At rest)    Pulse (!) 109    Temp 97.8 °F (36.6 °C)    Resp 18    Ht 5' 2\" (1.575 m)    Wt 89.6 kg (197 lb 8.5 oz)    SpO2 100%    BMI 36.13 kg/m2     Temp (24hrs), Av.2 °F (36.8 °C), Min:97.8 °F (36.6 °C), Max:98.8 °F (37.1 °C)       Lines:  Central Venous Catheter:  L chest, non tender        Physical Exam:    General:  Awake, alert, no acute distress, obese   Eyes:  Sclera anicteric. Pupils equally round and reactive to light.    Mouth/Throat: Mucous membranes normal, oral pharynx clear   Neck: Supple   Lungs:   Clear to auscultation bilaterally, good effort   CV:  Tachycardic, regular rate and rhythm, no audible murmur, click, rub or gallop   Abdomen:   Soft, non-tender. bowel sounds normal. non-distended; buttock dressing in place with ALEXANDER drain with sanguinous drainage   Extremities: No cyanosis or edema   Skin: Very dry peeling skin on upper extremities   Lymph nodes: Not examined today   Musculoskeletal: No swelling or deformity   Lines/Devices:  Intact, no erythema, drainage or tenderness   : Yeast with fissures bilateral inguinal areas, improved;  Pino with clear yellow urine       Data Review:     CBC:  Recent Labs      05/24/17   0545  05/23/17   1925  05/23/17   0754  05/22/17   0800   WBC  21.0*   --   30.3*  25.7*   GRANS   --    --   93*   --    MONOS   --    --   1*   --    EOS   --    --   0*   --    ANEU   --    --   28.3*   --    ABL   --    --   1.4   --    HGB  7.9*  9.1*  8.6*  9.0*   HCT  23.9*  26.7*  25.2*  27.0*   PLT  544*   --   389  268       BMP:  Recent Labs      05/24/17 0545  05/23/17   0754  05/22/17   0800   CREA  0.41*  0.50*  0.40*   BUN  8  10  11   NA  144  142  144   K  3.8  4.2  4.0   CL  110*  111*  111*   CO2  25  21  22   AGAP  9  10  11   GLU  174*  327*  115*       LFTS:  Recent Labs      05/23/17   0754 05/22/17   0800   TBILI   --   0.5   ALT   --   26   SGOT   --   38*   AP   --   134   TP   --   5.4*   ALB  2.1*  1.5*       Microbiology:     All Micro Results     Procedure Component Value Units Date/Time    CULTURE, CHLAMYDIA [[id number]] Collected:  05/19/17 1946    Order Status:  Completed Specimen:  Miscellaneous sample Updated:  05/24/17 1239     Source Cervical/vaginal swab        Chlamydia trachomatis Culture NEGATIVE          (NOTE)  Performed At: [address] [id number]  Jania Seals MD TA:[id number]         CULTURE, BLOOD [[id number]] Collected:  05/19/17 1020 Order Status:  Completed Specimen:  Blood from Blood Updated:  05/24/17 0736     Special Requests: RIGHT ANTECUBITAL        Culture result: NO GROWTH 5 DAYS       CULTURE, BLOOD [[id number]] Collected:  05/19/17 1033    Order Status:  Completed Specimen:  Blood from Blood Updated:  05/24/17 0736     Special Requests: LEFT HAND        Culture result: NO GROWTH 5 DAYS       FUNGUS CULTURE AND SMEAR [[id number]] Collected:  05/19/17 1915    Order Status:  Completed Specimen:  Miscellaneous sample Updated:  05/23/17 1736     Source BUTTOCK        Fungus stain Direct Inoculation     Fungus (Mycology) Culture Other source received      (NOTE)  Performed At: [address] [id number]  Jean-Pierre Gold MD JX:[id number]         Kody Hernandez [[id number]] Collected:  05/19/17 1946    Order Status:  Completed Specimen:  Cervical/vaginal swab Updated:  05/23/17 1249     Special Requests: NO SPECIAL REQUESTS        GRAM STAIN NO WBCS SEEN         FEW  YEAST         FEW  GRAM NEGATIVE RODS        Culture result:         NO NEISSERIA GONORRHOEAE ISOLATED    FUNGUS CULTURE AND SMEAR [[id number]] Collected:  05/18/17 1640    Order Status:  Completed Specimen:  Miscellaneous sample Updated:  05/23/17 1238     Source WOUND         PERIRECTAL  Corrected on 05/23 AT 66 135 36 14:  This is a correction to the medical record of the test results previously reported as PERIRECTAL          Fungus stain Direct Inoculation     Fungus (Mycology) Culture Other source received      (NOTE)  Performed At: [address] [id number]  Jean-Pierre Gold MD VS:[id number]         CULTURE, TISSUE [address] [[id number]]  (Abnormal)  (Susceptibility) Collected:  05/19/17 1915    Order Status:  Completed Specimen:  Buttock Updated:  05/23/17 0835     Special Requests: RIGHT        GRAM STAIN 0 TO 2  WBCS SEEN        MODERATE  GRAM VARIABLE RODS        Culture result: SCANT  ESCHERICHIA COLI   (A)       MODERATE  ALPHA STREPTOCOCCUS   (A)             SCANT  STREPTOCOCCI, BETA HEMOLYTIC GROUP B  THIS ORGANISM WILL BE HELD FOR 7 DAYS. IF FURTHER TESTING IS REQUIRED PLEASE NOTIFY MICROBIOLOGY   (A)    CULTURE, URINE [[id number]]  (Abnormal) Collected:  05/19/17 1117    Order Status:  Completed Specimen:  Urine from Cath Urine Updated:  05/23/17 0729     Special Requests: NO SPECIAL REQUESTS        Culture result:       >100,000  COLONIES/mL  CANDIDA GLABRATA  IDENTIFICATION TO FOLLOW   (A)      [id number]  COLONIES/mL  CANDIDA ALBICANS   (A)     CULTURE, BODY FLUID Melo Delude STAIN [[id number]]  (Abnormal) Collected:  05/18/17 1640    Order Status:  Completed Specimen:  Abscess Updated:  05/22/17 1406     Special Requests: PERIRECTAL     GRAM STAIN MANY  GRAM POSITIVE RODS         RARE  GRAM NEGATIVE RODS         10 TO 20  WBCS SEEN  PER OIF       Culture result:       LIGHT  STREPTOCOCCI, BETA HEMOLYTIC GROUP B  THIS ORGANISM WILL BE HELD FOR 7 DAYS. IF FURTHER TESTING IS REQUIRED PLEASE NOTIFY MICROBIOLOGY   (A)              NORMAL SKIN DEE ISOLATED    CULTURE, BLOOD [[id number]]  (Abnormal) Collected:  05/16/17 1730    Order Status:  Completed Specimen:  Blood from Blood Updated:  05/22/17 1342     Special Requests: LEFT ANTECUBITAL        GRAM STAIN GRAM POSITIVE RODS         ANAEROBIC BOTTLE POSITIVE               RESULTS VERIFIED, PHONED TO AND READ BACK BY  [id number] CHRISTI Groves RN @ [phone] BY SP       Culture result: GRAM POSITIVE RODS (A)                 CULTURE IN Lynnda Settler UPDATES TO FOLLOW    CULTURE, BLOOD [[id number]]  (Abnormal) Collected:  05/16/17 1736    Order Status:  Completed Specimen:  Blood from Blood Updated:  05/22/17 1342     Special Requests: RIGHT ANTECUBITAL        GRAM STAIN GRAM POSITIVE RODS         ANAEROBIC BOTTLE POSITIVE                 CRITICAL RESULT NOT CALLED DUE TO PREVIOUS NOTIFICATION OF CRITICAL RESULT WITHIN THE LAST 24 HOURS.      Culture result: GRAM POSITIVE RODS (A)         ISOLATE FORWARD TO [facility] FOR ID ON 05/22/17      SEE REPORT FROM [facility]  ACC [id number]       AEROBIC SUSCEPTIBILITY ID [[id number]] Collected:  05/16/17 1730    Order Status:  Completed Updated:  05/22/17 1159    CULTURE, ANAEROBIC [[id number]] Collected:  05/19/17 1915    Order Status:  Completed Specimen:  Buttock Updated:  05/22/17 0845     Special Requests: R BUTTOCK TISSUE     Culture result:         SUBCULTURE IS NECESSARY TO DETERMINE PRESENCE OR ABSENCE OF ANAEROBIC BACTERIA IN THIS CULTURE. FURTHER REPORT TO FOLLOW AFTER INCUBATION OF SUBCULTURE. CULTURE, HSV W/ TYPING [[id number]] Collected:  05/17/17 1710    Order Status:  Completed Specimen:  Miscellaneous sample Updated:  05/21/17 1241     Source PERINEUM     HSV culture w typing Comment         (NOTE)  Negative  No Herpes simplex virus isolated.   Performed At: [address] [id number]  Justin Knutson MD VU:[id number]         AFB CULTURE + SMEAR W/RFLX ID FROM CULTURE [[id number]] Collected:  05/19/17 1915    Order Status:  Completed Specimen:  Miscellaneous sample Updated:  05/21/17 1036     Source BUTTOCK        AFB Specimen processing Concentration     Acid Fast Smear NEGATIVE          (NOTE)  Performed At: [address] [id number]  Justin Knutson MD FX:[id number]          Acid Fast Culture PENDING    CULTURE, HSV W/ TYPING [[id number]]     Order Status:  Canceled     MRSA SCREEN - PCR (NASAL) [[id number]] Collected:  05/16/17 2025    Order Status:  Completed Specimen:  Nasal from Nasal Swab Updated:  05/16/17 2157     Special Requests: NO SPECIAL REQUESTS        Culture result:         MRSA target DNA is not detected (presumptive not colonized with MRSA)          Imaging:     CT ABD PELV WO CONT (Final result) Result time: 05/16/17 20:07:05     Final result     Impression:     IMPRESSION:  Large incompletely characterized pelvic mass with apparent  involvement/extension into the right gluteal muscles. The findings presumably  represent a neoplastic process. Pelvic MRI may be beneficial for further  delineation as it relates to other pelvic anatomy. Preferably this would be done  with IV contrast.     CT abd/pelv with contrast (5/17/17)  IMPRESSION: Indeterminate perirectal mass with extension of heterogeneous,  low-attenuation soft tissue into the right buttock an asymmetric edema and  muscular swelling and the right hemipelvis. Right inguinal adenopathy is  present. This may be a perirectal abscess or necrotic tumor with extrapelvic  extension.     Signed By: Warren Mcbride NP     May 24, 2017

## 2017-05-24 NOTE — PROGRESS NOTES
Spoke with [employer] about hgb lab draw sent at 15:25, tech states \"never received sample\". Will recollect.

## 2017-05-24 NOTE — PROGRESS NOTES
Hospitalist Progress Note    2017  Admit Date: 2017  3:14 PM   NAME: Anika Russo   :  1985   MRN:  [id number]   Attending: Vesta Jerez MD  PCP:  Cynthia Cordero MD    SUBJECTIVE:   AS Previously documented: \" 34yo F with no significant PMH presented with worsening Rt Flank pain, nausea, multiple episodes of vomiting and weakness. She was seen here in ER 1 week ago with back pain and diagnosed with Sciatica and given NSAIDs which she took without much relief. Yesterday her pain worsened and she took extra pain meds with about 4 glasses of Wine per . She then started having vomiting and abd discomfort that continued today and she came to ER. Found to be in DKA in ER with Na 115, K 6.0, LA 7.4, Bicarb 9, Ph 7.22, Cr 2.51 and Ck > 4000, WBC >40k, Hgb 10.8 with elevated LFTs. CT abdomen showed Pelvic mass concerning for malignancy. MRI showed anorectal abscess tracking up in the Gluteal region. S/p IR drain placement and Surgical Debridement by Dr. Dominic Arce. \"         20`7- seen - complains of difficulty moving the left leg but this is not new- surgery with plans to review the wound sanam- may need surgery again on thursday    Nursing notes and chart reviewed. Review of Systems negative with exception of pertinent positives noted above.     PHYSICAL EXAM     Visit Vitals    /62 (BP 1 Location: Right arm, BP Patient Position: At rest)    Pulse (!) 109    Temp 97.8 °F (36.6 °C)    Resp 18    Ht 5' 2\" (1.575 m)    Wt 89.6 kg (197 lb 8.5 oz)    LMP 2017    SpO2 100%    BMI 36.13 kg/m2      Temp (24hrs), Av.2 °F (36.8 °C), Min:97.8 °F (36.6 °C), Max:98.8 °F (37.1 °C)    Oxygen Therapy  O2 Sat (%): 100 % (17 1407)  Pulse via Oximetry: 99 beats per minute (17)  O2 Device: Nasal cannula (17)  O2 Flow Rate (L/min): 2 l/min (17)  ETCO2 (mmHg): 12 mmHg (17 1645)    Intake/Output Summary (Last 24 hours) at 17 1259  Last data filed at 05/24/17 0558   Gross per 24 hour   Intake                0 ml   Output             1400 ml   Net            -1400 ml       General: Mild distress. Alert.    Lungs:  CTABL. Heart:  RRR, no murmur, rub, or gallop, tachycardia  Abdomen: Soft, non-distended, non-tender, +bs  Extremities: No cyanosis or clubbing. Neurologic:  No focal deficits. Moves all extremities. Musculoskeletal: Tender Rt lower back  :   Large area of excoriations around her vagina and perineum. LABS AND STUDIES:  Personally reviewed all labs, meds, and studies for past 24hrs. 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 May need repeat I&D. · NALDO: Resolved, will c/w IVF   · Sepsis: Likely due to perirectal/Gluteal Abscess vs ? PID (she has +ve serology for STDs). ID managing antibiotics. - input and help appreciated  · Vaginal Lesions with Suspected PID: GYN input appreciated  · HIV screen  · Rhabdomyolysis: Resolved  · Acute Pancreatitis: resolved  · Anemia: Acute ? likely 2/2 hematoma. Transfused 2 U PRBCs. H&H slowly drifting down- will send a repeat- if still low will transfuse as appropriate  · Tachycardia- ivfls with ns for appropriate hydration- if no improvement may have to investigate further.    · Hpernatremia and Hyperkalemia: Resolved. Dispo: When OK with Surgery and ID. DVT ppx:  hsq  Discussed plan with pt and  who is in agreement. All questions answered.       Signed By: Eugene Gibbons MD     May 24, 2017

## 2017-05-24 NOTE — PROGRESS NOTES
Lg Schmitt M.D. Colon and Rectal Surgeon  [facility]  [address] [facility], [address]   Phone: [phone] Fax: [phone]      Sandie Dodge  [id number]    SUBJECTIVE:  29year old female admitted with DKA, rhabdomyolysis, NALDO, pancreatitis, lactic acidosis, leukocytosis, and sepsis. On CT, she was found to have a large pelvic process (mass vs abscess) with tracking into right buttock. She improved some with supportive care, fluid resuscitation, and antibiotics. She had an IR drain placed into her pelvic fluid collection. She was taken to the OR 5/19/17 for I&D of necrotic buttock fat. I found tracking into the extraperitoneal pelvic fluid collection. There was no connection to rectum. She was taken 5/22/17 for repeat debridement with much improved appearance of wound. Slept ok. Tolerating diet. No n/v.  Mild pain, mostly sore. Overall picture regarding source remains unclear. No abdominal pain. Not OOB, but PT ordered and patient wants to mobilize. ROS:  General--no fevers, chills  Respiratory--no shortness of breath, wheezing  Cardiovascular--no chest pain, palpitations  GI--no nausea/emesis. No hematochezia/melena. No pain    PHYSICAL EXAM:   Patient Vitals for the past 24 hrs:   BP Temp Pulse Resp SpO2   05/24/17 0650 107/60 98 °F (36.7 °C) (!) 104 18 100 %   05/24/17 0549 120/75 97.9 °F (36.6 °C) 98 20 99 %   05/23/17 2332 118/78 98.6 °F (37 °C) (!) 102 20 98 %   05/23/17 1942 117/71 98.8 °F (37.1 °C) (!) 108 18 100 %   05/23/17 1404 117/66 98 °F (36.7 °C) (!) 114 18 99 %   05/23/17 1059 118/65 98.1 °F (36.7 °C) (!) 111 18 99 %       General--AAO, NAD, answers all questions, appears comfortable  Abdomen--soft, nontender, nondistended. No peritoneal signs, no rebound, no guarding  Buttock--ALEXANDER from IR is in place, not holding suction as it connects to the wound. Dressing removed.   Small spotty dark areas, probably from cautery and a little residual necrotic tissue. Overall wound looks good. Difficult to evaluate deep component of wound in bed. There was some dark/black tissue on the gauze when it was removal.  Wound repacked. UOP: 1500/1000  ALEXANDER: nr  BM: none    Recent Labs      17   1925  17   0754  17   0800   WBC   --   30.3*  25.7*   HGB  9.1*  8.6*  9.0*   HCT  26.7*  25.2*  27.0*   PLT   --   389  268       Recent Labs      17   0545  17   0754  17   0800   NA  144  142  144   K  3.8  4.2  4.0   CL  110*  111*  111*   CO2  25  21  22   BUN  8  10  11   CREA  0.41*  0.50*  0.40*   TBILI   --    --   0.5   SGOT   --    --   38*   ALT   --    --   26   AP   --    --   134   MG   --   1.7*   --    PHOS   --    --   2.6       No results for input(s): AML, LPSE in the last 72 hours. No results for input(s): PTP, INR, APTT in the last 72 hours. No lab exists for component: INREXT, INREXT  Last lactate 1.4  Last procalcitonin 27.2  Last   RPR NR  Pelvic fluid culture from IR 17--beta hemolytic strep  AFB smear 17--negative  HSV culture 17--negative  Blood cultures 17--GPR x2 bottles  Blood cultures 17--no growth x 4 days  UA 17--1+ bacteria, neg LE/nitr--cultures with 100k yeast  OR tissue culture 17--beta hemolytic strep  Above labs reviewed by me. [id number]--CT abd/pelvis: \"IMPRESSION: Large incompletely characterized pelvic mass with apparent involvement/extension into the right gluteal muscles. The findings presumably represent a neoplastic process. Pelvic MRI may be beneficial for further delineation as it relates to other pelvic anatomy. Preferably this would be done with IV contrast.\"  17--CT chest/abd/pelvis: \"IMPRESSION: Indeterminate perirectal mass with extension of heterogeneous, low-attenuation soft tissue into the right buttock an asymmetric edema and muscular swelling and the right hemipelvis.  Right inguinal adenopathy is present. This may be a perirectal abscess or necrotic tumor with extrapelvic extension. \"  5/18/17--CT guided IR drain placement:   5/19/17--MRI pelvis--\"IMPRESSION: Status post right perianal/rectal abscess drainage. The extensive remaining abscess findings are detailed above. \"    ASSESSMENT:   Pelvic abscess   S/p IR drain 5/18/17   S/p operative I&D, debridement of necrotic tissue 5/19/17   S/p operative I&D, debridement of necrotic tissue 5/22/17  Sepsis, due to above--improved  Leukocytosis, due to above--improved  DKA--improved  Rhabdomyolysis--resolved  NALDO--resolved  Pancreatitis--resolved  Anemia--   S/p transfusion 2 units PRBC 5/18/17  Hypernatremia  Elevated LFTs  Hypophosphatemia    PLAN:  --continue IV pain medications  --OOB, IS, mobilize  --No CV issues. Tachycardia is compensatory response to sepsis, improving  --regular diabetic diet. --Dressing changed, wound looks good but difficult to evaluate deep component in bed. Wound repacked. Wound consult to evaluate for dressing options. Also will look at wound with her in AM.  Hopefully can avoid more trips to OR. Will remove ALEXANDER as it is not helping at all, in same cavity as the packing.    --follow nagi SANDOVAL. Thompson Yost by me to remove dwyer  --transfuse for <7  --continue broad spectrum antibiotics per ID recs  --based on operative findings, with the more necrotic tissues deeper, this seems more like a pelvic process tracking down into gluteus, as opposed to the other way around. --PT consult for mobilization.       Chandler Peacock MD

## 2017-05-24 NOTE — PROGRESS NOTES
Patient reclining in bed with mother at bedside  Patient stated that she was thankful about feeling better   I affirmed patient's desire to continue improving in order to go home soon  Patient and patient's mother appreciated follow up from chaplains Corinne Schirmer, III, PhD  Jackie Salcedo  [phone]

## 2017-05-24 NOTE — PROGRESS NOTES
[facility] Hematology & Oncology        Inpatient Hematology / Oncology Progress Note      Admission Date: 2017  3:14 PM  Reason for Admission/Hospital Course: DKA (diabetic ketoacidoses) (HCC)  Abscess  WOUND RIGHT BUTTOCK      24 Hour Events:  Hgb 7.9 today  Soluable transferrin receptor WNL  Mother at bedside      ROS:  Constitutional: Negative for fever, chills, weakness, malaise, fatigue. CV: Negative for chest pain, palpitations, edema. Respiratory: Negative for dyspnea, cough, wheezing. GI: Negative for nausea, abdominal pain, diarrhea. 10 point review of systems is otherwise negative with the exception of the elements mentioned above in the HPI. No Known Allergies    OBJECTIVE:  Patient Vitals for the past 8 hrs:   BP Temp Pulse Resp SpO2   17 1102 115/72 97.9 °F (36.6 °C) (!) 112 18 99 %   17 0650 107/60 98 °F (36.7 °C) (!) 104 18 100 %   17 0549 120/75 97.9 °F (36.6 °C) 98 20 99 %     Temp (24hrs), Av.2 °F (36.8 °C), Min:97.9 °F (36.6 °C), Max:98.8 °F (37.1 °C)         Physical Exam:  Constitutional: Well developed, well nourished female in no acute distress, lying comfortably in the hospital bed. Mother at bedside   HEENT: Normocephalic and atraumatic. Oropharynx is clear, mucous membranes are moist.  Sclerae anicteric. Neck supple without JVD. No thyromegaly present. Lymph node   Deferred   Skin Warm and dry. No bruising and no rash noted. No erythema. No pallor. Respiratory Lungs are clear to auscultation bilaterally without wheezes, rales or rhonchi, normal air exchange without accessory muscle use. CVS Normal rate, regular rhythm and normal S1 and S2. No murmurs, gallops, or rubs. Abdomen Soft, nontender and nondistended, normoactive bowel sounds. No palpable mass. No hepatosplenomegaly. Neuro Grossly nonfocal with no obvious sensory or motor deficits. MSK Normal range of motion in general.  No edema and no tenderness.    Psych Appropriate mood and affect. Labs:    Recent Labs      05/24/17   0545  05/23/17   1925  05/23/17   0754  05/22/17   0800   WBC  21.0*   --   30.3*  25.7*   RBC  2.83*   --   3.01*  3.20*   HGB  7.9*  9.1*  8.6*  9.0*   HCT  23.9*  26.7*  25.2*  27.0*   MCV  84.5   --   83.7  84.4   MCH  27.9   --   28.6  28.1   MCHC  33.1   --   34.1  33.3   RDW  16.7*   --   16.0*  15.3*   PLT  544*   --   389  268   GRANS   --    --   93*   --    LYMPH   --    --   5*   --    MONOS   --    --   1*   --    EOS   --    --   0*   --    BASOS   --    --   0   --    IG   --    --   0.6   --    DF   --    --   AUTOMATED   --    ANEU   --    --   28.3*   --    ABL   --    --   1.4   --    ABM   --    --   0.4   --    CHRIS   --    --   0.0   --    ABB   --    --   0.0   --    AIG   --    --   0.2   --       Recent Labs      05/24/17 0545  05/23/17   0754  05/22/17   0800   NA  144  142  144   K  3.8  4.2  4.0   CL  110*  111*  111*   CO2  25  21  22   AGAP  9  10  11   GLU  174*  327*  115*   BUN  8  10  11   CREA  0.41*  0.50*  0.40*   GFRAA  >60  >60  >60   GFRNA  >60  >60  >60   CA  7.5*  7.4*  7.3*   SGOT   --    --   38*   AP   --    --   134   TP   --    --   5.4*   ALB   --   2.1*  1.5*   GLOB   --    --   3.9*   AGRAT   --    --   0.4*   MG   --   1.7*   --    PHOS   --    --   2.6         Imaging:  Portable chest: 5/16/2017     History: Shortness of breath, fatigue x1 week      Comparison: None      Findings: A single view of the chest was obtained at 1748 hours. The patient is  slightly rotated towards the left.       The cardiac and mediastinal silhouette are normal in size and configuration. The  lungs and pleural spaces are clear. The pulmonary vascularity is within normal  limits.      IMPRESSION  Impression: Unremarkable portable chest radiograph     CT abdomen and pelvis without contrast 05/16/2017      History: Moderate abdominal pain, elevated lipase.  Nausea and vomiting x1 day      Technique: Helically acquired images were obtained from the upper abdomen to the  ischial tuberosities reconstructed at 5 mmmm intervals without oral or IV  contrast. Intravenous contrast was not administered due to elevated creatinine. Coronal reformatted images were submitted.      Radiation dose reduction techniques were used for this study: Our CT scanners  use one or all of the following: Automated exposure control, adjustment of the  mA and/or kVp according to patient's size, iterative reconstruction.      Comparison: None      CT abdomen: There is mild respiratory motion artifact occurring throughout the  exam. The liver, spleen, pancreas, adrenal glands and kidneys are grossly  unremarkable on this noncontrast exam as well as allowing for motion artifact. No adenopathy or ascites is present. There is mild to moderate subcutaneous  stranding within the right lower flank posteriorly. There is no bowel  obstruction.      CT PELVIS: There is a complex pelvic mass like structure with the margins are  ill-defined in the absence of oral and IV contrast. This is felt to measure at  least 7.4 x 7.6 cm. A Pino catheter is present within a collapsed urinary  bladder. There is abnormal soft tissue density extending into the right ischio  rectal fossa. There is asymmetric enlargement of the right gluteal muscles. No  definite free fluid is present. Several small right inguinal lymph nodes are  present.      IMPRESSION: Large incompletely characterized pelvic mass with apparent  involvement/extension into the right gluteal muscles. The findings presumably  represent a neoplastic process. Pelvic MRI may be beneficial for further  delineation as it relates to other pelvic anatomy. Preferably this would be done  with IV contrast.     CT CHEST ABDOMEN AND PELVIS WITH CONTRAST 5/17/2017      HISTORY: Critical calcium level of 5.6.  Pelvic mass with gluteal involvement      TECHNIQUE: The patient received oral contrast and 100 mL Isovue-370 nonionic IV  contrast. Axial images were obtained through the chest, abdomen and pelvis. Coronal reformatted images were generated. All CT scans at this facility used  dose modulation, interactive reconstruction and/or weight based dosing when  appropriate to reduce radiation dose to as low as reasonably achievable.      COMPARISON: May 16, 2017      FINDINGS:      CHEST: There is no thoracic adenopathy. There is no lobar consolidation, pleural  effusions or pulmonary masses.      ABDOMEN: The gallbladder, liver, pancreas, spleen, adrenal glands, and kidneys  are normal in appearance.      PELVIS: The uterus is present. A Pino balloon is present within a collapsed  bladder. There is a poorly defined right perirectal low attenuation collection  or mass measuring 6.6 cm AP by approximately 5.3 cm transverse (image 100). This  extends cephalad along the lateral aspect of the uterus and along the right  lateral wall of the bladder. An incompletely characterized low-attenuation mass  along the left fundal portion of the uterus (image 88) may be a exophytic  fibroid. This is 2.5 cm in diameter.      Right inguinal adenopathy is present and there is a heterogeneous lobulated  structure extending into the fat of the right buttock (image 124) which is  continuous with the perirectal lesion. In addition there is asymmetric edema in  the soft tissues of the right hemipelvis and right gluteus muscles which are  asymmetrically enlarged. There is edema throughout the soft tissues of the right  buttock and right hemipelvis. There are no aggressive osseous changes or  erosive/lytic osseous lesions.      IMPRESSION: Indeterminate perirectal mass with extension of heterogeneous,  low-attenuation soft tissue into the right buttock an asymmetric edema and  muscular swelling and the right hemipelvis. Right inguinal adenopathy is  present.  This may be a perirectal abscess or necrotic tumor with extrapelvic  Extension.     MRI pelvis without and with contrast. 5/19/2017      INDICATION: 37-year-old with 2 weeks of severe right buttocks pain with  perirectal abscess, status post drain placement into the right perirectal region  yesterday.      COMPARISON: Pelvis CT studies 5/16/2017 - 5/18/2017.      TECHNIQUE: Sagittal axial and coronal T2, precontrast and postcontrast  multiplanar T1 sequences. 10 mL MultiHance IV gadolinium contrast.      FINDINGS: Percutaneous drainage catheter was placed yesterday to the right  perirectal fascia space for drainage. This is just barely evident on the  postcontrast axial T1 sequence in position-this tubing is difficult to visualize  on MRI. Compared to the study of 2017 there is clearly less mass effect on the  rectum by the abscess collection compatible with interval drainage history. Otherwise, this imaging confirms a large right-sided perianal nonenhancing  abscess collection extending throughout the ischiorectal fossa directly  communicating with adjacent right perirectal fascial spaces and continuing to  extend up cephalad within the mesocolon fat, and also on word cephalad into the  lateral pelvic wall fascia about the deep iliac vessels-its most cephalad  extension is located deep to the common iliac bifurcation and adjacent iliacus  muscle. There is also direct extension through the sciatic notch about the  sciatic nerve with extensive nonenhancing abscess infiltrating through all of  the gluteus muscles and extending about the enlarged and enhancing sciatic nerve  into the lateral aspect of the posterior compartment hamstring muscles. All  about this is considerable enhancement both of surrounding fascial tissues, and  the deep muscular structures compatible with myofasciitis changes and there is  asymmetric thickening/edema in the more superficial soft tissues compatible with  associated reactive cellulitis. I do not see findings of osteomyelitis on this exam. There is no right hip joint  effusion.  The deep vascular structures at the groin appear patent. The bladder is collapsed around a Pino catheter. There probably is some mild proctitis changes adjacent to the abscess. The rest  of the pelvic bowel loops appear unremarkable. There is no evidence of extension  of abscess outside of the pelvis.      IMPRESSION: Status post right perianal/rectal abscess drainage. The extensive  remaining abscess findings are detailed above. ASSESSMENT:    Problem List  Date Reviewed: 5/17/2017          Codes Class Noted    Septic shock ([facility] ICD-10-CM: A41.9, R65.21  ICD-9-CM: 038.9, 785.52, 995.92  5/17/2017        Pelvic mass ICD-10-CM: R19.00  ICD-9-CM: 789.30  5/17/2017        * (Principal)DKA (diabetic ketoacidoses) (HCC) ICD-10-CM: E13.10  ICD-9-CM: 250.10  5/16/2017        Acute alcoholic pancreatitis OGO-21-XM: K85.20  ICD-9-CM: 577.0  5/16/2017        Hyponatremia ICD-10-CM: E87.1  ICD-9-CM: 276.1  5/16/2017    Overview Signed 5/16/2017  7:00 PM by Houston Sol MD     Pseudohyponatremia             Hyperkalemia ICD-10-CM: E87.5  ICD-9-CM: 276.7  5/16/2017        Acute renal failure (ARF) ([facility] ICD-10-CM: N17.9  ICD-9-CM: 584.9  5/16/2017        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  ICD-9-CM: 171.7  5/16/2017        Sepsis ([facility] ICD-10-CM: A41.9  ICD-9-CM: 038.9, 995.91  5/16/2017        Chlamydia infection ICD-10-CM: A74.9  ICD-9-CM: 079.98  5/16/2017        Herpes simplex vulvovaginitis ICD-10-CM: A60.04  ICD-9-CM: 054.11  5/16/2017                PLAN:  5/22 Anemia most likely related to a combination of things: 1) acute blood loss from perianal/rectal abscess that was drained along with surgical debridement, 2) infection/sepsis (as well as the NSAIDs she was placed on after the ED visit last week for sciatica) can cause a degree of myelosuppression, and 3) decreased kidney function (Cr 2.51) can affect erythropoietin production.  Her ferritin was elevated, however this can be elevated from infection or inflammation, so we will check a soluble transferrin receptor. 5/24 Soluable transferrin receptor WNL - no true iron deficiency. Anemia most likely multifactoral for reasons stated above. Thank you for allowing us to participate in the care of Ms. Alicia Echeverria. We will sign off for now, but please let us know if we can be of further assistance. Please have Ms. Wylie follow up with Dr. Erin Neal within 2 weeks after discharge.             [facility]  [address] [place] [address]  Office : [phone]  Fax : [phone]

## 2017-05-24 NOTE — PROGRESS NOTES
ALEXANDER drain dc'd per MD order, catheter intact. Small pressure dressing applied, secured with tape. Pt resting in bed, no distress noted.

## 2017-05-25 LAB
ANION GAP BLD CALC-SCNC: 12 MMOL/L (ref 7–16)
BUN SERPL-MCNC: 6 MG/DL (ref 6–23)
CALCIUM SERPL-MCNC: 6.5 MG/DL (ref 8.3–10.4)
CHLORIDE SERPL-SCNC: 112 MMOL/L (ref 98–107)
CO2 SERPL-SCNC: 24 MMOL/L (ref 21–32)
CREAT SERPL-MCNC: 0.3 MG/DL (ref 0.6–1)
ERYTHROCYTE [DISTWIDTH] IN BLOOD BY AUTOMATED COUNT: 16.4 % (ref 11.9–14.6)
GLUCOSE BLD STRIP.AUTO-MCNC: 109 MG/DL (ref 65–100)
GLUCOSE BLD STRIP.AUTO-MCNC: 110 MG/DL (ref 65–100)
GLUCOSE BLD STRIP.AUTO-MCNC: 155 MG/DL (ref 65–100)
GLUCOSE BLD STRIP.AUTO-MCNC: 210 MG/DL (ref 65–100)
GLUCOSE SERPL-MCNC: 109 MG/DL (ref 65–100)
HCT VFR BLD AUTO: 27.5 % (ref 35.8–46.3)
HGB BLD-MCNC: 9.1 G/DL (ref 11.7–15.4)
HIV 1+2 AB+HIV1 P24 AG SERPL QL IA: NON REACTIVE
MCH RBC QN AUTO: 27.9 PG (ref 26.1–32.9)
MCHC RBC AUTO-ENTMCNC: 33.1 G/DL (ref 31.4–35)
MCV RBC AUTO: 84.4 FL (ref 79.6–97.8)
PLATELET # BLD AUTO: 372 K/UL (ref 150–450)
PMV BLD AUTO: 9.4 FL (ref 10.8–14.1)
POTASSIUM SERPL-SCNC: 3.5 MMOL/L (ref 3.5–5.1)
RBC # BLD AUTO: 3.26 M/UL (ref 4.05–5.25)
SODIUM SERPL-SCNC: 148 MMOL/L (ref 136–145)
WBC # BLD AUTO: 19 K/UL (ref 4.3–11.1)

## 2017-05-25 PROCEDURE — 74011000258 HC RX REV CODE- 258: Performed by: INTERNAL MEDICINE

## 2017-05-25 PROCEDURE — 74011250636 HC RX REV CODE- 250/636: Performed by: INTERNAL MEDICINE

## 2017-05-25 PROCEDURE — 74011250637 HC RX REV CODE- 250/637: Performed by: INTERNAL MEDICINE

## 2017-05-25 PROCEDURE — 97606 NEG PRS WND THER DME>50 SQCM: CPT

## 2017-05-25 PROCEDURE — 80048 BASIC METABOLIC PNL TOTAL CA: CPT | Performed by: INTERNAL MEDICINE

## 2017-05-25 PROCEDURE — 77030025162 HC DRSG VAC ASST 1 KCON -B

## 2017-05-25 PROCEDURE — 74011636637 HC RX REV CODE- 636/637: Performed by: INTERNAL MEDICINE

## 2017-05-25 PROCEDURE — 85027 COMPLETE CBC AUTOMATED: CPT | Performed by: INTERNAL MEDICINE

## 2017-05-25 PROCEDURE — 74011250636 HC RX REV CODE- 250/636: Performed by: COLON & RECTAL SURGERY

## 2017-05-25 PROCEDURE — 77030019934 HC DRSG VAC ASST KCON -B

## 2017-05-25 PROCEDURE — 65660000000 HC RM CCU STEPDOWN

## 2017-05-25 PROCEDURE — 74750000023 HC WOUND THERAPY

## 2017-05-25 PROCEDURE — 82962 GLUCOSE BLOOD TEST: CPT

## 2017-05-25 RX ORDER — DEXTROSE MONOHYDRATE 50 MG/ML
150 INJECTION, SOLUTION INTRAVENOUS CONTINUOUS
Status: DISCONTINUED | OUTPATIENT
Start: 2017-05-25 | End: 2017-05-31

## 2017-05-25 RX ADMIN — FLUCONAZOLE 400 MG: 100 TABLET ORAL at 08:34

## 2017-05-25 RX ADMIN — HEPARIN SODIUM 5000 UNITS: 5000 INJECTION, SOLUTION INTRAVENOUS; SUBCUTANEOUS at 22:00

## 2017-05-25 RX ADMIN — INSULIN LISPRO 6 UNITS: 100 INJECTION, SOLUTION INTRAVENOUS; SUBCUTANEOUS at 16:41

## 2017-05-25 RX ADMIN — MORPHINE SULFATE 5 MG: 10 INJECTION INTRAMUSCULAR; INTRAVENOUS; SUBCUTANEOUS at 06:46

## 2017-05-25 RX ADMIN — DEXTROSE MONOHYDRATE 150 ML/HR: 5 INJECTION, SOLUTION INTRAVENOUS at 12:49

## 2017-05-25 RX ADMIN — PIPERACILLIN SODIUM,TAZOBACTAM SODIUM 3.38 G: 3; .375 INJECTION, POWDER, FOR SOLUTION INTRAVENOUS at 16:24

## 2017-05-25 RX ADMIN — INSULIN LISPRO 3 UNITS: 100 INJECTION, SOLUTION INTRAVENOUS; SUBCUTANEOUS at 23:54

## 2017-05-25 RX ADMIN — PIPERACILLIN SODIUM,TAZOBACTAM SODIUM 3.38 G: 3; .375 INJECTION, POWDER, FOR SOLUTION INTRAVENOUS at 08:34

## 2017-05-25 RX ADMIN — INSULIN LISPRO 5 UNITS: 100 INJECTION, SOLUTION INTRAVENOUS; SUBCUTANEOUS at 16:30

## 2017-05-25 RX ADMIN — PIPERACILLIN SODIUM,TAZOBACTAM SODIUM 3.38 G: 3; .375 INJECTION, POWDER, FOR SOLUTION INTRAVENOUS at 02:20

## 2017-05-25 NOTE — PROGRESS NOTES
Infectious Disease Progress Note    Today's Date: 2017   Admit Date: 2017    Impression:   · Large pelvic abscess; 20 cc purulent fluid drained 17, cultures with NSF; I&D , yeast and GNRs on stain, alpha strep, beta strep and E coli, repeat I&D   · Anaerobic GPR bacteremia (), pending - [facility]; repeat culture  NGTD  · Chlamydia cervicitis  · Severe inguinal yeast infection  · DKA (A1C 14), resolved  · Pancreatitis, resolved    Plan:   · Continue Zosyn and fluconazole  · Follow pending AFB/anaerobic cultures. · Wound vac in place now, per notes, wound is . 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 Objective:     Visit Vitals    /63    Pulse (!) 112    Temp 99.3 °F (37.4 °C)    Resp 18    Ht 5' 2\" (1.575 m)    Wt 89.6 kg (197 lb 8.5 oz)    SpO2 100%    BMI 36.13 kg/m2     Temp (24hrs), Av.9 °F (37.2 °C), Min:98.5 °F (36.9 °C), Max:99.3 °F (37.4 °C)       Lines:  Central Venous Catheter:  L chest, non tender        Physical Exam:    General:  Awake, alert, no acute distress, obese   Eyes:  Sclera anicteric. Pupils equally round and reactive to light.    Mouth/Throat: Mucous membranes normal, oral pharynx clear   Neck: Supple   Lungs:   Clear to auscultation bilaterally, good effort   CV:  Persistent tachycardia, regular rate and rhythm,soft murmur   Abdomen:   Soft, non-tender. bowel sounds normal. non-distended; wound vac to buttocks   Extremities: No cyanosis or edema   Skin: Very dry peeling skin on upper extremities   Lymph nodes: Not examined today   Musculoskeletal: No swelling or deformity   Lines/Devices:  Intact, no erythema, drainage or tenderness           Data Review:     CBC:  Recent Labs      05/25/17 0430 05/24/17 1953 05/24/17 0545   05/23/17   0754   WBC  19.0*   --   21.0*   --   30.3*   GRANS   --    --    --    --   93*   MONOS   --    --    --    --   1*   EOS   --    --    --    --   0*   ANEU   --    --    --    --   28.3*   ABL   --    --    --    --   1.4   HGB  9.1*  8.6*  7.9*   < >  8.6*   HCT  27.5*  26.0*  23.9*   < >  25.2*   PLT  372   --   544*   --   389    < > = values in this interval not displayed.        BMP:  Recent Labs      05/25/17 0430 05/24/17 0545  05/23/17   0754   CREA  0.30*  0.41*  0.50*   BUN  6  8  10   NA  148*  144  142   K  3.5  3.8  4.2   CL  112*  110*  111*   CO2  24  25  21   AGAP  12  9  10   GLU  109*  174*  327*       LFTS:  Recent Labs      05/23/17 0754   ALB  2.1*       Microbiology:     All Micro Results     Procedure Component Value Units Date/Time    AEROBE ID REFLEX [[id number]] Collected:  05/16/17 1730    Order Status:  Completed Specimen:  MICROBIAL ISOLATE Updated:  05/25/17 0841     Result 1 Gram positive rods      (NOTE)  Performed At: [address] [id number]  Amie Richardson MD HJ:[id number]         AEROBIC SUSCEPTIBILITY ID [[id number]] Collected:  05/16/17 1730    Order Status:  Completed Specimen:  MICROBIAL ISOLATE Updated:  05/25/17 0840     Source BLOOD        Aerobe ID + Suscept Preliminary report      (NOTE)  Performed At: [facility]  [address] [place], [place] [id number]  Miller Bernardo MD JU:[id number]         Zoe Nichols [[id number]] Collected:  05/19/17 1946    Order Status:  Completed Specimen:  Miscellaneous sample Updated:  05/24/17 1239     Source Cervical/vaginal swab        Chlamydia trachomatis Culture NEGATIVE          (NOTE)  Performed At: [address] [id number]  Miller Bernardo MD SL:[id number]         CULTURE, BLOOD [[id number]] Collected:  05/19/17 1020    Order Status:  Completed Specimen:  Blood from Blood Updated:  05/24/17 0736     Special Requests: RIGHT ANTECUBITAL        Culture result: NO GROWTH 5 DAYS       CULTURE, BLOOD [[id number]] Collected:  05/19/17 1033    Order Status:  Completed Specimen:  Blood from Blood Updated:  05/24/17 0736     Special Requests: LEFT HAND        Culture result: NO GROWTH 5 DAYS       FUNGUS CULTURE AND SMEAR [[id number]] Collected:  05/19/17 1915    Order Status:  Completed Specimen:  Miscellaneous sample Updated:  05/23/17 1736     Source BUTTOCK        Fungus stain Direct Inoculation     Fungus (Mycology) Culture Other source received      (NOTE)  Performed At: [address] [id number]  Miller Bernardo MD BZ:[id number]         Alexa Shelby [[id number]] Collected:  05/19/17 1946    Order Status:  Completed Specimen:  Cervical/vaginal swab Updated:  05/23/17 1249     Special Requests: NO SPECIAL REQUESTS        GRAM STAIN NO WBCS SEEN         FEW  YEAST         FEW  GRAM NEGATIVE RODS        Culture result:         NO NEISSERIA GONORRHOEAE ISOLATED    FUNGUS CULTURE AND SMEAR [[id number]] Collected:  05/18/17 1640    Order Status:  Completed Specimen:  Miscellaneous sample Updated:  05/23/17 1238     Source WOUND         PERIRECTAL  Corrected on 05/23 AT 66 135 36 14:  This is a correction to the medical record of the test results previously reported as PERIRECTAL          Fungus stain Direct Inoculation Fungus (Mycology) Culture Other source received      (NOTE)  Performed At: [address] [id number]  Miller Bernardo MD WZ:[id number]         CULTURE, TISSUE W Moy Donny [[id number]]  (Abnormal)  (Susceptibility) Collected:  05/19/17 1915    Order Status:  Completed Specimen:  Buttock Updated:  05/23/17 0835     Special Requests: RIGHT        GRAM STAIN 0 TO 2  WBCS SEEN        MODERATE  GRAM VARIABLE RODS        Culture result: SCANT  ESCHERICHIA COLI   (A)       MODERATE  ALPHA STREPTOCOCCUS   (A)             SCANT  STREPTOCOCCI, BETA HEMOLYTIC GROUP B  THIS ORGANISM WILL BE HELD FOR 7 DAYS. IF FURTHER TESTING IS REQUIRED PLEASE NOTIFY MICROBIOLOGY   (A)    CULTURE, URINE [[id number]]  (Abnormal) Collected:  05/19/17 1117    Order Status:  Completed Specimen:  Urine from Cath Urine Updated:  05/23/17 0729     Special Requests: NO SPECIAL REQUESTS        Culture result:       >100,000  COLONIES/mL  CANDIDA GLABRATA  IDENTIFICATION TO FOLLOW   (A)      [id number]  COLONIES/mL  CANDIDA ALBICANS   (A)     CULTURE, BODY FLUID Lavaughn Singletary STAIN [[id number]]  (Abnormal) Collected:  05/18/17 1640    Order Status:  Completed Specimen:  Abscess Updated:  05/22/17 1406     Special Requests: PERIRECTAL     GRAM STAIN MANY  GRAM POSITIVE RODS         RARE  GRAM NEGATIVE RODS         10 TO 20  WBCS SEEN  PER OIF       Culture result:       LIGHT  STREPTOCOCCI, BETA HEMOLYTIC GROUP B  THIS ORGANISM WILL BE HELD FOR 7 DAYS.  IF FURTHER TESTING IS REQUIRED PLEASE NOTIFY MICROBIOLOGY   (A)              NORMAL SKIN DEE ISOLATED    CULTURE, BLOOD [[id number]]  (Abnormal) Collected:  05/16/17 1730    Order Status:  Completed Specimen:  Blood from Blood Updated:  05/22/17 1342     Special Requests: LEFT ANTECUBITAL        GRAM STAIN GRAM POSITIVE RODS         ANAEROBIC BOTTLE POSITIVE               RESULTS VERIFIED, PHONED TO AND READ BACK BY  [id number] CHRISTI Groves RN @ [id number] [id number] BY SP       Culture result: Sai Butt POSITIVE RODS (A)                 CULTURE IN PROGRESS,FURTHER UPDATES TO FOLLOW    CULTURE, BLOOD [[id number]]  (Abnormal) Collected:  05/16/17 1736    Order Status:  Completed Specimen:  Blood from Blood Updated:  05/22/17 1342     Special Requests: RIGHT ANTECUBITAL        GRAM STAIN GRAM POSITIVE RODS         ANAEROBIC BOTTLE POSITIVE                 CRITICAL RESULT NOT CALLED DUE TO PREVIOUS NOTIFICATION OF CRITICAL RESULT WITHIN THE LAST 24 HOURS. Culture result: GRAM POSITIVE RODS (A)         ISOLATE FORWARD TO [facility] FOR ID ON 05/22/17      SEE REPORT FROM [facility]  ACC [id number]       CULTURE, ANAEROBIC [[id number]] Collected:  05/19/17 1915    Order Status:  Completed Specimen:  Buttock Updated:  05/22/17 0845     Special Requests: R BUTTOCK TISSUE     Culture result:         SUBCULTURE IS NECESSARY TO DETERMINE PRESENCE OR ABSENCE OF ANAEROBIC BACTERIA IN THIS CULTURE. FURTHER REPORT TO FOLLOW AFTER INCUBATION OF SUBCULTURE. CULTURE, HSV W/ TYPING [[id number]] Collected:  05/17/17 1710    Order Status:  Completed Specimen:  Miscellaneous sample Updated:  05/21/17 1241     Source PERINEUM     HSV culture w typing Comment         (NOTE)  Negative  No Herpes simplex virus isolated.   Performed At: [address] [id number]  Clau Segal MD PM:[id number]         AFB CULTURE + SMEAR W/RFLX ID FROM CULTURE [[id number]] Collected:  05/19/17 1915    Order Status:  Completed Specimen:  Miscellaneous sample Updated:  05/21/17 1036     Source BUTTOCK        AFB Specimen processing Concentration     Acid Fast Smear NEGATIVE          (NOTE)  Performed At: [address] [id number]  Clau Segal MD LA:[id number]          Acid Fast Culture PENDING    CULTURE, HSV W/ TYPING [[id number]]     Order Status:  Canceled     MRSA SCREEN - PCR (NASAL) [[id number]] Collected:  05/16/17 2025    Order Status:  Completed Specimen:  Nasal from Nasal Swab Updated:  05/16/17 2150     Special Requests: NO SPECIAL REQUESTS        Culture result:         MRSA target DNA is not detected (presumptive not colonized with MRSA)          Imaging:     CT ABD PELV WO CONT (Final result) Result time: 05/16/17 20:07:05     Final result     Impression:     IMPRESSION:  Large incompletely characterized pelvic mass with apparent  involvement/extension into the right gluteal muscles. The findings presumably  represent a neoplastic process. Pelvic MRI may be beneficial for further  delineation as it relates to other pelvic anatomy. Preferably this would be done  with IV contrast.     CT abd/pelv with contrast (5/17/17)  IMPRESSION: Indeterminate perirectal mass with extension of heterogeneous,  low-attenuation soft tissue into the right buttock an asymmetric edema and  muscular swelling and the right hemipelvis. Right inguinal adenopathy is  present. This may be a perirectal abscess or necrotic tumor with extrapelvic  extension.     Signed By: Shine Landin NP     May 25, 2017

## 2017-05-25 NOTE — DISCHARGE INSTRUCTIONS
Pt will need a follow-up appointment with MD in 3-5 days, sepsis education book and thermometer for home use. IF unable to do this contact Sepsis Navigator or .

## 2017-05-25 NOTE — PROGRESS NOTES
Pt with uneventful shift, wound vac in place, 150 ml brown output this shift. All needs met at this time.

## 2017-05-25 NOTE — DIABETES MGMT
FBS today 109. Blood glucose range today 109-110. Mother at bedside. Reviewed with pt current insulin regimen: :Lantus 30 units daily, Humalog 5 units 3x/day ac and Humalog sliding scale coverage 4x/day ac and hs, including type of insulin, timing with meals, onset, peak of action, and duration of effect. Pt verbalizes understanding. Pt given \"Sick Days\" booklet, which was reviewed with pt and mother. Discussed the relationship between hyperglycemia and infection. Discussed signs, symptoms and treatments for hyper/hypoglycemia. Pt verbalizes understanding. Pt would likely benefit from continued outpatient diabetes education. Referral has been initiated to [address] [place] [address] [employer] [facility]. Pt is agreeable to plan. Stressed the need for follow up care for diabetes management with PCP. Pt states that she would prefer vial and syringes at discharge. Pt given BD \"Getting Started\" Take Home Kit. Pt and mother have no further questions at this time.

## 2017-05-25 NOTE — PROGRESS NOTES
Jason Meier M.D. Colon and Rectal Surgeon  [facility] [facility], [facility] [address]dary   Phone: [phone] Fax: [phone]      Margaret Vazquez  [id number]    SUBJECTIVE:  29year old female admitted with DKA, rhabdomyolysis, NALDO, pancreatitis, lactic acidosis, leukocytosis, and sepsis. On CT, she was found to have a large pelvic process (mass vs abscess) with tracking into right buttock. She improved some with supportive care, fluid resuscitation, and antibiotics. She had an IR drain placed into her pelvic fluid collection. She was taken to the OR 5/19/17 for I&D of necrotic buttock fat. I found tracking into the extraperitoneal pelvic fluid collection. There was no connection to rectum. She was taken 5/22/17 for repeat debridement with much improved appearance of wound. Slept ok. Tolerating diet. No n/v.  Mild pain, mostly sore. Overall picture regarding source remains unclear. No abdominal pain. Seen by PT>    ROS:  General--no fevers, chills  Respiratory--no shortness of breath, wheezing  Cardiovascular--no chest pain, palpitations  GI--no nausea/emesis. No hematochezia/melena. No pain    PHYSICAL EXAM:   Patient Vitals for the past 24 hrs:   BP Temp Pulse Resp SpO2   05/25/17 0716 126/66 98.5 °F (36.9 °C) (!) 107 18 99 %   05/25/17 0439 118/65 99 °F (37.2 °C) (!) 105 18 99 %   05/25/17 0003 115/42 98.8 °F (37.1 °C) (!) 112 18 100 %   05/24/17 2141 122/52 98.7 °F (37.1 °C) (!) 114 18 100 %   05/24/17 1407 123/62 97.8 °F (36.6 °C) (!) 109 18 100 %   05/24/17 1102 115/72 97.9 °F (36.6 °C) (!) 112 18 99 %       General--AAO, NAD, answers all questions, appears comfortable  Abdomen--soft, nontender, nondistended. No peritoneal signs, no rebound, no guarding  Buttock--evaluated prone today with better  Inspection. ALEXANDER from IR is out. Dressing removed with wound RN today.   Superficially, small spotty dark areas, now with minimal residual necrotic tissue. Overall wound looks good. Deeper component still with some necrotic slough. There was some dark/black tissue on the gauze when it was removal.  I explored the wound with the RN and described operative findings, wound tracking, etc. We repacked the wound with a VAC, white foam deep in the wound    UOP: nr/1700  BM: none    Recent Labs      17   0430  17   0545   17   0754   WBC  19.0*   --   21.0*   --   30.3*   HGB  9.1*  8.6*  7.9*   < >  8.6*   HCT  27.5*  26.0*  23.9*   < >  25.2*   PLT  372   --   544*   --   389    < > = values in this interval not displayed. Recent Labs      1745  17   0754   NA  148*  144  142   K  3.5  3.8  4.2   CL  112*  110*  111*   CO2  24  25  21   BUN  6  8  10   CREA  0.30*  0.41*  0.50*   MG   --    --   1.7*       No results for input(s): AML, LPSE in the last 72 hours. No results for input(s): PTP, INR, APTT in the last 72 hours. No lab exists for component: INREXT, INREXT  Last lactate 1.4  Last procalcitonin 27.2  Last   RPR NR  Pelvic fluid culture from IR 17--beta hemolytic strep  AFB smear 17--negative  HSV culture 17--negative  Blood cultures 17--GPR x2 bottles  Blood cultures 17--no growth x 4 days  UA 17--1+ bacteria, neg LE/nitr--cultures with 100k yeast  OR tissue culture 17--beta hemolytic strep  Above labs reviewed by me. IMAGIN17--CT abd/pelvis: \"IMPRESSION: Large incompletely characterized pelvic mass with apparent involvement/extension into the right gluteal muscles. The findings presumably represent a neoplastic process. Pelvic MRI may be beneficial for further delineation as it relates to other pelvic anatomy.  Preferably this would be done with IV contrast.\"  17--CT chest/abd/pelvis: \"IMPRESSION: Indeterminate perirectal mass with extension of heterogeneous, low-attenuation soft tissue into the right buttock an asymmetric edema and muscular swelling and the right hemipelvis. Right inguinal adenopathy is present. This may be a perirectal abscess or necrotic tumor with extrapelvic extension. \"  5/18/17--CT guided IR drain placement:   5/19/17--MRI pelvis--\"IMPRESSION: Status post right perianal/rectal abscess drainage. The extensive remaining abscess findings are detailed above. \"    ASSESSMENT:   Pelvic abscess   S/p IR drain 5/18/17   S/p operative I&D, debridement of necrotic tissue 5/19/17   S/p operative I&D, debridement of necrotic tissue 5/22/17  Sepsis, due to above--improved  Leukocytosis, due to above--improved  DKA--improved  Rhabdomyolysis--resolved  NALDO--resolved  Pancreatitis--resolved  Anemia--   S/p transfusion 2 units PRBC 5/18/17  Hypernatremia  Elevated LFTs  Hypophosphatemia    PLAN:  --continue IV pain medications  --OOB, IS, mobilize  --No CV issues. Tachycardia is compensatory response to sepsis, improving  --regular diabetic diet. --Dressing changed, and VAC placed. Will try VAC. If we cant keep a seal, may need to go back to dry gauze packing. No operative debridement planned based on today's findings/exam.   Hopefully can avoid more trips to OR.    --follow UOP, creat. Inna Boop by me to remove dwyer  --transfuse for <7  --continue broad spectrum antibiotics per ID recs  --based on operative findings, with the more necrotic tissues deeper, this seems more like a pelvic process tracking down into gluteus, as opposed to the other way around.   --PT seeing      Vinayak García MD

## 2017-05-25 NOTE — PROGRESS NOTES
Hospitalist Progress Note    2017  Admit Date: 2017  3:14 PM   NAME: Romina Velazquez   :  1985   MRN:  [id number]   Attending: Daniel Razo MD  PCP:  Dewey Wood MD    SUBJECTIVE:   AS Previously documented: \" 32yo F with no significant PMH presented with worsening Rt Flank pain, nausea, multiple episodes of vomiting and weakness. She was seen here in ER 1 week ago with back pain and diagnosed with Sciatica and given NSAIDs which she took without much relief. Yesterday her pain worsened and she took extra pain meds with about 4 glasses of Wine per . She then started having vomiting and abd discomfort that continued today and she came to ER. Found to be in DKA in ER with Na 115, K 6.0, LA 7.4, Bicarb 9, Ph 7.22, Cr 2.51 and Ck > 4000, WBC >40k, Hgb 10.8 with elevated LFTs. CT abdomen showed Pelvic mass concerning for malignancy. MRI showed anorectal abscess tracking up in the Gluteal region. S/p IR drain placement and Surgical Debridement by Dr. Aggie Munguia. \"         20`7- seen - complains of difficulty moving the left leg but this is not new- surgery with plans to review the wound sanam- may need surgery again on thursday    Nursing notes and chart reviewed. Review of Systems negative with exception of pertinent positives noted above.     PHYSICAL EXAM     Visit Vitals    /63    Pulse (!) 112    Temp 99.3 °F (37.4 °C)    Resp 18    Ht 5' 2\" (1.575 m)    Wt 89.6 kg (197 lb 8.5 oz)    LMP 2017    SpO2 100%    BMI 36.13 kg/m2      Temp (24hrs), Av.9 °F (37.2 °C), Min:98.5 °F (36.9 °C), Max:99.3 °F (37.4 °C)    Oxygen Therapy  O2 Sat (%): 100 % (17 1413)  Pulse via Oximetry: 99 beats per minute (17)  O2 Device: Nasal cannula (17)  O2 Flow Rate (L/min): 2 l/min (17)  ETCO2 (mmHg): 12 mmHg (17 1645)    Intake/Output Summary (Last 24 hours) at 17 1659  Last data filed at 17 1309   Gross per 24 hour   Intake 2500 ml   Output             3325 ml   Net             -825 ml       General: Mild distress. Alert.    Lungs:  CTABL. Heart:  RRR, no murmur, rub, or gallop, tachycardia  Abdomen: Soft, non-distended, non-tender, +bs  Extremities: No cyanosis or clubbing. Neurologic:  No focal deficits. Moves all extremities. Musculoskeletal: Tender Rt lower back  :   Large area of excoriations around her vagina and perineum. LABS AND STUDIES:  Personally reviewed all labs, meds, and studies for past 24hrs. 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 No plans for any surgical intervention right now- will continue with drain and monitor  · NALDO: Resolved, will c/w IVF   · Sepsis: Likely due to perirectal/Gluteal Abscess vs ? PID (she has +ve serology for STDs). ID managing antibiotics. - input and help appreciated  · Vaginal Lesions with Suspected PID: GYN input appreciated  · HIV screen- non reactive  · Rhabdomyolysis: Resolved  · Acute Pancreatitis: resolved  · Anemia: Acute ? likely 2/2 hematoma. Transfused 2 U PRBCs.  H&H slowly drifting down- will send a repeat- if still low will transfuse as appropriate  · Tachycardia- continue ivfls- prior ct chest was contrast did not show a pe  · Hypernatremia- increase free water  · Hyperkalemia: Resolved. Dispo: When OK with Surgery and ID. DVT ppx:  hsq  Discussed plan with pt and  who is in agreement. All questions answered.       Signed By: Tadeo Farrell MD     May 25, 2017

## 2017-05-26 LAB
GLUCOSE BLD STRIP.AUTO-MCNC: 130 MG/DL (ref 65–100)
GLUCOSE BLD STRIP.AUTO-MCNC: 164 MG/DL (ref 65–100)
GLUCOSE BLD STRIP.AUTO-MCNC: 175 MG/DL (ref 65–100)
GLUCOSE BLD STRIP.AUTO-MCNC: 212 MG/DL (ref 65–100)

## 2017-05-26 PROCEDURE — 74011636637 HC RX REV CODE- 636/637: Performed by: INTERNAL MEDICINE

## 2017-05-26 PROCEDURE — 65270000029 HC RM PRIVATE

## 2017-05-26 PROCEDURE — 74011250636 HC RX REV CODE- 250/636: Performed by: INTERNAL MEDICINE

## 2017-05-26 PROCEDURE — 65660000000 HC RM CCU STEPDOWN

## 2017-05-26 PROCEDURE — 74750000023 HC WOUND THERAPY

## 2017-05-26 PROCEDURE — 82962 GLUCOSE BLOOD TEST: CPT

## 2017-05-26 PROCEDURE — 97530 THERAPEUTIC ACTIVITIES: CPT

## 2017-05-26 PROCEDURE — 74011250637 HC RX REV CODE- 250/637: Performed by: INTERNAL MEDICINE

## 2017-05-26 PROCEDURE — 74011000258 HC RX REV CODE- 258: Performed by: INTERNAL MEDICINE

## 2017-05-26 RX ORDER — FUROSEMIDE 10 MG/ML
20 INJECTION INTRAMUSCULAR; INTRAVENOUS EVERY 12 HOURS
Status: DISCONTINUED | OUTPATIENT
Start: 2017-05-26 | End: 2017-05-27

## 2017-05-26 RX ORDER — INSULIN LISPRO 100 [IU]/ML
7 INJECTION, SOLUTION INTRAVENOUS; SUBCUTANEOUS
Status: DISCONTINUED | OUTPATIENT
Start: 2017-05-26 | End: 2017-05-29

## 2017-05-26 RX ADMIN — PIPERACILLIN SODIUM,TAZOBACTAM SODIUM 3.38 G: 3; .375 INJECTION, POWDER, FOR SOLUTION INTRAVENOUS at 17:28

## 2017-05-26 RX ADMIN — PIPERACILLIN SODIUM,TAZOBACTAM SODIUM 3.38 G: 3; .375 INJECTION, POWDER, FOR SOLUTION INTRAVENOUS at 08:38

## 2017-05-26 RX ADMIN — PIPERACILLIN SODIUM,TAZOBACTAM SODIUM 3.38 G: 3; .375 INJECTION, POWDER, FOR SOLUTION INTRAVENOUS at 01:29

## 2017-05-26 RX ADMIN — FUROSEMIDE 20 MG: 10 INJECTION, SOLUTION INTRAMUSCULAR; INTRAVENOUS at 20:26

## 2017-05-26 RX ADMIN — HEPARIN SODIUM 5000 UNITS: 5000 INJECTION, SOLUTION INTRAVENOUS; SUBCUTANEOUS at 22:00

## 2017-05-26 RX ADMIN — INSULIN LISPRO 3 UNITS: 100 INJECTION, SOLUTION INTRAVENOUS; SUBCUTANEOUS at 22:00

## 2017-05-26 RX ADMIN — INSULIN LISPRO 5 UNITS: 100 INJECTION, SOLUTION INTRAVENOUS; SUBCUTANEOUS at 07:30

## 2017-05-26 RX ADMIN — FLUCONAZOLE 400 MG: 100 TABLET ORAL at 08:38

## 2017-05-26 RX ADMIN — INSULIN GLARGINE 30 UNITS: 100 INJECTION, SOLUTION SUBCUTANEOUS at 09:00

## 2017-05-26 RX ADMIN — INSULIN LISPRO 6 UNITS: 100 INJECTION, SOLUTION INTRAVENOUS; SUBCUTANEOUS at 12:08

## 2017-05-26 RX ADMIN — INSULIN LISPRO 3 UNITS: 100 INJECTION, SOLUTION INTRAVENOUS; SUBCUTANEOUS at 07:30

## 2017-05-26 RX ADMIN — INSULIN LISPRO 5 UNITS: 100 INJECTION, SOLUTION INTRAVENOUS; SUBCUTANEOUS at 12:08

## 2017-05-26 NOTE — PROGRESS NOTES
Infectious Disease Progress Note    Today's Date: 5/26/2017   Admit Date: 5/16/2017    Impression:   · Large pelvic abscess; 20 cc purulent fluid drained 5/18/17, cultures with NSF; I&D 5/19, yeast and GNRs on stain, alpha strep, beta strep and E coli, repeat I&D 5/22  · Anaerobic GPR bacteremia (5/16), pending - [facility]; repeat culture 5/19 NGTD  · Chlamydia cervicitis  · Severe inguinal yeast infection  · DKA (A1C 14), resolved  · Pancreatitis, resolved    Plan:   · Continue Zosyn and fluconazole. When ready for discharge, can transition to ertapenem to complete two weeks of treatment from date of last surgery. If no additional surgery planned, EOT would be 6/7/17. · Follow pending AFB/anaerobic cultures. · Labs ordered for Monday am.  · Wound vac off because of soiling; surgery plans to re-evaluate on Monday. Possible OR vs vac placement then. Anti-infectives:     Piperacillin/tazobactam  Doxycycline  Fluconazole  Acyclovir    Subjective:   Date of Examination:  May 26, 2017  Referring Physician: Dahlia Harley  Denies complaints of nausea, vomiting, diarrhea, fevers, chills or sweats. Understands plan of care. 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     Objective:     Visit Vitals    /73 (BP 1 Location: Left arm, BP Patient Position: At rest)    Pulse (!) 112    Temp 98.3 °F (36.8 °C)    Resp 18    Ht 5' 2\" (1.575 m)    Wt 89.6 kg (197 lb 8.5 oz)    SpO2 100%    BMI 36.13 kg/m2     Temp (24hrs), Av.8 °F (37.1 °C), Min:98.3 °F (36.8 °C), Max:99.4 °F (37.4 °C)       Lines:  Central Venous Catheter:  L chest, non tender        Physical Exam:    General:  Awake, alert, no acute distress, obese, sitting in the chair   Eyes:  Sclera anicteric. Pupils equally round and reactive to light. Mouth/Throat: Mucous membranes normal, oral pharynx clear   Neck: Supple   Lungs:   Clear to auscultation bilaterally, good effort   CV:  Persistent tachycardia, regular rate and rhythm, soft murmur   Abdomen:   Soft, non-tender. bowel sounds normal. non-distended; wound not examined   Extremities: No cyanosis or edema   Skin: Very dry peeling skin on upper extremities   Lymph nodes: Not examined today   Musculoskeletal: No swelling or deformity   Lines/Devices:  Intact, no erythema, drainage or tenderness           Data Review:     CBC:  Recent Labs      17   0430  17   0545   WBC  19.0*   --   21.0*   HGB  9.1*  8.6*  7.9*   HCT  27.5*  26.0*  23.9*   PLT  372   --   544*       BMP:  Recent Labs      17   0430  17   0545   CREA  0.30*  0.41*   BUN  6  8   NA  148*  144   K  3.5  3.8   CL  112*  110*   CO2  24  25   AGAP  12  9   GLU  109*  174*       LFTS:  No results for input(s): TBILI, ALT, SGOT, AP, TP, ALB in the last 72 hours.     Microbiology:     All Micro Results     Procedure Component Value Units Date/Time    CULTURE, ANAEROBIC [[id number]] Collected:  17    Order Status:  Completed Specimen:  Buttock Updated:  17     Special Requests: R BUTTOCK TISSUE     Culture result:       LIGHT  ANAEROBIC GRAM NEGATIVE COCCOBACILLI        SENT TO [facility] FOR TESTING  [id number]         REFER TO [address] [id number]    ANAEROBE ID W/ Sophia Clark [[id number]] Collected:  17    Order Status:  Completed Updated:  17 110    AEROBE ID REFLEX [[id number]] Collected:  17 173    Order Status:  Completed Specimen:  MICROBIAL ISOLATE Updated: 05/25/17 0841     Result 1 Gram positive rods      (NOTE)  Performed At: [address] [id number]  Karin Mosqueda MD PU:[id number]         AEROBIC SUSCEPTIBILITY ID [[id number]] Collected:  05/16/17 1730    Order Status:  Completed Specimen:  MICROBIAL ISOLATE Updated:  05/25/17 0840     Source BLOOD        Aerobe ID + Suscept Preliminary report      (NOTE)  Performed At: [address] [id number]  Karin Mosqueda MD QT:[id number]         Precilla Snide [[id number]] Collected:  05/19/17 1946    Order Status:  Completed Specimen:  Miscellaneous sample Updated:  05/24/17 1239     Source Cervical/vaginal swab        Chlamydia trachomatis Culture NEGATIVE          (NOTE)  Performed At: [address] [id number]  Karin Mosqueda MD GR:[id number]         CULTURE, BLOOD [[id number]] Collected:  05/19/17 1020    Order Status:  Completed Specimen:  Blood from Blood Updated:  05/24/17 0736     Special Requests: RIGHT ANTECUBITAL        Culture result: NO GROWTH 5 DAYS       CULTURE, BLOOD [[id number]] Collected:  05/19/17 1033    Order Status:  Completed Specimen:  Blood from Blood Updated:  05/24/17 0736     Special Requests: LEFT HAND        Culture result: NO GROWTH 5 DAYS       FUNGUS CULTURE AND SMEAR [[id number]] Collected:  05/19/17 1915    Order Status:  Completed Specimen:  Miscellaneous sample Updated:  05/23/17 1736     Source BUTTOCK        Fungus stain Direct Inoculation     Fungus (Mycology) Culture Other source received      (NOTE)  Performed At: [address] [id number]  Karin Mosqeuda MD OK:[id number]         [employer] [[id number]] Collected:  05/19/17 1946    Order Status:  Completed Specimen:  Cervical/vaginal swab Updated:  05/23/17 1249     Special Requests: NO SPECIAL REQUESTS        GRAM STAIN NO WBCS SEEN         FEW  YEAST FEW  GRAM NEGATIVE RODS        Culture result:         NO NEISSERIA GONORRHOEAE ISOLATED    FUNGUS CULTURE AND SMEAR [[id number]] Collected:  05/18/17 1640    Order Status:  Completed Specimen:  Miscellaneous sample Updated:  05/23/17 1238     Source WOUND         PERIRECTAL  Corrected on 05/23 AT 1237: This is a correction to the medical record of the test results previously reported as PERIRECTAL          Fungus stain Direct Inoculation     Fungus (Mycology) Culture Other source received      (NOTE)  Performed At: [address] [id number]  Christiano Edge MD OK:[id number]         CULTURE, TISSUE W Pratik Forts [[id number]]  (Abnormal)  (Susceptibility) Collected:  05/19/17 1915    Order Status:  Completed Specimen:  Buttock Updated:  05/23/17 0835     Special Requests: RIGHT        GRAM STAIN 0 TO 2  WBCS SEEN        MODERATE  GRAM VARIABLE RODS        Culture result: SCANT  ESCHERICHIA COLI   (A)       MODERATE  ALPHA STREPTOCOCCUS   (A)             SCANT  STREPTOCOCCI, BETA HEMOLYTIC GROUP B  THIS ORGANISM WILL BE HELD FOR 7 DAYS. IF FURTHER TESTING IS REQUIRED PLEASE NOTIFY MICROBIOLOGY   (A)    CULTURE, URINE [[id number]]  (Abnormal) Collected:  05/19/17 1117    Order Status:  Completed Specimen:  Urine from Cath Urine Updated:  05/23/17 0729     Special Requests: NO SPECIAL REQUESTS        Culture result:       >100,000  COLONIES/mL  CANDIDA GLABRATA  IDENTIFICATION TO FOLLOW   (A)      19327  COLONIES/mL  CANDIDA ALBICANS   (A)     CULTURE, BODY FLUID Cande Clap STAIN [[id number]]  (Abnormal) Collected:  05/18/17 1640    Order Status:  Completed Specimen:  Abscess Updated:  05/22/17 1406     Special Requests: PERIRECTAL     GRAM STAIN MANY  GRAM POSITIVE RODS         RARE  GRAM NEGATIVE RODS         10 TO 20  WBCS SEEN  PER OIF       Culture result:       LIGHT  STREPTOCOCCI, BETA HEMOLYTIC GROUP B  THIS ORGANISM WILL BE HELD FOR 7 DAYS.  IF FURTHER TESTING IS REQUIRED PLEASE NOTIFY MICROBIOLOGY   (A)              NORMAL SKIN DEE ISOLATED    CULTURE, BLOOD [[id number]]  (Abnormal) Collected:  05/16/17 1730    Order Status:  Completed Specimen:  Blood from Blood Updated:  05/22/17 1342     Special Requests: LEFT ANTECUBITAL        GRAM STAIN GRAM POSITIVE RODS         ANAEROBIC BOTTLE POSITIVE               RESULTS VERIFIED, PHONED TO AND READ BACK BY  [id number] CHRISTI Groves RN @ [id number] BY SP       Culture result: GRAM POSITIVE RODS (A)                 CULTURE IN [facility] UPDATES TO FOLLOW    CULTURE, BLOOD [[id number]]  (Abnormal) Collected:  05/16/17 1736    Order Status:  Completed Specimen:  Blood from Blood Updated:  05/22/17 1342     Special Requests: RIGHT ANTECUBITAL        GRAM STAIN GRAM POSITIVE RODS         ANAEROBIC BOTTLE POSITIVE                 CRITICAL RESULT NOT CALLED DUE TO PREVIOUS NOTIFICATION OF CRITICAL RESULT WITHIN THE LAST 24 HOURS. Culture result: GRAM POSITIVE RODS (A)         ISOLATE FORWARD TO [facility] FOR ID ON 05/22/17      SEE REPORT FROM [facility]  ACC [id number]       CULTURE, HSV W/ TYPING [[id number]] Collected:  05/17/17 1710    Order Status:  Completed Specimen:  Miscellaneous sample Updated:  05/21/17 1241     Source PERINEUM     HSV culture w typing Comment         (NOTE)  Negative  No Herpes simplex virus isolated.   Performed At: [address] [id number]  Marj Taylor MD ID:[id number]         AFB CULTURE + SMEAR W/RFLX ID FROM CULTURE [[id number]] Collected:  05/19/17 1915    Order Status:  Completed Specimen:  Miscellaneous sample Updated:  05/21/17 1036     Source BUTTOCK        AFB Specimen processing Concentration     Acid Fast Smear NEGATIVE          (NOTE)  Performed At: [address] [id number]  Marj Taylor MD GL:[id number]          Acid Fast Culture PENDING    CULTURE, HSV W/ TYPING [[id number]]     Order Status:  Canceled     MRSA SCREEN - PCR (NASAL) [[id number]] Collected:  05/16/17 2025    Order Status:  Completed Specimen:  Nasal from Nasal Swab Updated:  05/16/17 2157     Special Requests: NO SPECIAL REQUESTS        Culture result:         MRSA target DNA is not detected (presumptive not colonized with MRSA)          Imaging:     CT ABD PELV WO CONT (Final result) Result time: 05/16/17 20:07:05     Final result     Impression:     IMPRESSION:  Large incompletely characterized pelvic mass with apparent  involvement/extension into the right gluteal muscles. The findings presumably  represent a neoplastic process. Pelvic MRI may be beneficial for further  delineation as it relates to other pelvic anatomy. Preferably this would be done  with IV contrast.     CT abd/pelv with contrast (5/17/17)  IMPRESSION: Indeterminate perirectal mass with extension of heterogeneous,  low-attenuation soft tissue into the right buttock an asymmetric edema and  muscular swelling and the right hemipelvis. Right inguinal adenopathy is  present. This may be a perirectal abscess or necrotic tumor with extrapelvic  extension.     Signed By: Nba Subramanian NP     May 26, 2017

## 2017-05-26 NOTE — PROGRESS NOTES
New abd placed per patient request.  Up in chair most of the day, ambulating in room and hallway. All needs met at this time.

## 2017-05-26 NOTE — PROGRESS NOTES
Beatrice Mei M.D. Colon and Rectal Surgeon  [facility]  [address], [address] [place], [address]   Phone: [phone] Fax: [phone]      Michoacano Hector  [id number]    SUBJECTIVE:  29year old female admitted with DKA, rhabdomyolysis, NALDO, pancreatitis, lactic acidosis, leukocytosis, and sepsis. On CT, she was found to have a large pelvic process (mass vs abscess) with tracking into right buttock. She improved some with supportive care, fluid resuscitation, and antibiotics. She had an IR drain placed into her pelvic fluid collection. She was taken to the OR 5/19/17 for I&D of necrotic buttock fat. I found tracking into the extraperitoneal pelvic fluid collection. There was no connection to rectum. She was taken 5/22/17 for repeat debridement with much improved appearance of wound. Slept ok. Tolerating diet. No n/v.  Mild pain, mostly sore. No abdominal pain. Seen by PT.  JOSE walking some. Had multiple stools yesterday and VAC came loose, unable to get good seal after that,. ROS:  General--no fevers, chills  Respiratory--no shortness of breath, wheezing  Cardiovascular--no chest pain, palpitations  GI--no nausea/emesis. No hematochezia/melena. No pain    PHYSICAL EXAM:   Patient Vitals for the past 24 hrs:   BP Temp Pulse Resp SpO2   05/26/17 0222 133/59 99 °F (37.2 °C) (!) 116 18 98 %   05/25/17 1957 116/50 98.4 °F (36.9 °C) (!) 114 18 100 %   05/25/17 1413 125/63 - (!) 112 18 100 %   05/25/17 1114 129/61 99.3 °F (37.4 °C) (!) 108 18 99 %       General--AAO, NAD, answers all questions, appears comfortable  Abdomen--soft, nontender, nondistended. No peritoneal signs, no rebound, no guarding  Buttock--evaluated prone today with better  Inspection. ALEXANDER from IR is out. VAC Dressing removed as there was stool on it and in the wound. Superficially, still with small spotty dark areas, now with minimal residual necrotic tissue. Overall wound looks good. Deeper component still with some necrotic slough. Because of the weekend coming and less WOCN coverage, I repacked the wound with two Kerlix, with plans to reevaluate for VAC next week. UOP: 1600/nr  VAC: 150/nr  BM: ++    Recent Labs      1745   17   0754   WBC  19.0*   --   21.0*   --   30.3*   HGB  9.1*  8.6*  7.9*   < >  8.6*   HCT  27.5*  26.0*  23.9*   < >  25.2*   PLT  372   --   544*   --   389    < > = values in this interval not displayed. Recent Labs      17   0545  17   0754   NA  148*  144  142   K  3.5  3.8  4.2   CL  112*  110*  111*   CO2  24  25  21   BUN  6  8  10   CREA  0.30*  0.41*  0.50*   MG   --    --   1.7*       No results for input(s): AML, LPSE in the last 72 hours. No results for input(s): PTP, INR, APTT in the last 72 hours. No lab exists for component: INREXT, INREXT  Last lactate 1.4  Last procalcitonin 27.2  Last   RPR NR  Pelvic fluid culture from IR 17--beta hemolytic strep  AFB smear 17--negative  HSV culture 17--negative  Blood cultures 17--GPR x2 bottles  Blood cultures 17--no growth x 5 days  UA 17--1+ bacteria, neg LE/nitr--cultures with 100k candida glabrata  OR tissue culture 17--beta hemolytic strep  Above labs reviewed by me. IMAGIN17--CT abd/pelvis: \"IMPRESSION: Large incompletely characterized pelvic mass with apparent involvement/extension into the right gluteal muscles. The findings presumably represent a neoplastic process. Pelvic MRI may be beneficial for further delineation as it relates to other pelvic anatomy. Preferably this would be done with IV contrast.\"  17--CT chest/abd/pelvis: \"IMPRESSION: Indeterminate perirectal mass with extension of heterogeneous, low-attenuation soft tissue into the right buttock an asymmetric edema and muscular swelling and the right hemipelvis.  Right inguinal adenopathy is present. This may be a perirectal abscess or necrotic tumor with extrapelvic extension. \"  5/18/17--CT guided IR drain placement:   5/19/17--MRI pelvis--\"IMPRESSION: Status post right perianal/rectal abscess drainage. The extensive remaining abscess findings are detailed above. \"    ASSESSMENT:   Pelvic abscess   S/p IR drain 5/18/17   S/p operative I&D, debridement of necrotic tissue 5/19/17   S/p operative I&D, debridement of necrotic tissue 5/22/17  Sepsis, due to above--improved  Leukocytosis, due to above--improved  DKA--improved  Rhabdomyolysis--resolved  NALDO--resolved  Pancreatitis--resolved  Anemia--   S/p transfusion 2 units PRBC 5/18/17  Hypernatremia  Elevated LFTs  Hypophosphatemia    PLAN:  --continue IV and PO pain medications  --OOB, IS, mobilize  --No CV issues. Tachycardia is compensatory response to sepsis, improving  --regular diabetic diet. --VAC seems like a good idea, if we can get it to stick so close to the anus. Will do dry gauze packing daily over the weekend and reevaluate for VAC again on Monday. If it does not need repeat debridement in the OR by Monday (which I am optimistic will be the case) I think the likelihood for further OR debridements thereafter will be low.    --follow UOP, creat. --transfuse for <7  --continue broad spectrum antibiotics per ID recs  --PT seeing  --[facility] may be a good option for her?  --on call CSA covering Saturday/Sunday.   I will see again Monday    Rob Cherry MD

## 2017-05-26 NOTE — PROGRESS NOTES
Pt has had several BM's this shift. In cleaning herself pt has caused wound vac to loose suction and dressing to come off. Wound has been recovered. This nurse and [facility] RN have been unsuccessful  regaining suction with wound vac.  Will follow up with wound-care in AM.

## 2017-05-26 NOTE — DIABETES MGMT
Patient seen by diabetes educator. Blood glucose today 164. Blood glucose range yesterday 109-210. Reviewed with pt current insulin regimen: Lantus 30 units daily, Humalog 5 units 3x/day ac and Humalog sliding scale coverage 4x/day ac and hs, including type of insulin, timing with meals, onset, Peak of action, and duration of effect. Pt verbalizes understanding. Plan is to continue diabetes education as needed and for patient to practice insulin self-injection under RN supervision when insulin dose is due. Pt has no further questions at this time.

## 2017-05-26 NOTE — PROGRESS NOTES
Hospitalist Progress Note    2017  Admit Date: 2017  3:14 PM   NAME: Margaret Vazquez   :  1985   MRN:  [id number]   Attending: Farrukh Reilly MD  PCP:  César Segovia MD    SUBJECTIVE:   AS Previously documented: \" 34yo F with no significant PMH presented with worsening Rt Flank pain, nausea, multiple episodes of vomiting and weakness. She was seen here in ER 1 week ago with back pain and diagnosed with Sciatica and given NSAIDs which she took without much relief. Yesterday her pain worsened and she took extra pain meds with about 4 glasses of Wine per . She then started having vomiting and abd discomfort that continued today and she came to ER. Found to be in DKA in ER with Na 115, K 6.0, LA 7.4, Bicarb 9, Ph 7.22, Cr 2.51 and Ck > 4000, WBC >40k, Hgb 10.8 with elevated LFTs. CT abdomen showed Pelvic mass concerning for malignancy. MRI showed anorectal abscess tracking up in the Gluteal region. S/p IR drain placement and Surgical Debridement by Dr. Óscar Copeland. \"         2017- seen - complains of leg heaviness and swelling needs a note for work saying she is in the hospital        Nursing notes and chart reviewed. Review of Systems negative with exception of pertinent positives noted above. PHYSICAL EXAM     Visit Vitals    /68 (BP 1 Location: Right arm, BP Patient Position: Sitting)    Pulse (!) 109    Temp 98 °F (36.7 °C)    Resp 18    Ht 5' 2\" (1.575 m)    Wt 89.6 kg (197 lb 8.5 oz)    LMP 2017    SpO2 100%    BMI 36.13 kg/m2      Temp (24hrs), Av.6 °F (37 °C), Min:98 °F (36.7 °C), Max:99.4 °F (37.4 °C)    Oxygen Therapy  O2 Sat (%): 100 % (17)  Pulse via Oximetry: 99 beats per minute (17)  O2 Device: Room air (17)  O2 Flow Rate (L/min): 2 l/min (17)  ETCO2 (mmHg): 12 mmHg (17 1645)  No intake or output data in the 24 hours ending 17 0596    General: Mild distress. Alert.    Lungs:  CTABL. Heart:  RRR, no murmur, rub, or gallop, tachycardia  Abdomen: Soft, non-distended, non-tender, +bs  Extremities: No cyanosis or clubbing. Neurologic:  No focal deficits. Moves all extremities. Musculoskeletal: Tender Rt lower back  :   Large area of excoriations around her vagina and perineum. LABS AND STUDIES:  Personally reviewed all labs, meds, and studies for past 24hrs. 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 No plans for any surgical intervention right now- will continue with drain and monitor  · NALDO: Resolved, will c/w IVF   · Sepsis: Likely due to perirectal/Gluteal Abscess vs ? PID (she has +ve serology for STDs). ID managing antibiotics. - input and help appreciated  · Vaginal Lesions with Suspected PID: GYN input appreciated  · HIV screen- non reactive  · Rhabdomyolysis: Resolved  · Acute Pancreatitis: resolved  · Anemia: Acute ? likely 2/2 hematoma. Transfused 2 U PRBCs. H&H slowly drifting down- will send a repeat- if still low will transfuse as appropriate  · Tachycardia- baseline  · Hypernatremia- increase free water  · Hyperkalemia: Resolved. · Leg swelling - will give low dose lasix    Dispo: When OK with Surgery and ID. DVT ppx:  hsq  Discussed plan with pt and  who is in agreement. All questions answered.       Signed By: Narciso Calles MD     May 26, 2017

## 2017-05-27 LAB
ANION GAP BLD CALC-SCNC: 8 MMOL/L (ref 7–16)
BASOPHILS # BLD AUTO: 0 K/UL (ref 0–0.2)
BASOPHILS # BLD: 0 % (ref 0–2)
BUN SERPL-MCNC: 4 MG/DL (ref 6–23)
CALCIUM SERPL-MCNC: 7.5 MG/DL (ref 8.3–10.4)
CHLORIDE SERPL-SCNC: 105 MMOL/L (ref 98–107)
CO2 SERPL-SCNC: 29 MMOL/L (ref 21–32)
CREAT SERPL-MCNC: 0.38 MG/DL (ref 0.6–1)
DIFFERENTIAL METHOD BLD: ABNORMAL
EOSINOPHIL # BLD: 0.1 K/UL (ref 0–0.8)
EOSINOPHIL NFR BLD: 1 % (ref 0.5–7.8)
ERYTHROCYTE [DISTWIDTH] IN BLOOD BY AUTOMATED COUNT: 16.4 % (ref 11.9–14.6)
GLUCOSE BLD STRIP.AUTO-MCNC: 122 MG/DL (ref 65–100)
GLUCOSE BLD STRIP.AUTO-MCNC: 136 MG/DL (ref 65–100)
GLUCOSE BLD STRIP.AUTO-MCNC: 151 MG/DL (ref 65–100)
GLUCOSE BLD STRIP.AUTO-MCNC: 94 MG/DL (ref 65–100)
GLUCOSE SERPL-MCNC: 141 MG/DL (ref 65–100)
HCT VFR BLD AUTO: 22.8 % (ref 35.8–46.3)
HGB BLD-MCNC: 7.6 G/DL (ref 11.7–15.4)
IMM GRANULOCYTES # BLD: 0 K/UL (ref 0–0.5)
IMM GRANULOCYTES NFR BLD AUTO: 0.3 % (ref 0–5)
LYMPHOCYTES # BLD AUTO: 14 % (ref 13–44)
LYMPHOCYTES # BLD: 2 K/UL (ref 0.5–4.6)
MCH RBC QN AUTO: 28.1 PG (ref 26.1–32.9)
MCHC RBC AUTO-ENTMCNC: 33.3 G/DL (ref 31.4–35)
MCV RBC AUTO: 84.4 FL (ref 79.6–97.8)
MONOCYTES # BLD: 0.7 K/UL (ref 0.1–1.3)
MONOCYTES NFR BLD AUTO: 5 % (ref 4–12)
NEUTS SEG # BLD: 11 K/UL (ref 1.7–8.2)
NEUTS SEG NFR BLD AUTO: 80 % (ref 43–78)
PLATELET # BLD AUTO: 585 K/UL (ref 150–450)
PMV BLD AUTO: 8.6 FL (ref 10.8–14.1)
POTASSIUM SERPL-SCNC: 3.7 MMOL/L (ref 3.5–5.1)
RBC # BLD AUTO: 2.7 M/UL (ref 4.05–5.25)
SODIUM SERPL-SCNC: 142 MMOL/L (ref 136–145)
WBC # BLD AUTO: 13.8 K/UL (ref 4.3–11.1)

## 2017-05-27 PROCEDURE — 74011250636 HC RX REV CODE- 250/636: Performed by: INTERNAL MEDICINE

## 2017-05-27 PROCEDURE — 85025 COMPLETE CBC W/AUTO DIFF WBC: CPT | Performed by: INTERNAL MEDICINE

## 2017-05-27 PROCEDURE — 74011636637 HC RX REV CODE- 636/637: Performed by: INTERNAL MEDICINE

## 2017-05-27 PROCEDURE — 74011000258 HC RX REV CODE- 258: Performed by: INTERNAL MEDICINE

## 2017-05-27 PROCEDURE — 74011250637 HC RX REV CODE- 250/637: Performed by: INTERNAL MEDICINE

## 2017-05-27 PROCEDURE — 80048 BASIC METABOLIC PNL TOTAL CA: CPT | Performed by: INTERNAL MEDICINE

## 2017-05-27 PROCEDURE — 65660000000 HC RM CCU STEPDOWN

## 2017-05-27 PROCEDURE — 65270000029 HC RM PRIVATE

## 2017-05-27 PROCEDURE — 82962 GLUCOSE BLOOD TEST: CPT

## 2017-05-27 PROCEDURE — 74011250636 HC RX REV CODE- 250/636: Performed by: COLON & RECTAL SURGERY

## 2017-05-27 RX ORDER — FUROSEMIDE 10 MG/ML
20 INJECTION INTRAMUSCULAR; INTRAVENOUS EVERY 8 HOURS
Status: DISCONTINUED | OUTPATIENT
Start: 2017-05-27 | End: 2017-06-02

## 2017-05-27 RX ADMIN — INSULIN LISPRO 7 UNITS: 100 INJECTION, SOLUTION INTRAVENOUS; SUBCUTANEOUS at 17:03

## 2017-05-27 RX ADMIN — PIPERACILLIN SODIUM,TAZOBACTAM SODIUM 3.38 G: 3; .375 INJECTION, POWDER, FOR SOLUTION INTRAVENOUS at 17:30

## 2017-05-27 RX ADMIN — MORPHINE SULFATE 5 MG: 10 INJECTION INTRAMUSCULAR; INTRAVENOUS; SUBCUTANEOUS at 10:06

## 2017-05-27 RX ADMIN — FUROSEMIDE 20 MG: 10 INJECTION, SOLUTION INTRAMUSCULAR; INTRAVENOUS at 20:46

## 2017-05-27 RX ADMIN — INSULIN LISPRO 7 UNITS: 100 INJECTION, SOLUTION INTRAVENOUS; SUBCUTANEOUS at 12:25

## 2017-05-27 RX ADMIN — Medication 10 ML: at 03:46

## 2017-05-27 RX ADMIN — INSULIN LISPRO 7 UNITS: 100 INJECTION, SOLUTION INTRAVENOUS; SUBCUTANEOUS at 08:45

## 2017-05-27 RX ADMIN — HEPARIN SODIUM 5000 UNITS: 5000 INJECTION, SOLUTION INTRAVENOUS; SUBCUTANEOUS at 16:33

## 2017-05-27 RX ADMIN — PIPERACILLIN SODIUM,TAZOBACTAM SODIUM 3.38 G: 3; .375 INJECTION, POWDER, FOR SOLUTION INTRAVENOUS at 00:47

## 2017-05-27 RX ADMIN — HEPARIN SODIUM 5000 UNITS: 5000 INJECTION, SOLUTION INTRAVENOUS; SUBCUTANEOUS at 05:24

## 2017-05-27 RX ADMIN — FLUCONAZOLE 400 MG: 100 TABLET ORAL at 10:06

## 2017-05-27 RX ADMIN — PIPERACILLIN SODIUM,TAZOBACTAM SODIUM 3.38 G: 3; .375 INJECTION, POWDER, FOR SOLUTION INTRAVENOUS at 10:05

## 2017-05-27 RX ADMIN — MORPHINE SULFATE 5 MG: 10 INJECTION INTRAMUSCULAR; INTRAVENOUS; SUBCUTANEOUS at 17:22

## 2017-05-27 RX ADMIN — INSULIN GLARGINE 30 UNITS: 100 INJECTION, SOLUTION SUBCUTANEOUS at 09:00

## 2017-05-27 RX ADMIN — HEPARIN SODIUM 5000 UNITS: 5000 INJECTION, SOLUTION INTRAVENOUS; SUBCUTANEOUS at 20:46

## 2017-05-27 RX ADMIN — FUROSEMIDE 20 MG: 10 INJECTION, SOLUTION INTRAMUSCULAR; INTRAVENOUS at 10:06

## 2017-05-27 NOTE — PROGRESS NOTES
PLAN:  --continue IV and PO pain medications  --OOB, IS, mobilize  --No CV issues. Tachycardia is compensatory response to sepsis, improving  --regular diabetic diet. --VAC seems like a good idea, if we can get it to stick so close to the anus. Will do dry gauze packing daily over the weekend and reevaluate for VAC again on Monday. If it does not need repeat debridement in the OR by Monday (which I am optimistic will be the case) I think the likelihood for further OR debridements thereafter will be low.   --follow UOP, creat. --transfuse for <7  --continue broad spectrum antibiotics per ID recs  --PT seeing  --[facility] Do may be a good option for her?  --on call CSA covering Saturday/Sunday. I will see again Monday    ASSESSMENT:  Admit Date: 5/16/2017   5 Days Post-Op  Procedure(s):  DEBRIDEMENT RIGHT BUTTOCKS WITH DRESSING CHANGE    Principal Problem:    DKA (diabetic ketoacidoses) ([address] (5/16/2017)    Active Problems:    Acute alcoholic pancreatitis (1/16/1657)      Hyponatremia (5/16/2017)      Overview: Pseudohyponatremia      Hyperkalemia (5/16/2017)      Acute renal failure (ARF) ([address] (5/16/2017)      Rhabdomyosarcoma of flank ([address] (5/16/2017)      Sepsis ([address] (5/16/2017)      Chlamydia infection (5/16/2017)      Herpes simplex vulvovaginitis (5/16/2017)      Septic shock ([address] (5/17/2017)      Pelvic mass (5/17/2017)      SUBJECTIVE:  Pt sitting up in chair. No new complaints. Dressing changed - to Right buttocks - packed with dry gauze and covered with ABD pad. AF, VSS. OBJECTIVE:  Constitutional: Alert, cooperative, no acute distress; appears stated age   Visit Vitals    /58    Pulse (!) 109    Temp 98.4 °F (36.9 °C)    Resp 18    Ht 5' 2\" (1.575 m)    Wt 197 lb 8.5 oz (89.6 kg)    LMP 04/12/2017    SpO2 97%    BMI 36.13 kg/m2     Eyes:Sclera are clear. ENMT: no external lesions gross hearing normal; no obvious neck masses, no ear or lip lesions  CV: RRR.  Normal perfusion  Resp: No JVD.  Breathing is  non-labored; no audible wheezing. GI: soft, non-tender, and non-distended     Skin: Right Buttock - dressing c/d/i  Musculoskeletal: unremarkable with normal function. No embolic signs or cyanosis. Neuro:  Oriented; moves all 4; no focal deficits  Psychiatric: normal affect and mood, no memory impairment      Patient Vitals for the past 24 hrs:   BP Temp Pulse Resp SpO2   05/27/17 1033 112/58 98.4 °F (36.9 °C) (!) 109 18 97 %   05/27/17 0643 116/54 99.3 °F (37.4 °C) (!) 114 18 97 %   05/27/17 0322 118/59 98.9 °F (37.2 °C) (!) 110 18 97 %   05/26/17 2339 127/55 98.6 °F (37 °C) (!) 112 19 98 %   05/26/17 1941 126/73 98 °F (36.7 °C) (!) 115 18 99 %   05/26/17 1438 112/68 98 °F (36.7 °C) (!) 109 18 100 %   05/26/17 1138 137/73 98.3 °F (36.8 °C) (!) 112 18 100 %     Labs:  Recent Labs      05/27/17   0349   WBC  13.8*   HGB  7.6*   PLT  585*   NA  142   K  3.7   CL  105   CO2  29   BUN  4*   CREA  0.38*   GLU  141*     Chinedu Francis, [facility]    The patient was seen in conjunction with Dr. Josefa Terry who independently evaluated the patient, reviewed the chart and agreed with the assessment and plan.

## 2017-05-27 NOTE — PROGRESS NOTES
[facility] May 27, 2017       RE: Radha Hardy      To Whom It May Concern,    This is to certify that Radha Hardy has been in the hospital form 05/16/2017 and is currently still here. Please feel free to contact [facility],[address] 6th floor [phone] if you have any questions or concerns. Thank you for your assistance in this matter.       Sincerely,        Santos Mata MD

## 2017-05-28 LAB
GLUCOSE BLD STRIP.AUTO-MCNC: 110 MG/DL (ref 65–100)
GLUCOSE BLD STRIP.AUTO-MCNC: 175 MG/DL (ref 65–100)
GLUCOSE BLD STRIP.AUTO-MCNC: 82 MG/DL (ref 65–100)
GLUCOSE BLD STRIP.AUTO-MCNC: 85 MG/DL (ref 65–100)

## 2017-05-28 PROCEDURE — 65270000029 HC RM PRIVATE

## 2017-05-28 PROCEDURE — 74011250636 HC RX REV CODE- 250/636: Performed by: COLON & RECTAL SURGERY

## 2017-05-28 PROCEDURE — 65660000000 HC RM CCU STEPDOWN

## 2017-05-28 PROCEDURE — 74011000258 HC RX REV CODE- 258: Performed by: INTERNAL MEDICINE

## 2017-05-28 PROCEDURE — 74011250636 HC RX REV CODE- 250/636: Performed by: INTERNAL MEDICINE

## 2017-05-28 PROCEDURE — 74011250637 HC RX REV CODE- 250/637: Performed by: INTERNAL MEDICINE

## 2017-05-28 PROCEDURE — 74011636637 HC RX REV CODE- 636/637: Performed by: INTERNAL MEDICINE

## 2017-05-28 PROCEDURE — 82962 GLUCOSE BLOOD TEST: CPT

## 2017-05-28 PROCEDURE — 77030018836 HC SOL IRR NACL ICUM -A

## 2017-05-28 RX ADMIN — INSULIN LISPRO 7 UNITS: 100 INJECTION, SOLUTION INTRAVENOUS; SUBCUTANEOUS at 09:35

## 2017-05-28 RX ADMIN — HEPARIN SODIUM 5000 UNITS: 5000 INJECTION, SOLUTION INTRAVENOUS; SUBCUTANEOUS at 14:38

## 2017-05-28 RX ADMIN — FUROSEMIDE 20 MG: 10 INJECTION, SOLUTION INTRAMUSCULAR; INTRAVENOUS at 22:34

## 2017-05-28 RX ADMIN — FLUCONAZOLE 400 MG: 100 TABLET ORAL at 09:44

## 2017-05-28 RX ADMIN — HEPARIN SODIUM 5000 UNITS: 5000 INJECTION, SOLUTION INTRAVENOUS; SUBCUTANEOUS at 06:00

## 2017-05-28 RX ADMIN — INSULIN LISPRO 7 UNITS: 100 INJECTION, SOLUTION INTRAVENOUS; SUBCUTANEOUS at 12:34

## 2017-05-28 RX ADMIN — INSULIN GLARGINE 30 UNITS: 100 INJECTION, SOLUTION SUBCUTANEOUS at 09:37

## 2017-05-28 RX ADMIN — PIPERACILLIN SODIUM,TAZOBACTAM SODIUM 3.38 G: 3; .375 INJECTION, POWDER, FOR SOLUTION INTRAVENOUS at 18:24

## 2017-05-28 RX ADMIN — MORPHINE SULFATE 5 MG: 10 INJECTION INTRAMUSCULAR; INTRAVENOUS; SUBCUTANEOUS at 09:43

## 2017-05-28 RX ADMIN — ACETAMINOPHEN 650 MG: 325 TABLET, FILM COATED ORAL at 01:07

## 2017-05-28 RX ADMIN — PIPERACILLIN SODIUM,TAZOBACTAM SODIUM 3.38 G: 3; .375 INJECTION, POWDER, FOR SOLUTION INTRAVENOUS at 01:07

## 2017-05-28 RX ADMIN — MORPHINE SULFATE 5 MG: 10 INJECTION INTRAMUSCULAR; INTRAVENOUS; SUBCUTANEOUS at 14:43

## 2017-05-28 RX ADMIN — FUROSEMIDE 20 MG: 10 INJECTION, SOLUTION INTRAMUSCULAR; INTRAVENOUS at 03:32

## 2017-05-28 RX ADMIN — PIPERACILLIN SODIUM,TAZOBACTAM SODIUM 3.38 G: 3; .375 INJECTION, POWDER, FOR SOLUTION INTRAVENOUS at 09:43

## 2017-05-28 RX ADMIN — MORPHINE SULFATE 5 MG: 10 INJECTION INTRAMUSCULAR; INTRAVENOUS; SUBCUTANEOUS at 01:19

## 2017-05-28 RX ADMIN — HEPARIN SODIUM 5000 UNITS: 5000 INJECTION, SOLUTION INTRAVENOUS; SUBCUTANEOUS at 22:33

## 2017-05-28 RX ADMIN — HYDROCODONE BITARTRATE AND ACETAMINOPHEN 2 TABLET: 5; 325 TABLET ORAL at 14:38

## 2017-05-28 RX ADMIN — INSULIN LISPRO 3 UNITS: 100 INJECTION, SOLUTION INTRAVENOUS; SUBCUTANEOUS at 22:32

## 2017-05-28 RX ADMIN — FUROSEMIDE 20 MG: 10 INJECTION, SOLUTION INTRAMUSCULAR; INTRAVENOUS at 12:35

## 2017-05-28 NOTE — PROGRESS NOTES
Pt dressing soiled after an episode of diahrrea. Dressing removed. Wound packed with kerlex and ABD pad applied. Pt tolerated procedure well.

## 2017-05-28 NOTE — PROGRESS NOTES
Hospitalist Progress Note    2017  Admit Date: 2017  3:14 PM   NAME: Michoacano Hector   :  1985   MRN:  [id number]   Attending: Erika Morel MD  PCP:  Bunny Lanier MD    SUBJECTIVE:   AS Previously documented: \" 34yo F with no significant PMH presented with worsening Rt Flank pain, nausea, multiple episodes of vomiting and weakness. She was seen here in ER 1 week ago with back pain and diagnosed with Sciatica and given NSAIDs which she took without much relief. Yesterday her pain worsened and she took extra pain meds with about 4 glasses of Wine per . She then started having vomiting and abd discomfort that continued today and she came to ER. Found to be in DKA in ER with Na 115, K 6.0, LA 7.4, Bicarb 9, Ph 7.22, Cr 2.51 and Ck > 4000, WBC >40k, Hgb 10.8 with elevated LFTs. CT abdomen showed Pelvic mass concerning for malignancy. MRI showed anorectal abscess tracking up in the Gluteal region. S/p IR drain placement and Surgical Debridement by Dr. Tan Aguila. \"         2017- seen - complains of leg heaviness and swelling needs a note for work saying she is in the hospital      2017- seen- febrile overnight- leg swelling improved  Nursing notes and chart reviewed. Review of Systems negative with exception of pertinent positives noted above.     PHYSICAL EXAM     Visit Vitals    /66    Pulse (!) 104    Temp 98.5 °F (36.9 °C)    Resp 18    Ht 5' 2\" (1.575 m)    Wt 89.6 kg (197 lb 8.5 oz)    LMP 2017    SpO2 97%    BMI 36.13 kg/m2      Temp (24hrs), Av.3 °F (37.4 °C), Min:98.3 °F (36.8 °C), Max:100.9 °F (38.3 °C)    Oxygen Therapy  O2 Sat (%): 97 % (17 1109)  Pulse via Oximetry: 99 beats per minute (17)  O2 Device: Room air (17 1438)  O2 Flow Rate (L/min): 2 l/min (17)  ETCO2 (mmHg): 12 mmHg (17 1645)    Intake/Output Summary (Last 24 hours) at 17 1427  Last data filed at 17 1337   Gross per 24 hour   Intake 3262 ml   Output                0 ml   Net             3262 ml       General: Mild distress. Alert.    Lungs:  CTABL. Heart:  RRR, no murmur, rub, or gallop, tachycardia  Abdomen: Soft, non-distended, non-tender, +bs  Extremities: No cyanosis or clubbing. Neurologic:  No focal deficits. Moves all extremities. Musculoskeletal: Tender Rt lower back  :   Large area of excoriations around her vagina and perineum. LABS AND STUDIES:  Personally reviewed all labs, meds, and studies for past 24hrs. 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 No plans for any surgical intervention right now- will continue dressings- plan to review [facility] definitive management  · NALDO: Resolved, will c/w IVF   · Sepsis: Likely due to perirectal/Gluteal Abscess vs ? PID (she has +ve serology for STDs). ID managing antibiotics. - input and help appreciated  · Vaginal Lesions with Suspected PID: GYN input appreciated  · HIV screen- non reactive  · Rhabdomyolysis: Resolved  · Acute Pancreatitis: resolved  · Anemia: Acute ? likely 2/2 hematoma.  S/p 2units PBRCS- continue to monitor & will transfuse as appropriate  · Tachycardia- septic response  · Hypernatremia- resolved  · Hyperkalemia: Resolved. · Leg swelling - continue low dose lasix  · Fevers- continue to monitor closely- f/up am labs sanam    Dispo: When OK with Surgery and ID. DVT ppx:  hsq  Discussed plan with pt and  who is in agreement. All questions answered.       Signed By: Jesus Su MD     May 28, 2017

## 2017-05-28 NOTE — PROGRESS NOTES
Old dressing removed from R buttock, stool just inside wound cleaned out with NS. Packed with NS soaked Kerlix, covered with dry abd pad and secured with paper tape. Pt tolerated well, stated \"legs don't feel as heavy\".

## 2017-05-29 LAB
ALBUMIN SERPL BCP-MCNC: 1.4 G/DL (ref 3.5–5)
ALBUMIN SERPL BCP-MCNC: 1.4 G/DL (ref 3.5–5)
ALBUMIN/GLOB SERPL: 0.3 {RATIO} (ref 1.2–3.5)
ALP SERPL-CCNC: 109 U/L (ref 50–136)
ALT SERPL-CCNC: 21 U/L (ref 12–65)
ANION GAP BLD CALC-SCNC: 7 MMOL/L (ref 7–16)
ANTIMICROBIAL SUSCEPTIBILITY, 080575: NORMAL
AST SERPL W P-5'-P-CCNC: 15 U/L (ref 15–37)
BACTERIA ISLT: NORMAL
BASOPHILS # BLD AUTO: 0 K/UL (ref 0–0.2)
BASOPHILS # BLD: 0 % (ref 0–2)
BILIRUB SERPL-MCNC: 0.4 MG/DL (ref 0.2–1.1)
BUN SERPL-MCNC: 6 MG/DL (ref 6–23)
CALCIUM SERPL-MCNC: 7.4 MG/DL (ref 8.3–10.4)
CHLORIDE SERPL-SCNC: 102 MMOL/L (ref 98–107)
CO2 SERPL-SCNC: 30 MMOL/L (ref 21–32)
CREAT SERPL-MCNC: 0.45 MG/DL (ref 0.6–1)
CRP SERPL-MCNC: 18.3 MG/DL (ref 0–0.9)
DIFFERENTIAL METHOD BLD: ABNORMAL
EOSINOPHIL # BLD: 0.1 K/UL (ref 0–0.8)
EOSINOPHIL NFR BLD: 1 % (ref 0.5–7.8)
ERYTHROCYTE [DISTWIDTH] IN BLOOD BY AUTOMATED COUNT: 16.1 % (ref 11.9–14.6)
ERYTHROCYTE [SEDIMENTATION RATE] IN BLOOD: 118 MM/HR (ref 0–20)
GLOBULIN SER CALC-MCNC: 4.4 G/DL (ref 2.3–3.5)
GLUCOSE BLD STRIP.AUTO-MCNC: 121 MG/DL (ref 65–100)
GLUCOSE BLD STRIP.AUTO-MCNC: 145 MG/DL (ref 65–100)
GLUCOSE BLD STRIP.AUTO-MCNC: 171 MG/DL (ref 65–100)
GLUCOSE BLD STRIP.AUTO-MCNC: 64 MG/DL (ref 65–100)
GLUCOSE SERPL-MCNC: 78 MG/DL (ref 65–100)
HCT VFR BLD AUTO: 21.8 % (ref 35.8–46.3)
HGB BLD-MCNC: 7.2 G/DL (ref 11.7–15.4)
IMM GRANULOCYTES # BLD: 0 K/UL (ref 0–0.5)
IMM GRANULOCYTES NFR BLD AUTO: 0.4 % (ref 0–5)
LYMPHOCYTES # BLD AUTO: 16 % (ref 13–44)
LYMPHOCYTES # BLD: 1.7 K/UL (ref 0.5–4.6)
MCH RBC QN AUTO: 28 PG (ref 26.1–32.9)
MCHC RBC AUTO-ENTMCNC: 33 G/DL (ref 31.4–35)
MCV RBC AUTO: 84.8 FL (ref 79.6–97.8)
MONOCYTES # BLD: 1.3 K/UL (ref 0.1–1.3)
MONOCYTES NFR BLD AUTO: 12 % (ref 4–12)
NEUTS SEG # BLD: 7.3 K/UL (ref 1.7–8.2)
NEUTS SEG NFR BLD AUTO: 71 % (ref 43–78)
PLATELET # BLD AUTO: 629 K/UL (ref 150–450)
PMV BLD AUTO: 8.3 FL (ref 10.8–14.1)
POTASSIUM SERPL-SCNC: 3.6 MMOL/L (ref 3.5–5.1)
PROT SERPL-MCNC: 5.8 G/DL (ref 6.3–8.2)
RBC # BLD AUTO: 2.57 M/UL (ref 4.05–5.25)
RESULT 1, 080571: NORMAL
SODIUM SERPL-SCNC: 139 MMOL/L (ref 136–145)
SPECIMEN SOURCE: NORMAL
WBC # BLD AUTO: 10.4 K/UL (ref 4.3–11.1)

## 2017-05-29 PROCEDURE — 82040 ASSAY OF SERUM ALBUMIN: CPT | Performed by: INTERNAL MEDICINE

## 2017-05-29 PROCEDURE — 65270000029 HC RM PRIVATE

## 2017-05-29 PROCEDURE — 36415 COLL VENOUS BLD VENIPUNCTURE: CPT | Performed by: INTERNAL MEDICINE

## 2017-05-29 PROCEDURE — 82962 GLUCOSE BLOOD TEST: CPT

## 2017-05-29 PROCEDURE — 86140 C-REACTIVE PROTEIN: CPT | Performed by: NURSE PRACTITIONER

## 2017-05-29 PROCEDURE — 74011250636 HC RX REV CODE- 250/636: Performed by: INTERNAL MEDICINE

## 2017-05-29 PROCEDURE — 83605 ASSAY OF LACTIC ACID: CPT | Performed by: INTERNAL MEDICINE

## 2017-05-29 PROCEDURE — 74011000258 HC RX REV CODE- 258: Performed by: INTERNAL MEDICINE

## 2017-05-29 PROCEDURE — 87040 BLOOD CULTURE FOR BACTERIA: CPT | Performed by: INTERNAL MEDICINE

## 2017-05-29 PROCEDURE — 85652 RBC SED RATE AUTOMATED: CPT | Performed by: NURSE PRACTITIONER

## 2017-05-29 PROCEDURE — 80053 COMPREHEN METABOLIC PANEL: CPT | Performed by: NURSE PRACTITIONER

## 2017-05-29 PROCEDURE — 85025 COMPLETE CBC W/AUTO DIFF WBC: CPT | Performed by: NURSE PRACTITIONER

## 2017-05-29 PROCEDURE — 65660000000 HC RM CCU STEPDOWN

## 2017-05-29 PROCEDURE — 97530 THERAPEUTIC ACTIVITIES: CPT

## 2017-05-29 PROCEDURE — 74011250636 HC RX REV CODE- 250/636: Performed by: COLON & RECTAL SURGERY

## 2017-05-29 PROCEDURE — 74011636637 HC RX REV CODE- 636/637: Performed by: INTERNAL MEDICINE

## 2017-05-29 PROCEDURE — 74011250637 HC RX REV CODE- 250/637: Performed by: INTERNAL MEDICINE

## 2017-05-29 RX ORDER — INSULIN LISPRO 100 [IU]/ML
5 INJECTION, SOLUTION INTRAVENOUS; SUBCUTANEOUS
Status: DISCONTINUED | OUTPATIENT
Start: 2017-05-30 | End: 2017-05-30

## 2017-05-29 RX ADMIN — MORPHINE SULFATE 5 MG: 10 INJECTION INTRAMUSCULAR; INTRAVENOUS; SUBCUTANEOUS at 07:42

## 2017-05-29 RX ADMIN — HEPARIN SODIUM 5000 UNITS: 5000 INJECTION, SOLUTION INTRAVENOUS; SUBCUTANEOUS at 23:32

## 2017-05-29 RX ADMIN — FUROSEMIDE 20 MG: 10 INJECTION, SOLUTION INTRAMUSCULAR; INTRAVENOUS at 03:32

## 2017-05-29 RX ADMIN — PIPERACILLIN SODIUM,TAZOBACTAM SODIUM 3.38 G: 3; .375 INJECTION, POWDER, FOR SOLUTION INTRAVENOUS at 16:51

## 2017-05-29 RX ADMIN — PIPERACILLIN SODIUM,TAZOBACTAM SODIUM 3.38 G: 3; .375 INJECTION, POWDER, FOR SOLUTION INTRAVENOUS at 01:19

## 2017-05-29 RX ADMIN — MORPHINE SULFATE 5 MG: 10 INJECTION INTRAMUSCULAR; INTRAVENOUS; SUBCUTANEOUS at 01:18

## 2017-05-29 RX ADMIN — INSULIN LISPRO 3 UNITS: 100 INJECTION, SOLUTION INTRAVENOUS; SUBCUTANEOUS at 23:38

## 2017-05-29 RX ADMIN — HYDROCODONE BITARTRATE AND ACETAMINOPHEN 2 TABLET: 5; 325 TABLET ORAL at 16:52

## 2017-05-29 RX ADMIN — ACETAMINOPHEN 650 MG: 325 TABLET, FILM COATED ORAL at 03:29

## 2017-05-29 RX ADMIN — FUROSEMIDE 20 MG: 10 INJECTION, SOLUTION INTRAMUSCULAR; INTRAVENOUS at 12:03

## 2017-05-29 RX ADMIN — HEPARIN SODIUM 5000 UNITS: 5000 INJECTION, SOLUTION INTRAVENOUS; SUBCUTANEOUS at 14:58

## 2017-05-29 RX ADMIN — FUROSEMIDE 20 MG: 10 INJECTION, SOLUTION INTRAMUSCULAR; INTRAVENOUS at 20:05

## 2017-05-29 RX ADMIN — MORPHINE SULFATE 5 MG: 10 INJECTION INTRAMUSCULAR; INTRAVENOUS; SUBCUTANEOUS at 11:59

## 2017-05-29 RX ADMIN — FLUCONAZOLE 400 MG: 100 TABLET ORAL at 08:46

## 2017-05-29 RX ADMIN — PIPERACILLIN SODIUM,TAZOBACTAM SODIUM 3.38 G: 3; .375 INJECTION, POWDER, FOR SOLUTION INTRAVENOUS at 08:45

## 2017-05-29 NOTE — PROGRESS NOTES
Old dressing removed from R buttock, stool just inside wound cleaned out with NS. Packed with NS soaked Kerlix, covered with dry abd pad and secured with paper tape.  Pt tolerated well will continue to monitor

## 2017-05-29 NOTE — PROGRESS NOTES
Chart reviewed. Pt not seen today. Fevers noted overnight. Other vitals stable. Continue current antibiotics. Would repeat blood cultures with a new fever if this occurs again.

## 2017-05-29 NOTE — PROGRESS NOTES
Val Cadena M.D. Colon and Rectal Surgeon  [address], [address] [place] [address], [facility] Magali Chowdary   Phone: [phone] Fax: [phone]      Dustin Gutiérrez  [id number]    SUBJECTIVE:  29year old female admitted with DKA, rhabdomyolysis, NALDO, pancreatitis, lactic acidosis, leukocytosis, and sepsis. On CT, she was found to have a large pelvic process (mass vs abscess) with tracking into right buttock. She improved some with supportive care, fluid resuscitation, and antibiotics. She had an IR drain placed into her pelvic fluid collection. She was taken to the OR 5/19/17 for I&D of necrotic buttock fat. I found tracking into the extraperitoneal pelvic fluid collection. There was no connection to rectum. She was taken 5/22/17 for repeat debridement with much improved appearance of wound. Slept ok. Tolerating diet. No n/v.  Mild pain, mostly sore. No abdominal pain. Seen by PT.  JOSE walking some. ROS:  General--no fevers, chills  Respiratory--no shortness of breath, wheezing  Cardiovascular--no chest pain, palpitations  GI--no nausea/emesis. No hematochezia/melena. No pain    PHYSICAL EXAM:   Patient Vitals for the past 24 hrs:   BP Temp Pulse Resp SpO2   05/29/17 0718 106/42 98.7 °F (37.1 °C) (!) 108 18 99 %   05/29/17 0643 - 99.5 °F (37.5 °C) - - -   05/29/17 0326 117/53 (!) 101.7 °F (38.7 °C) (!) 118 18 95 %   05/28/17 2234 121/59 - (!) 117 - -   05/28/17 2038 116/57 98.8 °F (37.1 °C) (!) 118 18 95 %   05/28/17 1436 119/59 98.4 °F (36.9 °C) (!) 118 18 95 %   05/28/17 1109 119/66 98.5 °F (36.9 °C) (!) 104 18 97 %       General--AAO, NAD, answers all questions, appears comfortable  Abdomen--soft, nontender, nondistended. No peritoneal signs, no rebound, no guarding  Buttock--evaluated prone today with better inspection. Superficially, still with small spotty dark areas, now with minimal residual necrotic tissue. Overall wound looks good.   Wound had not been packed deeply into the pelvic extension, so some thin brownish fluid was blotted out. Deeper component of wound looks better than a few days ago. I repacked the wound with two Kerlix, with plans to reevaluate for VAC maybe tomorrow    UOP: 4/nr  VAC: off  BM: 2/nr    Recent Labs      17   0310  17   0349   WBC  10.4  13.8*   HGB  7.2*  7.6*   HCT  21.8*  22.8*   PLT  629*  585*       Recent Labs      17   0310  17   0349   NA  139  142   K  3.6  3.7   CL  102  105   CO2  30  29   BUN  6  4*   CREA  0.45*  0.38*   TBILI  0.4   --    SGOT  15   --    ALT  21   --    AP  109   --        No results for input(s): AML, LPSE in the last 72 hours. No results for input(s): PTP, INR, APTT in the last 72 hours. No lab exists for component: INREXT, INREXT  RPR NR  Pelvic fluid culture from IR 17--beta hemolytic strep  AFB smear 17--negative  HSV culture 17--negative  Blood cultures 17--GPR x2 bottles  Blood cultures 17--no growth x 5 days  UA 17--1+ bacteria, neg LE/nitr--cultures with 100k candida glabrata  OR tissue culture 17--beta hemolytic strep  Above labs reviewed by me. [id number]--CT abd/pelvis: \"IMPRESSION: Large incompletely characterized pelvic mass with apparent involvement/extension into the right gluteal muscles. The findings presumably represent a neoplastic process. Pelvic MRI may be beneficial for further delineation as it relates to other pelvic anatomy. Preferably this would be done with IV contrast.\"  17--CT chest/abd/pelvis: \"IMPRESSION: Indeterminate perirectal mass with extension of heterogeneous, low-attenuation soft tissue into the right buttock an asymmetric edema and muscular swelling and the right hemipelvis. Right inguinal adenopathy is present. This may be a perirectal abscess or necrotic tumor with extrapelvic extension. \"  17--CT guided IR drain placement:   17--MRI pelvis--\"IMPRESSION: Status post right perianal/rectal abscess drainage. The extensive remaining abscess findings are detailed above. \"    ASSESSMENT:   Pelvic abscess   S/p IR drain 5/18/17   S/p operative I&D, debridement of necrotic tissue 5/19/17   S/p operative I&D, debridement of necrotic tissue 5/22/17  Sepsis, due to above--improved  Leukocytosis, due to above--improved  DKA--improved  Rhabdomyolysis--resolved  NALDO--resolved  Pancreatitis--resolved  Anemia--   S/p transfusion 2 units PRBC 5/18/17  Hypernatremia  Elevated LFTs  Hypophosphatemia    PLAN:  --continue IV and PO pain medications  --OOB, IS, mobilize  --No CV issues. Tachycardia is compensatory response to sepsis, improving  --regular diabetic diet. --this remains a challenging wound, given the deep upward extension. Will reevaluate for [facility] tomorrow, see if we can get it to stick so close to the anus. Will do dry gauze packing daily over the weekend and reevaluate for [facility] again Tomorrow. Based on appearance today, I do not think we need further OR debridement. Certainly if she worsens or fails to improve, could consider another quick look under anesthesia  --follow UOP, creat. --transfuse for <7  --continue broad spectrum antibiotics per ID recs  --PT seeing  --Joann Mcdaniel may be a good option for her?       Ирина Sparks MD

## 2017-05-29 NOTE — PROGRESS NOTES
Hospitalist Progress Note    2017  Admit Date: 2017  3:14 PM   NAME: Linh Xiong   :  1985   MRN:  [id number]   Attending: Timoteo Urias MD  PCP:  Birdie Pallas, MD    SUBJECTIVE:   AS Previously documented: \" 32yo F with no significant PMH presented with worsening Rt Flank pain, nausea, multiple episodes of vomiting and weakness. She was seen here in ER 1 week ago with back pain and diagnosed with Sciatica and given NSAIDs which she took without much relief. Yesterday her pain worsened and she took extra pain meds with about 4 glasses of Wine per . She then started having vomiting and abd discomfort that continued today and she came to ER. Found to be in DKA in ER with Na 115, K 6.0, LA 7.4, Bicarb 9, Ph 7.22, Cr 2.51 and Ck > 4000, WBC >40k, Hgb 10.8 with elevated LFTs. CT abdomen showed Pelvic mass concerning for malignancy. MRI showed anorectal abscess tracking up in the Gluteal region. S/p IR drain placement and Surgical Debridement by Dr. Ramonita Saunders. \"       She has been re-evaluated for need for re-current surgery daily - and as per surgery no need for it at this time. Daily wound dressings. BL edema and started on iv lasix and this has improved. She has persistent tachycardia which is felt to be a an infectious response. Unfortunately she started having fevers again 2017. We are monitoring closely. MGt as per surgery and Gi at this time. Nursing notes and chart reviewed. Review of Systems negative with exception of pertinent positives noted above.     PHYSICAL EXAM     Visit Vitals    /50 (BP 1 Location: Right arm, BP Patient Position: Sitting)    Pulse (!) 118    Temp 98.4 °F (36.9 °C)    Resp 18    Ht 5' 2\" (1.575 m)    Wt 89.6 kg (197 lb 8.5 oz)    LMP 2017    SpO2 96%    BMI 36.13 kg/m2      Temp (24hrs), Av.4 °F (37.4 °C), Min:98.4 °F (36.9 °C), Max:101.7 °F (38.7 °C)    Oxygen Therapy  O2 Sat (%): 96 % (17 1451)  Pulse via Oximetry: 99 beats per minute (05/22/17 2059)  O2 Device: Room air (05/29/17 1451)  O2 Flow Rate (L/min): 2 l/min (05/22/17 2044)  ETCO2 (mmHg): 12 mmHg (05/18/17 1645)  No intake or output data in the 24 hours ending 05/29/17 1847    General: Mild distress. Alert.    Lungs:  CTABL. Heart:  RRR, no murmur, rub, or gallop, tachycardia  Abdomen: Soft, non-distended, non-tender, +bs  Extremities: No cyanosis or clubbing. Neurologic:  No focal deficits. Moves all extremities. Musculoskeletal: Tender Rt lower back  :   Large area of excoriations around her vagina and perineum. LABS AND STUDIES:  Personally reviewed all labs, meds, and studies for past 24hrs. ASSESSMENT      Active Hospital Problems    Diagnosis Date Noted    Septic shock ([facility] 05/17/2017    Pelvic mass 05/17/2017    DKA (diabetic ketoacidoses) ([facility] 05/16/2017    Acute alcoholic pancreatitis 18/65/2716    Hyponatremia 05/16/2017     Pseudohyponatremia      Hyperkalemia 05/16/2017    Acute renal failure (ARF) (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 05/16/2017    Chlamydia infection 05/16/2017    Herpes simplex vulvovaginitis 05/16/2017           PLAN:    · DKA: Resolved, AG closed, continue current insulin regimen- will titrate as needed  · Uncontrolled DM: A1C is 14- Bg better controlled- seen by diabetic educator   · Pelvic Mass was determined to be an Abscess: s/p IR drain, and Surgical I&D X 2, on IV Morphine. No plans for any surgical intervention right now- will continue dressings-surgery plans to review sanam to determine definitive management  · NALDO: Resolved, will c/w IVF   · Sepsis: Likely due to perirectal/Gluteal Abscess vs ? PID (she has +ve serology for STDs). ID managing antibiotics. - input and help appreciated  · Vaginal Lesions with Suspected PID: GYN input appreciated  · HIV screen- non reactive  · Rhabdomyolysis: Resolved  · Acute Pancreatitis: resolved  · Anemia: Acute ? likely 2/2 hematoma. S/p 2units PBRCS- continue to monitor & will transfuse as appropriate  · Tachycardia- septic response  · Hypernatremia- resolved  · Hyperkalemia: Resolved. · Leg swelling - continue low dose lasix and albumin- would start decreasing tomorrow form q8h to daily  · Fevers- continue to monitor closely- f/up am labs sanam- repeat cultures recommended by ID if she spikes again    Dispo: When OK with Surgery and ID. DVT ppx:  hsq  Discussed plan with pt and  who is in agreement. All questions answered.       Signed By: Meena Cheema MD     May 29, 2017

## 2017-05-29 NOTE — PROGRESS NOTES
Problem: Mobility Impaired (Adult and Pediatric)  Goal: *Acute Goals and Plan of Care (Insert Text)  STG:  (1.)Ms. Stefan Xavier will move from supine to sit and sit to supine , scoot up and down and roll side to side with CONTACT GUARD ASSIST within 3 day(s). (2.)Ms. Stefan Xavier will transfer from bed to chair and chair to bed with STAND BY ASSIST using the least restrictive device within 3 day(s). (3.)Ms. Stefan Xavier will ambulate with CONTACT GUARD ASSIST for 100 feet with the least restrictive device within 3 day(s). LTG:  (1.)Ms. Stefan Xavier will move from supine to sit and sit to supine , scoot up and down and roll side to side in bed with SUPERVISION within 7 day(s). (2.)Ms. Stefan Xavier will transfer from bed to chair and chair to bed with SUPERVISION using the least restrictive device within 7 day(s). (3.)Ms. Stefan Xavier will ambulate with STAND BY ASSIST for 500+ feet with the least restrictive device within 7 day(s). ________________________________________________________________________________________________      PHYSICAL THERAPY: Daily Note, Treatment Day: 2nd and AM 5/29/2017  INPATIENT: Hospital Day: 14  Payor: BLUE CROSS / Plan: [place] BLUE CROSS [facility] / Product Type: PPO /      NAME/AGE/GENDER: Zina Mendoza is a 29 y.o. female    PRIMARY DIAGNOSIS: DKA (diabetic ketoacidoses) ([facility]  Abscess  WOUND RIGHT BUTTOCK DKA (diabetic ketoacidoses) ([facility] DKA (diabetic ketoacidoses) (HCC)  Procedure(s) (LRB):  DEBRIDEMENT RIGHT BUTTOCKS WITH DRESSING CHANGE (Right)  7 Days Post-Op  ICD-10: Treatment Diagnosis:       · Generalized Muscle Weakness (M62.81)  · Difficulty in walking, Not elsewhere classified (R26.2)   Precaution/Allergies:  Review of patient's allergies indicates no known allergies. ASSESSMENT:      Ms. Stefan Xavier was received up in the bathroom. She was willing to participate when finished. Her dressing was falling off so RN oked this PT to re inforce it with an ABD and tape.   After that she was willing to go walking in the [place]. What was great is she was able to double her distance. Her legs are still swollen but she admits it is better. She has the walker in the room. She can ambulate short distances with family. This section established at most recent assessment   PROBLEM LIST (Impairments causing functional limitations):  1. Decreased Strength  2. Decreased ADL/Functional Activities  3. Decreased Ambulation Ability/Technique  4. Decreased Balance  5. Increased Pain  6. Decreased Activity Tolerance    INTERVENTIONS PLANNED: (Benefits and precautions of physical therapy have been discussed with the patient.)  1. Balance Exercise  2. Bed Mobility  3. Family Education  4. Gait Training  5. Neuromuscular Re-education/Strengthening  6. Therapeutic Activites  7. Therapeutic Exercise/Strengthening  8. Group Therapy      TREATMENT PLAN: Frequency/Duration: 3 times a week for duration of hospital stay  Rehabilitation Potential For Stated Goals: GOOD      RECOMMENDED REHABILITATION/EQUIPMENT: (at time of discharge pending progress): Continue Skilled Therapy and HHPT versus Rehab- dependent on pt progress. HISTORY:   History of Present Injury/Illness (Reason for Referral):  See H&P below  34yo F with no significant PMH presented with worsening Rt Flank pain, nausea, multiple episodes of vomiting and weakness. She was seen here in ER 1 week ago with back pain and diagnosed with Sciatica and given NSAIDs which she took without much relief. Yesterday her pain worsened and she took extra pain meds with about 4 glasses of Wine per . She then started having vomiting and abd discomfort that continued today and she came to ER. Found to be in DKA in ER with Na 115, K 6.0, LA 7.4, Bicarb 9, Ph 7.22, Cr 2.51 and Ck > 4000, WBC >40k, Hgb 10.8 with elevated LFTs Per  they took a trip to [place] by car and pt has been complaining for rt flank pain since they returned last week.  Also she was seen 3 days ago at GYN office and blood work in care everywhere showing +ve serology for HSV 1& 2 as well as Chlamydia. Per GYN note she had worrisome excoriated lesions around her vagina/Vulva. She was started on Insulin gtt and Abx, cultures were sent in ER and Hospitalist asked to admit. Past Medical History/Comorbidities:   Ms. Brittany Vasquez  has a past medical history of Ill-defined condition. Ms. Brittany Vasquez  has a past surgical history that includes other surgical.  Social History/Living Environment:   Home Environment: Private residence  # Steps to Enter: 0  One/Two Story Residence: Two story, live on 1st floor  Living Alone: No  Support Systems: Spouse/Significant Other/Partner  Patient Expects to be Discharged to[de-identified] Private residence  Current DME Used/Available at Home: None  Tub or Shower Type: Tub/Shower combination  Prior Level of Function/Work/Activity:  Lives with , independent with ADLs and gait, drives      Number of Personal Factors/Comorbidities that affect the Plan of Care: 1-2: MODERATE COMPLEXITY   EXAMINATION:   Most Recent Physical Functioning:   Gross Assessment:                  Posture:     Balance:  Sitting: Intact  Standing: Impaired; With support  Standing - Static: Fair  Standing - Dynamic : Fair Bed Mobility:     Wheelchair Mobility:     Transfers:     Gait:     Base of Support: Widened  Speed/Dina: Slow;Shuffled  Step Length: Right shortened;Left shortened  Distance (ft): 100 Feet (ft)  Assistive Device: Walker, rolling  Ambulation - Level of Assistance: Contact guard assistance;Stand-by asssistance  Interventions: Safety awareness training; Tactile cues; Verbal cues       Body Structures Involved:  1. Heart  2. Lungs  3. Bones  4. Joints  5. Muscles Body Functions Affected:  1. Sensory/Pain  2. Cardio  3. Respiratory  4. Neuromusculoskeletal  5. Movement Related  6. Skin Related Activities and Participation Affected:  1. General Tasks and Demands  2. Mobility  3.  Self Care  4. Domestic Life  5. Interpersonal Interactions and Relationships  6. Community, Social and [place]   Number of elements that affect the Plan of Care: 4+: HIGH COMPLEXITY   CLINICAL PRESENTATION:   Presentation: Stable and uncomplicated: LOW COMPLEXITY   CLINICAL DECISION MAKIN [facility] Mobility Inpatient Short Form  How much difficulty does the patient currently have. .. Unable A Lot A Little None   1. Turning over in bed (including adjusting bedclothes, sheets and blankets)? [ ] 1   [ ] 2   [X] 3   [ ] 4   2. Sitting down on and standing up from a chair with arms ( e.g., wheelchair, bedside commode, etc.)   [ ] 1   [ ] 2   [X] 3   [ ] 4   3. Moving from lying on back to sitting on the side of the bed? [ ] 1   [ ] 2   [X] 3   [ ] 4   How much help from another person does the patient currently need. .. Total A Lot A Little None   4. Moving to and from a bed to a chair (including a wheelchair)? [ ] 1   [ ] 2   [X] 3   [ ] 4   5. Need to walk in hospital room? [ ] 1   [X] 2   [ ] 3   [ ] 4   6. Climbing 3-5 steps with a railing? [ ] 1   [X] 2   [ ] 3   [ ] 4   © , Trustees of [address] [place] [address] [address] [address], under license to [employer]. All rights reserved    Score:  Initial: 16 Most Recent: X (Date: -- )     Interpretation of Tool:  Represents activities that are increasingly more difficult (i.e. Bed mobility, Transfers, Gait).        Score 24 23 22-20 19-15 14-10 9-7 6       Modifier CH CI CJ CK CL CM CN         · Mobility - Walking and Moving Around:               - CURRENT STATUS:    CK - 40%-59% impaired, limited or restricted               - GOAL STATUS:           CJ - 20%-39% impaired, limited or restricted               - D/C STATUS:                       ---------------To be determined---------------  Payor: BLUE CROSS / Plan: [facility] / Product Type: PPO /       Medical Necessity:     · Patient is expected to demonstrate progress in strength, balance, coordination and functional technique to decrease assistance required with gait and functional mobility. Reason for Services/Other Comments:  · Patient continues to require skilled intervention due to decreased strength, decreased balance, decreased functional tolerance, decreased cardiopulmonary endurance affecting participation in basic ADLs and functional tasks. Use of outcome tool(s) and clinical judgement create a POC that gives a: Clear prediction of patient's progress: LOW COMPLEXITY                 TREATMENT:   (In addition to Assessment/Re-Assessment sessions the following treatments were rendered)   Pre-treatment Symptoms/Complaints:  No complaints-  Pain: Initial:   Pain Intensity 1: 1 (she admits she just recently had pain medicine)  Pain Location 1: Buttocks  Post Session:  0/10      Therapeutic Activity: (    25 minutes): Therapeutic activities including Bed transfers, Chair transfers, Toilet transfers and Ambulation on level ground to improve mobility, strength, balance and endurance. Required minimal Safety awareness training; Tactile cues; Verbal cues to promote static and dynamic balance in standing with moderate use of the walker. Braces/Orthotics/Lines/Etc:   · IV  Treatment/Session Assessment:    · Response to Treatment:  Pt participated well with PT and motivated to move  · Interdisciplinary Collaboration:  · Physical Therapy Assistant and Registered Nurse  · After treatment position/precautions:  · Up in chair, Bed/Chair-wheels locked, Bed in low position and Call light within reach  · Compliance with Program/Exercises: Will assess as treatment progresses. · Recommendations/Intent for next treatment session: \"Next visit will focus on advancements to more challenging activities and reduction in assistance provided\".   Total Treatment Duration:  PT Patient Time In/Time Out  Time In: 1300  Time Out: 1325     Vaishali Moreno, PT

## 2017-05-30 LAB
BACTERIA SPEC CULT: ABNORMAL
GLUCOSE BLD STRIP.AUTO-MCNC: 116 MG/DL (ref 65–100)
GLUCOSE BLD STRIP.AUTO-MCNC: 154 MG/DL (ref 65–100)
GLUCOSE BLD STRIP.AUTO-MCNC: 96 MG/DL (ref 65–100)
GLUCOSE BLD STRIP.AUTO-MCNC: 99 MG/DL (ref 65–100)
GRAM STN SPEC: ABNORMAL
LACTATE SERPL-SCNC: 1.4 MMOL/L (ref 0.4–2)
SERVICE CMNT-IMP: ABNORMAL
SERVICE CMNT-IMP: ABNORMAL

## 2017-05-30 PROCEDURE — 74011000258 HC RX REV CODE- 258: Performed by: INTERNAL MEDICINE

## 2017-05-30 PROCEDURE — 74011250636 HC RX REV CODE- 250/636: Performed by: INTERNAL MEDICINE

## 2017-05-30 PROCEDURE — 97606 NEG PRS WND THER DME>50 SQCM: CPT

## 2017-05-30 PROCEDURE — 77030019934 HC DRSG VAC ASST KCON -B

## 2017-05-30 PROCEDURE — 74011636637 HC RX REV CODE- 636/637: Performed by: INTERNAL MEDICINE

## 2017-05-30 PROCEDURE — 74750000023 HC WOUND THERAPY

## 2017-05-30 PROCEDURE — 74011250636 HC RX REV CODE- 250/636: Performed by: COLON & RECTAL SURGERY

## 2017-05-30 PROCEDURE — 74011250637 HC RX REV CODE- 250/637: Performed by: INTERNAL MEDICINE

## 2017-05-30 PROCEDURE — 77030011641 HC PASTE OST ADH BMS -A

## 2017-05-30 PROCEDURE — 82962 GLUCOSE BLOOD TEST: CPT

## 2017-05-30 PROCEDURE — 77030025162 HC DRSG VAC ASST 1 KCON -B

## 2017-05-30 PROCEDURE — 77030019952 HC CANSTR VAC ASST KCON -B

## 2017-05-30 PROCEDURE — 65270000029 HC RM PRIVATE

## 2017-05-30 RX ORDER — ALBUMIN HUMAN 250 G/1000ML
25 SOLUTION INTRAVENOUS EVERY 6 HOURS
Status: DISCONTINUED | OUTPATIENT
Start: 2017-05-30 | End: 2017-05-30

## 2017-05-30 RX ADMIN — FLUCONAZOLE 400 MG: 100 TABLET ORAL at 09:29

## 2017-05-30 RX ADMIN — HYDROCODONE BITARTRATE AND ACETAMINOPHEN 2 TABLET: 5; 325 TABLET ORAL at 09:40

## 2017-05-30 RX ADMIN — HEPARIN SODIUM 5000 UNITS: 5000 INJECTION, SOLUTION INTRAVENOUS; SUBCUTANEOUS at 23:23

## 2017-05-30 RX ADMIN — HYDROCODONE BITARTRATE AND ACETAMINOPHEN 2 TABLET: 5; 325 TABLET ORAL at 16:28

## 2017-05-30 RX ADMIN — MORPHINE SULFATE 5 MG: 10 INJECTION INTRAMUSCULAR; INTRAVENOUS; SUBCUTANEOUS at 13:08

## 2017-05-30 RX ADMIN — HEPARIN SODIUM 5000 UNITS: 5000 INJECTION, SOLUTION INTRAVENOUS; SUBCUTANEOUS at 06:04

## 2017-05-30 RX ADMIN — HEPARIN SODIUM 5000 UNITS: 5000 INJECTION, SOLUTION INTRAVENOUS; SUBCUTANEOUS at 13:09

## 2017-05-30 RX ADMIN — FUROSEMIDE 20 MG: 10 INJECTION, SOLUTION INTRAMUSCULAR; INTRAVENOUS at 13:07

## 2017-05-30 RX ADMIN — MORPHINE SULFATE 5 MG: 10 INJECTION INTRAMUSCULAR; INTRAVENOUS; SUBCUTANEOUS at 20:24

## 2017-05-30 RX ADMIN — MORPHINE SULFATE 5 MG: 10 INJECTION INTRAMUSCULAR; INTRAVENOUS; SUBCUTANEOUS at 00:30

## 2017-05-30 RX ADMIN — FUROSEMIDE 20 MG: 10 INJECTION, SOLUTION INTRAMUSCULAR; INTRAVENOUS at 20:10

## 2017-05-30 RX ADMIN — PIPERACILLIN SODIUM,TAZOBACTAM SODIUM 3.38 G: 3; .375 INJECTION, POWDER, FOR SOLUTION INTRAVENOUS at 16:26

## 2017-05-30 RX ADMIN — INSULIN LISPRO 3 UNITS: 100 INJECTION, SOLUTION INTRAVENOUS; SUBCUTANEOUS at 16:27

## 2017-05-30 RX ADMIN — PIPERACILLIN SODIUM,TAZOBACTAM SODIUM 3.38 G: 3; .375 INJECTION, POWDER, FOR SOLUTION INTRAVENOUS at 09:29

## 2017-05-30 RX ADMIN — PIPERACILLIN SODIUM,TAZOBACTAM SODIUM 3.38 G: 3; .375 INJECTION, POWDER, FOR SOLUTION INTRAVENOUS at 00:16

## 2017-05-30 RX ADMIN — MORPHINE SULFATE 5 MG: 10 INJECTION INTRAMUSCULAR; INTRAVENOUS; SUBCUTANEOUS at 08:45

## 2017-05-30 RX ADMIN — FUROSEMIDE 20 MG: 10 INJECTION, SOLUTION INTRAMUSCULAR; INTRAVENOUS at 03:14

## 2017-05-30 NOTE — PROGRESS NOTES
Care Management Interventions  PCP Verified by CM: Yes  Transition of Care Consult (CM Consult): Home Health, Discharge [address] [address]: Yes  Current Support Network: Lives with Spouse  Confirm Follow Up Transport: Family  Plan discussed with Pt/Family/Caregiver: Yes  Freedom of Choice Offered: Yes  Discharge Location  Discharge Placement: Home with family assistance    SW met with patient this morning to assess potential D/C needs. Patient is alert and oriented in all spheres. Pt lives with her spouse and stated she has a lot of family support between her mom and extended family. Prior to admission pt was working full time and fully independent. Pt anticipates needing to be at home at discharge to continue to recuperate. Unknown at this time whether or not pt will need a wound vac at discharge or IV abx -     At a minimum pt will likely need Home Health to assist with initial wound care, pt is also requesting a walker and raised toilet seat at discharge. SW will follow for D/C needs    Plan: Return home with home health family support.

## 2017-05-30 NOTE — PROGRESS NOTES
Infectious Disease Progress Note    Today's Date: 2017   Admit Date: 2017    Impression:   · Polymicrobial Large pelvic abscess: 20 cc purulent fluid drained 17, cultures with GBS, GNR on GS; I&D : alpha strep, GBS, E coli and anaerobic GNC (pending); repeat I&D   · Lactobacillus bacteremia (); repeat culture  negative  · Fever  · Chlamydia cervicitis  · Severe anogenital HSV/inguinal yeast infection  · DKA (A1C 14), resolved  · Pancreatitis, resolved    Plan:   · Continue Zosyn and fluconazole. At discharge could transition to Ertapenem   · Follow pending AFB/anaerobic cultures. · Fever noted, BC rechecked: follow   · Herpetic lesions are dry/crusted, no indication she needs further ACV presently    Anti-infectives:     Piperacillin/tazobactam  Fluconazole      Subjective:     Resting in bed. Denies complaints of nausea, vomiting, diarrhea, fevers, chills or sweats. Ongoing pain, worse with wound manipulation. No Known Allergies     Review of Systems:  A comprehensive review of systems was negative except for that written in the History of Present Illness. Objective:     Visit Vitals    /44    Pulse (!) 121    Temp 99.3 °F (37.4 °C)    Resp 20    Ht 5' 2\" (1.575 m)    Wt 89.6 kg (197 lb 8.5 oz)    SpO2 98%    BMI 36.13 kg/m2     Temp (24hrs), Av.7 °F (37.6 °C), Min:98.4 °F (36.9 °C), Max:101.1 °F (38.4 °C)       Lines:  Central Venous Catheter:  L chest, non tender        Physical Exam:    General:  Awake, alert, no acute distress, obese, sitting in the chair   Eyes:  Sclera anicteric. Pupils equally round and reactive to light. Mouth/Throat: Mucous membranes normal, oral pharynx clear   Neck: Supple   Lungs:   Clear to auscultation bilaterally, good effort   CV:  Persistent tachycardia, regular rate and rhythm, soft murmur   Abdomen:   Soft, non-tender.  bowel sounds normal. non-distended;   Extremities: No cyanosis or edema   Skin: No rash, Anal/gluteal fold wound with wound vac, R thigh area: warm/indurated, dry and crusted lesions noted at gluteal fold. Musculoskeletal: No swelling or deformity   Lines/Devices:  Intact, no erythema, drainage or tenderness           Data Review:     CBC:  Recent Labs      05/29/17 0310   WBC  10.4   GRANS  71   MONOS  12   EOS  1   ANEU  7.3   ABL  1.7   HGB  7.2*   HCT  21.8*   PLT  629*       BMP:  Recent Labs      05/29/17 0310   CREA  0.45*   BUN  6   NA  139   K  3.6   CL  102   CO2  30   AGAP  7   GLU  78       LFTS:  Recent Labs      05/29/17   0645  05/29/17 0310   TBILI   --   0.4   ALT   --   21   SGOT   --   15   AP   --   109   TP   --   5.8*   ALB  1.4*  1.4*       Microbiology:     All Micro Results     Procedure Component Value Units Date/Time    CULTURE, BLOOD [[id number]] Collected:  05/29/17 2355    Order Status:  Completed Specimen:  Blood from Blood Updated:  05/30/17 0216    CULTURE, BLOOD [[id number]] Collected:  05/29/17 2345    Order Status:  Completed Specimen:  Blood from Blood Updated:  05/30/17 0216    AEROBE ID REFLEX [[id number]] Collected:  05/16/17 1730    Order Status:  Completed Specimen:  MICROBIAL ISOLATE Updated:  05/29/17 1436     Result 1 Lactobacillus species      Corrected on 05/27 AT 4306:  This is a correction to the medical record of the test results previously reported as Gram positive rods        Antimicrobial Susceptibility Comment         (NOTE)       ** S = Susceptible; I = Intermediate; R = Resistant **                    P = Positive; N = Negative             MICS are expressed in micrograms per mL    Antibiotic                 RSLT#1    RSLT#2    RSLT#3    RSLT#4  Ampicillin                     S =0.25  Clindamycin                    R> 0.5  Erythromycin                   S<=0.06  Penicillin                     S =0.06  Vancomycin                     S =1  Performed At: [address] [id number]  Romero Roa MD GO:[id number] AEROBIC SUSCEPTIBILITY ID [[id number]] Collected:  05/16/17 1730    Order Status:  Completed Specimen:  MICROBIAL ISOLATE Updated:  05/29/17 1436     Source BLOOD        Aerobe ID + Suscept Final Report Below      (NOTE)  Performed At: [address] [id number]  Freya Macario MD SW:[id number]  Corrected on 05/29 AT 1436:  This is a correction to the medical record of the test results previously reported as Preliminary report         CULTURE, ANAEROBIC [[id number]] Collected:  05/19/17 1915    Order Status:  Completed Specimen:  Buttock Updated:  05/26/17 1108     Special Requests: R BUTTOCK TISSUE     Culture result:       LIGHT  ANAEROBIC GRAM NEGATIVE COCCOBACILLI        SENT TO [facility] FOR TESTING  [id number]         REFER TO [address] [place] [address] Drive [id number]    ANAEROBE ID W/ Miguelito Richard [[id number]] Collected:  05/19/17 1915    Order Status:  Completed Updated:  05/26/17 1102    CULTURE, Axel Marcano [[id number]] Collected:  05/19/17 1946    Order Status:  Completed Specimen:  Miscellaneous sample Updated:  05/24/17 1239     Source Cervical/vaginal swab        Chlamydia trachomatis Culture NEGATIVE          (NOTE)  Performed At: [address] [id number]  Freya Macario MD RF:[id number]         CULTURE, BLOOD [[id number]] Collected:  05/19/17 1020    Order Status:  Completed Specimen:  Blood from Blood Updated:  05/24/17 0736     Special Requests: RIGHT ANTECUBITAL        Culture result: NO GROWTH 5 DAYS       CULTURE, BLOOD [[id number]] Collected:  05/19/17 1033    Order Status:  Completed Specimen:  Blood from Blood Updated:  05/24/17 0736     Special Requests: LEFT HAND        Culture result: NO GROWTH 5 DAYS       FUNGUS CULTURE AND SMEAR [[id number]] Collected:  05/19/17 1915    Order Status:  Completed Specimen:  Miscellaneous sample Updated:  05/23/17 1736     Source BUTTOCK        Fungus stain Direct Inoculation     Fungus (Mycology) Culture Other source received      (NOTE)  Performed At: [address] [id number]  Jesneia Neely MD LS:[id number]         Susie Cortes [[id number]] Collected:  05/19/17 1946    Order Status:  Completed Specimen:  Cervical/vaginal swab Updated:  05/23/17 1249     Special Requests: NO SPECIAL REQUESTS        GRAM STAIN NO WBCS SEEN         FEW  YEAST         FEW  GRAM NEGATIVE RODS        Culture result:         NO NEISSERIA GONORRHOEAE ISOLATED    FUNGUS CULTURE AND SMEAR [[id number]] Collected:  05/18/17 1640    Order Status:  Completed Specimen:  Miscellaneous sample Updated:  05/23/17 1238     Source WOUND         PERIRECTAL  Corrected on 05/23 AT 1237: This is a correction to the medical record of the test results previously reported as PERIRECTAL          Fungus stain Direct Inoculation     Fungus (Mycology) Culture Other source received      (NOTE)  Performed At: [address] [id number]  Jesenia Neely MD QE:[id number]         CULTURE, TISSUE W Anel Stanley [[id number]]  (Abnormal)  (Susceptibility) Collected:  05/19/17 1915    Order Status:  Completed Specimen:  Buttock Updated:  05/23/17 0835     Special Requests: RIGHT        GRAM STAIN 0 TO 2  WBCS SEEN        MODERATE  GRAM VARIABLE RODS        Culture result: SCANT  ESCHERICHIA COLI   (A)       MODERATE  ALPHA STREPTOCOCCUS   (A)             SCANT  STREPTOCOCCI, BETA HEMOLYTIC GROUP B  THIS ORGANISM WILL BE HELD FOR 7 DAYS.  IF FURTHER TESTING IS REQUIRED PLEASE NOTIFY MICROBIOLOGY   (A)    CULTURE, URINE [[id number]]  (Abnormal) Collected:  05/19/17 1117    Order Status:  Completed Specimen:  Urine from Cath Urine Updated:  05/23/17 0729     Special Requests: NO SPECIAL REQUESTS        Culture result:       >100,000  COLONIES/mL  CANDIDA GLABRATA  IDENTIFICATION TO FOLLOW   (A)      [id number]  COLONIES/mL  CANDIDA ALBICANS   (A)     CULTURE, BODY FLUID W GRAM STAIN [[id number]]  (Abnormal) Collected:  05/18/17 1640    Order Status:  Completed Specimen:  Abscess Updated:  05/22/17 1406     Special Requests: PERIRECTAL     GRAM STAIN MANY  GRAM POSITIVE RODS         RARE  GRAM NEGATIVE RODS         10 TO 20  WBCS SEEN  PER OIF       Culture result:       LIGHT  STREPTOCOCCI, BETA HEMOLYTIC GROUP B  THIS ORGANISM WILL BE HELD FOR 7 DAYS. IF FURTHER TESTING IS REQUIRED PLEASE NOTIFY MICROBIOLOGY   (A)              NORMAL SKIN DEE ISOLATED    CULTURE, BLOOD [[id number]]  (Abnormal) Collected:  05/16/17 1730    Order Status:  Completed Specimen:  Blood from Blood Updated:  05/22/17 1342     Special Requests: LEFT ANTECUBITAL        GRAM STAIN GRAM POSITIVE RODS         ANAEROBIC BOTTLE POSITIVE               RESULTS VERIFIED, PHONED TO AND READ BACK BY  [id number] CHRISTI Groves RN @ [phone] BY SP       Culture result: GRAM POSITIVE RODS (A)                 CULTURE IN [facility] UPDATES TO FOLLOW    CULTURE, BLOOD [[id number]]  (Abnormal) Collected:  05/16/17 1736    Order Status:  Completed Specimen:  Blood from Blood Updated:  05/22/17 1342     Special Requests: RIGHT ANTECUBITAL        GRAM STAIN GRAM POSITIVE RODS         ANAEROBIC BOTTLE POSITIVE                 CRITICAL RESULT NOT CALLED DUE TO PREVIOUS NOTIFICATION OF CRITICAL RESULT WITHIN THE LAST 24 HOURS. Culture result: GRAM POSITIVE RODS (A)         ISOLATE FORWARD TO [facility] FOR ID ON 05/22/17      SEE REPORT FROM [facility]  ACC [id number]       CULTURE, HSV W/ TYPING [[id number]] Collected:  05/17/17 1710    Order Status:  Completed Specimen:  Miscellaneous sample Updated:  05/21/17 1241     Source PERINEUM     HSV culture w typing Comment         (NOTE)  Negative  No Herpes simplex virus isolated.   Performed At: [place] [id number]  Julius Mendez MD SN:[id number]         AFB CULTURE + SMEAR W/RFLX ID FROM CULTURE [[id number]] Collected:  05/19/17 1915    Order Status:  Completed Specimen: Miscellaneous sample Updated:  17 1036     Source BUTTOCK        AFB Specimen processing Concentration     Acid Fast Smear NEGATIVE          (NOTE)  Performed At: [address] [id number]  Tatiana Johnson MD B          Acid Fast Culture PENDING    CULTURE, HSV W/ TYPING [[id number]]     Order Status:  Canceled     MRSA SCREEN - PCR (NASAL) [[id number]] Collected:  17    Order Status:  Completed Specimen:  Nasal from Nasal Swab Updated:  17     Special Requests: NO SPECIAL REQUESTS        Culture result:         MRSA target DNA is not detected (presumptive not colonized with MRSA)          Imaging:   No new    Signed By: Ochoa Morgan NP     May 30, 2017

## 2017-05-30 NOTE — PROGRESS NOTES
Lucille Ulloa M.D. Colon and Rectal Surgeon  [facility] [facility], [facility] [address] Mag[address] Chowdary   Phone: [phone] Fax: [phone]      Zaraus Screen  [id number]    SUBJECTIVE:  29year old female admitted with DKA, rhabdomyolysis, NALDO, pancreatitis, lactic acidosis, leukocytosis, and sepsis. On CT, she was found to have a large pelvic process (mass vs abscess) with tracking into right buttock. She improved some with supportive care, fluid resuscitation, and antibiotics. She had an IR drain placed into her pelvic fluid collection. She was taken to the OR 5/19/17 for I&D of necrotic buttock fat. I found tracking into the extraperitoneal pelvic fluid collection. There was no connection to rectum. She was taken 5/22/17 for repeat debridement with much improved appearance of wound. Slept ok. Tolerating diet. No n/v.  Mild pain, mostly sore. No abdominal pain. Seen by PT.  JOSE walking more and making good progress    ROS:  General--+fevers, chills  Respiratory--no shortness of breath, wheezing  Cardiovascular--no chest pain, palpitations  GI--no nausea/emesis. No hematochezia/melena. No pain    PHYSICAL EXAM:   Patient Vitals for the past 24 hrs:   BP Temp Pulse Resp SpO2   05/30/17 0703 112/44 99.3 °F (37.4 °C) (!) 121 20 98 %   05/30/17 0609 - (!) 101.1 °F (38.4 °C) - - -   05/30/17 0314 106/52 99.7 °F (37.6 °C) (!) 119 20 98 %   05/29/17 2246 113/61 (!) 101.1 °F (38.4 °C) (!) 118 16 97 %   05/29/17 1924 127/65 98.8 °F (37.1 °C) (!) 115 18 97 %   05/29/17 1451 119/50 98.4 °F (36.9 °C) (!) 118 18 96 %   05/29/17 1152 115/61 99.3 °F (37.4 °C) (!) 109 18 99 %       General--AAO, NAD, answers all questions, appears comfortable  Abdomen--soft, nontender, nondistended. No peritoneal signs, no rebound, no guarding  Buttock--evaluated prone today with better inspection.   Superficially, there a few small spotty dark areas, which were sharply debrided in an excisional manner at bedside back to healthy, viable tissue. Less than before. The wound appearance continues to improve. Walls of the superficial component of the wound look more healthy, with better granulation tissue. Widely spreading to examine the deeper component shows that this is also improving with dressing changes, and the appearance here is also improved, with less slough. On packing removal, there continues to be a grayish fluid that seems to be draining from the deeper component of the wound, blotted dry today. WOCN evaluated with me, and we will try to place a VAC again. I assisted in getting the white sponge up into the deep component of the wound. UOP: 3/1  VAC: off  BM: 0/2    Recent Labs      17   0310   WBC  10.4   HGB  7.2*   HCT  21.8*   PLT  629*       Recent Labs      17   0310   NA  139   K  3.6   CL  102   CO2  30   BUN  6   CREA  0.45*   TBILI  0.4   SGOT  15   ALT  21   AP  109       No results for input(s): AML, LPSE in the last 72 hours. No results for input(s): PTP, INR, APTT in the last 72 hours. No lab exists for component: Donna Levels  New lactate 1.4  Last CRP 18.3  RPR NR  Pelvic fluid culture from IR 17--beta hemolytic strep  AFB smear 17--negative  HSV culture 17--negative  Blood cultures 17--GPR x2 bottles  Blood cultures 17--no growth x 5 days  Blood cultures 17--  UA 17--1+ bacteria, neg LE/nitr--cultures with 100k candida glabrata  OR tissue culture 17--beta hemolytic strep  Above labs reviewed by me. [id number]--CT abd/pelvis: \"IMPRESSION: Large incompletely characterized pelvic mass with apparent involvement/extension into the right gluteal muscles. The findings presumably represent a neoplastic process. Pelvic MRI may be beneficial for further delineation as it relates to other pelvic anatomy.  Preferably this would be done with IV contrast.\"  17--CT chest/abd/pelvis: \"IMPRESSION: Indeterminate perirectal mass with extension of heterogeneous, low-attenuation soft tissue into the right buttock an asymmetric edema and muscular swelling and the right hemipelvis. Right inguinal adenopathy is present. This may be a perirectal abscess or necrotic tumor with extrapelvic extension. \"  5/18/17--CT guided IR drain placement:   5/19/17--MRI pelvis--\"IMPRESSION: Status post right perianal/rectal abscess drainage. The extensive remaining abscess findings are detailed above. \"    ASSESSMENT:   Pelvic abscess   S/p IR drain 5/18/17   S/p operative I&D, debridement of necrotic tissue 5/19/17   S/p operative I&D, debridement of necrotic tissue 5/22/17  Sepsis, due to above--improved  Recurrent fevers  Leukocytosis, due to above--resolved  DKA--improved  Rhabdomyolysis--resolved  NALDO--resolved  Pancreatitis--resolved  Anemia--   S/p transfusion 2 units PRBC 5/18/17  Hypernatremia--resolved  Elevated LFTs--resolved  Hypophosphatemia--resolved    PLAN:  --continue IV and PO pain medications  --OOB, IS, mobilize  --No CV issues. Tachycardia is compensatory response to sepsis, improving  --regular diabetic diet. --this remains a challenging wound, given the deep upward extension. Will try VAC again today, see if we can get it to stick, despite being so close to the anus. Based on appearance today, I do not think we need further OR debridement. Certainly if she worsens or fails to improve, could consider another quick look under anesthesia  --follow UOP, creat. --transfuse for <7  --continue broad spectrum antibiotics per ID recs  --PT seeing  --Nathan Mendoza may be a good option for her?       Alise Verdugo MD

## 2017-05-30 NOTE — PROGRESS NOTES
I was given in report By Sophia Thomas RN if the tempeture was elevated above normal to call the hospitalist.  Patient temp is 101.1 so I called Dr. Chhaya Porter he placed an order to have the lactic acid and blood culture done.

## 2017-05-30 NOTE — PROGRESS NOTES
Problem: Nutrition Deficit  Goal: *Optimize nutritional status  Nutrition F/U: day 14  Assessment:   Diet order(s): 5/17 NPO; 5/18 CLQ; 5/20 CCHO, Regular 5/25  Food/Nutrition Patient History: Patient is s/p repeat debridement on 5/22. She reports that she is not drinking any glucerna shakes at this time. She reports a good appetite, eating most of her meals. POC glucoses  mg/dl. Anthropometrics:Height: 5' 2\" (157.5 cm), Weight: 89.6 kg (197 lb 8.5 oz), Source not specified, Body mass index is 36.13 kg/(m^2). BMI class of obesity class II. Macronutrient needs:  EER: 3582-5519 kcal /day (15-20 kcal/kg BW) (expect this should produce wt loss)  EPR: 60-75 grams protein/day (1.2-1.5 grams/kg IBW)  Intake/Comparative Standards: RD observed patient consumed 100% of lunch today (hamburger and fruit) and 0% glucerna shake. Seven recorded meal intakes in one week averaging 89%. Consuming 89% of a regular diet meets 100% kcal needs, 100% protein needs. Intervention:  Meals and snacks: Add CCHO diet restriction to current regular diet.    Nutrition Supplement Therapy: D/C Glucerna TID Jennie Goldmann, MS, RD, LD, [phone]

## 2017-05-30 NOTE — PROGRESS NOTES
*ATTENTION:  This note has been created by a medical student for educational purposes only. Please do not refer to the content of this note for clinical decision-making, billing, or other purposes. Please see attending physicians note to obtain clinical information on this patient. *     Hospitalist Progress Note    2017  Admit Date: 2017  3:14 PM   NAME: Steven Thomas   :  1985   MRN:  [id number]   Attending: Melissa Ball MD  PCP:  Sara Resendiz MD  Treatment Team: Attending Provider: Melissa Ball MD; Consulting Provider: Rosalina Gandhi MD; Utilization Review: Bryce Mitchell RN; Consulting Provider: Monique Gonzalez MD; Care Manager: Nidhi Lyles  SUBJECTIVE:   Ms. Paulie Mckeon is a 28 yo female who was admitted 17 with DKA, rhabdomyolysis, NALDO, pancreatitis, lactic acidosis, leukocytosis, and sepsis. She was seen 1 week before admission in the ED for sciatica and given NSAIDs which she took with 4 glasses of wine before arrival in the ED again on . On CT she was found to have a large pelvic mass that was concerning for malignancy. She then had an MRI that showed the suspected mass was a perianal abscess tracking up in the gluteal region, ID showed GBS. On  a drain was placed by IR. An I&D of necrotic buttock fat was completed on  which showed the abscess tracking into an extraperitoneal pelvic fluid collection without connection to the rectum. ON , repeat debridement was completed. She is being re-evaluated for surgery daily and VAC will be tried again today. The dressing is being changed daily. Even after starting IV lasix, pt still has significant BLE edema. Pt has continued to spike fevers today up to 101.1.          ROS:  Negative except b/l LE swelling and fever. Pt states she currently has no pain.     Recent Results (from the past 24 hour(s))   GLUCOSE, POC    Collection Time: 17 11:13 AM   Result Value Ref Range    Glucose (POC) 121 (H) 65 - 100 mg/dL   GLUCOSE, POC    Collection Time: 05/29/17  4:12 PM   Result Value Ref Range    Glucose (POC) 145 (H) 65 - 100 mg/dL   GLUCOSE, POC    Collection Time: 05/29/17  8:14 PM   Result Value Ref Range    Glucose (POC) 171 (H) 65 - 100 mg/dL   LACTIC ACID, PLASMA    Collection Time: 05/29/17 11:45 PM   Result Value Ref Range    Lactic acid 1.4 0.4 - 2.0 MMOL/L   GLUCOSE, POC    Collection Time: 05/30/17  7:24 AM   Result Value Ref Range    Glucose (POC) 96 65 - 100 mg/dL       No Known Allergies  Current Facility-Administered Medications   Medication Dose Route Frequency Provider Last Rate Last Dose    insulin lispro (HUMALOG) injection 5 Units  5 Units SubCUTAneous TIDAC Sourav Barraza MD   Stopped at 05/30/17 0730    furosemide (LASIX) injection 20 mg  20 mg IntraVENous Q8H Sourav Barraza MD   20 mg at 05/30/17 0314    fluconazole (DIFLUCAN) tablet 400 mg  400 mg Oral DAILY Kun Marysol Boone MD   400 mg at 05/29/17 0846    insulin lispro (HUMALOG) injection   SubCUTAneous AC&HS Eron Noe MD   Stopped at 05/30/17 0730    insulin glargine (LANTUS) injection 30 Units  30 Units SubCUTAneous DAILY Eron Noe MD   Stopped at 05/29/17 0900    morphine injection 5 mg  5 mg IntraVENous Q4H PRN Anders Obando MD   5 mg at 05/30/17 0845    promethazine (PHENERGAN) with saline injection 12.5 mg  12.5 mg IntraVENous Q6H PRN Catarina Noe MD   12.5 mg at 05/18/17 0024    0.9% sodium chloride infusion 250 mL  250 mL IntraVENous PRN Eron Noe MD        sodium chloride (NS) flush 5-10 mL  5-10 mL IntraVENous PRN Eron Noe MD   10 mL at 05/27/17 0346    dextrose 40% (GLUTOSE) oral gel 1 Tube  15 g Oral PRN Eron Noe MD        glucagon (GLUCAGEN) injection 1 mg  1 mg IntraMUSCular PRN Eron Noe MD        dextrose (D50W) injection syrg 12.5-25 g  25-50 mL IntraVENous PRN Eron Noe MD   12.25 g at 05/22/17 1801    acetaminophen (TYLENOL) tablet 650 mg  650 mg Oral Q4H PRN Zafar Gross MD   650 mg at 17 0329    ondansetron (ZOFRAN) injection 4 mg  4 mg IntraVENous Q4H PRN Zafar Gross MD   4 mg at 177    bisacodyl (DULCOLAX) tablet 5 mg  5 mg Oral DAILY PRN Zafar Gross MD        heparin (porcine) injection 5,000 Units  5,000 Units SubCUTAneous Q8H Zafar Gross MD   5,000 Units at 17 0604    HYDROcodone-acetaminophen (Marlaine Jerry) 5-325 mg per tablet 2 Tab  2 Tab Oral Q6H PRN Zafar Gross MD   2 Tab at 17 1652    piperacillin-tazobactam (ZOSYN) 3.375 g in 0.9% sodium chloride (MBP/ADV) 100 mL  3.375 g IntraVENous Q8H Zafar Gross MD 25 mL/hr at 17 0016 3.375 g at 17 0016           Review of Systems as noted above in HPI   PHYSICAL EXAM     Visit Vitals    /44    Pulse (!) 121    Temp 99.3 °F (37.4 °C)    Resp 20    Ht 5' 2\" (1.575 m)    Wt 89.6 kg (197 lb 8.5 oz)    LMP 2017    SpO2 98%    BMI 36.13 kg/m2      Temp (24hrs), Av.7 °F (37.6 °C), Min:98.4 °F (36.9 °C), Max:101.1 °F (38.4 °C)    Oxygen Therapy  O2 Sat (%): 98 % (17 0703)  Pulse via Oximetry: 99 beats per minute (17)  O2 Device: Room air (17 1451)  O2 Flow Rate (L/min): 2 l/min (17 2044)  ETCO2 (mmHg): 12 mmHg (17 1645)    Intake/Output Summary (Last 24 hours) at 17 0924  Last data filed at 17 0835   Gross per 24 hour   Intake              360 ml   Output                0 ml   Net              360 ml      General: No acute distress,conversive AOx3  Lungs:  CTAB, good effort   Heart:  Regular rate and rhythm, no murmur, 2+ pitting edema, severe BLE swelling. Abdomen: Soft, nontender and nondistended, normal bowel sounds x4.     Extremities: Warm and dry         DIAGNOSTIC STUDIES      Labs and Studies from previous 24 hours have been personally reviewed by myself           Full Code    ASSESSMENT / Ying Jacobson 169 Problems    Diagnosis Date Noted    Septic shock ([facility] [address] 2017    Pelvic mass 2017  DKA (diabetic ketoacidoses) ([facility] [address] 05/16/2017    Acute alcoholic pancreatitis 42/07/4263    Hyponatremia 05/16/2017     Pseudohyponatremia      Hyperkalemia 05/16/2017    Acute renal failure (ARF) (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 Try VAC again today, no further OR debridement necessary at this time per surgery. B/l LE swelling due to infection. Consider restarting albumin in addition to the Lasix she getting? Waiting for today's CBC result, creatinine, and lactic acid. Monitor HGB. Recurrent fevers - Monitor closely. Waiting on repeat blood cultures. Consider repeat CT or MRI if fevers persist.    Anemia - s/p 2 units PRBC on 5/18. Continue to monitor HGB. Transfuse as appropriate. Persistent tachycardia - Compensatory response to sepsis. Improving. Vaginal lesions - Due to suspected PID. + serology for Chlamydia. HIV -. ID managing antibiotics. Uncontrolled DM - HgA1C is 14. Seen by diabetic educator. Blood glucose better controlled here. Continue current insulin regiment and titrate as necessary. Sepsis due to pelvic abscess - resolved. Leukocytosis due to pelvic abscess - resolved. DKA - resolved. Anion gap closed. Rhabdomyolysis - resolved. NALDO - resolved. Pancreatitis - resolved. Hypernatremia - resolved. Elevated LFTs - resolved. Hyperkalemia - resolved. FEN:  Diabetic diet  DVT Prophylaxis:  Sq heparin   Disposition:  Consider [facility]? D/c when ok with surgery and ID.     Signed By: Zen Lopez     May 30, 2017

## 2017-05-30 NOTE — PROGRESS NOTES
Hospitalist Progress Note    2017  Admit Date: 2017  3:14 PM   NAME: Piyush Lewis   :  1985   MRN:  [id number]   Attending: Blaire Gracía MD  PCP:  Javi Taylor MD    SUBJECTIVE:   AS Previously documented: \" 32yo F with no significant PMH presented with worsening Rt Flank pain, nausea, multiple episodes of vomiting and weakness. She was seen here in ER 1 week ago with back pain and diagnosed with Sciatica and given NSAIDs which she took without much relief. she took extra pain meds with about 4 glasses of Wine per . She then started having vomiting and abd discomfort that continued today and she came to ER. Found to be in DKA in ER with Na 115, K 6.0, LA 7.4, Bicarb 9, Ph 7.22, Cr 2.51 and Ck > 4000, WBC >40k, Hgb 10.8 with elevated LFTs. CT abdomen showed Pelvic mass concerning for malignancy. MRI showed anorectal abscess tracking up in the Gluteal region. S/p IR drain placement and Surgical Debridement by Dr. Amparo Sullivan. \"       She has been re-evaluated for need for re-current surgery daily - and as per surgery no need for it at this time. Daily wound dressings. BL edema and started on iv lasix and this has improved. She has persistent tachycardia which is felt to be a an infectious response. Unfortunately she started having fevers again 2017. We are monitoring closely. MGt as per surgery and Gi at this time. : Feels better, no abd pain, had fever earlier today 101.1. Nursing notes and chart reviewed. Review of Systems negative with exception of pertinent positives noted above.     PHYSICAL EXAM     Visit Vitals    /62    Pulse (!) 116    Temp 97.3 °F (36.3 °C)    Resp 18    Ht 5' 2\" (1.575 m)    Wt 89.6 kg (197 lb 8.5 oz)    LMP 2017    SpO2 99%    BMI 36.13 kg/m2      Temp (24hrs), Av.2 °F (37.3 °C), Min:97.3 °F (36.3 °C), Max:101.1 °F (38.4 °C)    Oxygen Therapy  O2 Sat (%): 99 % (17 1458)  Pulse via Oximetry: 99 beats per minute (05/22/17 2059)  O2 Device: Room air (05/29/17 1451)  O2 Flow Rate (L/min): 2 l/min (05/22/17 2044)  ETCO2 (mmHg): 12 mmHg (05/18/17 1645)    Intake/Output Summary (Last 24 hours) at 05/30/17 1506  Last data filed at 05/30/17 1427   Gross per 24 hour   Intake              600 ml   Output                0 ml   Net              600 ml       General: Mild distress. Alert.    Lungs:  CTABL. Heart:  RRR, no murmur, rub, or gallop, tachycardia  Abdomen: Soft, non-distended, non-tender, +bs  Extremities: No cyanosis or clubbing. Neurologic:  No focal deficits. Moves all extremities. Musculoskeletal: Tender Rt lower back  :   Large area of excoriations around her vagina and perineum. LABS AND STUDIES:  Personally reviewed all labs, meds, and studies for past 24hrs. 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 No plans for any surgical intervention right now- will continue dressings-surgery plans to review sanam to determine definitive management  · NALDO: Resolved, will c/w IVF   · Sepsis: Likely due to perirectal/Gluteal Abscess vs ? PID (she has +ve serology for STDs). ID managing antibiotics. - input and help appreciated  · Vaginal Lesions with Suspected PID: GYN input appreciated  · Rhabdomyolysis: Resolved  · Acute Pancreatitis: resolved  · Anemia: Acute ? likely 2/2 hematoma. S/p 2units PBRCS- continue to monitor & will transfuse as appropriate if Hgb < 7.  · Tachycardia- septic response  · Hypernatremia- resolved  · Hyperkalemia: Resolved. · Leg swelling - continue low dose lasix and albumin- would start decreasing tomorrow form q8h to daily  · Fevers- continue to monitor closely- f/up am labs sanam- repeat cultures recommended by ID if she spikes again    Dispo: When OK with Surgery and ID. DVT ppx:  hsq  Discussed plan with pt and  who is in agreement. All questions answered.       Signed By: Nina Mcgrath MD     May 30, 2017

## 2017-05-31 LAB
ANAEROBE ID RESULT 1, RAND1T: ABNORMAL
ANAEROBE ID W/SUSCEPT., ANIDLT: NORMAL
ANION GAP BLD CALC-SCNC: 7 MMOL/L (ref 7–16)
ANTIMICROBIAL SUSCEPT., RAND5T: ABNORMAL
BASOPHILS # BLD AUTO: 0 K/UL (ref 0–0.2)
BASOPHILS # BLD: 0 % (ref 0–2)
BUN SERPL-MCNC: 6 MG/DL (ref 6–23)
CALCIUM SERPL-MCNC: 7.6 MG/DL (ref 8.3–10.4)
CHLORIDE SERPL-SCNC: 99 MMOL/L (ref 98–107)
CO2 SERPL-SCNC: 32 MMOL/L (ref 21–32)
CREAT SERPL-MCNC: 0.47 MG/DL (ref 0.6–1)
DIFFERENTIAL METHOD BLD: ABNORMAL
EOSINOPHIL # BLD: 0.1 K/UL (ref 0–0.8)
EOSINOPHIL NFR BLD: 1 % (ref 0.5–7.8)
ERYTHROCYTE [DISTWIDTH] IN BLOOD BY AUTOMATED COUNT: 16.2 % (ref 11.9–14.6)
ERYTHROCYTE [DISTWIDTH] IN BLOOD BY AUTOMATED COUNT: 16.2 % (ref 11.9–14.6)
FUNGUS CULTURE, RFCO2T: POSITIVE
FUNGUS SMEAR, RFCO1T: ABNORMAL
FUNGUS SPEC CULT: NORMAL
FUNGUS STAIN, 188244: NORMAL
GLUCOSE BLD STRIP.AUTO-MCNC: 142 MG/DL (ref 65–100)
GLUCOSE BLD STRIP.AUTO-MCNC: 145 MG/DL (ref 65–100)
GLUCOSE BLD STRIP.AUTO-MCNC: 175 MG/DL (ref 65–100)
GLUCOSE BLD STRIP.AUTO-MCNC: 217 MG/DL (ref 65–100)
GLUCOSE SERPL-MCNC: 115 MG/DL (ref 65–100)
HCT VFR BLD AUTO: 18.8 % (ref 35.8–46.3)
HCT VFR BLD AUTO: 20.5 % (ref 35.8–46.3)
HGB BLD-MCNC: 6.1 G/DL (ref 11.7–15.4)
HGB BLD-MCNC: 6.8 G/DL (ref 11.7–15.4)
IMM GRANULOCYTES # BLD: 0 K/UL (ref 0–0.5)
IMM GRANULOCYTES NFR BLD AUTO: 0 % (ref 0–5)
LYMPHOCYTES # BLD AUTO: 17 % (ref 13–44)
LYMPHOCYTES # BLD: 2.1 K/UL (ref 0.5–4.6)
MAGNESIUM SERPL-MCNC: 1.4 MG/DL (ref 1.8–2.4)
MCH RBC QN AUTO: 27.7 PG (ref 26.1–32.9)
MCH RBC QN AUTO: 28.6 PG (ref 26.1–32.9)
MCHC RBC AUTO-ENTMCNC: 32.4 G/DL (ref 31.4–35)
MCHC RBC AUTO-ENTMCNC: 33.2 G/DL (ref 31.4–35)
MCV RBC AUTO: 85.5 FL (ref 79.6–97.8)
MCV RBC AUTO: 86.1 FL (ref 79.6–97.8)
MONOCYTES # BLD: 2 K/UL (ref 0.1–1.3)
MONOCYTES NFR BLD AUTO: 16 % (ref 4–12)
NEUTS SEG # BLD: 8.1 K/UL (ref 1.7–8.2)
NEUTS SEG NFR BLD AUTO: 66 % (ref 43–78)
PLATELET # BLD AUTO: 558 K/UL (ref 150–450)
PLATELET # BLD AUTO: 560 K/UL (ref 150–450)
PLATELET COMMENTS,PCOM: ABNORMAL
PMV BLD AUTO: 8.2 FL (ref 10.8–14.1)
PMV BLD AUTO: 8.4 FL (ref 10.8–14.1)
POTASSIUM SERPL-SCNC: 3.7 MMOL/L (ref 3.5–5.1)
PREALB SERPL-MCNC: 3.9 MG/DL (ref 18–35.7)
RBC # BLD AUTO: 2.2 M/UL (ref 4.05–5.25)
RBC # BLD AUTO: 2.38 M/UL (ref 4.05–5.25)
RBC MORPH BLD: ABNORMAL
RBC MORPH BLD: ABNORMAL
REFLEX TO ID, RFCO3T: ABNORMAL
SODIUM SERPL-SCNC: 138 MMOL/L (ref 136–145)
SPECIMEN SOURCE: ABNORMAL
SPECIMEN SOURCE: NORMAL
SPECIMEN SOURCE: NORMAL
WBC # BLD AUTO: 12.3 K/UL (ref 4.3–11.1)
WBC # BLD AUTO: 15.7 K/UL (ref 4.3–11.1)
WBC MORPH BLD: ABNORMAL

## 2017-05-31 PROCEDURE — 74011250637 HC RX REV CODE- 250/637: Performed by: INTERNAL MEDICINE

## 2017-05-31 PROCEDURE — 74011000258 HC RX REV CODE- 258: Performed by: INTERNAL MEDICINE

## 2017-05-31 PROCEDURE — 74750000023 HC WOUND THERAPY

## 2017-05-31 PROCEDURE — 84134 ASSAY OF PREALBUMIN: CPT | Performed by: NURSE PRACTITIONER

## 2017-05-31 PROCEDURE — 86923 COMPATIBILITY TEST ELECTRIC: CPT | Performed by: INTERNAL MEDICINE

## 2017-05-31 PROCEDURE — 36430 TRANSFUSION BLD/BLD COMPNT: CPT

## 2017-05-31 PROCEDURE — 82962 GLUCOSE BLOOD TEST: CPT

## 2017-05-31 PROCEDURE — P9016 RBC LEUKOCYTES REDUCED: HCPCS | Performed by: INTERNAL MEDICINE

## 2017-05-31 PROCEDURE — 80048 BASIC METABOLIC PNL TOTAL CA: CPT | Performed by: INTERNAL MEDICINE

## 2017-05-31 PROCEDURE — 85027 COMPLETE CBC AUTOMATED: CPT | Performed by: INTERNAL MEDICINE

## 2017-05-31 PROCEDURE — P9047 ALBUMIN (HUMAN), 25%, 50ML: HCPCS | Performed by: INTERNAL MEDICINE

## 2017-05-31 PROCEDURE — 36415 COLL VENOUS BLD VENIPUNCTURE: CPT | Performed by: INTERNAL MEDICINE

## 2017-05-31 PROCEDURE — 74011250636 HC RX REV CODE- 250/636: Performed by: INTERNAL MEDICINE

## 2017-05-31 PROCEDURE — 77030013131 HC IV BLD ST ICUM -A

## 2017-05-31 PROCEDURE — 83735 ASSAY OF MAGNESIUM: CPT | Performed by: NURSE PRACTITIONER

## 2017-05-31 PROCEDURE — 74011636637 HC RX REV CODE- 636/637: Performed by: INTERNAL MEDICINE

## 2017-05-31 PROCEDURE — 86900 BLOOD TYPING SEROLOGIC ABO: CPT | Performed by: INTERNAL MEDICINE

## 2017-05-31 PROCEDURE — 85025 COMPLETE CBC W/AUTO DIFF WBC: CPT | Performed by: NURSE PRACTITIONER

## 2017-05-31 PROCEDURE — 74011250636 HC RX REV CODE- 250/636: Performed by: COLON & RECTAL SURGERY

## 2017-05-31 PROCEDURE — 97530 THERAPEUTIC ACTIVITIES: CPT

## 2017-05-31 PROCEDURE — 65270000029 HC RM PRIVATE

## 2017-05-31 RX ORDER — MAGNESIUM SULFATE 1 G/100ML
1 INJECTION INTRAVENOUS ONCE
Status: COMPLETED | OUTPATIENT
Start: 2017-05-31 | End: 2017-05-31

## 2017-05-31 RX ORDER — MAGNESIUM SULFATE HEPTAHYDRATE 40 MG/ML
2 INJECTION, SOLUTION INTRAVENOUS ONCE
Status: COMPLETED | OUTPATIENT
Start: 2017-05-31 | End: 2017-05-31

## 2017-05-31 RX ORDER — ALBUMIN HUMAN 250 G/1000ML
25 SOLUTION INTRAVENOUS EVERY 6 HOURS
Status: DISPENSED | OUTPATIENT
Start: 2017-05-31 | End: 2017-06-01

## 2017-05-31 RX ORDER — SODIUM CHLORIDE 9 MG/ML
250 INJECTION, SOLUTION INTRAVENOUS AS NEEDED
Status: DISCONTINUED | OUTPATIENT
Start: 2017-05-31 | End: 2017-06-01

## 2017-05-31 RX ADMIN — ACETAMINOPHEN 650 MG: 325 TABLET, FILM COATED ORAL at 17:57

## 2017-05-31 RX ADMIN — MAGNESIUM SULFATE HEPTAHYDRATE 2 G: 40 INJECTION, SOLUTION INTRAVENOUS at 10:27

## 2017-05-31 RX ADMIN — MORPHINE SULFATE 5 MG: 10 INJECTION INTRAMUSCULAR; INTRAVENOUS; SUBCUTANEOUS at 17:31

## 2017-05-31 RX ADMIN — FUROSEMIDE 20 MG: 10 INJECTION, SOLUTION INTRAMUSCULAR; INTRAVENOUS at 08:39

## 2017-05-31 RX ADMIN — FUROSEMIDE 20 MG: 10 INJECTION, SOLUTION INTRAMUSCULAR; INTRAVENOUS at 03:23

## 2017-05-31 RX ADMIN — PIPERACILLIN SODIUM,TAZOBACTAM SODIUM 3.38 G: 3; .375 INJECTION, POWDER, FOR SOLUTION INTRAVENOUS at 17:31

## 2017-05-31 RX ADMIN — FLUCONAZOLE 400 MG: 100 TABLET ORAL at 08:39

## 2017-05-31 RX ADMIN — HEPARIN SODIUM 5000 UNITS: 5000 INJECTION, SOLUTION INTRAVENOUS; SUBCUTANEOUS at 21:55

## 2017-05-31 RX ADMIN — PIPERACILLIN SODIUM,TAZOBACTAM SODIUM 3.38 G: 3; .375 INJECTION, POWDER, FOR SOLUTION INTRAVENOUS at 01:11

## 2017-05-31 RX ADMIN — INSULIN LISPRO 6 UNITS: 100 INJECTION, SOLUTION INTRAVENOUS; SUBCUTANEOUS at 21:55

## 2017-05-31 RX ADMIN — MORPHINE SULFATE 5 MG: 10 INJECTION INTRAMUSCULAR; INTRAVENOUS; SUBCUTANEOUS at 02:05

## 2017-05-31 RX ADMIN — HYDROCODONE BITARTRATE AND ACETAMINOPHEN 2 TABLET: 5; 325 TABLET ORAL at 11:31

## 2017-05-31 RX ADMIN — MAGNESIUM SULFATE HEPTAHYDRATE 1 G: 1 INJECTION, SOLUTION INTRAVENOUS at 11:38

## 2017-05-31 RX ADMIN — HEPARIN SODIUM 5000 UNITS: 5000 INJECTION, SOLUTION INTRAVENOUS; SUBCUTANEOUS at 06:41

## 2017-05-31 RX ADMIN — MORPHINE SULFATE 5 MG: 10 INJECTION INTRAMUSCULAR; INTRAVENOUS; SUBCUTANEOUS at 08:39

## 2017-05-31 RX ADMIN — FUROSEMIDE 20 MG: 10 INJECTION, SOLUTION INTRAMUSCULAR; INTRAVENOUS at 21:55

## 2017-05-31 RX ADMIN — ALBUMIN (HUMAN) 25 G: 0.25 INJECTION, SOLUTION INTRAVENOUS at 11:32

## 2017-05-31 RX ADMIN — PIPERACILLIN SODIUM,TAZOBACTAM SODIUM 3.38 G: 3; .375 INJECTION, POWDER, FOR SOLUTION INTRAVENOUS at 08:39

## 2017-05-31 RX ADMIN — INSULIN LISPRO 3 UNITS: 100 INJECTION, SOLUTION INTRAVENOUS; SUBCUTANEOUS at 11:31

## 2017-05-31 NOTE — PROGRESS NOTES
Hospitalist Progress Note    2017  Admit Date: 2017  3:14 PM   NAME: Piyush Lewis   :  1985   MRN:  [id number]   Attending: Blaire García MD  PCP:  Javi Taylor MD    SUBJECTIVE:   AS Previously documented: \" 34yo F with no significant PMH presented with worsening Rt Flank pain, nausea, multiple episodes of vomiting and weakness. She was seen here in ER 1 week ago with back pain and diagnosed with Sciatica and given NSAIDs which she took without much relief. she took extra pain meds with about 4 glasses of Wine per . She then started having vomiting and abd discomfort that continued today and she came to ER. Found to be in DKA in ER with Na 115, K 6.0, LA 7.4, Bicarb 9, Ph 7.22, Cr 2.51 and Ck > 4000, WBC >40k, Hgb 10.8 with elevated LFTs. CT abdomen showed Pelvic mass concerning for malignancy. MRI showed anorectal abscess tracking up in the Gluteal region. S/p IR drain placement and Surgical Debridement by Dr. Amparo Sullivan. \"       She has been re-evaluated for need for re-current surgery daily - and as per surgery no need for it at this time. Daily wound dressings. BL edema and started on iv lasix and this has improved. She has persistent tachycardia which is felt to be a an infectious response. Unfortunately she started having fevers again 2017. We are monitoring closely. MGt as per surgery and Gi at this time. 5/3: Feels better, no abd pain, hgb 6.8 today, no bleeding      Nursing notes and chart reviewed. Review of Systems negative with exception of pertinent positives noted above.     PHYSICAL EXAM     Visit Vitals    /50 (BP 1 Location: Left arm, BP Patient Position: Sitting)    Pulse (!) 117    Temp 98.8 °F (37.1 °C)    Resp 18    Ht 5' 2\" (1.575 m)    Wt 89.6 kg (197 lb 8.5 oz)    LMP 2017    SpO2 97%    BMI 36.13 kg/m2      Temp (24hrs), Av.1 °F (37.3 °C), Min:97.3 °F (36.3 °C), Max:100.1 °F (37.8 °C)    Oxygen Therapy  O2 Sat (%): 97 % (17 1325)  Pulse via Oximetry: 99 beats per minute (05/22/17 2059)  O2 Device: Room air (05/31/17 1325)  O2 Flow Rate (L/min): 2 l/min (05/22/17 2044)  ETCO2 (mmHg): 12 mmHg (05/18/17 1645)    Intake/Output Summary (Last 24 hours) at 05/31/17 1422  Last data filed at 05/31/17 1325   Gross per 24 hour   Intake             1347 ml   Output                0 ml   Net             1347 ml       General: Mild distress. Alert.    Lungs:  CTABL. Heart:  RRR, no murmur, rub, or gallop, tachycardia  Abdomen: Soft, non-distended, non-tender, +bs  Extremities: No cyanosis or clubbing. Neurologic:  No focal deficits. Moves all extremities. Musculoskeletal: Tender Rt lower back  :   Large area of excoriations around her vagina and perineum. LABS AND STUDIES:  Personally reviewed all labs, meds, and studies for past 24hrs. 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 No plans for any surgical intervention right now- will continue dressings-surgery plans to review sanam to determine definitive management  · NALDO: Resolved, will c/w IVF   · Sepsis: Likely due to perirectal/Gluteal Abscess vs ? PID (she has +ve serology for STDs). ID managing antibiotics. - input and help appreciated  · Vaginal Lesions with Suspected PID: GYN input appreciated  · Rhabdomyolysis: Resolved  · Acute Pancreatitis: resolved  · Anemia: Acute ? likely 2/2 hematoma. S/p 2units PBRCS- continue to monitor & will transfuse 2uprbcs today   · Tachycardia- septic response  · Hypernatremia- resolved  · Hyperkalemia: Resolved. · Leg swelling - continue low dose lasix and albumin   · Fevers- continue to monitor closely- f/up am labs sanam- repeat cultures recommended by ID if she spikes again    Dispo: When OK with Surgery and ID. DVT ppx:  hsq  Discussed plan with pt and  who is in agreement. All questions answered.       Signed By: Melissa Ball MD     May 31, 2017

## 2017-05-31 NOTE — PROGRESS NOTES
*ATTENTION:  This note has been created by a medical student for educational purposes only. Please do not refer to the content of this note for clinical decision-making, billing, or other purposes. Please see attending physicians note to obtain clinical information on this patient. *     Hospitalist Progress Note    2017  Admit Date: 2017  3:14 PM   NAME: Piyush Lewis   :  1985   MRN:  [id number]   Attending: Blaire García MD  PCP:  Javi Taylor MD  Treatment Team: Attending Provider: Blaire García MD; Consulting Provider: Marck Schafer MD; Utilization Review: Afsaneh Brooks RN; Consulting Provider: Shania Yanez MD; Care Manager: Luis Lyles  SUBJECTIVE:   Ms. Seymour Almeida is a 30 yo female who was admitted to the ICU on 17 with DKA, rhabdomyolysis, NALDO, pancreatitis, lactic acidosis, leukocytosis, and sepsis. She was seen 1 week before admission in the ED for (what was thought to be) sciatica and given NSAIDs which she took with 4 glasses of wine before arrival in the ED again on . On CT, she was found to have a large pelvic mass that was concerning for malignancy. Pt was started on an insulin drip, IV fluids and vanc, zosyn, and doxycycline. Her lipase  5440, lactic acid 7.4, CK 4621, procalcitonin 78.7. Pt was seen 3 days prior to admission by her GYN were blood work shows + serology for HSV 1 and 2 and chlamydia. Her GYN noted worrisome excoriated lesions around her vagina/vulva. Acyclovir was started. She was seen by critical care who got a central line and administered pressors. ID was consulted and was concerned for possible tuboovarian abscess and PID vs cervicitis/urethritis. ID d/c vanc. On , DKA resolved and the anion gap closed. Pt switched to insulin sliding scale. HgA1c was 14 so pt diagnosed with DM as a new diagnosis. Her labs continued to improve but remained tachycardic.  GYN was consulted and did not see any herpetic ulcers, more consistent with severe yeast infection. Due to her recent + HSV, treatment for that was continued and diflucan was added for the yeast infection. Anion gap reopened and an insulin drip was started then d/c when anion gap closed. Hgb dropped to 6.1 and 2 units of PRBCs were given. A drain was placed by IR. General surgery was consulted. On 5/19, she then had an MRI that showed the suspected mass was a perianal abscess tracking up in the gluteal region, ID showed GBS. Gyn signed off. Lipase and procalcitonin continued to trend down. Lactic acid normalized. ID d/c acyclovir. An I&D of necrotic buttock fat was completed which showed the abscess with thick, black, foul fluid, tracking into an extraperitoneal pelvic fluid collection without connection to the rectum. Pt in less pain after this was done and pt was tranferred to medical floor with remote telemetry on 5/20. On 5/22, ID saw pt and said this was necrotizing soft tissue infection predisposed to by uncontrolled DM and DKA. Wound cultures grew MDR tj consistent with type 1 necrotizing fasciitis gowing anaerobic GPR, alpha and beta-hemolytic strep, GNR, and yeast.  D/c doxycycline. Hematology was consulted and thought anemia was multifactorial from acute blood loss, sepsis/infection, NALDO and checked soluble transferrin receptor to screen for true iron deficiency which was negative. Repeat debridement was completed. Pt received another 2 units of PRBCs. Surgery, wound care, and ID continued to re-evaluate patient. Hematology said there was not iron deficiency anemia, wants pt to f/u as outpatient and signed off on 5/24. Wound vac was placed on 5/25 and ID plans to continue abx until around 6/7 and discharge on ertapenem when the time comes. On 5/26, pt complained of leg swelling and lasix was started. Pt became febrile on 5/28. On 5/29, albumin was added to lasix for leg swelling. New blood cultures taken.       5/31 - Pt was in more pain today but was OOB. Wound vac was on. Edema seems to be decreasing some in BLE. Pt has continued to spike fevers up to 101.1 but has been afebrile since early AM.  Still tachycardic with pulse of 117. Hgb 6.1, prealbumin 3.9, magnesium 1.4, creatinine 0.47. BC NGTD after 1 day. ROS:  Negative except pain at site of abscess. Recent Results (from the past 24 hour(s))   GLUCOSE, POC    Collection Time: 05/30/17  4:02 PM   Result Value Ref Range    Glucose (POC) 154 (H) 65 - 100 mg/dL   GLUCOSE, POC    Collection Time: 05/30/17  7:31 PM   Result Value Ref Range    Glucose (POC) 99 65 - 100 mg/dL   GLUCOSE, POC    Collection Time: 05/31/17  7:05 AM   Result Value Ref Range    Glucose (POC) 142 (H) 65 - 100 mg/dL   CBC WITH AUTOMATED DIFF    Collection Time: 05/31/17  7:10 AM   Result Value Ref Range    WBC 12.3 (H) 4.3 - 11.1 K/uL    RBC 2.20 (L) 4.05 - 5.25 M/uL    HGB 6.1 (LL) 11.7 - 15.4 g/dL    HCT 18.8 (LL) 35.8 - 46.3 %    MCV 85.5 79.6 - 97.8 FL    MCH 27.7 26.1 - 32.9 PG    MCHC 32.4 31.4 - 35.0 g/dL    RDW 16.2 (H) 11.9 - 14.6 %    PLATELET 148 (H) 839 - 450 K/uL    MPV 8.4 (L) 10.8 - 14.1 FL    NEUTROPHILS 66 43 - 78 %    LYMPHOCYTES 17 13 - 44 %    MONOCYTES 16 (H) 4.0 - 12.0 %    EOSINOPHILS 1 0.5 - 7.8 %    BASOPHILS 0 0.0 - 2.0 %    IMMATURE GRANULOCYTES 0 0.0 - 5.0 %    ABS. NEUTROPHILS 8.1 1.7 - 8.2 K/UL    ABS. LYMPHOCYTES 2.1 0.5 - 4.6 K/UL    ABS. MONOCYTES 2.0 (H) 0.1 - 1.3 K/UL    ABS. EOSINOPHILS 0.1 0.0 - 0.8 K/UL    ABS. BASOPHILS 0.0 0.0 - 0.2 K/UL    ABS. IMM.  GRANS. 0.0 0.0 - 0.5 K/UL    RBC COMMENTS HYPOCHROMIA      RBC COMMENTS SLIGHT  ANISOCYTOSIS + POIKILOCYTOSIS        WBC COMMENTS Result Confirmed By Smear      PLATELET COMMENTS INCREASED      DF AUTOMATED     PREALBUMIN    Collection Time: 05/31/17  7:10 AM   Result Value Ref Range    Prealbumin 3.90 (L) 18.0 - 35.7 MG/DL   MAGNESIUM    Collection Time: 05/31/17  7:10 AM   Result Value Ref Range    Magnesium 1.4 (LL) 1.8 - 2.4 mg/dL   METABOLIC PANEL, BASIC    Collection Time: 05/31/17  7:20 AM   Result Value Ref Range    Sodium 138 136 - 145 mmol/L    Potassium 3.7 3.5 - 5.1 mmol/L    Chloride 99 98 - 107 mmol/L    CO2 32 21 - 32 mmol/L    Anion gap 7 7 - 16 mmol/L    Glucose 115 (H) 65 - 100 mg/dL    BUN 6 6 - 23 MG/DL    Creatinine 0.47 (L) 0.6 - 1.0 MG/DL    GFR est AA >60 >60 ml/min/1.73m2    GFR est non-AA >60 >60 ml/min/1.73m2    Calcium 7.6 (L) 8.3 - 10.4 MG/DL   GLUCOSE, POC    Collection Time: 05/31/17 10:57 AM   Result Value Ref Range    Glucose (POC) 175 (H) 65 - 100 mg/dL       No Known Allergies  Current Facility-Administered Medications   Medication Dose Route Frequency Provider Last Rate Last Dose    albumin human 25% (BUMINATE) solution 25 g  25 g IntraVENous Q6H Angelica Casey MD   25 g at 05/31/17 1132    magnesium sulfate 1 g/100 ml IVPB (premix or compounded)  1 g IntraVENous ONCE Nina Mcgrath  mL/hr at 05/31/17 1138 1 g at 05/31/17 1138    furosemide (LASIX) injection 20 mg  20 mg IntraVENous Q8H Louis Vaz MD   20 mg at 05/31/17 0839    fluconazole (DIFLUCAN) tablet 400 mg  400 mg Oral DAILY Kun Perez MD   400 mg at 05/31/17 1634    insulin lispro (HUMALOG) injection   SubCUTAneous AC&HS Nina Mcgrath MD   3 Units at 05/31/17 1131    morphine injection 5 mg  5 mg IntraVENous Q4H PRN Marlin Daily MD   5 mg at 05/31/17 0839    promethazine (PHENERGAN) with saline injection 12.5 mg  12.5 mg IntraVENous Q6H PRN Chandni Ji MD   12.5 mg at 05/18/17 0024    0.9% sodium chloride infusion 250 mL  250 mL IntraVENous PRN Nina Mcgrath MD        sodium chloride (NS) flush 5-10 mL  5-10 mL IntraVENous PRN Nina Mcgrath MD   10 mL at 05/27/17 0346    dextrose 40% (GLUTOSE) oral gel 1 Tube  15 g Oral PRN Nina Mcgrath MD        glucagon (GLUCAGEN) injection 1 mg  1 mg IntraMUSCular PRN Nina Mcgrath MD        dextrose (D50W) injection syrg 12.5-25 g  25-50 mL IntraVENous PRN Adnan MD Jacinto   12.25 g at 17 1801    acetaminophen (TYLENOL) tablet 650 mg  650 mg Oral Q4H PRN Artie Calvo MD   650 mg at 17 0329    ondansetron (ZOFRAN) injection 4 mg  4 mg IntraVENous Q4H PRN Artie Calvo MD   4 mg at 17 2037    bisacodyl (DULCOLAX) tablet 5 mg  5 mg Oral DAILY PRN Artie Calvo MD        heparin (porcine) injection 5,000 Units  5,000 Units SubCUTAneous Q8H Artie Calvo MD   5,000 Units at 17 0641    HYDROcodone-acetaminophen (NORCO) 5-325 mg per tablet 2 Tab  2 Tab Oral Q6H PRN Artie Calvo MD   2 Tab at 17 1131    piperacillin-tazobactam (ZOSYN) 3.375 g in 0.9% sodium chloride (MBP/ADV) 100 mL  3.375 g IntraVENous Q8H Artie Calvo MD 25 mL/hr at 17 0839 3.375 g at 17 0418           Review of Systems as noted above in HPI   PHYSICAL EXAM     Visit Vitals    BP 99/50 (BP 1 Location: Left arm, BP Patient Position: Sitting)    Pulse (!) 118    Temp 99.6 °F (37.6 °C)    Resp 18    Ht 5' 2\" (1.575 m)    Wt 89.6 kg (197 lb 8.5 oz)    LMP 2017    SpO2 96%    BMI 36.13 kg/m2      Temp (24hrs), Av.2 °F (37.3 °C), Min:97.3 °F (36.3 °C), Max:100.1 °F (37.8 °C)    Oxygen Therapy  O2 Sat (%): 96 % (17 112)  Pulse via Oximetry: 99 beats per minute (17)  O2 Device: Room air (17 112)  O2 Flow Rate (L/min): 2 l/min (17)  ETCO2 (mmHg): 12 mmHg (17 1645)    Intake/Output Summary (Last 24 hours) at 17 1221  Last data filed at 17 1831   Gross per 24 hour   Intake              600 ml   Output                0 ml   Net              600 ml      General: No acute distress,conversive, AOx3  Lungs:  CTAB, good effort   Heart:  Regular rate and rhythm, no murmur, 3+ edema BLE   Abdomen: Soft, nontender and nondistended, normal bowel sounds x4.   Extremities: Warm and dry         DIAGNOSTIC STUDIES      Labs and Studies from previous 24 hours have been personally reviewed by myself           Full Code    ASSESSMENT / Ying Alejandra Jacobson 169 Problems    Diagnosis Date Noted    [facility] 05/17/2017    Pelvic mass 05/17/2017    DKA (diabetic ketoacidoses) ([facility] 05/16/2017    Acute alcoholic pancreatitis 91/58/0856    Hyponatremia 05/16/2017     Pseudohyponatremia      Hyperkalemia 05/16/2017    Acute renal failure (ARF) ([facility] 05/16/2017    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 Try VAC again today, no further OR debridement necessary at this time per surgery. B/l LE swelling due to infection. Give albumin in addition to Lasix. WBC up to 15.7. Anemia - transfuse 2 units PRBC. Hgb 6.8 today. Continue to monitor hgb. Transfuse as appropriate.     Recurrent fevers - Monitor closely. Waiting on repeat blood cultures. Consider repeat CT or MRI if fevers persist.    Hypomagnesemia - replete magnesium and recheck level tomorrow.     Persistent tachycardia - Compensatory response to sepsis. Improving.      Vaginal lesions - Due to suspected PID. + serology for Chlamydia. HIV -. ID managing antibiotics.      Uncontrolled DM - HgA1C is 14. Seen by diabetic educator. Blood glucose better controlled here. Continue current insulin regiment and titrate as necessary.      Sepsis due to pelvic abscess - resolved.      DKA - resolved.  Anion gap closed.      Rhabdomyolysis - resolved.      NALDO - resolved.      Pancreatitis - resolved.      Hypernatremia - resolved.      Elevated LFTs - resolved.      Hyperkalemia - resolved.        FEN: Diabetic diet  DVT Prophylaxis: Sq heparin   Disposition: Discussing with Dr. Óscar Mcneil with home health    Signed By: Vanesa Lowery     May 31, 2017

## 2017-05-31 NOTE — PROGRESS NOTES
Arielle Low M.D. Colon and Rectal Surgeon  [place] [id number] [facility], [address] [place] [address], [facility] [address] Magali Chowdary   Phone: [phone] Fax: [phone]      Anika Russo  [id number]    SUBJECTIVE:  29year old female admitted with DKA, rhabdomyolysis, NALDO, pancreatitis, lactic acidosis, leukocytosis, and sepsis. On CT, she was found to have a large pelvic process (mass vs abscess) with tracking into right buttock. She improved some with supportive care, fluid resuscitation, and antibiotics. She had an IR drain placed into her pelvic fluid collection. She was taken to the OR 5/19/17 for I&D of necrotic buttock fat. I found tracking into the extraperitoneal pelvic fluid collection. There was no connection to rectum. She was taken 5/22/17 for repeat debridement with much improved appearance of wound. VAC placed yesterday and is still sticking. She has been OOB to bathroom. Not walking in halls and no BM since VAC placed. Slept ok. Tolerating diet. No n/v.  Mild pain, mostly sore. No abdominal pain. Seen by PT.      ROS:  General--no fevers, chills  Respiratory--no shortness of breath, wheezing  Cardiovascular--no chest pain, palpitations  GI--no nausea/emesis. No hematochezia/melena. No pain    PHYSICAL EXAM:   Patient Vitals for the past 24 hrs:   BP Temp Pulse Resp SpO2   05/31/17 0707 118/55 99.2 °F (37.3 °C) (!) 121 20 96 %   05/31/17 0408 106/47 99.8 °F (37.7 °C) (!) 119 20 98 %   05/30/17 2340 115/58 99.1 °F (37.3 °C) (!) 117 18 98 %   05/30/17 2131 - - (!) 124 - -   05/30/17 1935 111/53 100.1 °F (37.8 °C) (!) 125 22 95 %   05/30/17 1458 122/62 97.3 °F (36.3 °C) (!) 116 18 99 %   05/30/17 1105 121/47 97.3 °F (36.3 °C) (!) 115 20 97 %       General--AAO, NAD, answers all questions, appears comfortable  Abdomen--soft, nontender, nondistended.   No peritoneal signs, no rebound, no guarding  Buttock--VAC in place with good seal.  From 5/30/17: \"evaluated prone today with better inspection. Superficially, there a few small spotty dark areas, which were sharply debrided in an excisional manner at bedside back to healthy, viable tissue. Less than before. The wound appearance continues to improve. Walls of the superficial component of the wound look more healthy, with better granulation tissue. Widely spreading to examine the deeper component shows that this is also improving with dressing changes, and the appearance here is also improved, with less slough. On packing removal, there continues to be a grayish fluid that seems to be draining from the deeper component of the wound, blotted dry today. WOCN evaluated with me, and we will try to place a VAC again. I assisted in getting the white sponge up into the deep component of the wound. \"    UOP:   VAC: on   BM: 0/1    Recent Labs      17   0310   WBC  10.4   HGB  7.2*   HCT  21.8*   PLT  629*       Recent Labs      17   0310   NA  139   K  3.6   CL  102   CO2  30   BUN  6   CREA  0.45*   TBILI  0.4   SGOT  15   ALT  21   AP  109       No results for input(s): AML, LPSE in the last 72 hours. No results for input(s): PTP, INR, APTT in the last 72 hours. No lab exists for component: Vale Benitez  New lactate 1.4  Last CRP 18.3  RPR NR  Pelvic fluid culture from IR 17--beta hemolytic strep  AFB smear 17--negative  HSV culture 17--negative  Blood cultures 17--GPR x2 bottles  Blood cultures 17--no growth x 5 days  Blood cultures 17--no growth 1 day  UA 17--1+ bacteria, neg LE/nitr--cultures with 100k candida glabrata  OR tissue culture 17--beta hemolytic strep  Above labs reviewed by me. [id number]--CT abd/pelvis: \"IMPRESSION: Large incompletely characterized pelvic mass with apparent involvement/extension into the right gluteal muscles. The findings presumably represent a neoplastic process.  Pelvic MRI may be beneficial for further delineation as it relates to other pelvic anatomy. Preferably this would be done with IV contrast.\"  5/17/17--CT chest/abd/pelvis: \"IMPRESSION: Indeterminate perirectal mass with extension of heterogeneous, low-attenuation soft tissue into the right buttock an asymmetric edema and muscular swelling and the right hemipelvis. Right inguinal adenopathy is present. This may be a perirectal abscess or necrotic tumor with extrapelvic extension. \"  5/18/17--CT guided IR drain placement:   5/19/17--MRI pelvis--\"IMPRESSION: Status post right perianal/rectal abscess drainage. The extensive remaining abscess findings are detailed above. \"    ASSESSMENT:   Pelvic abscess   S/p IR drain 5/18/17   S/p operative I&D, debridement of necrotic tissue 5/19/17   S/p operative I&D, debridement of necrotic tissue 5/22/17  Sepsis, due to above--improved  Recurrent fevers--resolved  Leukocytosis, due to above--resolved  DKA--improved  Rhabdomyolysis--resolved  NALDO--resolved  Pancreatitis--resolved  Anemia--   S/p transfusion 2 units PRBC 5/18/17  Hypernatremia--resolved  Elevated LFTs--resolved  Hypophosphatemia--resolved    PLAN:  --continue IV and PO pain medications  --OOB, IS, mobilize  --No CV issues. Tachycardia is probably compensatory response to sepsis  --regular diabetic diet. --this remains a challenging wound, given the deep upward extension. Will see how VAC works and if we can get seal to stay 48 hrs. Will be challenging, being so close to the anus. Based on appearance yesterday, I do not think we need further OR debridement. Certainly if she worsens or fails to improve, could consider another quick look under anesthesia  --follow UOP, creat. --transfuse for <7  --continue broad spectrum antibiotics per ID recs  --PT seeing  --[facility] may be a good option for her? Patient hopes to go home with home health, which may be difficult, given the size, depth, and tracking of this wound. I doubt family will be able to care for it adequately.   --VAC change with Prateek Bae MD

## 2017-05-31 NOTE — PROGRESS NOTES
Wound vac cannister changed and emptied by Nic Post RN. 500 ml out. Continuous suction remains per MD orders.

## 2017-05-31 NOTE — PROGRESS NOTES
Redraw of CBC per Dr. Chen Patch request.    Critical hgb 6.8 called to MD.    New transfusion orders received at this time. Cross match , await  to witness cross match draw from pt quad lumen line per hospital policy.

## 2017-05-31 NOTE — PROGRESS NOTES
[facility] has a bed available for patient -   SW spoke with patient this date about potential transfer to [facility]- pt is uncertain whether or not physician will discharge home or [place] [address] is hopeful to return home but understands that due to the complexity of the care needed this may not be possible. SW will continue to follow.

## 2017-05-31 NOTE — PROGRESS NOTES
Problem: Mobility Impaired (Adult and Pediatric)  Goal: *Acute Goals and Plan of Care (Insert Text)  STG:  (1.)Ms. Jean Wick will move from supine to sit and sit to supine , scoot up and down and roll side to side with CONTACT GUARD ASSIST within 3 day(s). (2.)Ms. Jean Wick will transfer from bed to chair and chair to bed with STAND BY ASSIST using the least restrictive device within 3 day(s). Goal Met: 5/31/17  (3.)Ms. Jean Wick will ambulate with CONTACT GUARD ASSIST for 100 feet with the least restrictive device within 3 day(s). Goal Met: 5/31/17    LTG:  (1.)Ms. Jean Wick will move from supine to sit and sit to supine , scoot up and down and roll side to side in bed with SUPERVISION within 7 day(s). (2.)Ms. Jean Wick will transfer from bed to chair and chair to bed with SUPERVISION using the least restrictive device within 7 day(s). (3.)Ms. Jean Wick will ambulate with STAND BY ASSIST for 500+ feet with the least restrictive device within 7 day(s). ________________________________________________________________________________________________      PHYSICAL THERAPY: Daily Note, Treatment Day: 3rd and AM 5/31/2017  INPATIENT: Hospital Day: 16  Payor: Evelin Roach / Plan: [facility] / Product Type: PPO /      NAME/AGE/GENDER: Anika Russo is a 29 y.o. female    PRIMARY DIAGNOSIS: DKA (diabetic ketoacidoses) ([facility] [address]  Abscess  WOUND RIGHT BUTTOCK DKA (diabetic ketoacidoses) ([facility] [address] DKA (diabetic ketoacidoses) ([facility])  Procedure(s) (LRB):  DEBRIDEMENT RIGHT BUTTOCKS WITH DRESSING CHANGE (Right)  9 Days Post-Op  ICD-10: Treatment Diagnosis:       · Generalized Muscle Weakness (M62.81)  · Difficulty in walking, Not elsewhere classified (R26.2)   Precaution/Allergies:  Review of patient's allergies indicates no known allergies. ASSESSMENT:      Ms. Jean Wick was sitting up in chair on contact and agreeable to therapy. Very flat affect.  She reported she needed to use the restroom prior to mobilization and required CGA to ambulate into restroom with no assistive device. Increased ambulation distance to 200' with rolling walker and stand by assistance, no losses of balance noted with ambulation today and good self-monitoring taking several standing rest breaks during ambulation. Great progress with mobility, again doubling her ambulating distance. She reported decreased pain after ambulation. Discussed with her ambulating with family members in between therapy session. Will continue therapy efforts. This section established at most recent assessment   PROBLEM LIST (Impairments causing functional limitations):  1. Decreased Strength  2. Decreased ADL/Functional Activities  3. Decreased Ambulation Ability/Technique  4. Decreased Balance  5. Increased Pain  6. Decreased Activity Tolerance    INTERVENTIONS PLANNED: (Benefits and precautions of physical therapy have been discussed with the patient.)  1. Balance Exercise  2. Bed Mobility  3. Family Education  4. Gait Training  5. Neuromuscular Re-education/Strengthening  6. Therapeutic Activites  7. Therapeutic Exercise/Strengthening  8. Group Therapy      TREATMENT PLAN: Frequency/Duration: 3 times a week for duration of hospital stay  Rehabilitation Potential For Stated Goals: GOOD      RECOMMENDED REHABILITATION/EQUIPMENT: (at time of discharge pending progress): Continue Skilled Therapy and HHPT versus Rehab- dependent on pt progress. HISTORY:   History of Present Injury/Illness (Reason for Referral):  See H&P below  34yo F with no significant PMH presented with worsening Rt Flank pain, nausea, multiple episodes of vomiting and weakness. She was seen here in ER 1 week ago with back pain and diagnosed with Sciatica and given NSAIDs which she took without much relief. Yesterday her pain worsened and she took extra pain meds with about 4 glasses of Wine per .  She then started having vomiting and abd discomfort that continued today and she came to ER. Found to be in DKA in ER with Na 115, K 6.0, LA 7.4, Bicarb 9, Ph 7.22, Cr 2.51 and Ck > 4000, WBC >40k, Hgb 10.8 with elevated LFTs Per  they took a trip to [place] by car and pt has been complaining for rt flank pain since they returned last week. Also she was seen 3 days ago at GYN office and blood work in care everywhere showing +ve serology for HSV 1& 2 as well as Chlamydia. Per GYN note she had worrisome excoriated lesions around her vagina/Vulva. She was started on Insulin gtt and Abx, cultures were sent in ER and Hospitalist asked to admit. Past Medical History/Comorbidities:   Ms. Julia Mcdaniels  has a past medical history of Ill-defined condition. Ms. Julia Mcdaniels  has a past surgical history that includes other surgical.  Social History/Living Environment:   Home Environment: Private residence  # Steps to Enter: 0  One/Two Story Residence: Two story, live on 1st floor  Living Alone: No  Support Systems: Spouse/Significant Other/Partner  Patient Expects to be Discharged to[de-identified] Private residence  Current DME Used/Available at Home: None  Tub or Shower Type: Tub/Shower combination  Prior Level of Function/Work/Activity:  Lives with , independent with ADLs and gait, drives      Number of Personal Factors/Comorbidities that affect the Plan of Care: 1-2: MODERATE COMPLEXITY   EXAMINATION:   Most Recent Physical Functioning:   Gross Assessment:                  Posture:     Balance:  Sitting: Intact  Standing: Impaired  Standing - Static: Good  Standing - Dynamic : Fair Bed Mobility:     Wheelchair Mobility:     Transfers:  Sit to Stand: Stand-by asssistance  Stand to Sit: Stand-by asssistance  Gait:     Base of Support: Center of gravity altered; Widened  Speed/Dina: Slow;Shuffled  Step Length: Right shortened;Left shortened  Stance: Right decreased  Gait Abnormalities: Decreased step clearance; Antalgic;Trunk sway increased; Path deviations  Distance (ft): 200 Feet (ft)  Assistive Device: Agustín Thornton rolling  Ambulation - Level of Assistance: Stand-by asssistance;Contact guard assistance  Interventions: Safety awareness training;Verbal cues; Visual/Demos       Body Structures Involved:  1. Heart  2. Lungs  3. Bones  4. Joints  5. Muscles Body Functions Affected:  1. Sensory/Pain  2. Cardio  3. Respiratory  4. Neuromusculoskeletal  5. Movement Related  6. Skin Related Activities and Participation Affected:  1. General Tasks and Demands  2. Mobility  3. Self Care  4. Domestic Life  5. Interpersonal Interactions and Relationships  6. Community, Social and [place]   Number of elements that affect the Plan of Care: 4+: HIGH COMPLEXITY   CLINICAL PRESENTATION:   Presentation: Stable and uncomplicated: LOW COMPLEXITY   CLINICAL DECISION MAKIN [facility] Inpatient Short Form  How much difficulty does the patient currently have. .. Unable A Lot A Little None   1. Turning over in bed (including adjusting bedclothes, sheets and blankets)? [ ] 1   [ ] 2   [X] 3   [ ] 4   2. Sitting down on and standing up from a chair with arms ( e.g., wheelchair, bedside commode, etc.)   [ ] 1   [ ] 2   [X] 3   [ ] 4   3. Moving from lying on back to sitting on the side of the bed? [ ] 1   [ ] 2   [X] 3   [ ] 4   How much help from another person does the patient currently need. .. Total A Lot A Little None   4. Moving to and from a bed to a chair (including a wheelchair)? [ ] 1   [ ] 2   [X] 3   [ ] 4   5. Need to walk in hospital room? [ ] 1   [X] 2   [ ] 3   [ ] 4   6. Climbing 3-5 steps with a railing? [ ] 1   [X] 2   [ ] 3   [ ] 4   © , Trustees of [address] [place] [address] [address] [id number], under license to [employer]. All rights reserved    Score:  Initial: 16 Most Recent: X (Date: -- )     Interpretation of Tool:  Represents activities that are increasingly more difficult (i.e. Bed mobility, Transfers, Gait).        Score 24 23 22-20 19-15 14-10 9-7 6       Modifier CH CI CJ CK CL CM CN · Mobility - Walking and Moving Around:               - CURRENT STATUS:    CK - 40%-59% impaired, limited or restricted               - GOAL STATUS:           CJ - 20%-39% impaired, limited or restricted               - D/C STATUS:                       ---------------To be determined---------------  Payor: BLUE CROSS / Plan: [place] BLUE CROSS [facility] / Product Type: PPO /       Medical Necessity:     · Patient is expected to demonstrate progress in strength, balance, coordination and functional technique to decrease assistance required with gait and functional mobility. Reason for Services/Other Comments:  · Patient continues to require skilled intervention due to decreased strength, decreased balance, decreased functional tolerance, decreased cardiopulmonary endurance affecting participation in basic ADLs and functional tasks. Use of outcome tool(s) and clinical judgement create a POC that gives a: Clear prediction of patient's progress: LOW COMPLEXITY                 TREATMENT:   (In addition to Assessment/Re-Assessment sessions the following treatments were rendered)   Pre-treatment Symptoms/Complaints:  \"Ok. \"  Pain: Initial:   Pain Intensity 1: 5 (after pain medicine)  Post Session:  0/10      Therapeutic Activity: (    23 minutes): Therapeutic activities including Bed transfers, Chair transfers, Toilet transfers and Ambulation on level ground to improve mobility, strength, balance and endurance. Required minimal Safety awareness training;Verbal cues; Visual/Demos to promote static and dynamic balance in standing. Braces/Orthotics/Lines/Etc:   · IV  Treatment/Session Assessment:    · Response to Treatment:  Pt participated well with PT and motivated to move, no complications noted.   · Interdisciplinary Collaboration:  · Physical Therapist and Registered Nurse  · After treatment position/precautions:  · Up in chair, Bed/Chair-wheels locked, Bed in low position and Call light within reach  · Compliance with Program/Exercises: Will assess as treatment progresses. · Recommendations/Intent for next treatment session: \"Next visit will focus on advancements to more challenging activities and reduction in assistance provided\".   Total Treatment Duration:  PT Patient Time In/Time Out  Time In: 1015  Time Out: 20 [facility], [place]

## 2017-06-01 ENCOUNTER — ANESTHESIA EVENT (OUTPATIENT)
Dept: SURGERY | Age: 32
DRG: 853 | End: 2017-06-01
Payer: COMMERCIAL

## 2017-06-01 ENCOUNTER — APPOINTMENT (OUTPATIENT)
Dept: CT IMAGING | Age: 32
DRG: 853 | End: 2017-06-01
Attending: COLON & RECTAL SURGERY
Payer: COMMERCIAL

## 2017-06-01 LAB
ANION GAP BLD CALC-SCNC: 10 MMOL/L (ref 7–16)
BUN SERPL-MCNC: 5 MG/DL (ref 6–23)
CALCIUM SERPL-MCNC: 8.1 MG/DL (ref 8.3–10.4)
CHLORIDE SERPL-SCNC: 100 MMOL/L (ref 98–107)
CO2 SERPL-SCNC: 29 MMOL/L (ref 21–32)
CREAT SERPL-MCNC: 0.4 MG/DL (ref 0.6–1)
ERYTHROCYTE [DISTWIDTH] IN BLOOD BY AUTOMATED COUNT: 15 % (ref 11.9–14.6)
FUNGUS ID 1, (DID), RFIM1T: NORMAL
GLUCOSE BLD STRIP.AUTO-MCNC: 119 MG/DL (ref 65–100)
GLUCOSE BLD STRIP.AUTO-MCNC: 133 MG/DL (ref 65–100)
GLUCOSE BLD STRIP.AUTO-MCNC: 139 MG/DL (ref 65–100)
GLUCOSE BLD STRIP.AUTO-MCNC: 207 MG/DL (ref 65–100)
GLUCOSE SERPL-MCNC: 105 MG/DL (ref 65–100)
HCT VFR BLD AUTO: 25.2 % (ref 35.8–46.3)
HGB BLD-MCNC: 8.4 G/DL (ref 11.7–15.4)
MAGNESIUM SERPL-MCNC: 1.7 MG/DL (ref 1.8–2.4)
MCH RBC QN AUTO: 28.3 PG (ref 26.1–32.9)
MCHC RBC AUTO-ENTMCNC: 33.3 G/DL (ref 31.4–35)
MCV RBC AUTO: 84.8 FL (ref 79.6–97.8)
PLATELET # BLD AUTO: 487 K/UL (ref 150–450)
PMV BLD AUTO: 8.5 FL (ref 10.8–14.1)
POTASSIUM SERPL-SCNC: 3.4 MMOL/L (ref 3.5–5.1)
RBC # BLD AUTO: 2.97 M/UL (ref 4.05–5.25)
SODIUM SERPL-SCNC: 139 MMOL/L (ref 136–145)
SPECIMEN SOURCE: NORMAL
WBC # BLD AUTO: 14.7 K/UL (ref 4.3–11.1)

## 2017-06-01 PROCEDURE — 74177 CT ABD & PELVIS W/CONTRAST: CPT

## 2017-06-01 PROCEDURE — 74011250636 HC RX REV CODE- 250/636: Performed by: COLON & RECTAL SURGERY

## 2017-06-01 PROCEDURE — 83735 ASSAY OF MAGNESIUM: CPT | Performed by: INTERNAL MEDICINE

## 2017-06-01 PROCEDURE — 77030019934 HC DRSG VAC ASST KCON -B

## 2017-06-01 PROCEDURE — 74011000258 HC RX REV CODE- 258: Performed by: INTERNAL MEDICINE

## 2017-06-01 PROCEDURE — 74011636637 HC RX REV CODE- 636/637: Performed by: INTERNAL MEDICINE

## 2017-06-01 PROCEDURE — 74011250637 HC RX REV CODE- 250/637: Performed by: COLON & RECTAL SURGERY

## 2017-06-01 PROCEDURE — 85027 COMPLETE CBC AUTOMATED: CPT | Performed by: INTERNAL MEDICINE

## 2017-06-01 PROCEDURE — 82962 GLUCOSE BLOOD TEST: CPT

## 2017-06-01 PROCEDURE — 74750000023 HC WOUND THERAPY

## 2017-06-01 PROCEDURE — P9047 ALBUMIN (HUMAN), 25%, 50ML: HCPCS | Performed by: INTERNAL MEDICINE

## 2017-06-01 PROCEDURE — 77030025162 HC DRSG VAC ASST 1 KCON -B

## 2017-06-01 PROCEDURE — 65270000029 HC RM PRIVATE

## 2017-06-01 PROCEDURE — 74011250637 HC RX REV CODE- 250/637: Performed by: INTERNAL MEDICINE

## 2017-06-01 PROCEDURE — 74011250636 HC RX REV CODE- 250/636: Performed by: INTERNAL MEDICINE

## 2017-06-01 PROCEDURE — 80048 BASIC METABOLIC PNL TOTAL CA: CPT | Performed by: INTERNAL MEDICINE

## 2017-06-01 PROCEDURE — 36430 TRANSFUSION BLD/BLD COMPNT: CPT

## 2017-06-01 PROCEDURE — P9016 RBC LEUKOCYTES REDUCED: HCPCS | Performed by: INTERNAL MEDICINE

## 2017-06-01 PROCEDURE — 74011636320 HC RX REV CODE- 636/320: Performed by: INTERNAL MEDICINE

## 2017-06-01 RX ORDER — POLYETHYLENE GLYCOL 3350 17 G/17G
255 POWDER, FOR SOLUTION ORAL ONCE
Status: COMPLETED | OUTPATIENT
Start: 2017-06-01 | End: 2017-06-01

## 2017-06-01 RX ORDER — BISACODYL 5 MG
20 TABLET, DELAYED RELEASE (ENTERIC COATED) ORAL ONCE
Status: COMPLETED | OUTPATIENT
Start: 2017-06-01 | End: 2017-06-01

## 2017-06-01 RX ORDER — ALBUMIN HUMAN 250 G/1000ML
25 SOLUTION INTRAVENOUS ONCE
Status: COMPLETED | OUTPATIENT
Start: 2017-06-01 | End: 2017-06-01

## 2017-06-01 RX ORDER — POTASSIUM CHLORIDE 20MEQ/15ML
40 LIQUID (ML) ORAL
Status: COMPLETED | OUTPATIENT
Start: 2017-06-01 | End: 2017-06-01

## 2017-06-01 RX ORDER — POTASSIUM CHLORIDE 20 MEQ/1
40 TABLET, EXTENDED RELEASE ORAL
Status: COMPLETED | OUTPATIENT
Start: 2017-06-01 | End: 2017-06-01

## 2017-06-01 RX ORDER — MAGNESIUM SULFATE HEPTAHYDRATE 40 MG/ML
2 INJECTION, SOLUTION INTRAVENOUS ONCE
Status: COMPLETED | OUTPATIENT
Start: 2017-06-01 | End: 2017-06-01

## 2017-06-01 RX ORDER — SODIUM CHLORIDE 0.9 % (FLUSH) 0.9 %
10 SYRINGE (ML) INJECTION
Status: COMPLETED | OUTPATIENT
Start: 2017-06-01 | End: 2017-06-01

## 2017-06-01 RX ADMIN — ACETAMINOPHEN 650 MG: 325 TABLET, FILM COATED ORAL at 20:52

## 2017-06-01 RX ADMIN — ALBUMIN (HUMAN) 25 G: 0.25 INJECTION, SOLUTION INTRAVENOUS at 17:55

## 2017-06-01 RX ADMIN — Medication 10 ML: at 15:13

## 2017-06-01 RX ADMIN — MORPHINE SULFATE 5 MG: 10 INJECTION INTRAMUSCULAR; INTRAVENOUS; SUBCUTANEOUS at 02:23

## 2017-06-01 RX ADMIN — FUROSEMIDE 20 MG: 10 INJECTION, SOLUTION INTRAMUSCULAR; INTRAVENOUS at 20:53

## 2017-06-01 RX ADMIN — MORPHINE SULFATE 5 MG: 10 INJECTION INTRAMUSCULAR; INTRAVENOUS; SUBCUTANEOUS at 17:48

## 2017-06-01 RX ADMIN — PIPERACILLIN SODIUM,TAZOBACTAM SODIUM 3.38 G: 3; .375 INJECTION, POWDER, FOR SOLUTION INTRAVENOUS at 17:47

## 2017-06-01 RX ADMIN — ALBUMIN (HUMAN) 25 G: 0.25 INJECTION, SOLUTION INTRAVENOUS at 05:55

## 2017-06-01 RX ADMIN — MORPHINE SULFATE 5 MG: 10 INJECTION INTRAMUSCULAR; INTRAVENOUS; SUBCUTANEOUS at 06:25

## 2017-06-01 RX ADMIN — POTASSIUM CHLORIDE 40 MEQ: 20 TABLET, EXTENDED RELEASE ORAL at 17:47

## 2017-06-01 RX ADMIN — POLYETHYLENE GLYCOL 3350 255 G: 17 POWDER, FOR SOLUTION ORAL at 17:55

## 2017-06-01 RX ADMIN — ACETAMINOPHEN 650 MG: 325 TABLET, FILM COATED ORAL at 08:14

## 2017-06-01 RX ADMIN — MORPHINE SULFATE 5 MG: 10 INJECTION INTRAMUSCULAR; INTRAVENOUS; SUBCUTANEOUS at 12:33

## 2017-06-01 RX ADMIN — SODIUM CHLORIDE 100 ML: 900 INJECTION, SOLUTION INTRAVENOUS at 15:13

## 2017-06-01 RX ADMIN — ALBUMIN (HUMAN) 25 G: 0.25 INJECTION, SOLUTION INTRAVENOUS at 00:00

## 2017-06-01 RX ADMIN — INSULIN LISPRO 6 UNITS: 100 INJECTION, SOLUTION INTRAVENOUS; SUBCUTANEOUS at 22:00

## 2017-06-01 RX ADMIN — MORPHINE SULFATE 5 MG: 10 INJECTION INTRAMUSCULAR; INTRAVENOUS; SUBCUTANEOUS at 23:16

## 2017-06-01 RX ADMIN — FLUCONAZOLE 400 MG: 100 TABLET ORAL at 08:14

## 2017-06-01 RX ADMIN — HEPARIN SODIUM 5000 UNITS: 5000 INJECTION, SOLUTION INTRAVENOUS; SUBCUTANEOUS at 05:56

## 2017-06-01 RX ADMIN — HEPARIN SODIUM 5000 UNITS: 5000 INJECTION, SOLUTION INTRAVENOUS; SUBCUTANEOUS at 22:00

## 2017-06-01 RX ADMIN — POTASSIUM CHLORIDE 40 MEQ: 20 SOLUTION ORAL at 18:18

## 2017-06-01 RX ADMIN — Medication 10 ML: at 01:19

## 2017-06-01 RX ADMIN — BISACODYL 20 MG: 5 TABLET, COATED ORAL at 17:48

## 2017-06-01 RX ADMIN — IOPAMIDOL 100 ML: 755 INJECTION, SOLUTION INTRAVENOUS at 15:13

## 2017-06-01 RX ADMIN — PIPERACILLIN SODIUM,TAZOBACTAM SODIUM 3.38 G: 3; .375 INJECTION, POWDER, FOR SOLUTION INTRAVENOUS at 01:18

## 2017-06-01 RX ADMIN — DIATRIZOATE MEGLUMINE AND DIATRIZOATE SODIUM 15 ML: 600; 100 SOLUTION ORAL; RECTAL at 10:36

## 2017-06-01 RX ADMIN — FUROSEMIDE 20 MG: 10 INJECTION, SOLUTION INTRAMUSCULAR; INTRAVENOUS at 10:37

## 2017-06-01 RX ADMIN — MAGNESIUM SULFATE HEPTAHYDRATE 2 G: 40 INJECTION, SOLUTION INTRAVENOUS at 20:52

## 2017-06-01 RX ADMIN — FUROSEMIDE 20 MG: 10 INJECTION, SOLUTION INTRAMUSCULAR; INTRAVENOUS at 03:00

## 2017-06-01 RX ADMIN — PIPERACILLIN SODIUM,TAZOBACTAM SODIUM 3.38 G: 3; .375 INJECTION, POWDER, FOR SOLUTION INTRAVENOUS at 08:13

## 2017-06-01 NOTE — PROGRESS NOTES
Pt called for assistance upon finishing BM, needed, \"reforce my drsg. \"    Kerlix removed and saturated in stool. Stool oozing from wound. Wound cleansed, new kerlix and abd pad placed.

## 2017-06-01 NOTE — PROGRESS NOTES
Spoke with Dr. Juan Zaragoza MD was unaware of cathflo orders on [place].     DC'd at this time per MD.

## 2017-06-01 NOTE — PROGRESS NOTES
Hospitalist Progress Note    2017  Admit Date: 2017  3:14 PM   NAME: Roni Norwood   :  1985   MRN:  [id number]   Attending: Salome Hathaway MD  PCP:  Harley Moss MD    SUBJECTIVE:   AS Previously documented: \" 32yo F with no significant PMH presented with worsening Rt Flank pain, nausea, multiple episodes of vomiting and weakness. She was seen here in ER 1 week ago with back pain and diagnosed with Sciatica and given NSAIDs which she took without much relief. she took extra pain meds with about 4 glasses of Wine per . She then started having vomiting and abd discomfort that continued today and she came to ER. Found to be in DKA in ER with Na 115, K 6.0, LA 7.4, Bicarb 9, Ph 7.22, Cr 2.51 and Ck > 4000, WBC >40k, Hgb 10.8 with elevated LFTs. CT abdomen showed Pelvic mass concerning for malignancy. MRI showed anorectal abscess tracking up in the Gluteal region. S/p IR drain placement and Surgical Debridement by Dr. Emily Santso. \"       She has been re-evaluated for need for re-current surgery daily - and as per surgery no need for it at this time. Daily wound dressings. BL edema and started on iv lasix and this has improved. She has persistent tachycardia which is felt to be a an infectious response. Unfortunately she started having fevers again 2017. We are monitoring closely. MGt as per surgery and ID at this time. : Feels better, no abd pain, hgb better today, no bleeding. Nursing notes and chart reviewed. Review of Systems negative with exception of pertinent positives noted above.     PHYSICAL EXAM     Visit Vitals    /65 (BP 1 Location: Right arm, BP Patient Position: Sitting)    Pulse (!) 128    Temp 99.7 °F (37.6 °C)    Resp 18    Ht 5' 2\" (1.575 m)    Wt 89.6 kg (197 lb 8.5 oz)    LMP 2017    SpO2 100%    BMI 36.13 kg/m2      Temp (24hrs), Av.9 °F (37.7 °C), Min:98.1 °F (36.7 °C), Max:101.1 °F (38.4 °C)    Oxygen Therapy  O2 Sat (%): 100 % (06/01/17 1623)  Pulse via Oximetry: 99 beats per minute (05/22/17 2059)  O2 Device: Room air (06/01/17 1623)  O2 Flow Rate (L/min): 2 l/min (05/22/17 2044)  ETCO2 (mmHg): 12 mmHg (05/18/17 1645)    Intake/Output Summary (Last 24 hours) at 06/01/17 1635  Last data filed at 05/31/17 1839   Gross per 24 hour   Intake                0 ml   Output              500 ml   Net             -500 ml       General: Mild distress. Alert.    Lungs:  CTABL. Heart:  RRR, no murmur, rub, or gallop, tachycardia  Abdomen: Soft, non-distended, non-tender, +bs  Extremities: No cyanosis or clubbing. Neurologic:  No focal deficits. Moves all extremities. Musculoskeletal: Tender Rt lower back  :   Large area of excoriations around her vagina and perineum. LABS AND STUDIES:  Personally reviewed all labs, meds, and studies for past 24hrs. CT abd and Pelvis:    IMPRESSION: Extensive complex air/fluid collection involving the right gluteal  musculature extending into the right thigh, consistent with myositis and  possible gangrene. Probably associated with perforation of the right lateral  wall of the inferior rectum. 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 Plan for Colostomy given new CT findings.   · NALDO: Resolved. · Sepsis: Likely due to perirectal/Gluteal Abscess vs ? PID (she has +ve serology for STDs). ID managing antibiotics. - input and help appreciated  · Vaginal Lesions with Suspected PID: GYN input appreciated  · Rhabdomyolysis: Resolved  · Acute Pancreatitis: resolved  · Anemia: Acute ? likely 2/2 hematoma. S/p total 4units PBRCS- continue to monitor & will transfuse if <7. Seen by Heme. · Tachycardia- septic response  · Hypernatremia- resolved  · Hyperkalemia: Resolved. · Leg swelling - continue low dose lasix and albumin. Check Albumin in am      Dispo: When OK with Surgery and ID. DVT ppx:  hsq  Discussed plan with pt and  who is in agreement. All questions answered.       Signed By: Kvng Ackerman MD     June 1, 2017

## 2017-06-01 NOTE — PROGRESS NOTES
Infectious Disease Progress Note    Today's Date: 2017   Admit Date: 2017    Impression:   · Polymicrobial Large pelvic abscess: 20 cc purulent fluid drained 17, cultures with GBS, GNR on GS; I&D : alpha strep, GBS, E coli and bacteroides; repeat I&D . Now with stool contamination  · Lactobacillus bacteremia (); repeat culture  negative  · Fever/leukocytosis  · Chlamydia cervicitis  · Severe anogenital HSV/inguinal yeast infection  · DKA (A1C 14), resolved      Plan:   · Agree with imagining. Stool contamination of wound noted, sx team following and considering diverting bowel   · Continue Zosyn and fluconazole. · Fever noted, BC rechecked 2 days ago, NTD: follow       Anti-infectives:     Piperacillin/tazobactam  Fluconazole      Subjective:     Up in the chair. Denies complaints of nausea, vomiting, diarrhea, fevers, chills or sweats. Ongoing pain, tamiko LE edema improved. Discouraged with recent findings. No Known Allergies     Review of Systems:  A comprehensive review of systems was negative except for that written in the History of Present Illness. Objective:     Visit Vitals    /65 (BP 1 Location: Left arm, BP Patient Position: Sitting)    Pulse (!) 118    Temp 98.5 °F (36.9 °C)    Resp 18    Ht 5' 2\" (1.575 m)    Wt 89.6 kg (197 lb 8.5 oz)    SpO2 97%    BMI 36.13 kg/m2     Temp (24hrs), Av.8 °F (37.7 °C), Min:98.1 °F (36.7 °C), Max:101.1 °F (38.4 °C)       Lines:  Central Venous Catheter:  L chest, non tender        Physical Exam:    General:  Awake, alert, no acute distress, obese, sitting in the chair   Eyes:  Sclera anicteric. Pupils equally round and reactive to light. Mouth/Throat: Mucous membranes normal, oral pharynx clear   Neck: Supple   Lungs:   Clear to auscultation bilaterally, good effort   CV:  Persistent tachycardia, regular rate and rhythm, soft murmur   Abdomen:   Soft, non-tender.  bowel sounds normal. non-distended;   Extremities: No cyanosis, tamiko LE 2+ pitting edema   Skin: No rash, Anal/gluteal fold wound with wound vac, R thigh area: warm/indurated, dry and crusted lesions noted at gluteal fold. Musculoskeletal: No swelling or deformity   Lines/Devices:  Intact, no erythema, drainage or tenderness           Data Review:     CBC:  Recent Labs      06/01/17   0711  05/31/17   1200  05/31/17   0710   WBC  14.7*  15.7*  12.3*   GRANS   --    --   66   MONOS   --    --   16*   EOS   --    --   1   ANEU   --    --   8.1   ABL   --    --   2.1   HGB  8.4*  6.8*  6.1*   HCT  25.2*  20.5*  18.8*   PLT  487*  560*  558*       BMP:  Recent Labs      06/01/17   0711  05/31/17   0720   CREA  0.40*  0.47*   BUN  5*  6   NA  139  138   K  3.4*  3.7   CL  100  99   CO2  29  32   AGAP  10  7   GLU  105*  115*       LFTS:  No results for input(s): TBILI, ALT, SGOT, AP, TP, ALB in the last 72 hours.     Microbiology:     All Micro Results     Procedure Component Value Units Date/Time    CULTURE, BLOOD [[id number]] Collected:  05/29/17 2355    Order Status:  Completed Specimen:  Blood from Blood Updated:  06/01/17 0643     Special Requests: RIGHT HAND        Culture result: NO GROWTH 2 DAYS       CULTURE, BLOOD [[id number]] Collected:  05/29/17 2345    Order Status:  Completed Specimen:  Blood from Blood Updated:  06/01/17 0643     Special Requests: --        PORT  WHITE       Culture result: NO GROWTH 2 DAYS       FUNGUS CULTURE AND SMEAR [[id number]] Collected:  05/19/17 1915    Order Status:  Completed Specimen:  Miscellaneous sample Updated:  05/31/17 1836     Source BUTTOCK        Fungus stain Direct Inoculation     Fungus (Mycology) Culture Other source received      (NOTE)  CALLED AND FAXED RESULTS TO [facility] ON 5/30/17 AT 5:00PM,DPL  Performed At: [address] [id number]  Ruthy Dorantes MD GZ:[id number]         CULTURE, ANAEROBIC [[id number]]  (Abnormal) Collected:  05/19/17 1915    Order Status:  Completed Specimen:  Buttock Updated:  05/31/17 1106     Special Requests: R BUTTOCK TISSUE     Culture result: LIGHT  BACTEROIDES FRAGILIS GROUP   (A)       SENT TO [facility] FOR TESTING  [id number]         REFER TO [facility] [id number]    ANAEROBE ID REFLEX [[id number]]  (Abnormal) Collected:  05/19/17 1915    Order Status:  Completed Specimen:  MICROBIAL ISOLATE Updated:  05/31/17 1037     Anaerobe ID Result 1 Comment (A)         (NOTE)  Bacteroides species, Bacteroides fragilis group  Beta lactamase positive. Testing performed by broth microdilution. Antimicrobial Suscept. Comment         (NOTE)       ** S = Susceptible; I = Intermediate; R = Resistant **                    P = Positive; N = Negative             MICS are expressed in micrograms per mL    Antibiotic                 RSLT#1    RSLT#2    RSLT#3    RSLT#4  Ampicillin/Sulbactam           S =8  Cefoxitin                      S =8  Chloramphenicol                S =4  Clindamycin                    S< 0.5  Metronidazole                  S< 0.5  Penicillin                     R> 32  Piperacillin                   S =16  Performed At: [address] [id number]  Tatiana Johnson MD KV:[id number]         ANAEROBE ID W/ Linette Harvey [[id number]] Collected:  05/19/17 1915    Order Status:  Completed Specimen:  MICROBIAL ISOLATE Updated:  05/31/17 1037     Source BUTTOCK         RIGHT        Anaerobe ID w/Suscept. Final Report Below      (NOTE)  Performed At: [address] [id number]  Tatiana Johnson MD NJ:[id number]  Corrected on 05/31 AT 1037:  This is a correction to the medical record of the test results previously reported as Preliminary report         CULTURE, BLOOD [[id number]]  (Abnormal) Collected:  05/16/17 1736    Order Status:  Completed Specimen:  Blood from Blood Updated:  05/30/17 1412     Special Requests: RIGHT ANTECUBITAL        GRAM STAIN GRAM POSITIVE RODS         ANAEROBIC BOTTLE POSITIVE                 CRITICAL RESULT NOT CALLED DUE TO PREVIOUS NOTIFICATION OF CRITICAL RESULT WITHIN THE LAST 24 HOURS. Culture result: GRAM POSITIVE RODS (A)         REFER TO [id number] FOR ID AND SUSCEPTIBILITY    CULTURE, BLOOD [[id number]]  (Abnormal) Collected:  05/16/17 1730    Order Status:  Completed Specimen:  Blood from Blood Updated:  05/30/17 1411     Special Requests: LEFT ANTECUBITAL        GRAM STAIN GRAM POSITIVE RODS         ANAEROBIC BOTTLE POSITIVE               RESULTS VERIFIED, PHONED TO AND READ BACK BY  Chris Thapa RN @ [phone] BY SP       Culture result: LACTOBACILLUS SPECIES (A)               Testing Performed by:  [employer]  P.OButch Barnes        SEE REPORT FROM [facility] [id number]       AEROBE ID REFLEX [[id number]] Collected:  05/16/17 1730    Order Status:  Completed Specimen:  MICROBIAL ISOLATE Updated:  05/29/17 1436     Result 1 Lactobacillus species      Corrected on 05/27 AT 5297:  This is a correction to the medical record of the test results previously reported as Gram positive rods        Antimicrobial Susceptibility Comment         (NOTE)       ** S = Susceptible; I = Intermediate; R = Resistant **                    P = Positive; N = Negative             MICS are expressed in micrograms per mL    Antibiotic                 RSLT#1    RSLT#2    RSLT#3    RSLT#4  Ampicillin                     S =0.25  Clindamycin                    R> 0.5  Erythromycin                   S<=0.06  Penicillin                     S =0.06  Vancomycin                     S =1  Performed At: [address] [id number]  Gian Jordan MD AH:[id number]         AEROBIC SUSCEPTIBILITY ID [[id number]] Collected:  05/16/17 1730    Order Status:  Completed Specimen:  MICROBIAL ISOLATE Updated:  05/29/17 1436     Source BLOOD        Aerobe ID + Suscept Final Report Below      (NOTE)  Performed At: [facility] [address] [id number]  Freya Macario MD MI:[id number]  Corrected on 05/29 AT 1436: This is a correction to the medical record of the test results previously reported as Preliminary report         CULTURE Axel Marcano [[id number]] Collected:  05/19/17 1946    Order Status:  Completed Specimen:  Miscellaneous sample Updated:  05/24/17 1239     Source Cervical/vaginal swab        Chlamydia trachomatis Culture NEGATIVE          (NOTE)  Performed At: [address] [id number]  Freya Macario MD LD:[id number]         CULTURE, BLOOD [[id number]] Collected:  05/19/17 1020    Order Status:  Completed Specimen:  Blood from Blood Updated:  05/24/17 0736     Special Requests: RIGHT ANTECUBITAL        Culture result: NO GROWTH 5 DAYS       CULTURE, BLOOD [[id number]] Collected:  05/19/17 1033    Order Status:  Completed Specimen:  Blood from Blood Updated:  05/24/17 0736     Special Requests: LEFT HAND        Culture result: NO GROWTH 5 DAYS       CULTURE, GONORRHOEAE ONLY [[id number]] Collected:  05/19/17 1946    Order Status:  Completed Specimen:  Cervical/vaginal swab Updated:  05/23/17 1249     Special Requests: NO SPECIAL REQUESTS        GRAM STAIN NO WBCS SEEN         FEW  YEAST         FEW  GRAM NEGATIVE RODS        Culture result:         NO NEISSERIA GONORRHOEAE ISOLATED    FUNGUS CULTURE Padmini Neal [[id number]] Collected:  05/18/17 1640    Order Status:  Completed Specimen:  Miscellaneous sample Updated:  05/23/17 1238     Source WOUND         PERIRECTAL  Corrected on 05/23 AT 1237:  This is a correction to the medical record of the test results previously reported as PERIRECTAL          Fungus stain Direct Inoculation     Fungus (Mycology) Culture Other source received      (NOTE)  Performed At: [address] [id number]  Freya Macario MD IE:[id number]         CULTURE, TISSUE W [address] [[id number]] (Abnormal)  (Susceptibility) Collected:  05/19/17 1915    Order Status:  Completed Specimen:  Buttock Updated:  05/23/17 0835     Special Requests: RIGHT        GRAM STAIN 0 TO 2  WBCS SEEN        MODERATE  GRAM VARIABLE RODS        Culture result: SCANT  ESCHERICHIA COLI   (A)       MODERATE  ALPHA STREPTOCOCCUS   (A)             SCANT  STREPTOCOCCI, BETA HEMOLYTIC GROUP B  THIS ORGANISM WILL BE HELD FOR 7 DAYS. IF FURTHER TESTING IS REQUIRED PLEASE NOTIFY MICROBIOLOGY   (A)    CULTURE, URINE [[id number]]  (Abnormal) Collected:  05/19/17 1117    Order Status:  Completed Specimen:  Urine from Cath Urine Updated:  05/23/17 0729     Special Requests: NO SPECIAL REQUESTS        Culture result:       >100,000  COLONIES/mL  CANDIDA GLABRATA  IDENTIFICATION TO FOLLOW   (A)      [id number]  COLONIES/mL  CANDIDA ALBICANS   (A)     CULTURE, BODY FLUID Keira Bares STAIN [[id number]]  (Abnormal) Collected:  05/18/17 1640    Order Status:  Completed Specimen:  Abscess Updated:  05/22/17 1406     Special Requests: PERIRECTAL     GRAM STAIN MANY  GRAM POSITIVE RODS         RARE  GRAM NEGATIVE RODS         10 TO 20  WBCS SEEN  PER OIF       Culture result:       LIGHT  STREPTOCOCCI, BETA HEMOLYTIC GROUP B  THIS ORGANISM WILL BE HELD FOR 7 DAYS. IF FURTHER TESTING IS REQUIRED PLEASE NOTIFY MICROBIOLOGY   (A)              NORMAL SKIN DEE ISOLATED    CULTURE, HSV W/ TYPING [[id number]] Collected:  05/17/17 1710    Order Status:  Completed Specimen:  Miscellaneous sample Updated:  05/21/17 1241     Source PERINEUM     HSV culture w typing Comment         (NOTE)  Negative  No Herpes simplex virus isolated.   Performed At: [address] [id number]  Eliseo López MD NQ:[id number]         AFB CULTURE + SMEAR W/RFLX ID FROM CULTURE [[id number]] Collected:  05/19/17 1915    Order Status:  Completed Specimen:  Miscellaneous sample Updated:  05/21/17 1036     Source BUTTOCK        AFB Specimen processing Concentration     Acid Fast Smear NEGATIVE          (NOTE)  Performed At: [address] [id number]  Tatiana Johnson MD H          Acid Fast Culture PENDING    CULTURE, HSV W/ TYPING [[id number]]     Order Status:  Canceled     MRSA SCREEN - PCR (NASAL) [[id number]] Collected:  17    Order Status:  Completed Specimen:  Nasal from Nasal Swab Updated:  17     Special Requests: NO SPECIAL REQUESTS        Culture result:         MRSA target DNA is not detected (presumptive not colonized with MRSA)          Imaging:   No new    Signed By: Ochoa Morgan NP     2017

## 2017-06-01 NOTE — ADT AUTH CERT NOTES
Utilization Review           Diabetes - Care Day 15 (5/30/2017) by Sreekanth Jordan        Review Status Review Entered       Completed 5/31/2017       Details              Care Day: 15 Care Date: 5/30/2017 Level of Care: Inpatient Floor       Guideline Day 2        Clinical Status       (X) * Alert       ( ) * No precipitating cause identified or cause manageable in outpatient setting       ( ) * Hemodynamic stability       5/31/2017 4:11 PM EDT by Duyen Patient         /44, P 121, R 18              (X) * Acceptable acid-base balance       ( ) * Blood glucose under acceptable control       (X) * Dehydration absent       (X) * Electrolytes acceptable       (X) * Diet tolerated       ( ) * Discharge plans and education understood              Activity       (X) * Ambulatory              Routes       ( ) * Oral hydration, medications, and diet       (X) [place] diet, advance as tolerated.              Interventions       (X) Complete IV volume, water replacement, and electrolyte balance       (X) Diabetic education              Medications       (X) Subcutaneous insulin       5/31/2017 4:11 PM EDT by Duyen Patient         SSI SC AC & HS,              ( ) * Outpatient diabetic medication regimen established              5/31/2017 4:11 PM EDT by Duyen Patient       Subject: Additional Clinical Information               Hospitalist Progress Note          Feels better, no abd pain, had fever earlier today 101. 1. EXAM: Heart: RRR,ãno murmur, rub, or gallop, tachycardia. Musculoskeletal: Tender Rt lower back. : Large area of excoriations around her vagina and perineum.  T 101.1, POC GLUC             PLAN:         ã                    DKA: Resolved, AG closed, continue current insulin regimen- will titrate as needed                                        Uncontrolled DM: A1C is 14- Bg better controlled- seen by diabetic educator                                        Pelvic Mass was determined to be an Abscess: s/p IR drain, and Surgical I&D X 2, on IV Morphine. No plans for any surgical intervention right now- will continue dressings-surgery plans to review sanam to determine definitive management                                        NALDO: Resolved, will c/w IVF                                        Sepsis: Likely due to perirectal/Gluteal Abscess vs ? PID (she has +ve serology for STDs). ID managing antibiotics. - input and help appreciated                                        Vaginal Lesions with Suspected PID: GYN input appreciated                                        Rhabdomyolysis: Resolved                                        Acute Pancreatitis: resolved                                        Anemia: Acute ? likely 2/2 hematoma. S/p 2units PBRCS- continue to monitor & will transfuse as appropriate if Hgb < 7.                                        Tachycardia- septic response                                        Hypernatremia- resolved                                        Hyperkalemia: Resolved.                                        Leg swelling - continue low dose lasix and albumin- would start decreasing tomorrow form q8h to daily                                        Fevers- continue to monitor closely- f/up am labs sanam- repeat cultures recommended by ID if she spikes again                             ã         Dispo: When OK with Surgery and ID.         ã         ID NOTE:         Continue current treatment for now. She appears clinically stable. Could potentially switch drugs if fevers persist and we don't have an obvious source as drug fever is a possibility - would repeat an MRI or CT before we consider this, however.  Follow up on blood cultures.         ãLASIX IV Q 8 HRS, HEPARIN SC Q 8 HRS, MORPHINE IV Q 4 HRS PRN X 4 (7X/24HRS), ZOSYN IV Q 8 HRS,                                          * Milestone                  Diabetes - Care Day 14 (5/29/2017) by Flores South        Review Status Review Entered       Completed 5/31/2017       Details              Care Day: 14 Care Date: 5/29/2017 Level of Care: Inpatient Floor       Guideline Day 2        Clinical Status       (X) * Alert       ( ) * No precipitating cause identified or cause manageable in outpatient setting       ( ) * Hemodynamic stability       5/31/2017 4:05 PM EDT by Lizbeth Galarza         /61, P 118, R 16              (X) * Acceptable acid-base balance       ( ) * Blood glucose under acceptable control       (X) * Dehydration absent       (X) * Electrolytes acceptable       (X) * Diet tolerated       ( ) * Discharge plans and education understood              Activity       (X) * Ambulatory              Routes       ( ) * Oral hydration, medications, and diet       (X) [place] diet, advance as tolerated.              Interventions       (X) Complete IV volume, water replacement, and electrolyte balance       (X) Diabetic education              Medications       (X) Subcutaneous insulin       5/31/2017 4:05 PM EDT by Lizbeth Galarza         SSI SC AC &HS,              ( ) * Outpatient diabetic medication regimen established              5/31/2017 4:05 PM EDT by Lizbeth Galarza       Subject: Additional Clinical Information               Hospitalist Progress Note          She has been re-evaluated for need for re-current surgery daily - and as per surgery no need for it at this time. Daily wound dressings. BL edema and started on iv lasix and this has improved. She has persistent tachycardia which is felt to be a an infectious response. Unfortunately she started having fevers again 05/27/2017. We are monitoring closely. MGt as per surgery and Gi at this time. EXAM: General: Mild distress. Alert. ããMusculoskeletal: Tender Rt lower back : Large area of excoriations around her vagina and perineum.         T 101.1, , 02 SAT 96% RA         RBC 2.57, HGB 7.2, HCT 21.8, , SED RATE 118, CREAT 0.45, CA 7.4, TP 5.8, ALB 1.4, GLOB 4.4, C REACTV PROT 18.3                    ãPLAN:         ã                    DKA: Resolved, AG closed, continue current insulin regimen- will titrate as needed                                        Uncontrolled DM: A1C is 14- Bg better controlled- seen by diabetic educator                                        Pelvic Mass was determined to be an Abscess: s/p IR drain, and Surgical I&D X 2, on IV Morphine. No plans for any surgical intervention right now- will continue dressings-surgery plans to review sanam to determine definitive management                                        NALDO: Resolved, will c/w IVF                                        Sepsis: Likely due to perirectal/Gluteal Abscess vs ? PID (she has +ve serology for STDs). ID managing antibiotics. - input and help appreciated                                        Vaginal Lesions with Suspected PID: GYN input appreciated                                        HIV screen- non reactive                                        Rhabdomyolysis: Resolved                                        Acute Pancreatitis: resolved                                        Anemia: Acute ? likely 2/2 hematoma.  S/p 2units PBRCS- continue to monitor & will transfuse as appropriate                                        Tachycardia- septic response                                        Hypernatremia- resolved                                        Hyperkalemia: Resolved.                                        Leg swelling - continue low dose lasix and albumin- would start decreasing tomorrow form q8h to daily                                        Fevers- continue to monitor closely- f/up am labs sanam- repeat cultures recommended by ID if she spikes again                             ã                  LASIX IV Q 8 HRS, HEPARIN SC Q 8 HRS, MORPHINE 5 MG IV Q 4 HRS PRN X3 (6X/24 HRS), ZOSYN IV Q 8 HRS,

## 2017-06-01 NOTE — PROGRESS NOTES
PT Daily Note  Attempted to see patient for physical therapy this afternoon but patient is scheduled to leave the floor for a CT scan. Will check back with patient tomorrow if schedule permits.   Thank you,  Yan Gupta, PTA

## 2017-06-01 NOTE — DIABETES MGMT
Reviewed with pt current insulin regimen: Humalog per sliding scale for correction qid ac and hs. Pt verbalizes understanding. Reviewed with pt blood glucose ranged 115-217 yesterday with total of 9 units Humalog given and 105-119 this morning. Also reviewed target goals for blood glucose and relationship between infection and hyperglycemia. Pt verbalizes understanding. Encouraged pt to continue practicing insulin self-injection each time insulin dose is due. Discussed with primary nurse.    Pt denies any questions at this time.

## 2017-06-01 NOTE — PROGRESS NOTES
Arielle Low M.D. Colon and Rectal Surgeon  [facility] [address], [address] [facility], [facility] [address]  Phone: [phone] Fax: [phone]      Anika Russo  [id number]    SUBJECTIVE:  29year old female admitted with DKA, rhabdomyolysis, NALDO, pancreatitis, lactic acidosis, leukocytosis, and sepsis. On CT, she was found to have a large pelvic process (mass vs abscess) with tracking into right buttock. She improved some with supportive care, fluid resuscitation, and antibiotics. She had an IR drain placed into her pelvic fluid collection. She was taken to the OR 5/19/17 for I&D of necrotic buttock fat. I found tracking into the extraperitoneal pelvic fluid collection. There was no connection to rectum. She was taken 5/22/17 for repeat debridement with much improved appearance of wound. VAC placed two days ago, and seal held. She has been OOB walking. Slept ok. Tolerating diet. No n/v.  Mild pain, mostly sore. No abdominal pain. Seen by PT.      ROS:  General--+fevers, chills  Respiratory--no shortness of breath, wheezing  Cardiovascular--no chest pain, palpitations  GI--no nausea/emesis. No hematochezia/melena.   No pain    PHYSICAL EXAM:   Patient Vitals for the past 24 hrs:   BP Temp Pulse Resp SpO2   06/01/17 0736 121/57 (!) 101.1 °F (38.4 °C) (!) 133 18 99 %   06/01/17 0525 116/68 98.1 °F (36.7 °C) (!) 117 18 99 %   06/01/17 0425 117/61 100.1 °F (37.8 °C) (!) 114 18 99 %   06/01/17 0320 113/62 100.1 °F (37.8 °C) (!) 115 18 98 %   06/01/17 0305 118/50 100.1 °F (37.8 °C) (!) 118 18 99 %   05/31/17 1931 133/68 (!) 100.6 °F (38.1 °C) (!) 127 20 99 %   05/31/17 1804 124/53 99.9 °F (37.7 °C) (!) 117 18 96 %   05/31/17 1748 120/54 (!) 100.6 °F (38.1 °C) (!) 116 18 96 %   05/31/17 1325 111/50 98.8 °F (37.1 °C) (!) 117 18 97 %   05/31/17 1127 99/50 99.6 °F (37.6 °C) (!) 118 18 96 %       General--AAO, NAD, answers all questions, appears comfortable  Abdomen--soft, nontender, nondistended. No peritoneal signs, no rebound, no guarding  Buttock--VAC removed today. Unfortunately, the deeper white sponges have formed stool on them, most consistent with a connection to the rectum. Again, given the absence of peritoneal signs, this is likely all extraperitoneal.  Superficially, the wound looks great with excellent granulation tissue and no necrotic areas. Widely spreading to examine the deeper component shows some continued grayish fluid drainage, and what feels to be stool more proximally. Wound packed with gauze. UOP: 3/nr  VAC: on   BM: 0/nr    Recent Labs      17   0711  17   1200  17   0710   WBC  14.7*  15.7*  12.3*   HGB  8.4*  6.8*  6.1*   HCT  25.2*  20.5*  18.8*   PLT  487*  560*  558*       Recent Labs      17   0711  17   0720  17   0710   NA  139  138   --    K  3.4*  3.7   --    CL  100  99   --    CO2  29  32   --    BUN  5*  6   --    CREA  0.40*  0.47*   --    MG   --    --   1.4*       No results for input(s): AML, LPSE in the last 72 hours. No results for input(s): PTP, INR, APTT in the last 72 hours. No lab exists for component: Jp Farrar  New lactate 1.4  Last CRP 18.3  RPR NR  Pelvic fluid culture from IR 17--beta hemolytic strep  AFB smear 17--negative  HSV culture 17--negative  Blood cultures 17--GPR x2 bottles  Blood cultures 17--no growth x 5 days  Blood cultures 17--no growth 2 day  UA 17--1+ bacteria, neg LE/nitr--cultures with 100k candida glabrata  OR tissue culture 17--beta hemolytic strep  Above labs reviewed by me. IMAGIN17--CT abd/pelvis: \"IMPRESSION: Large incompletely characterized pelvic mass with apparent involvement/extension into the right gluteal muscles. The findings presumably represent a neoplastic process. Pelvic MRI may be beneficial for further delineation as it relates to other pelvic anatomy.  Preferably this would be done with IV contrast.\"  5/17/17--CT chest/abd/pelvis: \"IMPRESSION: Indeterminate perirectal mass with extension of heterogeneous, low-attenuation soft tissue into the right buttock an asymmetric edema and muscular swelling and the right hemipelvis. Right inguinal adenopathy is present. This may be a perirectal abscess or necrotic tumor with extrapelvic extension. \"  5/18/17--CT guided IR drain placement:   5/19/17--MRI pelvis--\"IMPRESSION: Status post right perianal/rectal abscess drainage. The extensive remaining abscess findings are detailed above. \"    ASSESSMENT:   Pelvic abscess   S/p IR drain 5/18/17   S/p operative I&D, debridement of necrotic tissue 5/19/17   S/p operative I&D, debridement of necrotic tissue 5/22/17  Sepsis, due to above--improved  Recurrent fevers--resolved  Leukocytosis, due to above--resolved  DKA--improved  Rhabdomyolysis--resolved  NALDO--resolved  Pancreatitis--resolved  Anemia--   S/p transfusion 2 units PRBC 5/18/17   S/p transfusion PRBC 5/31/17  Hypernatremia--resolved  Elevated LFTs--resolved  Hypophosphatemia--resolved    PLAN:  --continue IV and PO pain medications  --OOB, IS, mobilize  --No CV issues. Tachycardia is probably compensatory response to sepsis  --regular diabetic diet. --There has been a clear change in this wound. There is now stool present in it, almost undeniably so. This is a significant change, as both of her operations showed no evidence of connection to the rectum. This may be consistent with extension of the process to involve the rectal wall and breakdown of it. Unfortunately, given the low nature of this opening, I believe that a colostomy is appropriate to divert the area. That being said, with diversion and local wound care, we may be able to get this to heal in and avoid a permanent stoma.   --I will send her for a CT scan of the abdomen and pelvis to reevaluate the situation  --She will do a full MiraLAX/Dulcolax bowel prep today  --I will plan to take her for prone exam under anesthesia followed by a laparoscopic versus open diverting colostomy or ileostomy   --follow UOP, creat. --transfuse for <7  --continue broad spectrum antibiotics per ID recs  --PT seeing  --Barb Aden may be a good option for her. Given these new developments, transfer will have to be put on hold.         Lucille Ulloa MD

## 2017-06-02 ENCOUNTER — ANESTHESIA (OUTPATIENT)
Dept: SURGERY | Age: 32
DRG: 853 | End: 2017-06-02
Payer: COMMERCIAL

## 2017-06-02 LAB
ABO + RH BLD: NORMAL
ALBUMIN SERPL BCP-MCNC: 2.1 G/DL (ref 3.5–5)
ANION GAP BLD CALC-SCNC: 11 MMOL/L (ref 7–16)
BACTERIA SPEC CULT: ABNORMAL
BLD PROD TYP BPU: NORMAL
BLD PROD TYP BPU: NORMAL
BLOOD GROUP ANTIBODIES SERPL: NORMAL
BPU ID: NORMAL
BPU ID: NORMAL
BUN SERPL-MCNC: 4 MG/DL (ref 6–23)
CALCIUM SERPL-MCNC: 7.9 MG/DL (ref 8.3–10.4)
CHLORIDE SERPL-SCNC: 99 MMOL/L (ref 98–107)
CO2 SERPL-SCNC: 29 MMOL/L (ref 21–32)
CREAT SERPL-MCNC: 0.43 MG/DL (ref 0.6–1)
CROSSMATCH RESULT,%XM: NORMAL
CROSSMATCH RESULT,%XM: NORMAL
ERYTHROCYTE [DISTWIDTH] IN BLOOD BY AUTOMATED COUNT: 15.4 % (ref 11.9–14.6)
GLUCOSE BLD STRIP.AUTO-MCNC: 120 MG/DL (ref 65–100)
GLUCOSE BLD STRIP.AUTO-MCNC: 127 MG/DL (ref 65–100)
GLUCOSE BLD STRIP.AUTO-MCNC: 129 MG/DL (ref 65–100)
GLUCOSE BLD STRIP.AUTO-MCNC: 131 MG/DL (ref 65–100)
GLUCOSE SERPL-MCNC: 138 MG/DL (ref 65–100)
HCG SERPL QL: NEGATIVE
HCT VFR BLD AUTO: 25.7 % (ref 35.8–46.3)
HGB BLD-MCNC: 8.8 G/DL (ref 11.7–15.4)
MAGNESIUM SERPL-MCNC: 1.8 MG/DL (ref 1.8–2.4)
MCH RBC QN AUTO: 29 PG (ref 26.1–32.9)
MCHC RBC AUTO-ENTMCNC: 34.2 G/DL (ref 31.4–35)
MCV RBC AUTO: 84.8 FL (ref 79.6–97.8)
PLATELET # BLD AUTO: 437 K/UL (ref 150–450)
PMV BLD AUTO: 8.1 FL (ref 10.8–14.1)
POTASSIUM SERPL-SCNC: 3.4 MMOL/L (ref 3.5–5.1)
RBC # BLD AUTO: 3.03 M/UL (ref 4.05–5.25)
SERVICE CMNT-IMP: ABNORMAL
SODIUM SERPL-SCNC: 139 MMOL/L (ref 136–145)
SPECIMEN EXP DATE BLD: NORMAL
STATUS OF UNIT,%ST: NORMAL
STATUS OF UNIT,%ST: NORMAL
UNIT DIVISION, %UDIV: 0
UNIT DIVISION, %UDIV: 0
WBC # BLD AUTO: 21.2 K/UL (ref 4.3–11.1)

## 2017-06-02 PROCEDURE — 0D1N4Z4 BYPASS SIGMOID COLON TO CUTANEOUS, PERCUTANEOUS ENDOSCOPIC APPROACH: ICD-10-PCS | Performed by: COLON & RECTAL SURGERY

## 2017-06-02 PROCEDURE — 74011250636 HC RX REV CODE- 250/636: Performed by: INTERNAL MEDICINE

## 2017-06-02 PROCEDURE — 74011250636 HC RX REV CODE- 250/636: Performed by: ANESTHESIOLOGY

## 2017-06-02 PROCEDURE — 84703 CHORIONIC GONADOTROPIN ASSAY: CPT | Performed by: ANESTHESIOLOGY

## 2017-06-02 PROCEDURE — 74011000258 HC RX REV CODE- 258: Performed by: NURSE PRACTITIONER

## 2017-06-02 PROCEDURE — 74011250637 HC RX REV CODE- 250/637: Performed by: INTERNAL MEDICINE

## 2017-06-02 PROCEDURE — 77030035220 HC TRCR ENDOSC BLNTPRT ANCHR COVD -B: Performed by: COLON & RECTAL SURGERY

## 2017-06-02 PROCEDURE — 82040 ASSAY OF SERUM ALBUMIN: CPT | Performed by: INTERNAL MEDICINE

## 2017-06-02 PROCEDURE — 76210000006 HC OR PH I REC 0.5 TO 1 HR: Performed by: COLON & RECTAL SURGERY

## 2017-06-02 PROCEDURE — 77030008477 HC STYL SATN SLP COVD -A: Performed by: NURSE ANESTHETIST, CERTIFIED REGISTERED

## 2017-06-02 PROCEDURE — 83735 ASSAY OF MAGNESIUM: CPT | Performed by: INTERNAL MEDICINE

## 2017-06-02 PROCEDURE — 77030010507 HC ADH SKN DERMBND J&J -B: Performed by: COLON & RECTAL SURGERY

## 2017-06-02 PROCEDURE — 77030022703 HC LIGASURE  BLNT LAPSCP SEAL COVD -E: Performed by: COLON & RECTAL SURGERY

## 2017-06-02 PROCEDURE — 74011000258 HC RX REV CODE- 258: Performed by: INTERNAL MEDICINE

## 2017-06-02 PROCEDURE — 76010000173 HC OR TIME 3 TO 3.5 HR INTENSV-TIER 1: Performed by: COLON & RECTAL SURGERY

## 2017-06-02 PROCEDURE — 74011250637 HC RX REV CODE- 250/637: Performed by: ANESTHESIOLOGY

## 2017-06-02 PROCEDURE — 65270000029 HC RM PRIVATE

## 2017-06-02 PROCEDURE — 77030019908 HC STETH ESOPH SIMS -A: Performed by: NURSE ANESTHETIST, CERTIFIED REGISTERED

## 2017-06-02 PROCEDURE — 77030002933 HC SUT MCRYL J&J -A: Performed by: COLON & RECTAL SURGERY

## 2017-06-02 PROCEDURE — 74011250636 HC RX REV CODE- 250/636

## 2017-06-02 PROCEDURE — 77030009972 HC RELD STPLR INT COVD -B: Performed by: COLON & RECTAL SURGERY

## 2017-06-02 PROCEDURE — 77030008603 HC TRCR ENDOSC EPATH J&J -C: Performed by: COLON & RECTAL SURGERY

## 2017-06-02 PROCEDURE — 80048 BASIC METABOLIC PNL TOTAL CA: CPT | Performed by: INTERNAL MEDICINE

## 2017-06-02 PROCEDURE — 74011250636 HC RX REV CODE- 250/636: Performed by: COLON & RECTAL SURGERY

## 2017-06-02 PROCEDURE — 77030008518 HC TBNG INSUF ENDO STRY -B: Performed by: COLON & RECTAL SURGERY

## 2017-06-02 PROCEDURE — 0DQP0ZZ REPAIR RECTUM, OPEN APPROACH: ICD-10-PCS | Performed by: COLON & RECTAL SURGERY

## 2017-06-02 PROCEDURE — 77030010541: Performed by: COLON & RECTAL SURGERY

## 2017-06-02 PROCEDURE — 77030020782 HC GWN BAIR PAWS FLX 3M -B: Performed by: NURSE ANESTHETIST, CERTIFIED REGISTERED

## 2017-06-02 PROCEDURE — 77030018836 HC SOL IRR NACL ICUM -A: Performed by: COLON & RECTAL SURGERY

## 2017-06-02 PROCEDURE — 77030011640 HC PAD GRND REM COVD -A: Performed by: COLON & RECTAL SURGERY

## 2017-06-02 PROCEDURE — 76060000037 HC ANESTHESIA 3 TO 3.5 HR: Performed by: COLON & RECTAL SURGERY

## 2017-06-02 PROCEDURE — 85027 COMPLETE CBC AUTOMATED: CPT | Performed by: INTERNAL MEDICINE

## 2017-06-02 PROCEDURE — 74011000250 HC RX REV CODE- 250

## 2017-06-02 PROCEDURE — 77030003029 HC SUT VCRL J&J -B: Performed by: COLON & RECTAL SURGERY

## 2017-06-02 PROCEDURE — 77030031139 HC SUT VCRL2 J&J -A: Performed by: COLON & RECTAL SURGERY

## 2017-06-02 PROCEDURE — 0JB90ZZ EXCISION OF BUTTOCK SUBCUTANEOUS TISSUE AND FASCIA, OPEN APPROACH: ICD-10-PCS | Performed by: COLON & RECTAL SURGERY

## 2017-06-02 PROCEDURE — 82962 GLUCOSE BLOOD TEST: CPT

## 2017-06-02 PROCEDURE — 77030016151 HC PROTCTR LNS DFOG COVD -B: Performed by: COLON & RECTAL SURGERY

## 2017-06-02 PROCEDURE — 77030034849: Performed by: COLON & RECTAL SURGERY

## 2017-06-02 PROCEDURE — 77030008703 HC TU ET UNCUF COVD -A: Performed by: NURSE ANESTHETIST, CERTIFIED REGISTERED

## 2017-06-02 PROCEDURE — 74011250636 HC RX REV CODE- 250/636: Performed by: NURSE PRACTITIONER

## 2017-06-02 RX ORDER — LIDOCAINE HYDROCHLORIDE 10 MG/ML
0.1 INJECTION INFILTRATION; PERINEURAL AS NEEDED
Status: DISCONTINUED | OUTPATIENT
Start: 2017-06-02 | End: 2017-06-02 | Stop reason: HOSPADM

## 2017-06-02 RX ORDER — SODIUM CHLORIDE 0.9 % (FLUSH) 0.9 %
5-10 SYRINGE (ML) INJECTION AS NEEDED
Status: DISCONTINUED | OUTPATIENT
Start: 2017-06-02 | End: 2017-06-02 | Stop reason: HOSPADM

## 2017-06-02 RX ORDER — HYDROMORPHONE HYDROCHLORIDE 2 MG/ML
0.5 INJECTION, SOLUTION INTRAMUSCULAR; INTRAVENOUS; SUBCUTANEOUS
Status: DISCONTINUED | OUTPATIENT
Start: 2017-06-02 | End: 2017-06-02 | Stop reason: HOSPADM

## 2017-06-02 RX ORDER — NALOXONE HYDROCHLORIDE 0.4 MG/ML
0.1 INJECTION, SOLUTION INTRAMUSCULAR; INTRAVENOUS; SUBCUTANEOUS AS NEEDED
Status: DISCONTINUED | OUTPATIENT
Start: 2017-06-02 | End: 2017-06-02 | Stop reason: HOSPADM

## 2017-06-02 RX ORDER — ONDANSETRON 2 MG/ML
4 INJECTION INTRAMUSCULAR; INTRAVENOUS ONCE
Status: DISCONTINUED | OUTPATIENT
Start: 2017-06-02 | End: 2017-06-02 | Stop reason: HOSPADM

## 2017-06-02 RX ORDER — ACETAMINOPHEN 500 MG
500 TABLET ORAL ONCE
Status: DISCONTINUED | OUTPATIENT
Start: 2017-06-02 | End: 2017-06-02 | Stop reason: HOSPADM

## 2017-06-02 RX ORDER — ROCURONIUM BROMIDE 10 MG/ML
INJECTION, SOLUTION INTRAVENOUS AS NEEDED
Status: DISCONTINUED | OUTPATIENT
Start: 2017-06-02 | End: 2017-06-02 | Stop reason: HOSPADM

## 2017-06-02 RX ORDER — ONDANSETRON 2 MG/ML
INJECTION INTRAMUSCULAR; INTRAVENOUS AS NEEDED
Status: DISCONTINUED | OUTPATIENT
Start: 2017-06-02 | End: 2017-06-02 | Stop reason: HOSPADM

## 2017-06-02 RX ORDER — HYDROMORPHONE HYDROCHLORIDE 2 MG/ML
INJECTION, SOLUTION INTRAMUSCULAR; INTRAVENOUS; SUBCUTANEOUS AS NEEDED
Status: DISCONTINUED | OUTPATIENT
Start: 2017-06-02 | End: 2017-06-02 | Stop reason: HOSPADM

## 2017-06-02 RX ORDER — OXYCODONE HYDROCHLORIDE 5 MG/1
10 TABLET ORAL
Status: DISCONTINUED | OUTPATIENT
Start: 2017-06-02 | End: 2017-06-02 | Stop reason: HOSPADM

## 2017-06-02 RX ORDER — FENTANYL CITRATE 50 UG/ML
INJECTION, SOLUTION INTRAMUSCULAR; INTRAVENOUS AS NEEDED
Status: DISCONTINUED | OUTPATIENT
Start: 2017-06-02 | End: 2017-06-02 | Stop reason: HOSPADM

## 2017-06-02 RX ORDER — DIPHENHYDRAMINE HYDROCHLORIDE 50 MG/ML
12.5 INJECTION, SOLUTION INTRAMUSCULAR; INTRAVENOUS ONCE
Status: DISCONTINUED | OUTPATIENT
Start: 2017-06-02 | End: 2017-06-02 | Stop reason: HOSPADM

## 2017-06-02 RX ORDER — OXYCODONE HYDROCHLORIDE 5 MG/1
5 TABLET ORAL
Status: DISCONTINUED | OUTPATIENT
Start: 2017-06-02 | End: 2017-06-02 | Stop reason: HOSPADM

## 2017-06-02 RX ORDER — MIDAZOLAM HYDROCHLORIDE 1 MG/ML
2 INJECTION, SOLUTION INTRAMUSCULAR; INTRAVENOUS
Status: DISCONTINUED | OUTPATIENT
Start: 2017-06-02 | End: 2017-06-02 | Stop reason: HOSPADM

## 2017-06-02 RX ORDER — PROPOFOL 10 MG/ML
INJECTION, EMULSION INTRAVENOUS AS NEEDED
Status: DISCONTINUED | OUTPATIENT
Start: 2017-06-02 | End: 2017-06-02 | Stop reason: HOSPADM

## 2017-06-02 RX ORDER — LIDOCAINE HYDROCHLORIDE 20 MG/ML
INJECTION, SOLUTION EPIDURAL; INFILTRATION; INTRACAUDAL; PERINEURAL AS NEEDED
Status: DISCONTINUED | OUTPATIENT
Start: 2017-06-02 | End: 2017-06-02 | Stop reason: HOSPADM

## 2017-06-02 RX ORDER — FENTANYL CITRATE 50 UG/ML
100 INJECTION, SOLUTION INTRAMUSCULAR; INTRAVENOUS AS NEEDED
Status: DISCONTINUED | OUTPATIENT
Start: 2017-06-02 | End: 2017-06-02 | Stop reason: HOSPADM

## 2017-06-02 RX ORDER — SODIUM CHLORIDE, SODIUM LACTATE, POTASSIUM CHLORIDE, CALCIUM CHLORIDE 600; 310; 30; 20 MG/100ML; MG/100ML; MG/100ML; MG/100ML
1000 INJECTION, SOLUTION INTRAVENOUS CONTINUOUS
Status: DISCONTINUED | OUTPATIENT
Start: 2017-06-02 | End: 2017-06-02 | Stop reason: HOSPADM

## 2017-06-02 RX ORDER — SODIUM CHLORIDE, SODIUM LACTATE, POTASSIUM CHLORIDE, CALCIUM CHLORIDE 600; 310; 30; 20 MG/100ML; MG/100ML; MG/100ML; MG/100ML
75 INJECTION, SOLUTION INTRAVENOUS CONTINUOUS
Status: DISCONTINUED | OUTPATIENT
Start: 2017-06-02 | End: 2017-06-02 | Stop reason: HOSPADM

## 2017-06-02 RX ORDER — FAMOTIDINE 20 MG/1
20 TABLET, FILM COATED ORAL ONCE
Status: COMPLETED | OUTPATIENT
Start: 2017-06-02 | End: 2017-06-02

## 2017-06-02 RX ORDER — NEOSTIGMINE METHYLSULFATE 1 MG/ML
INJECTION INTRAVENOUS AS NEEDED
Status: DISCONTINUED | OUTPATIENT
Start: 2017-06-02 | End: 2017-06-02 | Stop reason: HOSPADM

## 2017-06-02 RX ORDER — FLUCONAZOLE 2 MG/ML
400 INJECTION, SOLUTION INTRAVENOUS EVERY 24 HOURS
Status: DISCONTINUED | OUTPATIENT
Start: 2017-06-03 | End: 2017-06-05 | Stop reason: HOSPADM

## 2017-06-02 RX ORDER — GLYCOPYRROLATE 0.2 MG/ML
INJECTION INTRAMUSCULAR; INTRAVENOUS AS NEEDED
Status: DISCONTINUED | OUTPATIENT
Start: 2017-06-02 | End: 2017-06-02 | Stop reason: HOSPADM

## 2017-06-02 RX ORDER — SODIUM CHLORIDE 0.9 % (FLUSH) 0.9 %
5-10 SYRINGE (ML) INJECTION EVERY 8 HOURS
Status: DISCONTINUED | OUTPATIENT
Start: 2017-06-02 | End: 2017-06-02 | Stop reason: HOSPADM

## 2017-06-02 RX ORDER — FAMOTIDINE 10 MG/ML
20 INJECTION INTRAVENOUS ONCE
Status: DISCONTINUED | OUTPATIENT
Start: 2017-06-02 | End: 2017-06-02 | Stop reason: HOSPADM

## 2017-06-02 RX ADMIN — FUROSEMIDE 20 MG: 10 INJECTION, SOLUTION INTRAMUSCULAR; INTRAVENOUS at 03:00

## 2017-06-02 RX ADMIN — ROCURONIUM BROMIDE 10 MG: 10 INJECTION, SOLUTION INTRAVENOUS at 14:54

## 2017-06-02 RX ADMIN — LIDOCAINE HYDROCHLORIDE 80 MG: 20 INJECTION, SOLUTION EPIDURAL; INFILTRATION; INTRACAUDAL; PERINEURAL at 13:08

## 2017-06-02 RX ADMIN — ROCURONIUM BROMIDE 10 MG: 10 INJECTION, SOLUTION INTRAVENOUS at 14:40

## 2017-06-02 RX ADMIN — ROCURONIUM BROMIDE 50 MG: 10 INJECTION, SOLUTION INTRAVENOUS at 13:08

## 2017-06-02 RX ADMIN — PIPERACILLIN SODIUM,TAZOBACTAM SODIUM 3.38 G: 3; .375 INJECTION, POWDER, FOR SOLUTION INTRAVENOUS at 08:20

## 2017-06-02 RX ADMIN — FLUCONAZOLE 100 MG: 100 TABLET ORAL at 08:18

## 2017-06-02 RX ADMIN — FENTANYL CITRATE 100 MCG: 50 INJECTION, SOLUTION INTRAMUSCULAR; INTRAVENOUS at 13:08

## 2017-06-02 RX ADMIN — PIPERACILLIN SODIUM,TAZOBACTAM SODIUM 3.38 G: 3; .375 INJECTION, POWDER, FOR SOLUTION INTRAVENOUS at 01:00

## 2017-06-02 RX ADMIN — PROPOFOL 200 MG: 10 INJECTION, EMULSION INTRAVENOUS at 13:08

## 2017-06-02 RX ADMIN — MORPHINE SULFATE 5 MG: 10 INJECTION INTRAMUSCULAR; INTRAVENOUS; SUBCUTANEOUS at 16:39

## 2017-06-02 RX ADMIN — PIPERACILLIN SODIUM,TAZOBACTAM SODIUM 4.5 G: 4; .5 INJECTION, POWDER, FOR SOLUTION INTRAVENOUS at 23:51

## 2017-06-02 RX ADMIN — GLYCOPYRROLATE 0.6 MG: 0.2 INJECTION INTRAMUSCULAR; INTRAVENOUS at 15:59

## 2017-06-02 RX ADMIN — SODIUM CHLORIDE, SODIUM LACTATE, POTASSIUM CHLORIDE, AND CALCIUM CHLORIDE 1000 ML: 600; 310; 30; 20 INJECTION, SOLUTION INTRAVENOUS at 11:30

## 2017-06-02 RX ADMIN — PIPERACILLIN SODIUM,TAZOBACTAM SODIUM 4.5 G: 4; .5 INJECTION, POWDER, FOR SOLUTION INTRAVENOUS at 17:54

## 2017-06-02 RX ADMIN — FENTANYL CITRATE 100 MCG: 50 INJECTION, SOLUTION INTRAMUSCULAR; INTRAVENOUS at 14:45

## 2017-06-02 RX ADMIN — NEOSTIGMINE METHYLSULFATE 4 MG: 1 INJECTION INTRAVENOUS at 15:59

## 2017-06-02 RX ADMIN — HEPARIN SODIUM 5000 UNITS: 5000 INJECTION, SOLUTION INTRAVENOUS; SUBCUTANEOUS at 06:00

## 2017-06-02 RX ADMIN — ROCURONIUM BROMIDE 10 MG: 10 INJECTION, SOLUTION INTRAVENOUS at 13:39

## 2017-06-02 RX ADMIN — MORPHINE SULFATE 5 MG: 10 INJECTION INTRAMUSCULAR; INTRAVENOUS; SUBCUTANEOUS at 08:03

## 2017-06-02 RX ADMIN — HYDROCODONE BITARTRATE AND ACETAMINOPHEN 2 TABLET: 5; 325 TABLET ORAL at 03:18

## 2017-06-02 RX ADMIN — HYDROCODONE BITARTRATE AND ACETAMINOPHEN 2 TABLET: 5; 325 TABLET ORAL at 23:49

## 2017-06-02 RX ADMIN — FAMOTIDINE 20 MG: 20 TABLET, FILM COATED ORAL at 08:18

## 2017-06-02 RX ADMIN — HYDROMORPHONE HYDROCHLORIDE 0.4 MG: 2 INJECTION, SOLUTION INTRAMUSCULAR; INTRAVENOUS; SUBCUTANEOUS at 15:37

## 2017-06-02 RX ADMIN — FENTANYL CITRATE 50 MCG: 50 INJECTION, SOLUTION INTRAMUSCULAR; INTRAVENOUS at 13:34

## 2017-06-02 RX ADMIN — FENTANYL CITRATE 50 MCG: 50 INJECTION, SOLUTION INTRAMUSCULAR; INTRAVENOUS at 14:02

## 2017-06-02 RX ADMIN — MORPHINE SULFATE 5 MG: 10 INJECTION INTRAMUSCULAR; INTRAVENOUS; SUBCUTANEOUS at 21:30

## 2017-06-02 RX ADMIN — FENTANYL CITRATE 100 MCG: 50 INJECTION, SOLUTION INTRAMUSCULAR; INTRAVENOUS at 13:14

## 2017-06-02 RX ADMIN — ONDANSETRON 4 MG: 2 INJECTION INTRAMUSCULAR; INTRAVENOUS at 15:32

## 2017-06-02 RX ADMIN — SODIUM CHLORIDE, SODIUM LACTATE, POTASSIUM CHLORIDE, AND CALCIUM CHLORIDE: 600; 310; 30; 20 INJECTION, SOLUTION INTRAVENOUS at 14:35

## 2017-06-02 RX ADMIN — HYDROMORPHONE HYDROCHLORIDE 1 MG: 2 INJECTION, SOLUTION INTRAMUSCULAR; INTRAVENOUS; SUBCUTANEOUS at 16:15

## 2017-06-02 RX ADMIN — HYDROMORPHONE HYDROCHLORIDE 0.6 MG: 2 INJECTION, SOLUTION INTRAMUSCULAR; INTRAVENOUS; SUBCUTANEOUS at 15:08

## 2017-06-02 RX ADMIN — SODIUM CHLORIDE, SODIUM LACTATE, POTASSIUM CHLORIDE, AND CALCIUM CHLORIDE: 600; 310; 30; 20 INJECTION, SOLUTION INTRAVENOUS at 16:07

## 2017-06-02 RX ADMIN — HEPARIN SODIUM 5000 UNITS: 5000 INJECTION, SOLUTION INTRAVENOUS; SUBCUTANEOUS at 21:30

## 2017-06-02 NOTE — PROGRESS NOTES
TRANSFER - IN REPORT:    Verbal report received from Gabbie Sequeira on Istiera Sailors  being received from PACU for routine progression of care      Report consisted of patients Situation, Background, Assessment and   Recommendations(SBAR). Information from the following report(s) SBAR was reviewed with the receiving nurse. Opportunity for questions and clarification was provided. Assessment completed upon patients arrival to unit and care assumed.

## 2017-06-02 NOTE — PROGRESS NOTES
Hospitalist Progress Note    2017  Admit Date: 2017  3:14 PM   NAME: Jose Daniel Velazquez   :  1985   MRN:  [id number]   Attending: Puma Polanco MD  PCP:  Calos Tobin MD    SUBJECTIVE:   AS Previously documented: \" 32yo F with no significant PMH presented with worsening Rt Flank pain, nausea, multiple episodes of vomiting and weakness. She was seen here in ER 1 week ago with back pain and diagnosed with Sciatica and given NSAIDs which she took without much relief. she took extra pain meds with about 4 glasses of Wine per . She then started having vomiting and abd discomfort that continued today and she came to ER. Found to be in DKA in ER with Na 115, K 6.0, LA 7.4, Bicarb 9, Ph 7.22, Cr 2.51 and Ck > 4000, WBC >40k, Hgb 10.8 with elevated LFTs. CT abdomen showed Pelvic mass concerning for malignancy. MRI showed anorectal abscess tracking up in the Gluteal region. S/p IR drain placement and Surgical Debridement by Dr. Lacey Penny. \"       She has been re-evaluated for need for re-current surgery daily - and as per surgery no need for it at this time. Daily wound dressings. BL edema and started on iv lasix and this has improved. She has persistent tachycardia which is felt to be a an infectious response. Unfortunately she started having fevers again 2017. We are monitoring closely. MGt as per surgery and ID at this time. : Feels better, no abd pain, hgb better today, no bleeding. Nursing notes and chart reviewed. Review of Systems negative with exception of pertinent positives noted above.     PHYSICAL EXAM     Visit Vitals    /58    Pulse (!) 121    Temp 99 °F (37.2 °C)    Resp 18    Ht 5' 2\" (1.575 m)    Wt 89.6 kg (197 lb 8.5 oz)    LMP 2017    SpO2 100%    BMI 36.13 kg/m2      Temp (24hrs), Av °F (37.8 °C), Min:99 °F (37.2 °C), Max:101.4 °F (38.6 °C)    Oxygen Therapy  O2 Sat (%): 100 % (17 0800)  Pulse via Oximetry: 99 beats per minute (17 2059)  O2 Device: Room air (06/01/17 1623)  O2 Flow Rate (L/min): 2 l/min (05/22/17 2044)  ETCO2 (mmHg): 12 mmHg (05/18/17 1645)  No intake or output data in the 24 hours ending 06/02/17 1137    General: Mild distress. Alert.    Lungs:  CTABL. Heart:  RRR, no murmur, rub, or gallop, tachycardia  Abdomen: Soft, non-distended, non-tender, +bs  Extremities: No cyanosis or clubbing. Neurologic:  No focal deficits. Moves all extremities. Musculoskeletal: Tender Rt lower back  :   Large area of excoriations around her vagina and perineum. LABS AND STUDIES:  Personally reviewed all labs, meds, and studies for past 24hrs. CT abd and Pelvis:    IMPRESSION: Extensive complex air/fluid collection involving the right gluteal  musculature extending into the right thigh, consistent with myositis and  possible gangrene. Probably associated with perforation of the right lateral  wall of the inferior rectum. 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 Plan for Colostomy today given new CT findings. · NALDO: Resolved. · Sepsis: Likely due to perirectal/Gluteal Abscess vs ? PID (she has +ve serology for STDs). ID managing antibiotics. - input and help appreciated  · Vaginal Lesions with Suspected PID: GYN input appreciated  · Rhabdomyolysis: Resolved  · Acute Pancreatitis: resolved  · Anemia: Acute ? likely 2/2 hematoma. S/p total 4units PBRCS- continue to monitor & transfuse if <7. Seen by Heme. · Tachycardia- septic response  · Hypernatremia- resolved  · Hyperkalemia: Resolved. · Leg swelling - Improved with lasix, will stop today. Case d/w Dr. Atilio Fernandez who agreed to take over as primary. Since most of her Acute medical issues have been resolved, Hospitalist will sign off. We will be on standby, please do not hesitate to call with questions.        Signed By: Carley Zazueta MD     June 2, 2017

## 2017-06-02 NOTE — PROGRESS NOTES
Infectious Disease Progress Note    Today's Date: 2017   Admit Date: 2017    Impression:   · Polymicrobial Large pelvic abscess, involving R gluteal/R thigh musculature: 20 cc purulent fluid drained 17, cultures with GBS, GNR on GS; I&D : alpha strep, GBS, E coli and bacteroides; repeat I&D . Now with stool contamination and concerns for rectal perf/necrosis with connection to the wound  · Lactobacillus bacteremia (); repeat culture  negative  · Fever/leukocytosis  · Chlamydia cervicitis  · Severe anogenital HSV/inguinal yeast infection  · DKA (A1C 14), resolved      Plan:   · CT findings noted, and would explain continued leukocytosis and fever  · Agree with operative debridement, recommend op cultures to assure antbx choice is still adequate  · Continue Zosyn (increase dose) and fluconazole change to IV for immediate post op period, change back to PO when taking orals. · BC, NTD: follow   · Dispo: [facility] when ready, DC held currently sec to sx      Anti-infectives:     Piperacillin/tazobactam -  Fluconazole -      Subjective:     Up in the shower. Getting ready for surgery, fevers and pain reported. No Known Allergies     Review of Systems:  A comprehensive review of systems was negative except for that written in the History of Present Illness. Objective:     Visit Vitals    /58    Pulse (!) 121    Temp 99 °F (37.2 °C)    Resp 18    Ht 5' 2\" (1.575 m)    Wt 89.6 kg (197 lb 8.5 oz)    SpO2 100%    BMI 36.13 kg/m2     Temp (24hrs), Av.7 °F (37.6 °C), Min:98.5 °F (36.9 °C), Max:101.4 °F (38.6 °C)       Lines:  Central Venous Catheter:  L chest, non tender        Physical Exam:    General:  Awake, alert, no acute distress, obese   Eyes:  Sclera anicteric. Pupils equally round and reactive to light. Mouth/Throat: No assessed    Neck: Supple   Lungs:   Good effort   CV:  Not assessed this am   Abdomen:   Soft, non-tender.  non-distended; Extremities: No cyanosis, tamiko LE 2+ pitting edema   Skin: No rash, Anal/gluteal fold wound with dressing in place, not examined today   Musculoskeletal: No deformity   Lines/Devices:  Intact, no erythema, drainage or tenderness           Data Review:     CBC:  Recent Labs      06/02/17   0500  06/01/17   0711  05/31/17   1200  05/31/17   0710   WBC  21.2*  14.7*  15.7*  12.3*   GRANS   --    --    --   66   MONOS   --    --    --   16*   EOS   --    --    --   1   ANEU   --    --    --   8.1   ABL   --    --    --   2.1   HGB  8.8*  8.4*  6.8*  6.1*   HCT  25.7*  25.2*  20.5*  18.8*   PLT  437  487*  560*  558*       BMP:  Recent Labs      06/02/17   0500  06/01/17   0711  05/31/17   0720   CREA  0.43*  0.40*  0.47*   BUN  4*  5*  6   NA  139  139  138   K  3.4*  3.4*  3.7   CL  99  100  99   CO2  29  29  32   AGAP  11  10  7   GLU  138*  105*  115*       LFTS:  Recent Labs      06/02/17   0500   ALB  2.1*       Microbiology:     All Micro Results     Procedure Component Value Units Date/Time    CULTURE, BLOOD [[id number]] Collected:  05/29/17 2355    Order Status:  Completed Specimen:  Blood from Blood Updated:  06/02/17 0735     Special Requests: RIGHT HAND        Culture result: NO GROWTH 3 DAYS       CULTURE, BLOOD [[id number]] Collected:  05/29/17 2345    Order Status:  Completed Specimen:  Blood from Blood Updated:  06/02/17 0735     Special Requests: --        PORT  WHITE       Culture result: NO GROWTH 3 DAYS       FUNGUS CULTURE AND SMEAR [[id number]] Collected:  05/19/17 1915    Order Status:  Completed Specimen:  Miscellaneous sample Updated:  05/31/17 1836     Source BUTTOCK        Fungus stain Direct Inoculation     Fungus (Mycology) Culture Other source received      (NOTE)  CALLED AND FAXED RESULTS TO [facility] ON 5/30/17 AT 5:00PM,DPL  Performed At: [address] [id number]  Yael Alfred MD NO:[id number]         CULTURE, ANAEROBIC [[id number]]  (Abnormal) Collected:  05/19/17 1915    Order Status:  Completed Specimen:  Buttock Updated:  05/31/17 1106     Special Requests: R BUTTOCK TISSUE     Culture result: LIGHT  BACTEROIDES FRAGILIS GROUP   (A)       SENT TO [facility] FOR TESTING  [id number]         REFER TO [facility] [id number]    ANAEROBE ID REFLEX [[id number]]  (Abnormal) Collected:  05/19/17 1915    Order Status:  Completed Specimen:  MICROBIAL ISOLATE Updated:  05/31/17 1037     Anaerobe ID Result 1 Comment (A)         (NOTE)  Bacteroides species, Bacteroides fragilis group  Beta lactamase positive. Testing performed by broth microdilution. Antimicrobial Suscept. Comment         (NOTE)       ** S = Susceptible; I = Intermediate; R = Resistant **                    P = Positive; N = Negative             MICS are expressed in micrograms per mL    Antibiotic                 RSLT#1    RSLT#2    RSLT#3    RSLT#4  Ampicillin/Sulbactam           S =8  Cefoxitin                      S =8  Chloramphenicol                S =4  Clindamycin                    S< 0.5  Metronidazole                  S< 0.5  Penicillin                     R> 32  Piperacillin                   S =16  Performed At: [address] [id number]  Jean-Pierre Gold MD DI:[id number]         ANAEROBE ID W/ Leila Button [[id number]] Collected:  05/19/17 1915    Order Status:  Completed Specimen:  MICROBIAL ISOLATE Updated:  05/31/17 1037     Source BUTTOCK         RIGHT        Anaerobe ID w/Suscept. Final Report Below      (NOTE)  Performed At: [address] [id number]  Jean-Pierre Gold MD LD:[id number]  Corrected on 05/31 AT 1037:  This is a correction to the medical record of the test results previously reported as Preliminary report         CULTURE, BLOOD [[id number]]  (Abnormal) Collected:  05/16/17 1736    Order Status:  Completed Specimen:  Blood from Blood Updated:  05/30/17 1412     Special Requests: RIGHT ANTECUBITAL        GRAM STAIN GRAM POSITIVE RODS         ANAEROBIC BOTTLE POSITIVE                 CRITICAL RESULT NOT CALLED DUE TO PREVIOUS NOTIFICATION OF CRITICAL RESULT WITHIN THE LAST 24 HOURS. Culture result: GRAM POSITIVE RODS (A)         REFER TO [id number] FOR ID AND SUSCEPTIBILITY    CULTURE, BLOOD [[id number]]  (Abnormal) Collected:  05/16/17 1730    Order Status:  Completed Specimen:  Blood from Blood Updated:  05/30/17 1411     Special Requests: LEFT ANTECUBITAL        GRAM STAIN GRAM POSITIVE RODS         ANAEROBIC BOTTLE POSITIVE               RESULTS VERIFIED, PHONED TO AND READ BACK BY  Shirley Valles RN @ [phone] BY SP       Culture result: LACTOBACILLUS SPECIES (A)               Testing Performed by:  [employer]  P.OButch Marsh        [facility] [id number]       AEROBE ID REFLEX [[id number]] Collected:  05/16/17 1730    Order Status:  Completed Specimen:  MICROBIAL ISOLATE Updated:  05/29/17 1436     Result 1 Lactobacillus species      Corrected on 05/27 AT 4708:  This is a correction to the medical record of the test results previously reported as Gram positive rods        Antimicrobial Susceptibility Comment         (NOTE)       ** S = Susceptible; I = Intermediate; R = Resistant **                    P = Positive; N = Negative             MICS are expressed in micrograms per mL    Antibiotic                 RSLT#1    RSLT#2    RSLT#3    RSLT#4  Ampicillin                     S =0.25  Clindamycin                    R> 0.5  Erythromycin                   S<=0.06  Penicillin                     S =0.06  Vancomycin                     S =1  Performed At: [address] [id number]  Alannah Ernandez MD WD:[id number]         AEROBIC SUSCEPTIBILITY ID [[id number]] Collected:  05/16/17 1730    Order Status:  Completed Specimen:  MICROBIAL ISOLATE Updated:  05/29/17 1436     Source BLOOD        Aerobe ID + Suscept Final Report Below      (NOTE)  Performed At: [address] [id number]  Aston Mendoza MD V  Corrected on  AT 1436: This is a correction to the medical record of the test results previously reported as Preliminary report         CULTURE, Jamin Awan [[id number]] Collected:  17    Order Status:  Completed Specimen:  Miscellaneous sample Updated:  17 1239     Source Cervical/vaginal swab        Chlamydia trachomatis Culture NEGATIVE          (NOTE)  Performed At: [address] [id number]  Aston Mendoza MD SK:[id number]         CULTURE, BLOOD [[id number]] Collected:  17 1020    Order Status:  Completed Specimen:  Blood from Blood Updated:  1736     Special Requests: RIGHT ANTECUBITAL        Culture result: NO GROWTH 5 DAYS       CULTURE, BLOOD [[id number]] Collected:  17 1033    Order Status:  Completed Specimen:  Blood from Blood Updated:  1736     Special Requests: LEFT HAND        Culture result: NO GROWTH 5 DAYS       CULTURE, GONORRHOEAE ONLY [[id number]] Collected:  17    Order Status:  Completed Specimen:  Cervical/vaginal swab Updated:  17 1249     Special Requests: NO SPECIAL REQUESTS        GRAM STAIN NO WBCS SEEN         FEW  YEAST         FEW  GRAM NEGATIVE RODS        Culture result:         NO NEISSERIA GONORRHOEAE ISOLATED    FUNGUS CULTURE AND [place] [[id number]] Collected:  17 1640    Order Status:  Completed Specimen:  Miscellaneous sample Updated:  17 1238     Source WOUND         PERIRECTAL  Corrected on  AT 1237:  This is a correction to the medical record of the test results previously reported as PERIRECTAL          Fungus stain Direct Inoculation     Fungus (Mycology) Culture Other source received      (NOTE)  Performed At: [address] [id number]  Aston Mendoza MD MV:[id number] CULTURE, TISSUE Canda Oven STAIN [[id number]]  (Abnormal)  (Susceptibility) Collected:  05/19/17 1915    Order Status:  Completed Specimen:  Buttock Updated:  05/23/17 0835     Special Requests: RIGHT        GRAM STAIN 0 TO 2  WBCS SEEN        MODERATE  GRAM VARIABLE RODS        Culture result: SCANT  ESCHERICHIA COLI   (A)       MODERATE  ALPHA STREPTOCOCCUS   (A)             SCANT  STREPTOCOCCI, BETA HEMOLYTIC GROUP B  THIS ORGANISM WILL BE HELD FOR 7 DAYS. IF FURTHER TESTING IS REQUIRED PLEASE NOTIFY MICROBIOLOGY   (A)    CULTURE, URINE [[id number]]  (Abnormal) Collected:  05/19/17 1117    Order Status:  Completed Specimen:  Urine from Cath Urine Updated:  05/23/17 0729     Special Requests: NO SPECIAL REQUESTS        Culture result:       >100,000  COLONIES/mL  CANDIDA GLABRATA  IDENTIFICATION TO FOLLOW   (A)      01969  COLONIES/mL  CANDIDA ALBICANS   (A)     CULTURE, BODY FLUID Canda Oven STAIN [[id number]]  (Abnormal) Collected:  05/18/17 1640    Order Status:  Completed Specimen:  Abscess Updated:  05/22/17 1406     Special Requests: PERIRECTAL     GRAM STAIN MANY  GRAM POSITIVE RODS         RARE  GRAM NEGATIVE RODS         10 TO 20  WBCS SEEN  PER OIF       Culture result:       LIGHT  STREPTOCOCCI, BETA HEMOLYTIC GROUP B  THIS ORGANISM WILL BE HELD FOR 7 DAYS. IF FURTHER TESTING IS REQUIRED PLEASE NOTIFY MICROBIOLOGY   (A)              NORMAL SKIN DEE ISOLATED    CULTURE, HSV W/ TYPING [[id number]] Collected:  05/17/17 1710    Order Status:  Completed Specimen:  Miscellaneous sample Updated:  05/21/17 1241     Source PERINEUM     HSV culture w typing Comment         (NOTE)  Negative  No Herpes simplex virus isolated.   Performed At: [address] [id number]  Freya Macario MD PW:[id number]         AFB CULTURE + SMEAR W/RFLX ID FROM CULTURE [[id number]] Collected:  05/19/17 1915    Order Status:  Completed Specimen:  Miscellaneous sample Updated:  05/21/17 1036     Source BUTTOCK        AFB Specimen processing Concentration     Acid Fast Smear NEGATIVE          (NOTE)  Performed At: [address] [id number]  Kris Taylor MD UD:[id number]          Acid Fast Culture PENDING    CULTURE, HSV W/ TYPING [[id number]]     Order Status:  Canceled     MRSA SCREEN - PCR (NASAL) [[id number]] Collected:  17    Order Status:  Completed Specimen:  Nasal from Nasal Swab Updated:  17     Special Requests: NO SPECIAL REQUESTS        Culture result:         MRSA target DNA is not detected (presumptive not colonized with MRSA)          Imagin/1/17 CT pelvis  IMPRESSION: Extensive complex air/fluid collection involving the right gluteal  musculature extending into the right thigh, consistent with myositis and  possible gangrene. Probably associated with perforation of the right lateral  wall of the inferior rectum.     Signed By: Clark Jauregui NP     2017

## 2017-06-02 NOTE — PROGRESS NOTES
PT NOTE:    Pt off floor for surgery. Will check back at later date as schedule allows.     Rhiannon Perez, PTA

## 2017-06-02 NOTE — PERIOP NOTES
TRANSFER - IN REPORT:    Verbal report received from [facility] on Mo Cabrerais  being received from room [phone] for routine progression of care      Report consisted of patients Situation, Background, Assessment and   Recommendations(SBAR). Information from the following report(s) SBAR, Kardex and MAR was reviewed with the receiving nurse. Opportunity for questions and clarification was provided. Assessment completed upon patients arrival to unit and care assumed.

## 2017-06-02 NOTE — PERIOP NOTES
TRANSFER - OUT REPORT:    Verbal report given to Estefani Velazquez RN(name) on Mo Roth  being transferred to [facility](unit) for routine post - op       Report consisted of patients Situation, Background, Assessment and   Recommendations(SBAR). Information from the following report(s) SBAR, OR Summary, Procedure Summary, Intake/Output and MAR was reviewed with the receiving nurse. Lines:   Quad Lumen 05/16/17 Left Subclavian (Active)   Central Line Being Utilized Yes 6/2/2017  4:48 PM   Criteria for Appropriate Use Long term IV/antibiotic administration 6/2/2017  4:48 PM   Site Assessment Clean, dry, & intact 6/2/2017  4:48 PM   Infiltration Assessment 0 6/2/2017  4:48 PM   Affected Extremity/Extremities Pulses palpable;Color distal to insertion site pink (or appropriate for race); Range of motion performed 6/2/2017  7:21 AM   Date of Last Dressing Change 06/01/17 6/2/2017  7:21 AM   Dressing Status Clean, dry, & intact 6/2/2017  4:48 PM   Dressing Type Transparent 6/2/2017  4:48 PM   Action Taken Dressing changed 6/1/2017  4:55 PM   Proximal Hub Color/Line Status Patent; Flushed 6/2/2017  7:21 AM   Positive Blood Return (Medial Site) No 6/1/2017  4:55 PM   Medial 1 Hub Color/Line Status Flushed;Patent 6/2/2017  7:21 AM   Positive Blood Return (Lateral Site) No 6/1/2017  4:55 PM   Medial 2 Hub Color/Line Status Patent; Flushed 6/2/2017  7:21 AM   Positive Blood Return (Site #3) Yes 6/1/2017  4:55 PM   Distal Hub Color/Line Status Flushed;Patent 6/2/2017  7:21 AM   Positive Blood Return (Site #4) Yes 6/1/2017  4:55 PM   Alcohol Cap Used No 5/30/2017  7:40 PM        Opportunity for questions and clarification was provided. Patient transported with:   O2 @ 3 liters    VTE prophylaxis orders have not been written for Mo Roth. Patient and family given floor number and nurses name. Family updated re: pt status after security code verified.

## 2017-06-02 NOTE — PERIOP NOTES
Pt states she was unable to void prior to coming to pre op and doesn't think she can do it now. Indicates she does not want to get up right now.  Katja Jordan here & order rec'd for serum pregnancy test.

## 2017-06-02 NOTE — ANESTHESIA POSTPROCEDURE EVALUATION
Post-Anesthesia Evaluation and Assessment    Patient: Romina Velazquez MRN: [id number]  SSN: [id number]    YOB: 1985  Age: 29 y.o. Sex: female       Cardiovascular Function/Vital Signs  Visit Vitals    /64    Pulse (!) 113    Temp 37.2 °C (99 °F)    Resp 16    Ht 5' 2\" (1.575 m)    Wt 89.6 kg (197 lb 8.5 oz)    SpO2 100%    BMI 36.13 kg/m2       Patient is status post general anesthesia for Procedure(s):  LAPAROSCOPIC COLOSTOMY  RECTAL EXAM UNDER ANESTHESIA (EUA) / .    Nausea/Vomiting: None    Postoperative hydration reviewed and adequate. Pain:  Pain Scale 1: Numeric (0 - 10) (06/02/17 1648)  Pain Intensity 1: 2 (06/02/17 1648)   Managed    Neurological Status:   Neuro (WDL): Within Defined Limits (06/02/17 1648)  Neuro  Neurologic State: Alert; Appropriate for age;Eyes open spontaneously (06/02/17 1644)  Orientation Level: Oriented X4 (06/02/17 7999)  Cognition: Appropriate decision making; Appropriate for age attention/concentration; Appropriate safety awareness; Follows commands (06/02/17 1099)  Speech: Appropriate for age;Clear (06/02/17 3596)  Assessment L Pupil: Brisk (06/02/17 0712)  Size L Pupil (mm): 2 (05/31/17 1921)  Assessment R Pupil: Brisk (06/01/17 1935)  Size R Pupil (mm): 2 (05/31/17 1921)  LUE Motor Response: Purposeful (06/02/17 1648)  LLE Motor Response: Purposeful (06/02/17 1648)  RUE Motor Response: Purposeful (06/02/17 1648)  RLE Motor Response: Purposeful (06/02/17 1648)   At baseline    Mental Status and Level of Consciousness: Arousable    Pulmonary Status:   O2 Device: Nasal cannula (06/02/17 1648)   Adequate oxygenation and airway patent    Complications related to anesthesia: None    Post-anesthesia assessment completed.  No concerns    Signed By: Radha Rashid MD     June 2, 2017

## 2017-06-02 NOTE — ANESTHESIA PREPROCEDURE EVALUATION
Anesthetic History   No history of anesthetic complications            Review of Systems / Medical History  Patient summary reviewed and pertinent labs reviewed    Pulmonary  Within defined limits                 Neuro/Psych   Within defined limits           Cardiovascular  Within defined limits                Exercise tolerance: >4 METS  Comments: No cardiac history   GI/Hepatic/Renal  Within defined limits              Endo/Other    Diabetes: poorly controlled, using insulin    Anemia     Other Findings   Comments: T&S ordered         Physical Exam    Airway  Mallampati: III  TM Distance: 4 - 6 cm  Neck ROM: normal range of motion        Cardiovascular    Rhythm: regular  Rate: normal         Dental    Dentition: Poor dentition     Pulmonary  Breath sounds clear to auscultation               Abdominal  GI exam deferred       Other Findings            Anesthetic Plan    ASA: 3  Anesthesia type: general          Induction: Intravenous

## 2017-06-02 NOTE — OP NOTES
[facility]   OPERATIVE REPORT       Name:  Jackson Hugo   MR#:  [id number]   :  1985   Account #:  [de-identified]   Date of Adm:  2017       DATE OF SURGERY: 2017    PREOPERATIVE DIAGNOSIS: Necrotizing buttock and pelvic   infection. POSTOPERATIVE DIAGNOSIS: Necrotizing buttock and pelvic   infection. PROCEDURE PERFORMED:   1. Rectal exam under anesthesia. 2. Sharp excisional debridement of skin, subcutaneous tissues, and fascia. 3. Suture repair of rectal perforation x2.   4. Ultrasound evaluation of soft tissues of the right side. 5. Laparoscopic and loop diverting colostomy. SURGEON: Angel Matamoros MD    ASSISTANT: None. ANESTHESIA: General.    BLOOD LOSS: 50 mL. SPECIMENS: None. COMPLICATIONS: None. BRIEF DESCRIPTION OF THE FINDINGS: The majority of the buttock   wound was very clean with minimal necrotic tissue debrided. There was some necrotic tissue on the tip of the coccyx that was   sharply debrided excisional fashion. There were 2 holes noted in   the rectum which were closed with 3-0 Vicryl sutures. Ultrasound   of the right thigh identified no drainable fluid collections in   the tissues. I could not find any tracking of the infection from   this open wound into the thigh as was suggested on the CT scan. The loop colostomy was formed without any incident. BRIEF HISTORY: The patient a 28-year-old female who presented to   the hospital with DKA, acute kidney injury, pancreatitis,   rhabdomyolysis. She was found on imaging to have a complex   pelvic fluid collection as well as   suggestion of extension into her buttock. Interventional   Radiology drain was placed and then she was taken to the   operating room, where I found her to have a large area of   necrotic tissue in the right buttock. This tracked up into her   extraperitoneal pelvis. Tissue was widely debrided and packed.    She was brought back to the operating room several days later   for a second look and repeat drainage. After that, the wound   appeared to be healing appropriately and very nicely. Unfortunately, on dressing change yesterday she was noted to   have a large amount of stool in her wound, consistent with a   rectal perforation. With these findings, I counseled her on   reoperative intervention and a diverting colostomy. The patient was evaluated in the preoperative holding area. We   discussed the planned operation, risks, benefits, and   alternatives. She was brought to the operating room and placed   prone on the OR table after general anesthesia had been   administered. The buttocks and perineum were prepped and draped   in a sterile fashion. A time-out was performed. I began by   inspecting the rectum. The majority of the tissues were very   healthy and appeared to be granulating in nicely. The small skin   bridge on the medial aspect of the wound was excised as it was   no longer appearing to be viable. The edges of the wound were   trimmed up and necrotic/ischemic tissue was sharply excised with   the scissors. A small patch of necrotic fat was excised sharply   using scissors from the superior aspect of the wound. Lateral   and inferior aspects were very clean. Up more proximally, deep   in the pelvic component of the wound, I identified the rectal   perforation, which was wide open and appeared to be very clean   and healthy edges. There was a small amount of yellowish grayish   slough in the wound at this area, which was sharply debrided   with scissors. There was also tracking straight down into the   wound and up towards what appeared to be the hip area and this   was also debrided with a combination of a curette and sharply   using scissors. I repaired the rectal perforation through the wound using a 3-0   Vicryl in a running, full thickness fashion. Closure appeared   hemostatic, airtight and watertight.  I then placed a Nneka Bosch retractor in the anus and reevaluated the rectum. Rectal tissues   appeared very healthy and well vascularized. The exception was a   hole approximately 1 cm in size in the right lateral rectal   wall, about 2 cm above the dentate line. This was a second   perforation, which on exploration seemed to track to a   point just distal to the other perforation in the   buttocks/pelvic wound. The necrotic tissue in the base of this   was sharply debrided using scissors and this hole was also   closed with a running, full thickness, 3-0 Vicryl suture. I then   probed around the wound extensively looking for any tracking   into other tissues as was suggested by the CT scan. I found no   evidence of tracking anywhere despite extensive changes seen on   the CT scan. There was no necrotic tissue, no drainage from this   wound. The wound was irrigated with saline. Hemostasis was good. The wound was packed with dry Kerlix gauze. The thigh was inspected  with the handheld ultrasound. Despite a very detailed inspection,   I only found a large amount of tissue edema. I could not find any   pockets of purulence on ultrasound evaluation. The patient was placed back supine and then transferred back to   the OR table. Pressure points were all padded. Pino catheter   was placed. The abdomen was then prepped with ChloraPrep and   draped in a sterile fashion. She had been previously marked by   the enterostomal therapist for her ostomy site. The patient was   redraped. A time-out was again performed. I began by excising a   donut of skin from the left lower abdomen where the previous   marking had been. Electrocautery was used to dissect through   subcutaneous tissues to the anterior fascia which was scored   longitudinally. Stay sutures were placed. The muscles were   spread bluntly and the peritoneum was grasped and elevated. It   was incised sharply.  Sheba trocar was placed and the abdomen   was insufflated with carbon dioxide. Under direct visualization,   5 mm ports were placed in the right mid and right lower abdomen. I grasped the sigmoid colon and pulled it medially. I used the   LigaSure to divide along the white line of Toldt and lift the   colon up and off of the retroperitoneum in the avascular plane. This dissection was very nice and very clear. The ureter was   identified at the pelvic brim and was protected, being kept deep   to all of our dissection planes. The descending and sigmoid   colon were mobilized very nicely off of the retroperitoneum, and   when I reevaluated, the colon reached easily up to the   insufflated undersurface of the abdominal wall. With this in   mind, I grasped the colon at the planned site of the colostomy. The abdomen was desufflated. The Sheba trocar was removed and   the fascial hole was enlarged. The muscles spread bluntly and   the peritoneal hole was also enlarged. I grasped the colon and   pulled it out through the wound. It was clamped into place. The 5 mm ports were removed and the skin was closed with 4-0   Monocryl suture. I then matured the colostomy. A window was made   in the mesentery. A ZACKARY blue load stapler was used to transect   the bowel at this point. A corner of the distal limb was trimmed   off and a skin level colostomy was matured to the inferomedial   aspect of the colostomy hole. I then excised the staple line   from the proximal end, and then brooked the proximal limb using   3-0 Vicryl suture. Brooking stitches were placed at the 9, 12   and 3 o'clock and tied down. This nicely everted the colon   approximately 3 cm. Intervening stitches were then placed   between the dermis and the full thickness abdominal wall as well   as between the 2 limbs of the bowel wall in the inferomedial   aspect where the previous distal end had already been matured. Colostomy appliance was applied.  The patient was awakened,   extubated, and taken to recovery in stable condition. Sponge,   instrument, and needle counts were correct.         MD PABLITO Goss / Marysol Bangura   D:  06/02/2017   16:27   T:  06/02/2017   17:19   Job #:  [id number]

## 2017-06-02 NOTE — PROGRESS NOTES
CT scan reviewed. Extensive inflammation, and air pockets throughout the buttock and thigh. Also there is suggestion of hole in the rectum. With these findings, her ongoing fevers make sense. These pockets of air and inflammation seem differed from what I had found in the OR, and seems to have progressed since her last CT and MRI imaging exams. This is rather surprising, given how good the wound was looking on inspection alone. I think with these findings, will definitely plan repeat EUA, and will also need diverting colostomy to hopefully allow for healing and to save the rectum. She is tolerating her bowel prep, with loose, watery stool. Stool is coming out the wound. Besides fevers and the CT scan images, she remains stable.   Continue current course  Oscar Orlando MD

## 2017-06-02 NOTE — PROGRESS NOTES
TRANSFER - OUT REPORT:    Verbal report given toNancy on [employer]  being transferred to pre op for ordered procedure       Report consisted of patients Situation, Background, Assessment and   Recommendations(SBAR). Information from the following report(s) SBAR was reviewed with the receiving nurse. Opportunity for questions and clarification was provided.       Patient transported with:  Transport staff

## 2017-06-02 NOTE — PROGRESS NOTES
Lucille Ulloa M.D. Colon and Rectal Surgeon  [facility]  [address], [address] [facility], [address]   Phone: [phone] Fax: [phone]      Sameera Screen  [id number]    SUBJECTIVE:  29year old female admitted with DKA, rhabdomyolysis, NALDO, pancreatitis, lactic acidosis, leukocytosis, and sepsis. On CT, she was found to have a large pelvic process (mass vs abscess) with tracking into right buttock. She improved some with supportive care, fluid resuscitation, and antibiotics. She had an IR drain placed into her pelvic fluid collection. She was taken to the OR 5/19/17 for I&D of necrotic buttock fat. I found tracking into the extraperitoneal pelvic fluid collection. There was no connection to rectum. She was taken 5/22/17 for repeat debridement with much improved appearance of wound. Wound seemed to be improving, but on VAC change 6/1, there was stool in the wound, consistent with rectal wall breakdown. Due to fevers and worsening WBC, she had repeat CT done, showing extensive gas and inflammation in the right buttock and thigh. Plans for EUA and colostomy. Poor sleep last night. No n/v. Completed prep. She has been OOB walking to bathroom. Mild pain, mostly sore. No abdominal pain. ROS:  General--+fevers, chills  Respiratory--no shortness of breath, wheezing  Cardiovascular--no chest pain, palpitations  GI--no nausea/emesis. No hematochezia/melena. No pain    PHYSICAL EXAM:   Patient Vitals for the past 24 hrs:   BP Temp Pulse Resp SpO2   06/02/17 0420 106/48 99.7 °F (37.6 °C) (!) 119 16 95 %   06/01/17 1927 107/54 (!) 101.4 °F (38.6 °C) (!) 125 16 97 %   06/01/17 1623 128/65 99.7 °F (37.6 °C) (!) 128 18 100 %   06/01/17 1130 121/65 98.5 °F (36.9 °C) (!) 118 18 97 %   06/01/17 0736 121/57 (!) 101.1 °F (38.4 °C) (!) 133 18 99 %       General--AAO, NAD, answers all questions, appears comfortable  Abdomen--soft, nontender, nondistended.   No peritoneal signs, no rebound, no guarding  From : \"Buttock--VAC removed today. Unfortunately, the deeper white sponges have formed stool on them, most consistent with a connection to the rectum. Again, given the absence of peritoneal signs, this is likely all extraperitoneal.  Superficially, the wound looks great with excellent granulation tissue and no necrotic areas. Widely spreading to examine the deeper component shows some continued grayish fluid drainage, and what feels to be stool more proximally. Wound packed with gauze. \"    UOP: 4/3  BM:       Recent Labs      17   0500  17   0711  17   1200   WBC  21.2*  14.7*  15.7*   HGB  8.8*  8.4*  6.8*   HCT  25.7*  25.2*  20.5*   PLT  437  487*  560*       Recent Labs      17   0500  17   1802  17   0711  17   0720  17   0710   NA  139   --   139  138   --    K  3.4*   --   3.4*  3.7   --    CL  99   --   100  99   --    CO2  29   --   29  32   --    BUN  4*   --   5*  6   --    CREA  0.43*   --   0.40*  0.47*   --    MG  1.8  1.7*   --    --   1.4*       No results for input(s): AML, LPSE in the last 72 hours. No results for input(s): PTP, INR, APTT in the last 72 hours. No lab exists for component: Fabio Raymundosheyla  New lactate 1.4  Last CRP 18.3  RPR NR  Pelvic fluid culture from IR 17--beta hemolytic strep  AFB smear 17--negative  HSV culture 17--negative  Blood cultures 17--GPR x2 bottles  Blood cultures 17--no growth x 5 days  Blood cultures 17--no growth 2 day  UA 17--1+ bacteria, neg LE/nitr--cultures with 100k candida glabrata  OR tissue culture 17--beta hemolytic strep  Above labs reviewed by me. [id number]--CT abd/pelvis: \"IMPRESSION: Large incompletely characterized pelvic mass with apparent involvement/extension into the right gluteal muscles. The findings presumably represent a neoplastic process.  Pelvic MRI may be beneficial for further delineation as it relates to other pelvic anatomy. Preferably this would be done with IV contrast.\"  5/17/17--CT chest/abd/pelvis: \"IMPRESSION: Indeterminate perirectal mass with extension of heterogeneous, low-attenuation soft tissue into the right buttock an asymmetric edema and muscular swelling and the right hemipelvis. Right inguinal adenopathy is present. This may be a perirectal abscess or necrotic tumor with extrapelvic extension. \"  5/18/17--CT guided IR drain placement:   5/19/17--MRI pelvis--\"IMPRESSION: Status post right perianal/rectal abscess drainage. The extensive remaining abscess findings are detailed above. \"  6/1/17--CT abd/pelv--\"IMPRESSION: Extensive complex air/fluid collection involving the right gluteal musculature extending into the right thigh, consistent with myositis and possible gangrene. Probably associated with perforation of the right lateral wall of the inferior rectum. \"    ASSESSMENT:   Pelvic abscess   S/p IR drain 5/18/17   S/p operative I&D, debridement of necrotic tissue 5/19/17   S/p operative I&D, debridement of necrotic tissue 5/22/17  Sepsis, due to above--improved  Recurrent fevers--resolved  Leukocytosis, due to above--resolved  DKA--improved  Rhabdomyolysis--resolved  NALDO--resolved  Pancreatitis--resolved  Anemia--   S/p transfusion 2 units PRBC 5/18/17   S/p transfusion PRBC 5/31/17  Hypernatremia--resolved  Elevated LFTs--resolved  Hypophosphatemia--resolved    PLAN:  --continue IV and PO pain medications  --OOB, IS, mobilize  --No CV issues. Tachycardia is probably compensatory response to sepsis  --regular diabetic diet. --Given her ongoing fevers, worsening leukocytosis, and now evidence of rectal perforation into the wound, I recommended repeat EUA of the buttock thigh wound as well as colostomy diversion. With diversion and local wound care, we may be able to get this to heal in and avoid a permanent stoma.   Given the appearance of the thigh process on CT, there will likely be extensive tracking, etc. To explore  --follow UOP, creat. --transfuse for <7  --continue broad spectrum antibiotics per ID recs  --PT seeing  --Madina Mckeon may be a good option for her. Given these new developments, transfer will have to be put on hold.         Corrina Gimenez MD

## 2017-06-02 NOTE — BRIEF OP NOTE
BRIEF OPERATIVE NOTE    Date of Procedure: 6/2/2017   Preoperative Diagnosis: Necrotizing buttock and pelvic infection  Postoperative Diagnosis: Same  Procedure(s):  RECTAL EXAM UNDER ANESTHESIA   SHARP EXCISIONAL DEBRIDEMENT OF SKIN, SUBCUTANEOUS, AND FASCIA  SUTURE REPAIR OF RECTAL PERFORATIONS X2  ULTRASOUND EVALUATION OF SOFT TISSUES OF RIGHT THIGH  LAPAROSCOPIC END-LOOP DIVERTING COLOSTOMY    Surgeon(s) and Role:     * Arielle Low MD - Primary         Assistant Staff:    Surgical Staff:  Circ-1: Tito Alaniz RN  Circ-Relief: Ayse Morales RN  Scrub Tech-1: Melissa Dumont; [address]  Scrub Tech-2: Anuj Ho; Diane Neff  Event Time In   Incision Start 1332   Incision Close 1558     Anesthesia: General   Estimated Blood Loss: 30  Specimens: * No specimens in log *   Findings: IN BUTTOCK, MINIMAL NECROTIC TISSUE DEEP IN WOUND IN THE PELVIC EXTENSION. MINIMAL NECROTIC TISSUE IN THE REMAINDER OF THE WOUND. NO TRACKING DEEP INTO THE THIGH. ULTRASOUND IDENTIFIES NO DRAINABLE COLLECTIONS.   LAP END-LOOP COLOSTOMY FORMED WITHOUT DIFFICULTY  Complications: NONE INTRAOP  Implants: * No implants in log *

## 2017-06-03 LAB
ANION GAP BLD CALC-SCNC: 11 MMOL/L (ref 7–16)
BUN SERPL-MCNC: 7 MG/DL (ref 6–23)
CALCIUM SERPL-MCNC: 8 MG/DL (ref 8.3–10.4)
CHLORIDE SERPL-SCNC: 100 MMOL/L (ref 98–107)
CO2 SERPL-SCNC: 28 MMOL/L (ref 21–32)
CREAT SERPL-MCNC: 0.37 MG/DL (ref 0.6–1)
ERYTHROCYTE [DISTWIDTH] IN BLOOD BY AUTOMATED COUNT: 15.8 % (ref 11.9–14.6)
GLUCOSE BLD STRIP.AUTO-MCNC: 114 MG/DL (ref 65–100)
GLUCOSE BLD STRIP.AUTO-MCNC: 115 MG/DL (ref 65–100)
GLUCOSE BLD STRIP.AUTO-MCNC: 120 MG/DL (ref 65–100)
GLUCOSE BLD STRIP.AUTO-MCNC: 124 MG/DL (ref 65–100)
GLUCOSE SERPL-MCNC: 114 MG/DL (ref 65–100)
HCT VFR BLD AUTO: 25.3 % (ref 35.8–46.3)
HGB BLD-MCNC: 8.2 G/DL (ref 11.7–15.4)
MCH RBC QN AUTO: 28.4 PG (ref 26.1–32.9)
MCHC RBC AUTO-ENTMCNC: 32.4 G/DL (ref 31.4–35)
MCV RBC AUTO: 87.5 FL (ref 79.6–97.8)
PLATELET # BLD AUTO: 415 K/UL (ref 150–450)
PMV BLD AUTO: 8.5 FL (ref 10.8–14.1)
POTASSIUM SERPL-SCNC: 4.1 MMOL/L (ref 3.5–5.1)
RBC # BLD AUTO: 2.89 M/UL (ref 4.05–5.25)
SODIUM SERPL-SCNC: 139 MMOL/L (ref 136–145)
WBC # BLD AUTO: 26.2 K/UL (ref 4.3–11.1)

## 2017-06-03 PROCEDURE — 74011000258 HC RX REV CODE- 258: Performed by: NURSE PRACTITIONER

## 2017-06-03 PROCEDURE — 74011250637 HC RX REV CODE- 250/637: Performed by: COLON & RECTAL SURGERY

## 2017-06-03 PROCEDURE — 74011250636 HC RX REV CODE- 250/636: Performed by: INTERNAL MEDICINE

## 2017-06-03 PROCEDURE — 65270000029 HC RM PRIVATE

## 2017-06-03 PROCEDURE — 74011250636 HC RX REV CODE- 250/636: Performed by: COLON & RECTAL SURGERY

## 2017-06-03 PROCEDURE — 51798 US URINE CAPACITY MEASURE: CPT

## 2017-06-03 PROCEDURE — 85027 COMPLETE CBC AUTOMATED: CPT | Performed by: COLON & RECTAL SURGERY

## 2017-06-03 PROCEDURE — 74011250636 HC RX REV CODE- 250/636: Performed by: FAMILY MEDICINE

## 2017-06-03 PROCEDURE — 74011250636 HC RX REV CODE- 250/636: Performed by: NURSE PRACTITIONER

## 2017-06-03 PROCEDURE — 77030027138 HC INCENT SPIROMETER -A

## 2017-06-03 PROCEDURE — 82962 GLUCOSE BLOOD TEST: CPT

## 2017-06-03 PROCEDURE — 80048 BASIC METABOLIC PNL TOTAL CA: CPT | Performed by: COLON & RECTAL SURGERY

## 2017-06-03 RX ORDER — SODIUM CHLORIDE 9 MG/ML
100 INJECTION, SOLUTION INTRAVENOUS CONTINUOUS
Status: DISCONTINUED | OUTPATIENT
Start: 2017-06-03 | End: 2017-06-05 | Stop reason: HOSPADM

## 2017-06-03 RX ORDER — OXYCODONE HYDROCHLORIDE 5 MG/1
10 TABLET ORAL
Status: DISCONTINUED | OUTPATIENT
Start: 2017-06-03 | End: 2017-06-05 | Stop reason: HOSPADM

## 2017-06-03 RX ORDER — MORPHINE SULFATE 2 MG/ML
2 INJECTION, SOLUTION INTRAMUSCULAR; INTRAVENOUS
Status: DISCONTINUED | OUTPATIENT
Start: 2017-06-03 | End: 2017-06-05 | Stop reason: HOSPADM

## 2017-06-03 RX ORDER — OXYCODONE HYDROCHLORIDE 5 MG/1
5 TABLET ORAL
Status: DISCONTINUED | OUTPATIENT
Start: 2017-06-03 | End: 2017-06-05 | Stop reason: HOSPADM

## 2017-06-03 RX ADMIN — PIPERACILLIN SODIUM,TAZOBACTAM SODIUM 4.5 G: 4; .5 INJECTION, POWDER, FOR SOLUTION INTRAVENOUS at 15:52

## 2017-06-03 RX ADMIN — Medication 2 MG: at 21:23

## 2017-06-03 RX ADMIN — FLUCONAZOLE 400 MG: 2 INJECTION, SOLUTION INTRAVENOUS at 09:00

## 2017-06-03 RX ADMIN — OXYCODONE HYDROCHLORIDE 5 MG: 5 TABLET ORAL at 09:04

## 2017-06-03 RX ADMIN — SODIUM CHLORIDE 200 ML/HR: 900 INJECTION, SOLUTION INTRAVENOUS at 17:40

## 2017-06-03 RX ADMIN — OXYCODONE HYDROCHLORIDE 10 MG: 5 TABLET ORAL at 17:39

## 2017-06-03 RX ADMIN — SODIUM CHLORIDE 1000 ML: 900 INJECTION, SOLUTION INTRAVENOUS at 05:44

## 2017-06-03 RX ADMIN — Medication 2 MG: at 11:44

## 2017-06-03 RX ADMIN — MORPHINE SULFATE 5 MG: 10 INJECTION INTRAMUSCULAR; INTRAVENOUS; SUBCUTANEOUS at 06:11

## 2017-06-03 RX ADMIN — HEPARIN SODIUM 5000 UNITS: 5000 INJECTION, SOLUTION INTRAVENOUS; SUBCUTANEOUS at 14:00

## 2017-06-03 RX ADMIN — SODIUM CHLORIDE 200 ML/HR: 900 INJECTION, SOLUTION INTRAVENOUS at 15:52

## 2017-06-03 RX ADMIN — PIPERACILLIN SODIUM,TAZOBACTAM SODIUM 4.5 G: 4; .5 INJECTION, POWDER, FOR SOLUTION INTRAVENOUS at 23:57

## 2017-06-03 RX ADMIN — SODIUM CHLORIDE 200 ML/HR: 900 INJECTION, SOLUTION INTRAVENOUS at 07:00

## 2017-06-03 RX ADMIN — OXYCODONE HYDROCHLORIDE 10 MG: 5 TABLET ORAL at 13:59

## 2017-06-03 RX ADMIN — HEPARIN SODIUM 5000 UNITS: 5000 INJECTION, SOLUTION INTRAVENOUS; SUBCUTANEOUS at 06:02

## 2017-06-03 RX ADMIN — HEPARIN SODIUM 5000 UNITS: 5000 INJECTION, SOLUTION INTRAVENOUS; SUBCUTANEOUS at 21:32

## 2017-06-03 RX ADMIN — Medication 2 MG: at 15:09

## 2017-06-03 RX ADMIN — PIPERACILLIN SODIUM,TAZOBACTAM SODIUM 4.5 G: 4; .5 INJECTION, POWDER, FOR SOLUTION INTRAVENOUS at 07:40

## 2017-06-03 NOTE — PROGRESS NOTES
Pt not producing urine via dwyer, bladder scan 38 ml of urine. Dr. Nikkie Sprague called, orders 1L n/s bolus, n/s 200 ml ph continuous.

## 2017-06-03 NOTE — CONSULTS
FRACTURE CONSULT NOTE    Subjective:     Date of Consultation:  Tanya 3, 2017  Referring Physician: Dr. Benjamin Garcia is a 29 y.o. female who is being seen for right gluteal perirectal abscess, soft tissue necrosis, multiple debridements now 1 day s/p diverting colostomy She relates a rapidly worsening course after being seen in the ED and then a day later returning and being admitted. She relates that this began with a perirectal abscess. She has been under Dr. Young Galindo care with multiple debridements, diverting colostomy, hospitalists and ID consults. She  has been diagnosed with DM on this admission and denies previous history. She states there is a family history of DM through her father. She denies any other significant health problems and states that she was in her usual state of good health and employed as a lease manager. She has no children. No family members or friends are present for this exam.    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 05/16/2017    Chlamydia infection 05/16/2017    Herpes simplex vulvovaginitis 05/16/2017     History reviewed. No pertinent family history. Social History   Substance Use Topics    Smoking status: Never Smoker    Smokeless tobacco: Not on file    Alcohol use Yes      Comment: occasional     Past Medical History:   Diagnosis Date    Ill-defined condition     herpes      Past Surgical History:   Procedure Laterality Date    HX OTHER SURGICAL      pilonidal cyst      Prior to Admission medications    Medication Sig Start Date End Date Taking?  Authorizing Provider   acyclovir (ZOVIRAX) 200 mg capsule Take 4 Caps by mouth two (2) times a day. 16   Mckenzie Kiser MD     Current Facility-Administered Medications   Medication Dose Route Frequency    morphine injection 2 mg  2 mg IntraVENous Q3H PRN    oxyCODONE IR (ROXICODONE) tablet 5 mg  5 mg Oral Q4H PRN    oxyCODONE IR (ROXICODONE) tablet 10 mg  10 mg Oral Q4H PRN    0.9% sodium chloride infusion  200 mL/hr IntraVENous CONTINUOUS    fluconazole (DIFLUCAN) 400mg/200 mL IVPB (premix)  400 mg IntraVENous Q24H    piperacillin-tazobactam (ZOSYN) 4.5 g in 0.9% sodium chloride (MBP/ADV) 100 mL  4.5 g IntraVENous Q8H    insulin lispro (HUMALOG) injection   SubCUTAneous AC&HS    morphine injection 5 mg  5 mg IntraVENous Q4H PRN    promethazine (PHENERGAN) with saline injection 12.5 mg  12.5 mg IntraVENous Q6H PRN    sodium chloride (NS) flush 5-10 mL  5-10 mL IntraVENous PRN    dextrose 40% (GLUTOSE) oral gel 1 Tube  15 g Oral PRN    glucagon (GLUCAGEN) injection 1 mg  1 mg IntraMUSCular PRN    dextrose (D50W) injection syrg 12.5-25 g  25-50 mL IntraVENous PRN    acetaminophen (TYLENOL) tablet 650 mg  650 mg Oral Q4H PRN    ondansetron (ZOFRAN) injection 4 mg  4 mg IntraVENous Q4H PRN    bisacodyl (DULCOLAX) tablet 5 mg  5 mg Oral DAILY PRN    heparin (porcine) injection 5,000 Units  5,000 Units SubCUTAneous Q8H      No Known Allergies     Review of Systems:  A comprehensive review of systems was negative except for that written in the HPI. Mental Status: no dementia    Objective:     Patient Vitals for the past 8 hrs:   BP Temp Pulse Resp SpO2   17 0723 124/64 98.8 °F (37.1 °C) (!) 120 28 97 %   17 0331 122/53 99.7 °F (37.6 °C) (!) 119 15 97 %     Temp (24hrs), Av.6 °F (37.6 °C), Min:98.8 °F (37.1 °C), Max:100.8 °F (38.2 °C)      EXAM:   General:  Alert, cooperative, well noursished, well developed, appears stated age   Eyes:  Sclera anicteric.  PERRL   Mouth/Throat: Mucous membranes normal, oral pharynx clear   Neck: Supple   Lungs:   Clear to auscultation bilaterally, good effort   CV:  Regular rate and rhythm,no murmur, click, rub or gallop   Abdomen:   Obese, tender, not palpated secondary to recent procedure   Extremities: Moderate edema RLE. This is from thigh to toes. Mild edema LLE. Skin: Skin color, texture, turgor normal. no acute rash or lesions       Musculoskeletal:  RLE. No pain with ankle ROM and no pain with Knee ROM. She has no pain with hip ROM. She states that she is able to full weight bear on her RLE with no increased hip pain. I did not inspect the wound . I was able to have her roll to the left and palpate arounfdher buttock area   Lines/Devices:  Intact, no erythema, drainage or tenderness   Psych: Alert and oriented, normal mood affect given the setting         Imaging Review: CT shows free air near the right hip joint, but I see no effusion of the hip, air in the hip joint or bone involvement.      Labs:   Recent Results (from the past 24 hour(s))   GLUCOSE, POC    Collection Time: 06/02/17 11:45 AM   Result Value Ref Range    Glucose (POC) 129 (H) 65 - 100 mg/dL   GLUCOSE, POC    Collection Time: 06/02/17  5:28 PM   Result Value Ref Range    Glucose (POC) 131 (H) 65 - 100 mg/dL   GLUCOSE, POC    Collection Time: 06/02/17  8:50 PM   Result Value Ref Range    Glucose (POC) 120 (H) 65 - 100 mg/dL   GLUCOSE, POC    Collection Time: 06/03/17  7:27 AM   Result Value Ref Range    Glucose (POC) 124 (H) 65 - 100 mg/dL   CBC W/O DIFF    Collection Time: 06/03/17  7:46 AM   Result Value Ref Range    WBC 26.2 (H) 4.3 - 11.1 K/uL    RBC 2.89 (L) 4.05 - 5.25 M/uL    HGB 8.2 (L) 11.7 - 15.4 g/dL    HCT 25.3 (L) 35.8 - 46.3 %    MCV 87.5 79.6 - 97.8 FL    MCH 28.4 26.1 - 32.9 PG    MCHC 32.4 31.4 - 35.0 g/dL    RDW 15.8 (H) 11.9 - 14.6 %    PLATELET 563 512 - 479 K/uL    MPV 8.5 (L) 10.8 - 96.0 FL   METABOLIC PANEL, BASIC    Collection Time: 06/03/17  7:46 AM   Result Value Ref Range    Sodium 139 136 - 145 mmol/L    Potassium 4.1 3.5 - 5.1 mmol/L    Chloride 100 98 - 107 mmol/L    CO2 28 21 - 32 mmol/L    Anion gap 11 7 - 16 mmol/L    Glucose 114 (H) 65 - 100 mg/dL    BUN 7 6 - 23 MG/DL    Creatinine 0.37 (L) 0.6 - 1.0 MG/DL    GFR est AA >60 >60 ml/min/1.73m2    GFR est non-AA >60 >60 ml/min/1.73m2    Calcium 8.0 (L) 8.3 - 10.4 MG/DL         Impression:     Principal Problem:    DKA (diabetic ketoacidoses) ([facility]) (5/16/2017)    Active Problems:    Acute alcoholic pancreatitis (4/49/7843)      Hyponatremia (5/16/2017)      Overview: Pseudohyponatremia      Hyperkalemia (5/16/2017)      Acute renal failure (ARF) ([facility]) (5/16/2017)      Rhabdomyosarcoma of flank ([facility] (5/16/2017)      Sepsis ([facility] (5/16/2017)      Chlamydia infection (5/16/2017)      Herpes simplex vulvovaginitis (5/16/2017)      Septic shock ([address] (5/17/2017)      Pelvic mass (5/17/2017)        Plan: Will follow . Thank you for this consult. If her hip worsens we may need to aspirate from an anterior position. I plan to speak to Dr. Kelsey Reyes  Today. Thank you.       Boubacar Sr MD

## 2017-06-03 NOTE — PROGRESS NOTES
[address]  [address] [facility], [address] [place] [address], [address]      PLAN: Continue IV antibiotics. Advance diet as tolerated. Repeat white count tomorrow. Monitor fevers and white count and if patient does not improve consider returning to surgery for wound exploration. ASSESSMENT:  Admit Date: 5/16/2017   1 Day Post-Op  Procedure(s):  LAPAROSCOPIC COLOSTOMY  RECTAL EXAM UNDER ANESTHESIA (EUA) /     Principal Problem:    DKA (diabetic ketoacidoses) ([address] (5/16/2017)    Active Problems:    Acute alcoholic pancreatitis (5/20/7136)      Hyponatremia (5/16/2017)      Overview: Pseudohyponatremia      Hyperkalemia (5/16/2017)      Acute renal failure (ARF) ([address] (5/16/2017)      Rhabdomyosarcoma of flank ([address] (5/16/2017)      Sepsis ([address] (5/16/2017)      Chlamydia infection (5/16/2017)      Herpes simplex vulvovaginitis (5/16/2017)      Septic shock ([address] (5/17/2017)      Pelvic mass (5/17/2017)         SUBJECTIVE: Patient appears comfortable. Ostomy is pink. She denies significant pain. Patient had fever to 100.8. Her white count is 26,000 today. She is status post rectal repair and colostomy formation. She is hemodynamically stable. She denies any nausea or vomiting. She denies any needs at this time. OBJECTIVE:  Constitutional: Alert oriented cooperative patient in no acute distress; appears stated age   Visit Vitals    /64 (BP 1 Location: Right arm, BP Patient Position: At rest)    Pulse (!) 120    Temp 98.8 °F (37.1 °C)    Resp 28    Ht 5' 2\" (1.575 m)    Wt 197 lb 8.5 oz (89.6 kg)    LMP 04/12/2017    SpO2 97%    BMI 36.13 kg/m2     Eyes:Sclera are clear. ENMT: no external lesions gross hearing normal; no obvious neck masses, no ear or lip lesions  CV: RRR. Normal perfusion  Resp: No JVD. Breathing is  non-labored; no audible wheezing. GI: Ostomy is pink. Incisions are clean dry and intact with Dermabond.   She has positive bowel sounds in all 4 quadrants. There is no stool in the ostomy. Musculoskeletal: unremarkable with normal function. No embolic signs or cyanosis.    Neuro:  Oriented; moves all 4; no focal deficits  Psychiatric: normal affect and mood, no memory impairment      Patient Vitals for the past 24 hrs:   BP Temp Pulse Resp SpO2   06/03/17 0723 124/64 98.8 °F (37.1 °C) (!) 120 28 97 %   06/03/17 0331 122/53 99.7 °F (37.6 °C) (!) 119 15 97 %   06/02/17 2242 102/47 99.8 °F (37.7 °C) (!) 127 15 95 %   06/02/17 1935 113/54 100.3 °F (37.9 °C) (!) 129 15 100 %   06/02/17 1658 138/66 - (!) 114 16 100 %   06/02/17 1653 129/64 - (!) 113 16 100 %   06/02/17 1648 128/67 99 °F (37.2 °C) (!) 112 16 100 %   06/02/17 1643 127/68 - (!) 113 16 100 %   06/02/17 1638 126/64 - (!) 113 16 99 %   06/02/17 1633 125/63 - (!) 113 16 100 %   06/02/17 1628 126/63 - (!) 109 16 99 %   06/02/17 1623 122/60 - (!) 105 16 100 %   06/02/17 1622 124/58 99 °F (37.2 °C) (!) 105 16 100 %   06/02/17 1618 124/58 99.2 °F (37.3 °C) (!) 109 18 100 %   06/02/17 1613 131/60 - - 18 -   06/02/17 1125 - (!) 100.8 °F (38.2 °C) (!) 128 18 100 %     Labs:  Recent Labs      06/03/17   0746   WBC  26.2*   HGB  8.2*   PLT  415   NA  139   K  4.1   CL  100   CO2  28   BUN  7   CREA  0.37*   GLU  114*         Daphne Gaitan, DO

## 2017-06-03 NOTE — PROGRESS NOTES
Dressing reinforced. Yellow, brown drainage noted on bed and outside dressing. Foul odor to drainage. Abd pad placed over packing, and taped in place. Pt tolerated ambulating and dressing change well with 2mg of morphine IVP. Pt returned to bed. Difficulty returning to bed due swelling in legs and pain from surgical site. Call light within in reach. All needs met at this time.

## 2017-06-03 NOTE — PROGRESS NOTES
Chart was reviewed, but patient was not seen. Patient went to the OR and notes were reviewed. No specimens were sent for culture. Therefore, will continue current therapy. If wbc's continue to rise, we might change the zosyn to merrem, but let's see how she responds to the surgery alone. Please call if questions arise. Will follow up on Monday or sooner if called.

## 2017-06-04 LAB
ANION GAP BLD CALC-SCNC: 12 MMOL/L (ref 7–16)
BACTERIA SPEC CULT: NORMAL
BACTERIA SPEC CULT: NORMAL
BUN SERPL-MCNC: 6 MG/DL (ref 6–23)
CALCIUM SERPL-MCNC: 7.7 MG/DL (ref 8.3–10.4)
CHLORIDE SERPL-SCNC: 103 MMOL/L (ref 98–107)
CO2 SERPL-SCNC: 25 MMOL/L (ref 21–32)
CREAT SERPL-MCNC: 0.34 MG/DL (ref 0.6–1)
ERYTHROCYTE [DISTWIDTH] IN BLOOD BY AUTOMATED COUNT: 15.8 % (ref 11.9–14.6)
GLUCOSE BLD STRIP.AUTO-MCNC: 105 MG/DL (ref 65–100)
GLUCOSE BLD STRIP.AUTO-MCNC: 110 MG/DL (ref 65–100)
GLUCOSE BLD STRIP.AUTO-MCNC: 136 MG/DL (ref 65–100)
GLUCOSE BLD STRIP.AUTO-MCNC: 139 MG/DL (ref 65–100)
GLUCOSE BLD STRIP.AUTO-MCNC: 140 MG/DL (ref 65–100)
GLUCOSE SERPL-MCNC: 115 MG/DL (ref 65–100)
HCT VFR BLD AUTO: 26.4 % (ref 35.8–46.3)
HGB BLD-MCNC: 8.6 G/DL (ref 11.7–15.4)
MCH RBC QN AUTO: 28.3 PG (ref 26.1–32.9)
MCHC RBC AUTO-ENTMCNC: 32.6 G/DL (ref 31.4–35)
MCV RBC AUTO: 86.8 FL (ref 79.6–97.8)
PLATELET # BLD AUTO: 442 K/UL (ref 150–450)
PMV BLD AUTO: 8.6 FL (ref 10.8–14.1)
POTASSIUM SERPL-SCNC: 3.8 MMOL/L (ref 3.5–5.1)
RBC # BLD AUTO: 3.04 M/UL (ref 4.05–5.25)
SERVICE CMNT-IMP: NORMAL
SERVICE CMNT-IMP: NORMAL
SODIUM SERPL-SCNC: 140 MMOL/L (ref 136–145)
WBC # BLD AUTO: 26.9 K/UL (ref 4.3–11.1)

## 2017-06-04 PROCEDURE — 87040 BLOOD CULTURE FOR BACTERIA: CPT | Performed by: INTERNAL MEDICINE

## 2017-06-04 PROCEDURE — 85027 COMPLETE CBC AUTOMATED: CPT | Performed by: COLON & RECTAL SURGERY

## 2017-06-04 PROCEDURE — 74011250636 HC RX REV CODE- 250/636: Performed by: COLON & RECTAL SURGERY

## 2017-06-04 PROCEDURE — 74011000258 HC RX REV CODE- 258: Performed by: NURSE PRACTITIONER

## 2017-06-04 PROCEDURE — 74011250637 HC RX REV CODE- 250/637: Performed by: INTERNAL MEDICINE

## 2017-06-04 PROCEDURE — 94760 N-INVAS EAR/PLS OXIMETRY 1: CPT

## 2017-06-04 PROCEDURE — 74011250637 HC RX REV CODE- 250/637: Performed by: COLON & RECTAL SURGERY

## 2017-06-04 PROCEDURE — 74011250636 HC RX REV CODE- 250/636: Performed by: NURSE PRACTITIONER

## 2017-06-04 PROCEDURE — 74011250636 HC RX REV CODE- 250/636: Performed by: FAMILY MEDICINE

## 2017-06-04 PROCEDURE — 97530 THERAPEUTIC ACTIVITIES: CPT

## 2017-06-04 PROCEDURE — 36415 COLL VENOUS BLD VENIPUNCTURE: CPT | Performed by: INTERNAL MEDICINE

## 2017-06-04 PROCEDURE — 74011000258 HC RX REV CODE- 258: Performed by: INTERNAL MEDICINE

## 2017-06-04 PROCEDURE — 74011250636 HC RX REV CODE- 250/636: Performed by: INTERNAL MEDICINE

## 2017-06-04 PROCEDURE — 82962 GLUCOSE BLOOD TEST: CPT

## 2017-06-04 PROCEDURE — 80048 BASIC METABOLIC PNL TOTAL CA: CPT | Performed by: COLON & RECTAL SURGERY

## 2017-06-04 PROCEDURE — 65270000029 HC RM PRIVATE

## 2017-06-04 RX ADMIN — ACETAMINOPHEN 650 MG: 325 TABLET, FILM COATED ORAL at 19:32

## 2017-06-04 RX ADMIN — HEPARIN SODIUM 5000 UNITS: 5000 INJECTION, SOLUTION INTRAVENOUS; SUBCUTANEOUS at 23:02

## 2017-06-04 RX ADMIN — SODIUM CHLORIDE 200 ML/HR: 900 INJECTION, SOLUTION INTRAVENOUS at 00:04

## 2017-06-04 RX ADMIN — Medication 2 MG: at 11:35

## 2017-06-04 RX ADMIN — MEROPENEM 500 MG: 500 INJECTION, POWDER, FOR SOLUTION INTRAVENOUS at 23:02

## 2017-06-04 RX ADMIN — HEPARIN SODIUM 5000 UNITS: 5000 INJECTION, SOLUTION INTRAVENOUS; SUBCUTANEOUS at 06:39

## 2017-06-04 RX ADMIN — HEPARIN SODIUM 5000 UNITS: 5000 INJECTION, SOLUTION INTRAVENOUS; SUBCUTANEOUS at 14:00

## 2017-06-04 RX ADMIN — FLUCONAZOLE 400 MG: 2 INJECTION, SOLUTION INTRAVENOUS at 08:53

## 2017-06-04 RX ADMIN — PIPERACILLIN SODIUM,TAZOBACTAM SODIUM 4.5 G: 4; .5 INJECTION, POWDER, FOR SOLUTION INTRAVENOUS at 08:00

## 2017-06-04 RX ADMIN — ACETAMINOPHEN 650 MG: 325 TABLET, FILM COATED ORAL at 15:18

## 2017-06-04 RX ADMIN — Medication 2 MG: at 06:40

## 2017-06-04 RX ADMIN — MEROPENEM 500 MG: 500 INJECTION, POWDER, FOR SOLUTION INTRAVENOUS at 16:55

## 2017-06-04 RX ADMIN — OXYCODONE HYDROCHLORIDE 10 MG: 5 TABLET ORAL at 09:04

## 2017-06-04 RX ADMIN — OXYCODONE HYDROCHLORIDE 10 MG: 5 TABLET ORAL at 15:18

## 2017-06-04 NOTE — PROGRESS NOTES
End of shift:  Pt tolerating pain better today with 10mg roxicodone and morphine 2mg IVP. Pt having a fever of 100. 6. tylenol 650mg PO administered. Reporting off to night shift RN.

## 2017-06-04 NOTE — PROGRESS NOTES
unable to obtain second set of BC peripherally. Spoke to Ingrid Stauffer NP with surgery to obtain permission to draw [facility] through central line. BC set taken from line and sent down to lab.

## 2017-06-04 NOTE — PROGRESS NOTES
Problem: Mobility Impaired (Adult and Pediatric)  Goal: *Acute Goals and Plan of Care (Insert Text)  STG:  (1.)Ms. Sridhar Melgar will move from supine to sit and sit to supine , scoot up and down and roll side to side with CONTACT GUARD ASSIST within 3 day(s). (2.)Ms. Sridhar Melgar will transfer from bed to chair and chair to bed with STAND BY ASSIST using the least restrictive device within 3 day(s). Goal Met: 5/31/17  (3.)Ms. Sridhar Melgar will ambulate with CONTACT GUARD ASSIST for 100 feet with the least restrictive device within 3 day(s). Goal Met: 5/31/17    LTG:  (1.)Ms. Sridhar Melgar will move from supine to sit and sit to supine , scoot up and down and roll side to side in bed with SUPERVISION within 7 day(s). (2.)Ms. Sridhar Melgar will transfer from bed to chair and chair to bed with SUPERVISION using the least restrictive device within 7 day(s). (3.)Ms. Sridhar Melgar will ambulate with STAND BY ASSIST for 500+ feet with the least restrictive device within 7 day(s). ________________________________________________________________________________________________      PHYSICAL THERAPY: Daily Note, Treatment Day: 4th and PM 6/4/2017  INPATIENT: Hospital Day: 20  Payor: Magda Pan / Plan: [facility] / Product Type: PPO /      NAME/AGE/GENDER: Katt Rodriguez is a 29 y.o. female    PRIMARY DIAGNOSIS: Abdominal mass, unspecified location [R19.00] DKA (diabetic ketoacidoses) ([facility] DKA (diabetic ketoacidoses) ([facility]  Procedure(s) (LRB):  LAPAROSCOPIC COLOSTOMY (N/A)  RECTAL EXAM UNDER ANESTHESIA (EUA) /  (N/A)  2 Days Post-Op  ICD-10: Treatment Diagnosis:       · Generalized Muscle Weakness (M62.81)  · Difficulty in walking, Not elsewhere classified (R26.2)   Precaution/Allergies:  Review of patient's allergies indicates no known allergies. ASSESSMENT:      Ms. Sridhar Melgar was supine and has not been seen since her debridement and colostomy late last week.   She needed encouragement to get up and walk and has not done so since surgery. She reports a lot of pain when sitting up. She required moderate assist to get out of bed but then walked quite well about 100 feet although appearing to be in quite a bit of pain. Assisted her back into bed as sitting up she reports as being very painful on her colostomy area yesterday. Good progress since surgery but much more pain. This section established at most recent assessment   PROBLEM LIST (Impairments causing functional limitations):  1. Decreased Strength  2. Decreased ADL/Functional Activities  3. Decreased Ambulation Ability/Technique  4. Decreased Balance  5. Increased Pain  6. Decreased Activity Tolerance    INTERVENTIONS PLANNED: (Benefits and precautions of physical therapy have been discussed with the patient.)  1. Balance Exercise  2. Bed Mobility  3. Family Education  4. Gait Training  5. Neuromuscular Re-education/Strengthening  6. Therapeutic Activites  7. Therapeutic Exercise/Strengthening  8. Group Therapy      TREATMENT PLAN: Frequency/Duration: 3 times a week for duration of hospital stay  Rehabilitation Potential For Stated Goals: GOOD      RECOMMENDED REHABILITATION/EQUIPMENT: (at time of discharge pending progress): Continue Skilled Therapy and HHPT versus Rehab- dependent on pt progress. HISTORY:   History of Present Injury/Illness (Reason for Referral):  See H&P below  34yo F with no significant PMH presented with worsening Rt Flank pain, nausea, multiple episodes of vomiting and weakness. She was seen here in ER 1 week ago with back pain and diagnosed with Sciatica and given NSAIDs which she took without much relief. Yesterday her pain worsened and she took extra pain meds with about 4 glasses of Wine per . She then started having vomiting and abd discomfort that continued today and she came to ER.  Found to be in DKA in ER with Na 115, K 6.0, LA 7.4, Bicarb 9, Ph 7.22, Cr 2.51 and Ck > 4000, WBC >40k, Hgb 10.8 with elevated LFTs Per  they took a trip to [place] by car and pt has been complaining for rt flank pain since they returned last week. Also she was seen 3 days ago at GYN office and blood work in care everywhere showing +ve serology for HSV 1& 2 as well as Chlamydia. Per GYN note she had worrisome excoriated lesions around her vagina/Vulva. She was started on Insulin gtt and Abx, cultures were sent in ER and Hospitalist asked to admit. Past Medical History/Comorbidities:   Ms. Gold Gonzalez  has a past medical history of Ill-defined condition. Ms. Gold Gonzalez  has a past surgical history that includes other surgical.  Social History/Living Environment:   Home Environment: Private residence  # Steps to Enter: 0  One/Two Story Residence: Two story, live on 1st floor  Living Alone: No  Support Systems: Spouse/Significant Other/Partner  Patient Expects to be Discharged to[de-identified] Private residence  Current DME Used/Available at Home: None  Tub or Shower Type: Tub/Shower combination  Prior Level of Function/Work/Activity:  Lives with , independent with ADLs and gait, drives      Number of Personal Factors/Comorbidities that affect the Plan of Care: 1-2: MODERATE COMPLEXITY   EXAMINATION:   Most Recent Physical Functioning:   Gross Assessment:                  Posture:     Balance:    Bed Mobility:  Supine to Sit: Moderate assistance  Sit to Supine: Moderate assistance  Wheelchair Mobility:     Transfers:  Sit to Stand: Stand-by asssistance;Contact guard assistance  Stand to Sit: Stand-by asssistance  Gait:     Speed/Dina: Shuffled; Slow  Gait Abnormalities: Decreased step clearance;Shuffling gait; Steppage gait  Distance (ft): 100 Feet (ft)  Assistive Device: Walker, rolling  Ambulation - Level of Assistance: Contact guard assistance       Body Structures Involved:  1. Heart  2. Lungs  3. Bones  4. Joints  5. Muscles Body Functions Affected:  1. Sensory/Pain  2. Cardio  3. Respiratory  4. Neuromusculoskeletal  5. Movement Related  6.  Skin Related Activities and Participation Affected:  1. General Tasks and Demands  2. Mobility  3. Self Care  4. Domestic Life  5. Interpersonal Interactions and Relationships  6. Community, Social and [place] Level   Number of elements that affect the Plan of Care: 4+: HIGH COMPLEXITY   CLINICAL PRESENTATION:   Presentation: Stable and uncomplicated: LOW COMPLEXITY   CLINICAL DECISION MAKIN [facility] Mobility Inpatient Short Form  How much difficulty does the patient currently have. .. Unable A Lot A Little None   1. Turning over in bed (including adjusting bedclothes, sheets and blankets)? [ ] 1   [ ] 2   [X] 3   [ ] 4   2. Sitting down on and standing up from a chair with arms ( e.g., wheelchair, bedside commode, etc.)   [ ] 1   [ ] 2   [X] 3   [ ] 4   3. Moving from lying on back to sitting on the side of the bed? [ ] 1   [ ] 2   [X] 3   [ ] 4   How much help from another person does the patient currently need. .. Total A Lot A Little None   4. Moving to and from a bed to a chair (including a wheelchair)? [ ] 1   [ ] 2   [X] 3   [ ] 4   5. Need to walk in hospital room? [ ] 1   [X] 2   [ ] 3   [ ] 4   6. Climbing 3-5 steps with a railing? [ ] 1   [X] 2   [ ] 3   [ ] 4   © , Trustees of [address] [place] [address], under license to [employer]. All rights reserved    Score:  Initial: 16 Most Recent: X (Date: -- )     Interpretation of Tool:  Represents activities that are increasingly more difficult (i.e. Bed mobility, Transfers, Gait).        Score 24 23 22-20 19-15 14-10 9-7 6       Modifier CH CI CJ CK CL CM CN         · Mobility - Walking and Moving Around:               - CURRENT STATUS:    CK - 40%-59% impaired, limited or restricted               - GOAL STATUS:           CJ - 20%-39% impaired, limited or restricted               - D/C STATUS:                       ---------------To be determined---------------  Payor: BLUE CROSS / Plan: [place] [employer] [place] / Product Type: PPO /       Medical Necessity:     · Patient is expected to demonstrate progress in strength, balance, coordination and functional technique to decrease assistance required with gait and functional mobility. Reason for Services/Other Comments:  · Patient continues to require skilled intervention due to decreased strength, decreased balance, decreased functional tolerance, decreased cardiopulmonary endurance affecting participation in basic ADLs and functional tasks. Use of outcome tool(s) and clinical judgement create a POC that gives a: Clear prediction of patient's progress: LOW COMPLEXITY                 TREATMENT:   (In addition to Assessment/Re-Assessment sessions the following treatments were rendered)   Pre-treatment Symptoms/Complaints:  \"it's so painful. \"  Pain: Initial:   Pain Intensity 1: 5  Pain Intervention(s) 1:  (medicated per RN)  Post Session:  0/10      Therapeutic Activity: (    23 minutes): Therapeutic activities including Bed transfers and Ambulation on level ground to improve mobility, strength, balance and endurance. Required minimal   to promote static and dynamic balance in standing but moderate assistance for bed mobility due to colostomy. Braces/Orthotics/Lines/Etc:   · IV  Treatment/Session Assessment:    · Response to Treatment:  Pt participated with encouragement  · Interdisciplinary Collaboration:  · Physical Therapy Assistant and Registered Nurse  · After treatment position/precautions:  · Supine in bed, Bed/Chair-wheels locked, Bed in low position, Call light within reach and RN notified  · Compliance with Program/Exercises: Will assess as treatment progresses. · Recommendations/Intent for next treatment session: \"Next visit will focus on advancements to more challenging activities and reduction in assistance provided\".   Total Treatment Duration:  PT Patient Time In/Time Out  Time In: 1515  Time Out: [address], PTA

## 2017-06-04 NOTE — PROGRESS NOTES
Chart reviewed but patient not seen. She has continued fevers and wbc's continue the slow trend upward despite being s/p OR. Therefore, we will check BCs again and change the zosyn to merrem.      Will f/u Monday or sooner if called

## 2017-06-04 NOTE — PROGRESS NOTES
Patient reports decreased pain overall, with painless ROM of right hip. No family present. Alert, oriented. Still with moderate RLE edema. NVI. Painless ROM.

## 2017-06-04 NOTE — PROGRESS NOTES
Pt with fever of 100.5. PRN 650mg tylenol administered. Will continue to follow.      1700 pt oral temperature 98.8 F

## 2017-06-04 NOTE — PROGRESS NOTES
[address]  [facility], [address] [place] [address], [address]      PLAN: Continue to monitor right lower extremity edema. If there is no improvement then she may need to return to the OR for debridement. I will discuss this with Dr. Ochoa Patient tomorrow. Repeat CBC and BMP in a.m. Continue IV Zosyn  PT and OT to evaluate  Case management to arrange rehab. 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 Appreciate orthopedic input. She denies nausea or vomiting. Patient did have fevers overnight to 100.8. She denies flatus or bowel movement from her ostomy. She reports stable pain of her right lower extremity but has asked for some Lasix for the swelling. Her white count remains elevated at 26,000. OBJECTIVE:  Constitutional: Alert oriented cooperative patient in no acute distress; appears stated age   Visit Vitals    /83 (BP 1 Location: Left arm, BP Patient Position: At rest)    Pulse (!) 135    Temp 99.9 °F (37.7 °C)    Resp (!) 32    Ht 5' 2\" (1.575 m)    Wt 197 lb 8.5 oz (89.6 kg)    LMP 04/12/2017    SpO2 90%    BMI 36.13 kg/m2     Eyes:Sclera are clear. ENMT: no external lesions gross hearing normal; no obvious neck masses, no ear or lip lesions  CV: RRR. Normal perfusion  Resp: No JVD.   Breathing is non-labored; no audible wheezing. GI: Soft incisions are clean dry and intact, bowel sounds are audible, ostomy is pink with serous output in the appliance. Musculoskeletal: unremarkable with normal function. No embolic signs or cyanosis. Neuro:  Oriented; moves all 4; no focal deficits  Psychiatric: normal affect and mood, no memory impairment  Extremity right lower extremity edema is noted with mild tenderness to palpation. There is no fluctuance or drainage.     Patient Vitals for the past 24 hrs:   BP Temp Pulse Resp SpO2   06/04/17 1130 151/83 99.9 °F (37.7 °C) (!) 135 (!) 32 90 %   06/04/17 0725 131/70 98.8 °F (37.1 °C) (!) 129 27 91 %   06/04/17 0352 127/63 99.6 °F (37.6 °C) (!) 123 15 93 %   06/04/17 0214 - - - - 91 %   06/04/17 0213 - - - - (!) 87 %   06/03/17 2309 128/65 (!) 100.8 °F (38.2 °C) (!) 128 15 95 %   06/03/17 1926 140/73 (!) 100.6 °F (38.1 °C) (!) 129 15 95 %   06/03/17 1432 126/69 98.9 °F (37.2 °C) (!) 116 16 100 %   06/03/17 1404 - 99 °F (37.2 °C) - - -     Labs:  Recent Labs      06/04/17   0812   WBC  26.9*   HGB  8.6*   PLT  442   NA  140   K  3.8   CL  103   CO2  25   BUN  6   CREA  0.34*   GLU  115*         Thomos Perea Kandy, DO

## 2017-06-05 ENCOUNTER — HOSPITAL ENCOUNTER (OUTPATIENT)
Dept: GENERAL RADIOLOGY | Age: 32
End: 2017-06-05
Attending: INTERNAL MEDICINE
Payer: COMMERCIAL

## 2017-06-05 ENCOUNTER — ANESTHESIA EVENT (OUTPATIENT)
Dept: SURGERY | Age: 32
DRG: 853 | End: 2017-06-05
Payer: COMMERCIAL

## 2017-06-05 ENCOUNTER — HOSPITAL ENCOUNTER (OUTPATIENT)
Dept: GENERAL RADIOLOGY | Age: 32
Discharge: HOME | End: 2017-06-05
Attending: INTERNAL MEDICINE
Payer: COMMERCIAL

## 2017-06-05 ENCOUNTER — HOSPITAL ENCOUNTER (OUTPATIENT)
Age: 32
Discharge: HOME | End: 2017-07-05
Attending: INTERNAL MEDICINE | Admitting: INTERNAL MEDICINE

## 2017-06-05 VITALS
OXYGEN SATURATION: 95 % | HEART RATE: 120 BPM | HEIGHT: 62 IN | DIASTOLIC BLOOD PRESSURE: 80 MMHG | BODY MASS INDEX: 36.35 KG/M2 | WEIGHT: 197.53 LBS | RESPIRATION RATE: 18 BRPM | TEMPERATURE: 99.8 F | SYSTOLIC BLOOD PRESSURE: 131 MMHG

## 2017-06-05 LAB
ANION GAP BLD CALC-SCNC: 10 MMOL/L (ref 7–16)
BUN SERPL-MCNC: 9 MG/DL (ref 6–23)
CALCIUM SERPL-MCNC: 7.2 MG/DL (ref 8.3–10.4)
CHLORIDE SERPL-SCNC: 111 MMOL/L (ref 98–107)
CO2 SERPL-SCNC: 20 MMOL/L (ref 21–32)
CREAT SERPL-MCNC: 0.97 MG/DL (ref 0.6–1)
ERYTHROCYTE [DISTWIDTH] IN BLOOD BY AUTOMATED COUNT: 15.5 % (ref 11.9–14.6)
GLUCOSE BLD STRIP.AUTO-MCNC: 101 MG/DL (ref 65–100)
GLUCOSE BLD STRIP.AUTO-MCNC: 114 MG/DL (ref 65–100)
GLUCOSE BLD STRIP.AUTO-MCNC: 99 MG/DL (ref 65–100)
GLUCOSE SERPL-MCNC: 213 MG/DL (ref 65–100)
HCT VFR BLD AUTO: 27.6 % (ref 35.8–46.3)
HGB BLD-MCNC: 9 G/DL (ref 11.7–15.4)
MCH RBC QN AUTO: 30.5 PG (ref 26.1–32.9)
MCHC RBC AUTO-ENTMCNC: 32.6 G/DL (ref 31.4–35)
MCV RBC AUTO: 93.6 FL (ref 79.6–97.8)
PLATELET # BLD AUTO: 133 K/UL (ref 150–450)
PMV BLD AUTO: 10.4 FL (ref 10.8–14.1)
POTASSIUM SERPL-SCNC: 3.5 MMOL/L (ref 3.5–5.1)
RBC # BLD AUTO: 2.95 M/UL (ref 4.05–5.25)
SODIUM SERPL-SCNC: 141 MMOL/L (ref 136–145)
WBC # BLD AUTO: 5.8 K/UL (ref 4.3–11.1)

## 2017-06-05 PROCEDURE — 74011250636 HC RX REV CODE- 250/636: Performed by: INTERNAL MEDICINE

## 2017-06-05 PROCEDURE — 80048 BASIC METABOLIC PNL TOTAL CA: CPT | Performed by: COLON & RECTAL SURGERY

## 2017-06-05 PROCEDURE — 77030010541

## 2017-06-05 PROCEDURE — 82962 GLUCOSE BLOOD TEST: CPT

## 2017-06-05 PROCEDURE — 85027 COMPLETE CBC AUTOMATED: CPT | Performed by: COLON & RECTAL SURGERY

## 2017-06-05 PROCEDURE — 74011000258 HC RX REV CODE- 258: Performed by: INTERNAL MEDICINE

## 2017-06-05 PROCEDURE — 74011250637 HC RX REV CODE- 250/637: Performed by: INTERNAL MEDICINE

## 2017-06-05 PROCEDURE — 71010 XR CHEST PORT: CPT

## 2017-06-05 PROCEDURE — 74011250636 HC RX REV CODE- 250/636: Performed by: NURSE PRACTITIONER

## 2017-06-05 PROCEDURE — 74011250637 HC RX REV CODE- 250/637: Performed by: COLON & RECTAL SURGERY

## 2017-06-05 PROCEDURE — 77030010522

## 2017-06-05 PROCEDURE — 74011250636 HC RX REV CODE- 250/636: Performed by: COLON & RECTAL SURGERY

## 2017-06-05 RX ORDER — FLUCONAZOLE 2 MG/ML
400 INJECTION, SOLUTION INTRAVENOUS EVERY 24 HOURS
Qty: 1 ML | Refills: 0 | Status: ON HOLD
Start: 2017-06-05 | End: 2017-06-23 | Stop reason: CLARIF

## 2017-06-05 RX ORDER — OXYCODONE HYDROCHLORIDE 10 MG/1
10 TABLET ORAL
Qty: 1 TAB | Refills: 0 | Status: SHIPPED
Start: 2017-06-05 | End: 2017-07-26

## 2017-06-05 RX ORDER — OXYCODONE HYDROCHLORIDE 5 MG/1
5 TABLET ORAL
Qty: 1 TAB | Refills: 0 | Status: SHIPPED
Start: 2017-06-05 | End: 2017-07-26

## 2017-06-05 RX ORDER — MORPHINE SULFATE 2 MG/ML
2 INJECTION, SOLUTION INTRAMUSCULAR; INTRAVENOUS
Qty: 1 SYRINGE | Refills: 0 | Status: SHIPPED
Start: 2017-06-05 | End: 2017-07-26

## 2017-06-05 RX ORDER — HEPARIN SODIUM 5000 [USP'U]/ML
5000 INJECTION, SOLUTION INTRAVENOUS; SUBCUTANEOUS EVERY 8 HOURS
Qty: 1 SYRINGE | Refills: 0 | Status: ON HOLD
Start: 2017-06-05 | End: 2017-06-23 | Stop reason: CLARIF

## 2017-06-05 RX ORDER — ACETAMINOPHEN 325 MG/1
650 TABLET ORAL
Qty: 1 TAB | Refills: 0 | Status: ON HOLD
Start: 2017-06-05 | End: 2017-06-23 | Stop reason: CLARIF

## 2017-06-05 RX ORDER — INSULIN LISPRO 100 [IU]/ML
INJECTION, SOLUTION INTRAVENOUS; SUBCUTANEOUS
Qty: 1 VIAL | Refills: 0 | Status: SHIPPED
Start: 2017-06-05

## 2017-06-05 RX ORDER — ONDANSETRON 2 MG/ML
4 INJECTION INTRAMUSCULAR; INTRAVENOUS
Qty: 1 ML | Refills: 0 | Status: ON HOLD
Start: 2017-06-05 | End: 2017-06-23 | Stop reason: CLARIF

## 2017-06-05 RX ORDER — FUROSEMIDE 10 MG/ML
20 INJECTION INTRAMUSCULAR; INTRAVENOUS ONCE
Status: COMPLETED | OUTPATIENT
Start: 2017-06-05 | End: 2017-06-05

## 2017-06-05 RX ORDER — BISACODYL 5 MG
5 TABLET, DELAYED RELEASE (ENTERIC COATED) ORAL
Qty: 1 TAB | Refills: 0 | Status: SHIPPED
Start: 2017-06-05 | End: 2018-07-23

## 2017-06-05 RX ORDER — SODIUM CHLORIDE 9 MG/ML
100 INJECTION, SOLUTION INTRAVENOUS CONTINUOUS
Qty: 1 ML | Refills: 0 | Status: ON HOLD
Start: 2017-06-05 | End: 2017-06-23

## 2017-06-05 RX ORDER — MORPHINE SULFATE 10 MG/ML
5 INJECTION, SOLUTION INTRAMUSCULAR; INTRAVENOUS
Qty: 1 VIAL | Refills: 0 | Status: SHIPPED
Start: 2017-06-05 | End: 2017-07-26

## 2017-06-05 RX ADMIN — FLUCONAZOLE 400 MG: 2 INJECTION, SOLUTION INTRAVENOUS at 09:33

## 2017-06-05 RX ADMIN — MEROPENEM 500 MG: 500 INJECTION, POWDER, FOR SOLUTION INTRAVENOUS at 15:12

## 2017-06-05 RX ADMIN — MORPHINE SULFATE 5 MG: 10 INJECTION INTRAMUSCULAR; INTRAVENOUS; SUBCUTANEOUS at 14:48

## 2017-06-05 RX ADMIN — OXYCODONE HYDROCHLORIDE 10 MG: 5 TABLET ORAL at 09:33

## 2017-06-05 RX ADMIN — MEROPENEM 500 MG: 500 INJECTION, POWDER, FOR SOLUTION INTRAVENOUS at 09:33

## 2017-06-05 RX ADMIN — OXYCODONE HYDROCHLORIDE 10 MG: 5 TABLET ORAL at 02:20

## 2017-06-05 RX ADMIN — HEPARIN SODIUM 5000 UNITS: 5000 INJECTION, SOLUTION INTRAVENOUS; SUBCUTANEOUS at 06:06

## 2017-06-05 RX ADMIN — HEPARIN SODIUM 5000 UNITS: 5000 INJECTION, SOLUTION INTRAVENOUS; SUBCUTANEOUS at 14:48

## 2017-06-05 RX ADMIN — ACETAMINOPHEN 650 MG: 325 TABLET, FILM COATED ORAL at 04:32

## 2017-06-05 RX ADMIN — MEROPENEM 500 MG: 500 INJECTION, POWDER, FOR SOLUTION INTRAVENOUS at 04:32

## 2017-06-05 RX ADMIN — FUROSEMIDE 20 MG: 10 INJECTION, SOLUTION INTRAMUSCULAR; INTRAVENOUS at 14:48

## 2017-06-05 RX ADMIN — MORPHINE SULFATE 5 MG: 10 INJECTION INTRAMUSCULAR; INTRAVENOUS; SUBCUTANEOUS at 06:55

## 2017-06-05 NOTE — PROGRESS NOTES
Report called to KEVIN Mathews at [facility].    SBAR discussed. Opportunities for questions and clarification provided, verbalized understanding. Pt to DC to [facility] AT [facility] hospital transport service.

## 2017-06-05 NOTE — DISCHARGE SUMMARY
Discharge Note    Linh Xiong  [id number]    Admission date:  5/16/2017  Discharge date:  6/5/17    Admitting Diagnosis:  Diabetic Ketoacidosis    Discharge Diagnoses:    Pelvic abscess  Septic shock, due to above--improved  Recurrent fevers--resolved  Leukocytosis, due to above--resolved  DKA--improved  Rhabdomyolysis--resolved  Acute renal failure--resolved  Pancreatitis--resolved  Anemia--stable  Hyponatremia--resolved  Hypernatremia--resolved  Elevated LFTs--resolved  Hypophosphatemia--resolved    Consultations:   General surgery  Infectious disease  Pulmonary/critcical care  Interventional radiology  OB/GYN  Orthopedics  Physical and occupational therapy  Wound care/ostomy nurse    Procedures/studies:   --left IJ central line 5/17/17  --transfusion 2 units PRBC 5/18/17  --IR drain 5/18/17  --operative I&D, debridement of necrotic tissue 5/19/17  --operative I&D, debridement of necrotic tissue 5/22/17  --transfusion 2 units PRBC 5/31/17  --operative I&D, debridement, suture repair of rectum x2, and lap diverting end-loop colostomy 6/2/17    Hospital Course:   Linh Xiong is a 29 y.o.  female who presented to the ED on 5/16 with complaints of worsening right flank pain, N/V and lethargy. She reported feeling poorly for 4-5 days prior. She was evaluated at Urgent Care and by her gynecologist.  She was found to have +HSV and Chlamydia for which she was treated. She continued to feel poorly and came to the ED. She reported that she has increased latha-rectal pain and pressure over the past week. She reported some constipation prior to admission, but denied painful defication, rectal bleeding or melena. On arrival to ED she was found to be septic with DKA (BS >600), Rhabdo (CK >4000), NALDO (Cr 2.51) and pancreatitis (Lipase >5000). WBCs 45K. Lactic 7.4. PCT 78.7. She was admitted by the Hospitalist to the ICU for the above.  She underwent CTAP which revealed: \"Large incompletely characterized pelvic mass with apparent involvement/extension into the right gluteal muscles. \"  GYN was consulted for evaluation and felt that this was not her typical HSV and that she had yeast.  ID was also consulted. By the time I saw her, her DKA, NALDO, lactate, and procalcitonin were improving. Interventional radiology placed a drain in her pelvic fluid cavity 5/18/17. Because of the uncertain nature of the process, I took her for EUA and debridement of necrotic buttock tissues 5/19/17. I found a large amount of dead fat with extension of the wound up into her pelvic fluid cavity. This was drained, debrided, and packed. She received dressing changes and was taken back 5/22/17 for a second look  And further debridement. Findings at that time were much improved. Her labs continued to improve. DKA was managed and sugars controlled by the hospitalist.  Rhabdomyolysis, pancreatitis, and NALDO also all resolved. We continued bedside dressing changes and she was doing well. She was starting to ambulate and work with PT. Her WBC normalized, but then began to climb. She started having fevers again. Repeat imaging of her pelvis was done and showed extensive tracking of air in her buttock and thigh, possibly consistent with extension of the previously noted findings. She was also found to have stool in her buttock wound. She was taken back to the OR 6/2/17 for EUA, debridement of the cavity, suture repair of the rectum x2, and laparoscopic diverting end-loop colostomy. She is doing well after that operation. She is having flatus per ostomy and tolerating a diet.   Further intervention may be needed for the possible infectious process in her thigh, but she was felt stable for transfer to [facility] for ongoing care.     Physical Exam:   GEN: well developed and in no acute distress   HEENT: PERRL, EOMI, oral mucosa moist without cyanosis  NECK: supple, no JVD, no masses   LUNGS: resp even/unlab on room air   HEART: RRR with no M,G,R;  ABDOMEN: soft with mild tenderness, no peritoneal signs, rebound, or guarding. New healthy colostomy in LLQ. Lap port sites clean and dry. BUTTOCK: Appears that dressing not changed over weekend. There is again some grayish purulence in the base of the wound. Superficially, the wound looks great with excellent granulation tissue and no necrotic areas. Widely spreading to examine the deeper component shows some continued grayish fluid drainage. Wound packed with gauze today as I would like to clean it up a little before placing VAC again   EXTREMITIES: warm with no cyanosis, no upper or lower extremity edema  SKIN: no jaundice; rashes, sores, or ecchymoses  NEURO:  Awake, alert, oriented x3, no distress, nonfocal exam.    Recent Labs      175  17   0812  17   0746   WBC  5.8  26.9*  26.2*   HGB  9.0*  8.6*  8.2*   HCT  27.6*  26.4*  25.3*   PLT  133*  442  415     Recent Labs      175  17   0812  17   0746   NA  141  140  139   K  3.5  3.8  4.1   CL  111*  103  100   CO2  20*  25  28   BUN  9  6  7   CREA  0.97  0.34*  0.37*       Last lactate 1.4  Last CRP 18.3  RPR NR  Blood cultures 17--GPR x2 bottles  HSV culture 17--negative  Pelvic fluid culture from IR 17--beta hemolytic strep  UA 17--1+ bacteria, neg LE/nitr--cultures with 100k candida glabrata  AFB smear 17--negative  OR tissue culture 17--beta hemolytic strep  Blood cultures 17--no growth x 5 days  Blood cultures 17--no growth 5 days  Blood cultures 17--no growth x14 hrs     IMAGIN17--CT abd/pelvis: \"IMPRESSION: Large incompletely characterized pelvic mass with apparent involvement/extension into the right gluteal muscles. The findings presumably represent a neoplastic process. Pelvic MRI may be beneficial for further delineation as it relates to other pelvic anatomy.  Preferably this would be done with IV contrast.\"  17--CT chest/abd/pelvis: \"IMPRESSION: Indeterminate perirectal mass with extension of heterogeneous, low-attenuation soft tissue into the right buttock an asymmetric edema and muscular swelling and the right hemipelvis. Right inguinal adenopathy is present. This may be a perirectal abscess or necrotic tumor with extrapelvic extension. \"  5/18/17--CT guided IR drain placement:   5/19/17--MRI pelvis--\"IMPRESSION: Status post right perianal/rectal abscess drainage. The extensive remaining abscess findings are detailed above. \"  6/1/17--CT abd/pelv--\"IMPRESSION: Extensive complex air/fluid collection involving the right gluteal musculature extending into the right thigh, consistent with myositis and possible gangrene. Probably associated with perforation of the right lateral wall of the inferior rectum. \"    Discharge Medications:    Diflucan 400 mg IV daily  Heparin 5000 units SQ q8h  Humalog SSI  Merropenem 500 mg q6h  Normal saline infusion--may heplock when tolerating diet  Tylenol 650 mg PO q4h prn pain or fevers  Dulcolax 5 mg daily PRN constipation  Morphine 2-5 mg q4h prn pain  Zofran 4 mg IV q6h prn n/v  Oxycodone 5-10 mg q4h prn pain  Phenergan 12.5 mg q6h prn n/v    Other orders:   --ambulate as tolerated  --out of bed to chair  --I/Os every shift  --dwyer to gravity. May remove once mobile  --blood glucose checks AC and HS  --vital signs per routine  --PT and OT, evaluate and treat  --O2 by NC to keep sats >94%  --daily dressing change to buttock wound. Pack with dry kerlix    -- ID will follow on [facility] for antibiotic management      Condition at Discharge: improved    Disposition:  [facility]    Diet: Regular diabetic   Activity: as tolerated. PT and OT to evaluate and treat.    Out of bed to chair and ambulate with assistance   Wound care: daily and as needed dry kerlix gauze packing to buttock wound   Follow-up:   --PCP follow up after discharge home  --Dr Joey Mccarthy in medical oncology 2 weeks after discharge home  --Follow up as outpatient with [facility] 2-3 weeks after discharge home    >30 minutes were spent discharging the patient.     NINO Rausch MD

## 2017-06-05 NOTE — PROGRESS NOTES
Beni Brenner M.D. Colon and Rectal Surgeon  [facility] [address], [address] [facility], [facility] [address]  Phone: [phone] Fax: [phone]      Anika Russo  [id number]    SUBJECTIVE:  29year old female admitted with DKA, rhabdomyolysis, NALDO, pancreatitis, lactic acidosis, leukocytosis, and sepsis. On CT, she was found to have a large pelvic process (mass vs abscess) with tracking into right buttock. She improved some with supportive care, fluid resuscitation, and antibiotics. She had an IR drain placed into her pelvic fluid collection. She was taken to the OR 5/19/17 for I&D of necrotic buttock fat. I found tracking into the extraperitoneal pelvic fluid collection. There was no connection to rectum. She was taken 5/22/17 for repeat debridement with much improved appearance of wound. Wound seemed to be improving, but on VAC change 6/1, there was stool in the wound, consistent with rectal wall breakdown. Due to fevers and worsening WBC, she had repeat CT done, showing extensive gas and inflammation in the right buttock and thigh. Taken for wound exploration, debridement, suture repair of rectum x2, and lap diverting end-loop colostomy 6/2/17. I could not find tracking into the thigh process seen on CT imaging. Ortho has seen patient for ongoing thigh process. Patient with uneventful weekend. Reports some buttock, leg, and abdominal pain. Tolerated some diet. Minimal serous fluid out of ostomy thus far. No n/v.   Wound dressing not changed over weekend      PHYSICAL EXAM:   Patient Vitals for the past 24 hrs:   BP Temp Pulse Resp SpO2   06/05/17 0331 133/72 100.3 °F (37.9 °C) (!) 124 16 92 %   06/04/17 2319 146/78 98.6 °F (37 °C) (!) 127 16 92 %   06/04/17 1922 132/71 (!) 100.6 °F (38.1 °C) (!) 126 16 95 %   06/04/17 1616 - 98.8 °F (37.1 °C) - - -   06/04/17 1433 137/80 (!) 100.9 °F (38.3 °C) (!) 130 24 90 %   06/04/17 1130 151/83 99.9 °F (37.7 °C) (!) 135 (!) 32 90 %       General--AAO, NAD, answers all questions, appears comfortable  Abdomen--soft, mildly tender, nondistended. No peritoneal signs, no rebound, no guarding. Stoma in LLQ is pink, healthy, and budded. Minimal jay fluid in bag. Buttock--dressing removed. Appears that dressing not changed over weekend. There is again some grayish purulence in the base of the wound. Superficially, the wound looks great with excellent granulation tissue and no necrotic areas. Widely spreading to examine the deeper component shows some continued grayish fluid drainage. Wound packed with gauze today as I would like to clean it up a little before placing VAC again    UOP: 250/525  BM: min stoma output      Recent Labs      175  17   0812  17   0746   WBC  5.8  26.9*  26.2*   HGB  9.0*  8.6*  8.2*   HCT  27.6*  26.4*  25.3*   PLT  133*  442  415       Recent Labs      17   0812  17   0746   NA  141  140  139   K  3.5  3.8  4.1   CL  111*  103  100   CO2  20*  25  28   BUN  9  6  7   CREA  0.97  0.34*  0.37*       No results for input(s): AML, LPSE in the last 72 hours. No results for input(s): PTP, INR, APTT in the last 72 hours. No lab exists for component: INREXT, INREXT  Last lactate 1.4  Last CRP 18.3  RPR NR  Pelvic fluid culture from IR 17--beta hemolytic strep  AFB smear 17--negative  HSV culture 17--negative  Blood cultures 17--GPR x2 bottles  Blood cultures 17--no growth x 5 days  Blood cultures 17--no growth 5 days  Blood cultures 17--no growth x14 hrs  UA 17--1+ bacteria, neg LE/nitr--cultures with 100k candida glabrata  OR tissue culture 17--beta hemolytic strep  Above labs reviewed by me. IMAGIN17--CT abd/pelvis: \"IMPRESSION: Large incompletely characterized pelvic mass with apparent involvement/extension into the right gluteal muscles.  The findings presumably represent a neoplastic process. Pelvic MRI may be beneficial for further delineation as it relates to other pelvic anatomy. Preferably this would be done with IV contrast.\"  5/17/17--CT chest/abd/pelvis: \"IMPRESSION: Indeterminate perirectal mass with extension of heterogeneous, low-attenuation soft tissue into the right buttock an asymmetric edema and muscular swelling and the right hemipelvis. Right inguinal adenopathy is present. This may be a perirectal abscess or necrotic tumor with extrapelvic extension. \"  5/18/17--CT guided IR drain placement:   5/19/17--MRI pelvis--\"IMPRESSION: Status post right perianal/rectal abscess drainage. The extensive remaining abscess findings are detailed above. \"  6/1/17--CT abd/pelv--\"IMPRESSION: Extensive complex air/fluid collection involving the right gluteal musculature extending into the right thigh, consistent with myositis and possible gangrene. Probably associated with perforation of the right lateral wall of the inferior rectum. \"    ASSESSMENT:   Pelvic abscess   S/p IR drain 5/18/17   S/p operative I&D, debridement of necrotic tissue 5/19/17   S/p operative I&D, debridement of necrotic tissue 5/22/17   S/p operative I&D, debridement, suture repair of rectum x2, and lap diverting end-loop colostomy 6/2/17  Sepsis, due to above--improved  Recurrent fevers--resolved  Leukocytosis, due to above--resolved  DKA--improved  Rhabdomyolysis--resolved  NALDO--resolved  Pancreatitis--resolved  Anemia--   S/p transfusion 2 units PRBC 5/18/17   S/p transfusion 2 units PRBC 5/31/17  Hypernatremia--resolved  Elevated LFTs--resolved  Hypophosphatemia--resolved    PLAN:  --continue IV and PO pain medications  --OOB, IS, mobilize  --No CV issues. Tachycardia is probably compensatory response to sepsis  --regular diabetic diet. --With diversion and local wound care, we may be able to get this to heal in and avoid a permanent stoma.     --Given the appearance of the thigh process on CT, I was surprised that I could not find any tracking on Friday's wound exploration  --will discuss her case with Dr Paula Davila  --follow UOP, creat. --transfuse for <7  --continue broad spectrum antibiotics per ID recs  --PT seeing  --[facility] may be a good option for her.      Ирина Sparks MD

## 2017-06-05 NOTE — PROGRESS NOTES
Care Management Interventions  PCP Verified by CM: Yes  Transition of Care Consult (CM Consult): Discharge Planning, [address]: Yes  Current Support Network: Lives with Spouse  Confirm Follow Up Transport: Family  Plan discussed with Pt/Family/Caregiver: Yes  Freedom of Choice Offered: Yes  Discharge Location  Discharge Placement: [facility] ([facility])    Patient will discharge to [facility] once Discharge Order is in place. Contact at [facility] is Yemi  [phone] who can be reached for any insurance related D/C needs. Ms Freddi Galeazzi verified insurance has authorized transfer.

## 2017-06-05 NOTE — PROGRESS NOTES
Calcium trending down; Ca 7.7 at this time. Will notify day shift RN to notify hospitalist rounding on pt.

## 2017-06-05 NOTE — PROGRESS NOTES
Infectious Disease Progress Note    Today's Date: 2017   Admit Date: 2017    Impression:   · Polymicrobial large pelvic abscess, involving R gluteal/R thigh musculature: 20 cc purulent fluid drained 17, cultures with GBS, GNR on GS; I&D : alpha strep, GBS, E coli and bacteroides; repeat I&D . Now with stool contamination and concerns for rectal perf/necrosis with connection to the wound  · Lactobacillus bacteremia (); repeat culture  negative;  BC NGTD  · Fever/leukocytosis resolved  · Chlamydia cervicitis  · Severe anogenital HSV/inguinal yeast infection  · DKA (A1C 14), resolved    Plan:   · Continue Meropenem and fluconazole   ·  S/P rectal exam under anesthesia, sharp excisional debridement of skin, subcutaneous tissues, and fascia, suture repair of rectal perforation x2,  ultrasound evaluation of soft tissues of the right side, and  laparoscopic and loop diverting colostomy 17   ·  BC, NGTD: follow   · Dispo: [facility] is planned      Anti-infectives:     Piperacillin/tazobactam -)  Fluconazole -  Merrem (-    Subjective:      Denies N/V. Denies fever or chills. Unable to roll for exam today. No Known Allergies     Review of Systems:  A comprehensive review of systems was negative except for that written in the History of Present Illness. Objective:     Visit Vitals    /72    Pulse (!) 124    Temp 100.3 °F (37.9 °C)    Resp 16    Ht 5' 2\" (1.575 m)    Wt 89.6 kg (197 lb 8.5 oz)    SpO2 92%    BMI 36.13 kg/m2     Temp (24hrs), Av.9 °F (37.7 °C), Min:98.6 °F (37 °C), Max:100.9 °F (38.3 °C)       Lines:  Central Venous Catheter:  L chest, non tender        Physical Exam:    General:  Awake, alert, no acute distress, obese   Eyes:  Sclera anicteric. Pupils equally round and reactive to light. Mouth/Throat: No assessed    Neck: Supple   Lungs:   Good effort   CV:  Not assessed this am   Abdomen:   Soft, non-tender.  non-distended; Extremities: No cyanosis, tamiko LE 2+ pitting edema   Skin: No rash, Anal/gluteal fold wound with dressing in place, not examined today   Musculoskeletal: No deformity   Lines/Devices:  Intact, no erythema, drainage or tenderness           Data Review:     CBC:  Recent Labs      06/05/17 0425 06/04/17   0812  06/03/17   0746   WBC  5.8  26.9*  26.2*   HGB  9.0*  8.6*  8.2*   HCT  27.6*  26.4*  25.3*   PLT  133*  442  415       BMP:  Recent Labs      06/05/17 0425 06/04/17   0812  06/03/17   0746   CREA  0.97  0.34*  0.37*   BUN  9  6  7   NA  141  140  139   K  3.5  3.8  4.1   CL  111*  103  100   CO2  20*  25  28   AGAP  10  12  11   GLU  213*  115*  114*       LFTS:  No results for input(s): TBILI, ALT, SGOT, AP, TP, ALB in the last 72 hours.     Microbiology:     All Micro Results     Procedure Component Value Units Date/Time    CULTURE, BLOOD [[id number]] Collected:  06/04/17 1641    Order Status:  Completed Specimen:  Blood from Blood Updated:  06/05/17 0704     Special Requests: PROXIMAL LINE     Culture result: NO GROWTH AFTER 13 HOURS       CULTURE, BLOOD [[id number]] Collected:  06/04/17 1555    Order Status:  Completed Specimen:  Blood from Blood Updated:  06/05/17 0704     Special Requests: [address]     Culture result: NO GROWTH AFTER 14 HOURS       CULTURE, BLOOD [[id number]] Collected:  05/29/17 2355    Order Status:  Completed Specimen:  Blood from Blood Updated:  06/04/17 0640     Special Requests: RIGHT HAND        Culture result: NO GROWTH 5 DAYS       CULTURE, BLOOD [[id number]] Collected:  05/29/17 2345    Order Status:  Completed Specimen:  Blood from Blood Updated:  06/04/17 0640     Special Requests: --        PORT  WHITE       Culture result: NO GROWTH 5 DAYS       CULTURE, ANAEROBIC [[id number]]  (Abnormal) Collected:  05/19/17 1915    Order Status:  Completed Specimen:  Buttock Updated:  06/02/17 1050     Special Requests: R BUTTOCK TISSUE     Culture result: LIGHT  BACTEROIDES FRAGILIS GROUP (A)             Testing Performed by:  [address] [place] [address]. Butch Rich        REFER TO [facility] [id number]    FUNGUS CULTURE AND SMEAR [[id number]] Collected:  05/19/17 1915    Order Status:  Completed Specimen:  Miscellaneous sample Updated:  05/31/17 1836     Source BUTTOCK        Fungus stain Direct Inoculation     Fungus (Mycology) Culture Other source received      (NOTE)  CALLED AND FAXED RESULTS TO [facility] ON 5/30/17 AT 5:00PM,DPL  Performed At: [address] [id number]  Jania Seals MD YM:[id number]         ANAEROBE ID REFLEX [[id number]]  (Abnormal) Collected:  05/19/17 1915    Order Status:  Completed Specimen:  MICROBIAL ISOLATE Updated:  05/31/17 1037     Anaerobe ID Result 1 Comment (A)         (NOTE)  Bacteroides species, Bacteroides fragilis group  Beta lactamase positive. Testing performed by broth microdilution. Antimicrobial Suscept. Comment         (NOTE)       ** S = Susceptible; I = Intermediate; R = Resistant **                    P = Positive; N = Negative             MICS are expressed in micrograms per mL    Antibiotic                 RSLT#1    RSLT#2    RSLT#3    RSLT#4  Ampicillin/Sulbactam           S =8  Cefoxitin                      S =8  Chloramphenicol                S =4  Clindamycin                    S< 0.5  Metronidazole                  S< 0.5  Penicillin                     R> 32  Piperacillin                   S =16  Performed At: [address] [id number]  Jania Seals MD CT:[id number]         ANAEROBE ID W/ [employer] [[id number]] Collected:  05/19/17 1915    Order Status:  Completed Specimen:  MICROBIAL ISOLATE Updated:  05/31/17 1037     Source BUTTOCK         RIGHT        Anaerobe ID w/Suscept.  Final Report Below      (NOTE)  Performed At: [address] [id number]  Jania Seals MD TF:[id number]  Corrected on 05/31 AT 1037: This is a correction to the medical record of the test results previously reported as Preliminary report         CULTURE, BLOOD [[id number]]  (Abnormal) Collected:  05/16/17 1736    Order Status:  Completed Specimen:  Blood from Blood Updated:  05/30/17 1412     Special Requests: RIGHT ANTECUBITAL        GRAM STAIN GRAM POSITIVE RODS         ANAEROBIC BOTTLE POSITIVE                 CRITICAL RESULT NOT CALLED DUE TO PREVIOUS NOTIFICATION OF CRITICAL RESULT WITHIN THE LAST 24 HOURS. Culture result: GRAM POSITIVE RODS (A)         REFER TO [id number] FOR ID AND SUSCEPTIBILITY    CULTURE, BLOOD [[id number]]  (Abnormal) Collected:  05/16/17 1730    Order Status:  Completed Specimen:  Blood from Blood Updated:  05/30/17 1411     Special Requests: LEFT ANTECUBITAL        GRAM STAIN GRAM POSITIVE RODS         ANAEROBIC BOTTLE POSITIVE               RESULTS VERIFIED, PHONED TO AND READ BACK BY  Jax Santos RN @ [phone] BY SP       Culture result: LACTOBACILLUS SPECIES (A)               Testing Performed by:  [facility]  P.OButch Gonzalez        SEE REPORT FROM [facility] [id number]       AEROBE ID REFLEX [[id number]] Collected:  05/16/17 1730    Order Status:  Completed Specimen:  MICROBIAL ISOLATE Updated:  05/29/17 1436     Result 1 Lactobacillus species      Corrected on 05/27 AT 0014:  This is a correction to the medical record of the test results previously reported as Gram positive rods        Antimicrobial Susceptibility Comment         (NOTE)       ** S = Susceptible; I = Intermediate; R = Resistant **                    P = Positive; N = Negative             MICS are expressed in micrograms per mL    Antibiotic                 RSLT#1    RSLT#2    RSLT#3    RSLT#4  Ampicillin                     S =0.25  Clindamycin                    R> 0.5  Erythromycin                   S<=0.06  Penicillin                     S =0.06  Vancomycin                     S =1  Performed At: [address] [id number]  Didier Mayer MD WD:[id number]         AEROBIC SUSCEPTIBILITY ID [[id number]] Collected:  05/16/17 1730    Order Status:  Completed Specimen:  MICROBIAL ISOLATE Updated:  05/29/17 1436     Source BLOOD        Aerobe ID + Suscept Final Report Below      (NOTE)  Performed At: [address] [id number]  Didier Mayer MD XL:[id number]  Corrected on 05/29 AT 1436:  This is a correction to the medical record of the test results previously reported as Preliminary report         CULTURE, Shanta Arreola [[id number]] Collected:  05/19/17 1946    Order Status:  Completed Specimen:  Miscellaneous sample Updated:  05/24/17 1239     Source Cervical/vaginal swab        Chlamydia trachomatis Culture NEGATIVE          (NOTE)  Performed At: [address] [id number]  Didier Mayer MD LW:[id number]         CULTURE, BLOOD [[id number]] Collected:  05/19/17 1020    Order Status:  Completed Specimen:  Blood from Blood Updated:  05/24/17 0736     Special Requests: RIGHT ANTECUBITAL        Culture result: NO GROWTH 5 DAYS       CULTURE, BLOOD [[id number]] Collected:  05/19/17 1033    Order Status:  Completed Specimen:  Blood from Blood Updated:  05/24/17 0736     Special Requests: LEFT HAND        Culture result: NO GROWTH 5 DAYS       CULTURE, GONORRHOEAE ONLY [[id number]] Collected:  05/19/17 1946    Order Status:  Completed Specimen:  Cervical/vaginal swab Updated:  05/23/17 1249     Special Requests: NO SPECIAL REQUESTS        GRAM STAIN NO WBCS SEEN         FEW  YEAST         FEW  GRAM NEGATIVE RODS        Culture result:         NO NEISSERIA GONORRHOEAE ISOLATED    FUNGUS CULTURE AND Lamont Varela [[id number]] Collected:  05/18/17 1640    Order Status:  Completed Specimen:  Miscellaneous sample Updated:  05/23/17 1238     Source WOUND PERIRECTAL  Corrected on 05/23 AT 1237: This is a correction to the medical record of the test results previously reported as PERIRECTAL          Fungus stain Direct Inoculation     Fungus (Mycology) Culture Other source received      (NOTE)  Performed At: [address] [id number]  Eve Spurling MD XL:[id number]         CULTURE, TISSUE W [address] [[id number]]  (Abnormal)  (Susceptibility) Collected:  05/19/17 1915    Order Status:  Completed Specimen:  Buttock Updated:  05/23/17 0835     Special Requests: RIGHT        GRAM STAIN 0 TO 2  WBCS SEEN        MODERATE  GRAM VARIABLE RODS        Culture result: SCANT  ESCHERICHIA COLI   (A)       MODERATE  ALPHA STREPTOCOCCUS   (A)             SCANT  STREPTOCOCCI, BETA HEMOLYTIC GROUP B  THIS ORGANISM WILL BE HELD FOR 7 DAYS. IF FURTHER TESTING IS REQUIRED PLEASE NOTIFY MICROBIOLOGY   (A)    CULTURE, URINE [[id number]]  (Abnormal) Collected:  05/19/17 1117    Order Status:  Completed Specimen:  Urine from Cath Urine Updated:  05/23/17 0729     Special Requests: NO SPECIAL REQUESTS        Culture result:       >100,000  COLONIES/mL  CANDIDA GLABRATA  IDENTIFICATION TO FOLLOW   (A)      [id number]  COLONIES/mL  CANDIDA ALBICANS   (A)     CULTURE, BODY FLUID Cleta Cheeks STAIN [[id number]]  (Abnormal) Collected:  05/18/17 1640    Order Status:  Completed Specimen:  Abscess Updated:  05/22/17 1406     Special Requests: PERIRECTAL     GRAM STAIN MANY  GRAM POSITIVE RODS         RARE  GRAM NEGATIVE RODS         10 TO 20  WBCS SEEN  PER OIF       Culture result:       LIGHT  STREPTOCOCCI, BETA HEMOLYTIC GROUP B  THIS ORGANISM WILL BE HELD FOR 7 DAYS.  IF FURTHER TESTING IS REQUIRED PLEASE NOTIFY MICROBIOLOGY   (A)              NORMAL SKIN DEE ISOLATED    CULTURE, HSV W/ TYPING [[id number]] Collected:  05/17/17 1710    Order Status:  Completed Specimen:  Miscellaneous sample Updated:  05/21/17 1241     Source PERINEUM     HSV culture w typing Comment (NOTE)  Negative  No Herpes simplex virus isolated. Performed At: [address] [id number]  Yael Alfred MD RD:[id number]         AFB CULTURE + SMEAR W/RFLX ID FROM CULTURE [[id number]] Collected:  17    Order Status:  Completed Specimen:  Miscellaneous sample Updated:  17 1036     Source BUTTOCK        AFB Specimen processing Concentration     Acid Fast Smear NEGATIVE          (NOTE)  Performed At: [address] [id number]  Yael Alfred MD TO:[id number]          Acid Fast Culture PENDING    CULTURE, HSV W/ TYPING [[id number]]     Order Status:  Canceled     MRSA SCREEN - PCR (NASAL) [[id number]] Collected:  17    Order Status:  Completed Specimen:  Nasal from Nasal Swab Updated:  17     Special Requests: NO SPECIAL REQUESTS        Culture result:         MRSA target DNA is not detected (presumptive not colonized with MRSA)          Imagin/1/17 CT pelvis  IMPRESSION: Extensive complex air/fluid collection involving the right gluteal  musculature extending into the right thigh, consistent with myositis and  possible gangrene. Probably associated with perforation of the right lateral  wall of the inferior rectum.     Signed By: Blake Burden NP     2017

## 2017-06-06 ENCOUNTER — ANESTHESIA (OUTPATIENT)
Dept: SURGERY | Age: 32
DRG: 853 | End: 2017-06-06
Payer: COMMERCIAL

## 2017-06-06 ENCOUNTER — HOSPITAL ENCOUNTER (INPATIENT)
Age: 32
Setting detail: SURGERY ADMIT
LOS: 1 days | Discharge: NURSING, REHAB OR LONG-TERM CARE | DRG: 853 | End: 2017-06-06
Attending: COLON & RECTAL SURGERY | Admitting: COLON & RECTAL SURGERY
Payer: COMMERCIAL

## 2017-06-06 VITALS
HEART RATE: 119 BPM | BODY MASS INDEX: 36.35 KG/M2 | SYSTOLIC BLOOD PRESSURE: 133 MMHG | OXYGEN SATURATION: 100 % | TEMPERATURE: 99.7 F | WEIGHT: 197.53 LBS | RESPIRATION RATE: 22 BRPM | HEIGHT: 62 IN | DIASTOLIC BLOOD PRESSURE: 66 MMHG

## 2017-06-06 LAB — GLUCOSE BLD STRIP.AUTO-MCNC: 91 MG/DL (ref 65–100)

## 2017-06-06 PROCEDURE — 74011250636 HC RX REV CODE- 250/636: Performed by: ANESTHESIOLOGY

## 2017-06-06 PROCEDURE — 77030013727 HC IRR FAN PULSVC ZIMM -B: Performed by: COLON & RECTAL SURGERY

## 2017-06-06 PROCEDURE — 65270000029 HC RM PRIVATE

## 2017-06-06 PROCEDURE — 87076 CULTURE ANAEROBE IDENT EACH: CPT | Performed by: COLON & RECTAL SURGERY

## 2017-06-06 PROCEDURE — 74011000250 HC RX REV CODE- 250

## 2017-06-06 PROCEDURE — 76010000132 HC OR TIME 2.5 TO 3 HR: Performed by: COLON & RECTAL SURGERY

## 2017-06-06 PROCEDURE — 77030011640 HC PAD GRND REM COVD -A: Performed by: COLON & RECTAL SURGERY

## 2017-06-06 PROCEDURE — 87075 CULTR BACTERIA EXCEPT BLOOD: CPT | Performed by: COLON & RECTAL SURGERY

## 2017-06-06 PROCEDURE — 87205 SMEAR GRAM STAIN: CPT | Performed by: COLON & RECTAL SURGERY

## 2017-06-06 PROCEDURE — 74011250636 HC RX REV CODE- 250/636

## 2017-06-06 PROCEDURE — 87186 SC STD MICRODIL/AGAR DIL: CPT | Performed by: COLON & RECTAL SURGERY

## 2017-06-06 PROCEDURE — 74011000250 HC RX REV CODE- 250: Performed by: ANESTHESIOLOGY

## 2017-06-06 PROCEDURE — 77030002996 HC SUT SLK J&J -A: Performed by: COLON & RECTAL SURGERY

## 2017-06-06 PROCEDURE — 82962 GLUCOSE BLOOD TEST: CPT

## 2017-06-06 PROCEDURE — 76210000017 HC OR PH I REC 1.5 TO 2 HR: Performed by: COLON & RECTAL SURGERY

## 2017-06-06 PROCEDURE — P9045 ALBUMIN (HUMAN), 5%, 250 ML: HCPCS

## 2017-06-06 PROCEDURE — 77030008703 HC TU ET UNCUF COVD -A: Performed by: ANESTHESIOLOGY

## 2017-06-06 PROCEDURE — 77030019908 HC STETH ESOPH SIMS -A: Performed by: ANESTHESIOLOGY

## 2017-06-06 PROCEDURE — 77030008477 HC STYL SATN SLP COVD -A: Performed by: ANESTHESIOLOGY

## 2017-06-06 PROCEDURE — 0KBQ0ZZ EXCISION OF RIGHT UPPER LEG MUSCLE, OPEN APPROACH: ICD-10-PCS | Performed by: COLON & RECTAL SURGERY

## 2017-06-06 PROCEDURE — 76060000036 HC ANESTHESIA 2.5 TO 3 HR: Performed by: COLON & RECTAL SURGERY

## 2017-06-06 PROCEDURE — 77030012422 HC DRN WND COVD -A: Performed by: COLON & RECTAL SURGERY

## 2017-06-06 PROCEDURE — 77030020782 HC GWN BAIR PAWS FLX 3M -B: Performed by: ANESTHESIOLOGY

## 2017-06-06 PROCEDURE — 77030033269 HC SLV COMPR SCD KNE2 CARD -B: Performed by: COLON & RECTAL SURGERY

## 2017-06-06 PROCEDURE — 77030018836 HC SOL IRR NACL ICUM -A: Performed by: COLON & RECTAL SURGERY

## 2017-06-06 PROCEDURE — 87077 CULTURE AEROBIC IDENTIFY: CPT | Performed by: COLON & RECTAL SURGERY

## 2017-06-06 RX ORDER — NEOSTIGMINE METHYLSULFATE 1 MG/ML
INJECTION INTRAVENOUS AS NEEDED
Status: DISCONTINUED | OUTPATIENT
Start: 2017-06-06 | End: 2017-06-06 | Stop reason: HOSPADM

## 2017-06-06 RX ORDER — HYDROCODONE BITARTRATE AND ACETAMINOPHEN 5; 325 MG/1; MG/1
1 TABLET ORAL AS NEEDED
Status: DISCONTINUED | OUTPATIENT
Start: 2017-06-06 | End: 2017-06-06 | Stop reason: HOSPADM

## 2017-06-06 RX ORDER — ACETAMINOPHEN 500 MG
1000 TABLET ORAL
Status: DISCONTINUED | OUTPATIENT
Start: 2017-06-06 | End: 2017-06-06 | Stop reason: HOSPADM

## 2017-06-06 RX ORDER — SODIUM CHLORIDE 9 MG/ML
50 INJECTION, SOLUTION INTRAVENOUS CONTINUOUS
Status: DISCONTINUED | OUTPATIENT
Start: 2017-06-06 | End: 2017-06-06 | Stop reason: HOSPADM

## 2017-06-06 RX ORDER — GLYCOPYRROLATE 0.2 MG/ML
INJECTION INTRAMUSCULAR; INTRAVENOUS AS NEEDED
Status: DISCONTINUED | OUTPATIENT
Start: 2017-06-06 | End: 2017-06-06 | Stop reason: HOSPADM

## 2017-06-06 RX ORDER — PROPOFOL 10 MG/ML
INJECTION, EMULSION INTRAVENOUS AS NEEDED
Status: DISCONTINUED | OUTPATIENT
Start: 2017-06-06 | End: 2017-06-06 | Stop reason: HOSPADM

## 2017-06-06 RX ORDER — ALBUMIN HUMAN 50 G/1000ML
SOLUTION INTRAVENOUS AS NEEDED
Status: DISCONTINUED | OUTPATIENT
Start: 2017-06-06 | End: 2017-06-06 | Stop reason: HOSPADM

## 2017-06-06 RX ORDER — ROCURONIUM BROMIDE 10 MG/ML
INJECTION, SOLUTION INTRAVENOUS AS NEEDED
Status: DISCONTINUED | OUTPATIENT
Start: 2017-06-06 | End: 2017-06-06 | Stop reason: HOSPADM

## 2017-06-06 RX ORDER — FENTANYL CITRATE 50 UG/ML
INJECTION, SOLUTION INTRAMUSCULAR; INTRAVENOUS AS NEEDED
Status: DISCONTINUED | OUTPATIENT
Start: 2017-06-06 | End: 2017-06-06 | Stop reason: HOSPADM

## 2017-06-06 RX ORDER — HYDROMORPHONE HYDROCHLORIDE 2 MG/ML
0.5 INJECTION, SOLUTION INTRAMUSCULAR; INTRAVENOUS; SUBCUTANEOUS
Status: DISCONTINUED | OUTPATIENT
Start: 2017-06-06 | End: 2017-06-06 | Stop reason: HOSPADM

## 2017-06-06 RX ORDER — LIDOCAINE HYDROCHLORIDE 20 MG/ML
INJECTION, SOLUTION EPIDURAL; INFILTRATION; INTRACAUDAL; PERINEURAL AS NEEDED
Status: DISCONTINUED | OUTPATIENT
Start: 2017-06-06 | End: 2017-06-06 | Stop reason: HOSPADM

## 2017-06-06 RX ORDER — ONDANSETRON 2 MG/ML
INJECTION INTRAMUSCULAR; INTRAVENOUS AS NEEDED
Status: DISCONTINUED | OUTPATIENT
Start: 2017-06-06 | End: 2017-06-06 | Stop reason: HOSPADM

## 2017-06-06 RX ORDER — METOPROLOL TARTRATE 5 MG/5ML
5 INJECTION INTRAVENOUS ONCE
Status: COMPLETED | OUTPATIENT
Start: 2017-06-06 | End: 2017-06-06

## 2017-06-06 RX ORDER — SODIUM CHLORIDE, SODIUM LACTATE, POTASSIUM CHLORIDE, CALCIUM CHLORIDE 600; 310; 30; 20 MG/100ML; MG/100ML; MG/100ML; MG/100ML
50 INJECTION, SOLUTION INTRAVENOUS CONTINUOUS
Status: DISCONTINUED | OUTPATIENT
Start: 2017-06-06 | End: 2017-06-06 | Stop reason: HOSPADM

## 2017-06-06 RX ORDER — SODIUM CHLORIDE 0.9 % (FLUSH) 0.9 %
5-10 SYRINGE (ML) INJECTION AS NEEDED
Status: DISCONTINUED | OUTPATIENT
Start: 2017-06-06 | End: 2017-06-06 | Stop reason: HOSPADM

## 2017-06-06 RX ORDER — SODIUM CHLORIDE, SODIUM LACTATE, POTASSIUM CHLORIDE, CALCIUM CHLORIDE 600; 310; 30; 20 MG/100ML; MG/100ML; MG/100ML; MG/100ML
150 INJECTION, SOLUTION INTRAVENOUS CONTINUOUS
Status: DISCONTINUED | OUTPATIENT
Start: 2017-06-06 | End: 2017-06-06 | Stop reason: HOSPADM

## 2017-06-06 RX ADMIN — HYDROMORPHONE HYDROCHLORIDE 0.5 MG: 2 INJECTION, SOLUTION INTRAMUSCULAR; INTRAVENOUS; SUBCUTANEOUS at 17:43

## 2017-06-06 RX ADMIN — FENTANYL CITRATE 50 MCG: 50 INJECTION, SOLUTION INTRAMUSCULAR; INTRAVENOUS at 16:21

## 2017-06-06 RX ADMIN — GLYCOPYRROLATE 0.3 MG: 0.2 INJECTION INTRAMUSCULAR; INTRAVENOUS at 16:30

## 2017-06-06 RX ADMIN — FENTANYL CITRATE 50 MCG: 50 INJECTION, SOLUTION INTRAMUSCULAR; INTRAVENOUS at 16:00

## 2017-06-06 RX ADMIN — SODIUM CHLORIDE, SODIUM LACTATE, POTASSIUM CHLORIDE, AND CALCIUM CHLORIDE: 600; 310; 30; 20 INJECTION, SOLUTION INTRAVENOUS at 14:44

## 2017-06-06 RX ADMIN — SODIUM CHLORIDE, SODIUM LACTATE, POTASSIUM CHLORIDE, AND CALCIUM CHLORIDE 50 ML/HR: 600; 310; 30; 20 INJECTION, SOLUTION INTRAVENOUS at 13:51

## 2017-06-06 RX ADMIN — HYDROMORPHONE HYDROCHLORIDE 0.5 MG: 2 INJECTION, SOLUTION INTRAMUSCULAR; INTRAVENOUS; SUBCUTANEOUS at 17:53

## 2017-06-06 RX ADMIN — ONDANSETRON 4 MG: 2 INJECTION INTRAMUSCULAR; INTRAVENOUS at 16:22

## 2017-06-06 RX ADMIN — ALBUMIN HUMAN 250 ML: 50 SOLUTION INTRAVENOUS at 16:26

## 2017-06-06 RX ADMIN — LIDOCAINE HYDROCHLORIDE 100 MG: 20 INJECTION, SOLUTION EPIDURAL; INFILTRATION; INTRACAUDAL; PERINEURAL at 14:59

## 2017-06-06 RX ADMIN — METOPROLOL TARTRATE 5 MG: 5 INJECTION INTRAVENOUS at 17:49

## 2017-06-06 RX ADMIN — FENTANYL CITRATE 100 MCG: 50 INJECTION, SOLUTION INTRAMUSCULAR; INTRAVENOUS at 14:47

## 2017-06-06 RX ADMIN — HYDROMORPHONE HYDROCHLORIDE 0.5 MG: 2 INJECTION, SOLUTION INTRAMUSCULAR; INTRAVENOUS; SUBCUTANEOUS at 17:59

## 2017-06-06 RX ADMIN — PROPOFOL 200 MG: 10 INJECTION, EMULSION INTRAVENOUS at 14:59

## 2017-06-06 RX ADMIN — ROCURONIUM BROMIDE 35 MG: 10 INJECTION, SOLUTION INTRAVENOUS at 15:00

## 2017-06-06 RX ADMIN — SODIUM CHLORIDE, SODIUM LACTATE, POTASSIUM CHLORIDE, AND CALCIUM CHLORIDE: 600; 310; 30; 20 INJECTION, SOLUTION INTRAVENOUS at 15:45

## 2017-06-06 RX ADMIN — NEOSTIGMINE METHYLSULFATE 3 MG: 1 INJECTION INTRAVENOUS at 16:30

## 2017-06-06 RX ADMIN — HYDROMORPHONE HYDROCHLORIDE 0.5 MG: 2 INJECTION, SOLUTION INTRAMUSCULAR; INTRAVENOUS; SUBCUTANEOUS at 17:48

## 2017-06-06 RX ADMIN — FENTANYL CITRATE 100 MCG: 50 INJECTION, SOLUTION INTRAMUSCULAR; INTRAVENOUS at 15:22

## 2017-06-06 RX ADMIN — FENTANYL CITRATE 50 MCG: 50 INJECTION, SOLUTION INTRAMUSCULAR; INTRAVENOUS at 16:43

## 2017-06-06 RX ADMIN — FENTANYL CITRATE 50 MCG: 50 INJECTION, SOLUTION INTRAMUSCULAR; INTRAVENOUS at 16:54

## 2017-06-06 NOTE — ANESTHESIA POSTPROCEDURE EVALUATION
Post-Anesthesia Evaluation and Assessment    Patient: Sameera Marie MRN: [id number]  SSN: [id number]    YOB: 1985  Age: 29 y.o. Sex: female       Cardiovascular Function/Vital Signs  Visit Vitals    /66 (BP 1 Location: Left arm, BP Patient Position: During activity;Supine)    Pulse (!) 119    Temp 37.6 °C (99.7 °F)    Resp 22    Ht 5' 2\" (1.575 m)    Wt 89.6 kg (197 lb 8.5 oz)    SpO2 100%    BMI 36.13 kg/m2       Patient is status post general anesthesia for Procedure(s):  RIGHT LATERAL THIGH INCISION AND DEBRIDEMENT . Nausea/Vomiting: None    Postoperative hydration reviewed and adequate. Pain:  Pain Scale 1: Numeric (0 - 10) (06/06/17 1758)  Pain Intensity 1: 3 (06/06/17 1758)   Managed    Neurological Status:   Neuro (WDL): Within Defined Limits (06/06/17 1822)   At baseline    Mental Status and Level of Consciousness: Arousable    Pulmonary Status:   O2 Device: Nasal cannula (06/06/17 1725)   Adequate oxygenation and airway patent    Complications related to anesthesia: None    Post-anesthesia assessment completed.  No concerns    Signed By: Flakito Seals MD     June 6, 2017

## 2017-06-06 NOTE — OP NOTES
[facility] [facility]   OPERATIVE REPORT       Name:  Carlie Martinez   MR#:  [id number]   :  1985   Account #:  [de-identified]   Date of Adm:  2017       DATE OF SURGERY: 2017    PREOPERATIVE DIAGNOSIS: Right hip abscess. POSTOPERATIVE DIAGNOSIS: Right hip abscess (necrotizing   fasciitis). PROCEDURE: Incision and drainage and debridement of right hip   wound. SURGEON: Jesus Jaime MD    ASSIST: Meg Zendejas DO    COMPLICATIONS: None. DISPOSITION: Guarded. DESCRIPTION OF PROCEDURE: This is a 65-year-old female with a   history of a perirectal abscess and hip infection by CAT scan. She is prepared for incision and drainage and debridement of the   hip abscess. I was scheduled to help Dr. Gladis Harmon, all preop   orders and preparation was completed by Dr. Gladis Harmon. I entered   the room and the patient was under general anesthesia in a left   lateral decubitus position. The hip incision had already been   started with Dr. Gladis Harmon. At this point, I entered the case and   observed the wound. There was black necrotic tissue within the   muscle and subcutaneous space. There was an abundance of pus. We   explored the wound together in all planes superiorly to the   anterior superior iliac spine medially anterior to the femur and   hip joint posteriorly to the gluteus muscle, inferiorly beneath   the iliotibial band in a plane adjacent to the femur and then in   the subcutaneous space in all directions. Necrotic muscle and   tissue was debrided sharply with scissors and also debrided   bluntly with a ring forceps and curette. We PulsaVacked the   wound with 4 liters of saline. Hemostasis was achieved. We   packed the wound with Betadine-soaked Kerlix gauze. A Penrose   drain was placed into the tracking wound to the perirectal space   and sutured into place with silk suture.  At this point, I   concluded my participation and left the room, Dr. Gladis Harmon   completed the case.        ARVIND DIAZ DO DD / Savi Christian   D:  06/06/2017   17:08   T:  06/06/2017   17:32   Job #:  [id number]

## 2017-06-06 NOTE — PERIOP NOTES
TRANSFER - IN REPORT:    Verbal report received from Jose C shell RN on Geetha Natasha  being received from 4th floor for ordered procedure      Report consisted of patients Situation, Background, Assessment and   Recommendations(SBAR). Information from the following report(s) SBAR, Procedure Summary, Intake/Output, MAR and Recent Results was reviewed with the receiving nurse. Opportunity for questions and clarification was provided. Assessment completed upon patients arrival to unit and care assumed.

## 2017-06-06 NOTE — BRIEF OP NOTE
BRIEF OPERATIVE NOTE    Date of Procedure: 6/6/2017   Preoperative Diagnosis: Necrotizing fasciitis ([facility] [address] [M72.6]  Postoperative Diagnosis: Necrotizing fasciitis of the thigh ([facility] [address] [M72.6]    Procedure(s):  RIGHT LATERAL THIGH INCISION, DRAINAGE OF ABSCESS, AND DEBRIDEMENT OF NECROTIZING SOFT TISSUE INFECTION, INCLUDING SKIN, SQ, MUSCLE, AND FASCIA  Surgeon(s) and Role:     * Hiwot Espino MD - Primary     * Payal Wakefield DO         Assistant Staff:       Surgical Staff:  Circ-1: Saida Richardson RN  Circ-Relief: Shawn Harris RN  Scrub Tech-1: Claudene Keeler Webster  Scrub Tech-2: Tj Valencia  Event Time In   Incision Start 1523   Incision Close 1707     Anesthesia: General   Estimated Blood Loss: 125  Specimens:   ID Type Source Tests Collected by Time Destination   1 : right hip wound Wound Hip CULTURE, WOUND W GRAM STAIN, CULTURE, ANAEROBIC Hiwot Espino MD 6/6/2017 1528 Microbiology      Findings: EXTENSIVE SOFT TISSUE INFECTION SPREADING THROUGH MUCH OF THE RIGHT THIGH, CONNECTING DEEP AND SUPERIORLY TO THE BUTTOCK WOUND.   ALL PURULENCE DRAINED, POCKETS EXPLORED AND DEBRIDED, AND MAJORITY OF NECROTIC TISSUE SHARPLY AND BLUNTLY DEBRIDED  Complications: NONE INTRAOP  Implants: * No implants in log *

## 2017-06-06 NOTE — PERIOP NOTES
TRANSFER - OUT REPORT:    Verbal report given to [facility] RN  on Roni Norwood  being transferred to room 425 [facility]  for routine progression of care       Report consisted of patients Situation, Background, Assessment and   Recommendations(SBAR). Information from the following report(s) SBAR, Kardex, Procedure Summary, Intake/Output, MAR and Cardiac Rhythm ST was reviewed with the receiving nurse. Lines:   Quad Lumen 05/16/17 Left Subclavian (Active)   Central Line Being Utilized Yes 6/5/2017  7:20 AM   Criteria for Appropriate Use Long term IV/antibiotic administration 6/6/2017  6:22 PM   Site Assessment Clean, dry, & intact 6/6/2017  6:22 PM   Infiltration Assessment 0 6/6/2017  6:22 PM   Affected Extremity/Extremities Color distal to insertion site pink (or appropriate for race) 6/6/2017  6:22 PM   Date of Last Dressing Change 06/01/17 6/5/2017  7:20 AM   Dressing Status Clean, dry, & intact 6/6/2017  6:22 PM   Dressing Type Tape;Transparent 6/5/2017  7:20 AM   Action Taken Dressing reinforced 6/5/2017  7:20 AM   Proximal Hub Color/Line Status Patent 6/5/2017  7:20 AM   Positive Blood Return (Medial Site) Yes 6/5/2017  7:20 AM   Medial 1 Hub Color/Line Status Patent 6/5/2017  7:20 AM   Positive Blood Return (Lateral Site) Yes 6/5/2017  7:20 AM   Medial 2 Hub Color/Line Status Patent 6/5/2017  7:20 AM   Positive Blood Return (Site #3) Yes 6/5/2017  7:20 AM   Distal Hub Color/Line Status Patent 6/5/2017  7:20 AM   Positive Blood Return (Site #4) No 6/3/2017  7:26 PM   Alcohol Cap Used No 6/3/2017  7:26 PM        Opportunity for questions and clarification was provided. Patient transported with:   O2 @ 3 liters  Registered Nurse    VTE prophylaxis orders have been written for Roni Norwood. Patient and family given floor number and nurses name. Family updated re: pt status after security code verified.

## 2017-06-06 NOTE — H&P
History and Physical    Patient: Karl Su MRN: [id number]  SSN: [id number]    YOB: 1985  Age: 29 y.o. Sex: female      Subjective:      29year old female admitted with DKA, rhabdomyolysis, NALDO, pancreatitis, lactic acidosis, leukocytosis, and sepsis. On CT, she was found to have a large pelvic process (mass vs abscess) with tracking into right buttock. She improved some with supportive care, fluid resuscitation, and antibiotics. She had an IR drain placed into her pelvic fluid collection.      She was taken to the OR 5/19/17 for I&D of necrotic buttock fat. I found tracking into the extraperitoneal pelvic fluid collection. There was no connection to rectum. She was taken 5/22/17 for repeat debridement with much improved appearance of wound. Wound seemed to be improving, but on VAC change 6/1, there was stool in the wound, consistent with rectal wall breakdown. Due to fevers and worsening WBC, she had repeat CT done, showing extensive gas and inflammation in the right buttock and thigh.      Taken for wound exploration, debridement, suture repair of rectum x2, and lap diverting end-loop colostomy 6/2/17. I could not find tracking into the thigh process seen on CT imaging. Ortho has seen patient for ongoing thigh process. Patient with uneventful weekend. Reports some buttock, leg, and abdominal pain. Tolerated some diet. Minimal serous fluid out of ostomy thus far. No n/v. Wound dressing not changed over weekend        Past Medical History:   Diagnosis Date    Ill-defined condition     herpes     Past Surgical History:   Procedure Laterality Date    HX OTHER SURGICAL      pilonidal cyst      No family history on file. Social History   Substance Use Topics    Smoking status: Never Smoker    Smokeless tobacco: Not on file    Alcohol use Yes      Comment: occasional      Prior to Admission medications    Medication Sig Start Date End Date Taking?  Authorizing Provider   0.9% sodium chloride solution 100 mL/hr by IntraVENous route continuous. 6/5/17   Navya Issa NP   acetaminophen (TYLENOL) 325 mg tablet Take 2 Tabs by mouth every four (4) hours as needed for Fever (for fever greater than 100.4 F). 6/5/17   Navya Issa NP   bisacodyl (DULCOLAX) 5 mg EC tablet Take 1 Tab by mouth daily as needed for Constipation. 6/5/17   Navya Issa NP   fluconazole in 0.9% NaCl (DIFLUCAN) 400 mg/200 mL IVPB 200 mL by IntraVENous route every twenty-four (24) hours. 6/5/17   Navya Issa NP   HEPARIN SODIUM,PORCINE (HEPARIN, PORCINE,) 5,000 unit/mL injection 1 mL by SubCUTAneous route every eight (8) hours. 6/5/17   Navya Issa NP   insulin lispro (HUMALOG) 100 unit/mL injection Very Insulin Resistant  For Blood Sugar (mg/dL) of:              Less than 150 =   0 units  150 -199 =   3 units  200 -249 =   6 units  250 -299 =   9 units  300 -349 =   12 units  350 and above =   15 units and Call Physician  Initiate Hypoglycemic protocol if blood glucose is <70 mg/dL. 6/5/17   Navya Issa NP   meropenem 500 mg, ADDaptor 1 Device IVPB 500 mg by IntraVENous route every six (6) hours. 6/5/17   Navya Issa NP   morphine 2 mg/mL injection 1 mL by IntraVENous route every three (3) hours as needed. Max Daily Amount: 16 mg. 6/5/17   Navya Issa NP   morphine 10 mg/ml soln 0.5 mL by IntraVENous route every four (4) hours as needed. Max Daily Amount: 30 mg. 6/5/17   Navya Issa NP   ondansetron (ZOFRAN) 4 mg/2 mL soln 2 mL by IntraVENous route every four (4) hours as needed. 6/5/17   Navya Issa NP   oxyCODONE IR (ROXICODONE) 10 mg tab immediate release tablet Take 1 Tab by mouth every four (4) hours as needed. Max Daily Amount: 60 mg. 6/5/17   Navya Issa NP   oxyCODONE IR (ROXICODONE) 5 mg immediate release tablet Take 1 Tab by mouth every four (4) hours as needed.  Max Daily Amount: 30 mg. 6/5/17   Navya Issa NP   sodium chloride 0.9 % soln 10 mL with promethazine 25 mg/mL soln 12.5 mg 12.5 mg by IntraVENous route every six (6) hours as needed. 6/5/17   Macdonald Holter, NP        No Known Allergies    Review of Systems:  A comprehensive review of systems was negative except for that written in the History of Present Illness. Objective:     Vitals:    06/06/17 1337   BP: 138/71   Pulse: (!) 131   Resp: 18   Temp: 99.4 °F (37.4 °C)   SpO2: 97%   Weight: 197 lb 8.5 oz (89.6 kg)   Height: 5' 2\" (1.575 m)        Physical Exam:  General:  Alert, cooperative, no distress, appears stated age. Eyes:  Conjunctivae/corneas clear. PERRL, EOMs intact. Fundi benign   Ears:  Normal TMs and external ear canals both ears. Nose: Nares normal. Septum midline. Mucosa normal. No drainage or sinus tenderness. Mouth/Throat: Lips, mucosa, and tongue normal. Teeth and gums normal.   Neck: Supple, symmetrical, trachea midline, no adenopathy, thyroid: no enlargment/tenderness/nodules, no carotid bruit and no JVD. Back:   Symmetric, no curvature. ROM normal. No CVA tenderness. Lungs:   Clear to auscultation bilaterally. Heart:  Regular rate and rhythm, S1, S2 normal, no murmur, click, rub or gallop. Abdomen:   soft, mildly tender, nondistended. No peritoneal signs, no rebound, no guarding. Stoma in LLQ is pink, healthy, and budded. Minimal jay fluid in bag.     Buttock--dressing removed. Appears that dressing not changed over weekend. There is again some grayish purulence in the base of the wound. Superficially, the wound looks great with excellent granulation tissue and no necrotic areas. Widely spreading to examine the deeper component shows some continued grayish fluid drainage. Wound packed with gauze today as I would like to clean it up a little before placing VAC again     Extremities: Extremities normal, atraumatic, no cyanosis or edema. Pulses: 2+ and symmetric all extremities.    Skin: Skin color, texture, turgor normal. No rashes or lesions   Lymph nodes: Cervical, supraclavicular, and axillary nodes normal.   Neurologic: CNII-XII intact. Normal strength, sensation and reflexes throughout.                Recent Labs      17  0425 17  0812 17  0746   WBC 5.8 26.9* 26.2*   HGB 9.0* 8.6* 8.2*   HCT 27.6* 26.4* 25.3*   * 442 415               Recent Labs      17  0425 17  0812 17  0746    140 139   K 3.5 3.8 4.1   * 103 100   CO2 20* 25 28   BUN 9 6 7   CREA 0.97 0.34* 0.37*         No results for input(s): AML, LPSE in the last 72 hours.     No results for input(s): PTP, INR, APTT in the last 72 hours.     No lab exists for component: INREXT, INREXT  Last lactate 1.4  Last CRP 18.3  RPR NR  Pelvic fluid culture from IR 17--beta hemolytic strep  AFB smear 17--negative  HSV culture 17--negative  Blood cultures 17--GPR x2 bottles  Blood cultures 17--no growth x 5 days  Blood cultures 17--no growth 5 days  Blood cultures 17--no growth x14 hrs  UA 17--1+ bacteria, neg LE/nitr--cultures with 100k candida glabrata  OR tissue culture 17--beta hemolytic strep  Above labs reviewed by me.     IMAGIN17--CT abd/pelvis: \"IMPRESSION: Large incompletely characterized pelvic mass with apparent involvement/extension into the right gluteal muscles. The findings presumably represent a neoplastic process. Pelvic MRI may be beneficial for further delineation as it relates to other pelvic anatomy. Preferably this would be done with IV contrast.\"  17--CT chest/abd/pelvis: \"IMPRESSION: Indeterminate perirectal mass with extension of heterogeneous, low-attenuation soft tissue into the right buttock an asymmetric edema and muscular swelling and the right hemipelvis. Right inguinal adenopathy is present. This may be a perirectal abscess or necrotic tumor with extrapelvic extension. \"  17--CT guided IR drain placement:   17--MRI pelvis--\"IMPRESSION: Status post right perianal/rectal abscess drainage.  The extensive remaining abscess findings are detailed above. \"  6/1/17--CT abd/pelv--\"IMPRESSION: Extensive complex air/fluid collection involving the right gluteal musculature extending into the right thigh, consistent with myositis and possible gangrene. Probably associated with perforation of the right lateral wall of the inferior rectum.  \"      Assessment:   Pelvic abscess   S/p IR drain 5/18/17   S/p operative I&D, debridement of necrotic tissue 5/19/17   S/p operative I&D, debridement of necrotic tissue 5/22/17   S/p operative I&D, debridement, suture repair of rectum x2, and lap diverting end-loop colostomy 6/2/17  Sepsis, due to above--improved  Recurrent fevers--resolved  Leukocytosis, due to above--resolved  DKA--improved  Rhabdomyolysis--resolved  NALDO--resolved  Pancreatitis--resolved  Anemia--   S/p transfusion 2 units PRBC 5/18/17   S/p transfusion 2 units PRBC 5/31/17  Hypernatremia--resolved  Elevated LFTs--resolved  Hypophosphatemia--resolved       Plan:     --to OR for RIGHT leg exploration, possible I&D      Signed By: Jason Meier MD     June 6, 2017

## 2017-06-06 NOTE — ANESTHESIA PREPROCEDURE EVALUATION
Anesthetic History   No history of anesthetic complications            Review of Systems / Medical History  Patient summary reviewed and pertinent labs reviewed    Pulmonary  Within defined limits                 Neuro/Psych   Within defined limits           Cardiovascular  Within defined limits                Exercise tolerance: >4 METS  Comments: No cardiac history   GI/Hepatic/Renal  Within defined limits             Comments: H/o alcoholic pancreatitis Endo/Other    Diabetes (h/o DKA): poorly controlled, using insulin    Anemia     Other Findings   Comments: H/o dka with rectal abcess that has tracted to the side of her thigh, she has been septic. Hr in the 130's right now.                       Physical Exam    Airway  Mallampati: III  TM Distance: 4 - 6 cm  Neck ROM: normal range of motion        Cardiovascular    Rhythm: regular  Rate: normal         Dental    Dentition: Poor dentition     Pulmonary  Breath sounds clear to auscultation               Abdominal  GI exam deferred       Other Findings            Anesthetic Plan    ASA: 3  Anesthesia type: general          Induction: Intravenous  Anesthetic plan and risks discussed with: Patient and Family

## 2017-06-07 ENCOUNTER — HOSPITAL ENCOUNTER (OUTPATIENT)
Age: 32
Setting detail: OUTPATIENT SURGERY
Discharge: NURSING, REHAB OR LONG-TERM CARE | End: 2017-06-07
Attending: COLON & RECTAL SURGERY | Admitting: COLON & RECTAL SURGERY

## 2017-06-07 ENCOUNTER — APPOINTMENT (OUTPATIENT)
Dept: CT IMAGING | Age: 32
End: 2017-06-07
Attending: COLON & RECTAL SURGERY

## 2017-06-07 ENCOUNTER — ANESTHESIA (OUTPATIENT)
Dept: SURGERY | Age: 32
End: 2017-06-07

## 2017-06-07 ENCOUNTER — ANESTHESIA EVENT (OUTPATIENT)
Dept: SURGERY | Age: 32
End: 2017-06-07

## 2017-06-07 VITALS
OXYGEN SATURATION: 100 % | RESPIRATION RATE: 29 BRPM | BODY MASS INDEX: 36.03 KG/M2 | WEIGHT: 197 LBS | HEART RATE: 115 BPM | TEMPERATURE: 98.4 F | DIASTOLIC BLOOD PRESSURE: 58 MMHG | SYSTOLIC BLOOD PRESSURE: 118 MMHG

## 2017-06-07 LAB
ANION GAP BLD CALC-SCNC: 11 MMOL/L (ref 7–16)
BUN SERPL-MCNC: 3 MG/DL (ref 6–23)
CALCIUM SERPL-MCNC: 7.1 MG/DL (ref 8.3–10.4)
CHLORIDE SERPL-SCNC: 103 MMOL/L (ref 98–107)
CO2 SERPL-SCNC: 30 MMOL/L (ref 21–32)
CREAT SERPL-MCNC: 0.28 MG/DL (ref 0.6–1)
CREAT SERPL-MCNC: 0.3 MG/DL (ref 0.6–1)
DIFFERENTIAL METHOD BLD: ABNORMAL
ERYTHROCYTE [DISTWIDTH] IN BLOOD BY AUTOMATED COUNT: 16.4 % (ref 11.9–14.6)
GLUCOSE BLD STRIP.AUTO-MCNC: 130 MG/DL (ref 65–100)
GLUCOSE SERPL-MCNC: 94 MG/DL (ref 65–100)
HCT VFR BLD AUTO: 19.7 % (ref 35.8–46.3)
HGB BLD-MCNC: 6.4 G/DL (ref 11.7–15.4)
LYMPHOCYTES # BLD: 1.3 K/UL (ref 0.5–4.6)
LYMPHOCYTES NFR BLD MANUAL: 4 % (ref 16–44)
MAGNESIUM SERPL-MCNC: 1.2 MG/DL (ref 1.8–2.4)
MCH RBC QN AUTO: 28.2 PG (ref 26.1–32.9)
MCHC RBC AUTO-ENTMCNC: 32.5 G/DL (ref 31.4–35)
MCV RBC AUTO: 86.8 FL (ref 79.6–97.8)
MONOCYTES # BLD: 0.6 K/UL (ref 0.1–1.3)
MONOCYTES NFR BLD MANUAL: 2 % (ref 3–9)
NEUTS BAND NFR BLD MANUAL: 6 % (ref 0–10)
NEUTS SEG # BLD: 29.9 K/UL (ref 1.7–8.2)
NEUTS SEG NFR BLD MANUAL: 88 % (ref 47–75)
PLATELET # BLD AUTO: 537 K/UL (ref 150–450)
PLATELET COMMENTS,PCOM: ABNORMAL
PMV BLD AUTO: 9.1 FL (ref 10.8–14.1)
POTASSIUM SERPL-SCNC: 3.7 MMOL/L (ref 3.5–5.1)
RBC # BLD AUTO: 2.27 M/UL (ref 4.05–5.25)
RBC MORPH BLD: ABNORMAL
RBC MORPH BLD: ABNORMAL
SODIUM SERPL-SCNC: 144 MMOL/L (ref 136–145)
WBC # BLD AUTO: 31.8 K/UL (ref 4.3–11.1)
WBC MORPH BLD: ABNORMAL

## 2017-06-07 PROCEDURE — 80048 BASIC METABOLIC PNL TOTAL CA: CPT | Performed by: COLON & RECTAL SURGERY

## 2017-06-07 PROCEDURE — 86900 BLOOD TYPING SEROLOGIC ABO: CPT | Performed by: INTERNAL MEDICINE

## 2017-06-07 PROCEDURE — 76010000172 HC OR TIME 2.5 TO 3 HR INTENSV-TIER 1: Performed by: COLON & RECTAL SURGERY

## 2017-06-07 PROCEDURE — 36415 COLL VENOUS BLD VENIPUNCTURE: CPT | Performed by: INTERNAL MEDICINE

## 2017-06-07 PROCEDURE — 73701 CT LOWER EXTREMITY W/DYE: CPT

## 2017-06-07 PROCEDURE — 74011250636 HC RX REV CODE- 250/636

## 2017-06-07 PROCEDURE — 74011000250 HC RX REV CODE- 250: Performed by: COLON & RECTAL SURGERY

## 2017-06-07 PROCEDURE — 77030018836 HC SOL IRR NACL ICUM -A: Performed by: COLON & RECTAL SURGERY

## 2017-06-07 PROCEDURE — 83735 ASSAY OF MAGNESIUM: CPT | Performed by: INTERNAL MEDICINE

## 2017-06-07 PROCEDURE — 74011000250 HC RX REV CODE- 250

## 2017-06-07 PROCEDURE — 85025 COMPLETE CBC W/AUTO DIFF WBC: CPT | Performed by: INTERNAL MEDICINE

## 2017-06-07 PROCEDURE — 82962 GLUCOSE BLOOD TEST: CPT

## 2017-06-07 PROCEDURE — 74011250636 HC RX REV CODE- 250/636: Performed by: ANESTHESIOLOGY

## 2017-06-07 PROCEDURE — 86923 COMPATIBILITY TEST ELECTRIC: CPT | Performed by: INTERNAL MEDICINE

## 2017-06-07 PROCEDURE — 76210000017 HC OR PH I REC 1.5 TO 2 HR: Performed by: COLON & RECTAL SURGERY

## 2017-06-07 PROCEDURE — 77030008477 HC STYL SATN SLP COVD -A: Performed by: ANESTHESIOLOGY

## 2017-06-07 PROCEDURE — 77030003666 HC NDL SPINAL BD -A: Performed by: COLON & RECTAL SURGERY

## 2017-06-07 PROCEDURE — 77030013727 HC IRR FAN PULSVC ZIMM -B: Performed by: COLON & RECTAL SURGERY

## 2017-06-07 PROCEDURE — 74011000258 HC RX REV CODE- 258: Performed by: INTERNAL MEDICINE

## 2017-06-07 PROCEDURE — 77030008703 HC TU ET UNCUF COVD -A: Performed by: ANESTHESIOLOGY

## 2017-06-07 PROCEDURE — 76060000036 HC ANESTHESIA 2.5 TO 3 HR: Performed by: COLON & RECTAL SURGERY

## 2017-06-07 PROCEDURE — 74011636320 HC RX REV CODE- 636/320: Performed by: INTERNAL MEDICINE

## 2017-06-07 RX ORDER — HYDROMORPHONE HYDROCHLORIDE 2 MG/ML
0.5 INJECTION, SOLUTION INTRAMUSCULAR; INTRAVENOUS; SUBCUTANEOUS
Status: DISCONTINUED | OUTPATIENT
Start: 2017-06-07 | End: 2017-06-08 | Stop reason: HOSPADM

## 2017-06-07 RX ORDER — NALOXONE HYDROCHLORIDE 0.4 MG/ML
0.2 INJECTION, SOLUTION INTRAMUSCULAR; INTRAVENOUS; SUBCUTANEOUS AS NEEDED
Status: DISCONTINUED | OUTPATIENT
Start: 2017-06-07 | End: 2017-06-08 | Stop reason: HOSPADM

## 2017-06-07 RX ORDER — NALOXONE HYDROCHLORIDE 0.4 MG/ML
0.1 INJECTION, SOLUTION INTRAMUSCULAR; INTRAVENOUS; SUBCUTANEOUS
Status: DISCONTINUED | OUTPATIENT
Start: 2017-06-07 | End: 2017-06-08 | Stop reason: HOSPADM

## 2017-06-07 RX ORDER — SODIUM CHLORIDE, SODIUM LACTATE, POTASSIUM CHLORIDE, CALCIUM CHLORIDE 600; 310; 30; 20 MG/100ML; MG/100ML; MG/100ML; MG/100ML
75 INJECTION, SOLUTION INTRAVENOUS CONTINUOUS
Status: DISCONTINUED | OUTPATIENT
Start: 2017-06-07 | End: 2017-06-08 | Stop reason: HOSPADM

## 2017-06-07 RX ORDER — OXYCODONE AND ACETAMINOPHEN 5; 325 MG/1; MG/1
1 TABLET ORAL AS NEEDED
Status: DISCONTINUED | OUTPATIENT
Start: 2017-06-07 | End: 2017-06-08 | Stop reason: HOSPADM

## 2017-06-07 RX ORDER — BACITRACIN 50000 [IU]/1
INJECTION, POWDER, FOR SOLUTION INTRAMUSCULAR AS NEEDED
Status: DISCONTINUED | OUTPATIENT
Start: 2017-06-07 | End: 2017-06-07 | Stop reason: HOSPADM

## 2017-06-07 RX ORDER — NEOSTIGMINE METHYLSULFATE 1 MG/ML
INJECTION INTRAVENOUS AS NEEDED
Status: DISCONTINUED | OUTPATIENT
Start: 2017-06-07 | End: 2017-06-07 | Stop reason: HOSPADM

## 2017-06-07 RX ORDER — ACETAMINOPHEN 10 MG/ML
1000 INJECTION, SOLUTION INTRAVENOUS ONCE
Status: COMPLETED | OUTPATIENT
Start: 2017-06-07 | End: 2017-06-07

## 2017-06-07 RX ORDER — FENTANYL CITRATE 50 UG/ML
INJECTION, SOLUTION INTRAMUSCULAR; INTRAVENOUS AS NEEDED
Status: DISCONTINUED | OUTPATIENT
Start: 2017-06-07 | End: 2017-06-07 | Stop reason: HOSPADM

## 2017-06-07 RX ORDER — PROPOFOL 10 MG/ML
INJECTION, EMULSION INTRAVENOUS AS NEEDED
Status: DISCONTINUED | OUTPATIENT
Start: 2017-06-07 | End: 2017-06-07 | Stop reason: HOSPADM

## 2017-06-07 RX ORDER — SODIUM CHLORIDE, SODIUM LACTATE, POTASSIUM CHLORIDE, CALCIUM CHLORIDE 600; 310; 30; 20 MG/100ML; MG/100ML; MG/100ML; MG/100ML
INJECTION, SOLUTION INTRAVENOUS
Status: DISCONTINUED | OUTPATIENT
Start: 2017-06-07 | End: 2017-06-07 | Stop reason: HOSPADM

## 2017-06-07 RX ORDER — ONDANSETRON 2 MG/ML
INJECTION INTRAMUSCULAR; INTRAVENOUS AS NEEDED
Status: DISCONTINUED | OUTPATIENT
Start: 2017-06-07 | End: 2017-06-07 | Stop reason: HOSPADM

## 2017-06-07 RX ORDER — FLUMAZENIL 0.1 MG/ML
0.2 INJECTION INTRAVENOUS AS NEEDED
Status: DISCONTINUED | OUTPATIENT
Start: 2017-06-07 | End: 2017-06-08 | Stop reason: HOSPADM

## 2017-06-07 RX ORDER — GLYCOPYRROLATE 0.2 MG/ML
INJECTION INTRAMUSCULAR; INTRAVENOUS AS NEEDED
Status: DISCONTINUED | OUTPATIENT
Start: 2017-06-07 | End: 2017-06-07 | Stop reason: HOSPADM

## 2017-06-07 RX ORDER — SODIUM CHLORIDE 0.9 % (FLUSH) 0.9 %
5-10 SYRINGE (ML) INJECTION AS NEEDED
Status: DISCONTINUED | OUTPATIENT
Start: 2017-06-07 | End: 2017-06-08 | Stop reason: HOSPADM

## 2017-06-07 RX ORDER — OXYCODONE HYDROCHLORIDE 5 MG/1
10 TABLET ORAL
Status: DISCONTINUED | OUTPATIENT
Start: 2017-06-07 | End: 2017-06-08 | Stop reason: HOSPADM

## 2017-06-07 RX ORDER — OXYCODONE HYDROCHLORIDE 5 MG/1
5 TABLET ORAL
Status: DISCONTINUED | OUTPATIENT
Start: 2017-06-07 | End: 2017-06-08 | Stop reason: HOSPADM

## 2017-06-07 RX ORDER — SODIUM CHLORIDE 0.9 % (FLUSH) 0.9 %
10 SYRINGE (ML) INJECTION
Status: COMPLETED | OUTPATIENT
Start: 2017-06-07 | End: 2017-06-07

## 2017-06-07 RX ORDER — LIDOCAINE HYDROCHLORIDE 20 MG/ML
INJECTION, SOLUTION EPIDURAL; INFILTRATION; INTRACAUDAL; PERINEURAL AS NEEDED
Status: DISCONTINUED | OUTPATIENT
Start: 2017-06-07 | End: 2017-06-07 | Stop reason: HOSPADM

## 2017-06-07 RX ORDER — DIPHENHYDRAMINE HYDROCHLORIDE 50 MG/ML
12.5 INJECTION, SOLUTION INTRAMUSCULAR; INTRAVENOUS
Status: DISCONTINUED | OUTPATIENT
Start: 2017-06-07 | End: 2017-06-08 | Stop reason: HOSPADM

## 2017-06-07 RX ORDER — ROCURONIUM BROMIDE 10 MG/ML
INJECTION, SOLUTION INTRAVENOUS AS NEEDED
Status: DISCONTINUED | OUTPATIENT
Start: 2017-06-07 | End: 2017-06-07 | Stop reason: HOSPADM

## 2017-06-07 RX ADMIN — LIDOCAINE HYDROCHLORIDE 100 MG: 20 INJECTION, SOLUTION EPIDURAL; INFILTRATION; INTRACAUDAL; PERINEURAL at 17:06

## 2017-06-07 RX ADMIN — ACETAMINOPHEN 1000 MG: 10 INJECTION, SOLUTION INTRAVENOUS at 15:41

## 2017-06-07 RX ADMIN — IOPAMIDOL 100 ML: 755 INJECTION, SOLUTION INTRAVENOUS at 12:01

## 2017-06-07 RX ADMIN — GLYCOPYRROLATE 0.2 MG: 0.2 INJECTION INTRAMUSCULAR; INTRAVENOUS at 19:12

## 2017-06-07 RX ADMIN — HYDROMORPHONE HYDROCHLORIDE 0.5 MG: 2 INJECTION, SOLUTION INTRAMUSCULAR; INTRAVENOUS; SUBCUTANEOUS at 20:02

## 2017-06-07 RX ADMIN — ROCURONIUM BROMIDE 40 MG: 10 INJECTION, SOLUTION INTRAVENOUS at 17:06

## 2017-06-07 RX ADMIN — Medication 10 ML: at 12:01

## 2017-06-07 RX ADMIN — SODIUM CHLORIDE, SODIUM LACTATE, POTASSIUM CHLORIDE, CALCIUM CHLORIDE: 600; 310; 30; 20 INJECTION, SOLUTION INTRAVENOUS at 16:52

## 2017-06-07 RX ADMIN — NEOSTIGMINE METHYLSULFATE 2 MG: 1 INJECTION INTRAVENOUS at 19:12

## 2017-06-07 RX ADMIN — FENTANYL CITRATE 100 MCG: 50 INJECTION, SOLUTION INTRAMUSCULAR; INTRAVENOUS at 17:06

## 2017-06-07 RX ADMIN — PROPOFOL 200 MG: 10 INJECTION, EMULSION INTRAVENOUS at 17:06

## 2017-06-07 RX ADMIN — HYDROMORPHONE HYDROCHLORIDE 0.5 MG: 2 INJECTION, SOLUTION INTRAMUSCULAR; INTRAVENOUS; SUBCUTANEOUS at 20:37

## 2017-06-07 RX ADMIN — SODIUM CHLORIDE 100 ML: 900 INJECTION, SOLUTION INTRAVENOUS at 12:01

## 2017-06-07 RX ADMIN — ONDANSETRON 4 MG: 2 INJECTION INTRAMUSCULAR; INTRAVENOUS at 19:18

## 2017-06-07 NOTE — ROUTINE PROCESS
TRANSFER - IN REPORT:    Verbal report received from [employer], RN (name) on Roni Norwood  being received from [employer] (unit) for ordered procedure      Report consisted of patients Situation, Background, Assessment and   Recommendations(SBAR). Information from the following report(s) SBAR, MAR and Recent Results was reviewed with the receiving nurse. Opportunity for questions and clarification was provided. Assessment completed upon patients arrival to unit and care assumed.

## 2017-06-07 NOTE — BRIEF OP NOTE
BRIEF OPERATIVE NOTE    Date of Procedure: 6/7/2017   Preoperative Diagnosis: Necrotizing fasciitis ([facility] [address] [M72.6] of right thigh and buttock  Postoperative Diagnosis: same  Procedure(s):  RIGHT LATERAL THIGH AND RIGHT BUTTOCKS INCISION, DRAINAGE, AND DEBRIDEMENT  Surgeon(s) and Role:     * Nicki Spicer MD - Primary     * Florida Shah DO - Assisting         Assistant Staff:       Surgical Staff:  Circ-1: Joel Grissom RN  Scrub Tech-1: Norris Blair  Scrub Tech-2: Karena Bales CNA  Event Time In   Incision Start 1735   Incision Close 1917     Anesthesia: General   Estimated Blood Loss: 100  Specimens: * No specimens in log *   Findings: wound much . Few small pockets identified.   Additional posterolateral incision just above popliteal fossa made to drain small cavity seen on CT   Complications: none intraop  Implants: * No implants in log *

## 2017-06-07 NOTE — OP NOTES
[address]   OPERATIVE REPORT       Name:  Hilton Booker   MR#:  [id number]   :  1985   Account #:  [de-identified]   Date of Adm:  2017       DATE OF SURGERY: 2017    PREOPERATIVE DIAGNOSIS: Necrotizing fasciitis. POSTOPERATIVE DIAGNOSIS: Necrotizing fasciitis/soft tissue infection of the right thigh. PROCEDURE PERFORMED: Right lateral thigh incision and drainage  of abscess, and debridement of necrotizing soft tissue  infection, including skin, subcutaneous tissue, muscle, and  fascia. SURGEON: Iglesia Warner MD    ASSISTANT: Orlin Mcneil DO      ANESTHESIA: General.    BLOOD LOSS: 125. SPECIMENS: Right hip purulent fluid for culture and Gram stain. COMPLICATIONS: None. BRIEF DESCRIPTION OF THE FINDINGS: Extensive soft tissue infection spreading through much of the right thigh, connecting deeply and superiorly to the buttock wound. Size of wound was 22 x 10 cm. A Penrose drain was passed between the 2 wounds. All the purulence was drained today. Pockets were all explored and sharply excisionally debrided. The majority of the necrotic tissues were sharply and bluntly debrided. BRIEF HISTORY: The patient is a 57-year-old female who presented several weeks ago with DKA, acute kidney injury, pancreatitis, rhabdomyolysis. She was found also on CT imaging to have a complex pelvic fluid/mass. A drain was placed in the pelvic fluid, revealing necrotic type tissue. I explored her shortly after admission and found necrotic fat in her right buttock, extending all the way up into the extraperitoneal pelvis. She was taken back once for repeat debridement. She initially seemed to improve, but then her white count began to increase again and she began spiking fevers.  With these findings, repeat CT scan was performed showing extensive inflammatory and possible infectious process in her right thigh, and she was also noted to have stool in her wound during a wound dressing change. With these findings, she was taken back last week and a diverting loop colostomy was performed. The wound was again debrided. I again could not find tracking into the thigh from the wound. PROCEDURE: I discussed repeat exploration of the right thigh with her, her , and her mother and we agreed to proceed with hopes of completely clearing up this infection. The patient was evaluated in the preoperative holding area. We discussed the planned operation, risks, benefits, and alternatives. She was brought to the operating room and general  anesthesia was administered. She was then placed in the left lateral decubitus with a beanbag. An axillary roll was placed. All pressure points were padded. Her old dressing was removed from her buttocks, and there was a large amount of thin, grayish colored purulent material draining from the wound, despite the fact that it had only been changed yesterday. The hip was prepped and draped in a sterile fashion. A time-out was performed. I inspected the hip with the hand held ultrasound. I identified an area in the mid hip, approximately over the acetabulum that I thought could represent a pocket of fluid. Under ultrasound guidance, a needle was passed into the tissue and aspirated. I aspirated air and purulence. Some of this purulence was sent for culture. With the finding of the air, I was confident that this was the area that was seen on the CAT scan and created a longitudinal incision along the hip. I entered this pocket of purulence. From here, the wound was carefully explored with my fingers, identifying multiple other tracking areas and pockets. The incision was extended both superiorly and inferiorly to adequately expose the area. Inferiorly, the tracking seemed to stop at approximately the mid thigh.  Superiorly, it extended up to the level of approximately the iliac crest.     We then began a slow and tedious process, sharply excisionally debriding all of the necrotic tissue. This was done sharply with scissors as well as bluntly with a curette and ring forceps. We worked meticulously through the wound, exploring and finding multiple other pockets of purulence and draining these, then debriding all of the necrotic tissue. This wound extended all  the way down to what appeared to be the capsule and the tendinous insertion around the hip joint. There was no evidence of breach into the capsule. The wound tracked anteriorly around the hip into the anterior thigh as well as down onto the distal anterior thigh. More proximally and superiorly, it tracked deep, and I was able to find connections between the deep component of the buttock wound and this right hip wound. The wound was cleaned with 6 liters of bacitracin infused saline using the pulse lavage system. The wound was reinspected and was much . The necrotic tissue had been significantly debrided, and now seemed to encompass less than 20% of the wound surface. The wound was then packed with Betadine-soaked Kerlix. A total of 7 Kerlix rolls were packed into the wound, packing into deep and tracking areas as best as possible. A Penrose drain had been placed between the buttock wound and the hip wound to identify this tract for future reference. It was sutured to the skin. The buttock wound was packed with dry gauze. Sterile dressings were applied. The patient was awakened, extubated, and taken to recovery in stable condition. Sponge,  instrument, and needle counts were correct.       MD PABLITO Molina / Roseanne Cee   D:  06/06/2017   17:27   T:  06/06/2017   20:00   Job #:  [id number]

## 2017-06-07 NOTE — INTERVAL H&P NOTE
H&P Update:  Katt Rodriguez was seen and examined. Patient taken yesterday for extensive thigh I&D. CT showed good results. Plan for EUA, dressing change, and further drainage/debridement, if needed. Discussion/consent with Mother and patient   History and physical has been reviewed. The patient has been examined.  There have been no significant clinical changes since the completion of the originally dated History and Physical.    Signed By: Danielle Canada MD     June 7, 2017 4:52 PM

## 2017-06-08 ENCOUNTER — HOSPITAL ENCOUNTER (OUTPATIENT)
Age: 32
Setting detail: OUTPATIENT SURGERY
Discharge: NURSING, REHAB OR LONG-TERM CARE | End: 2017-06-08
Attending: COLON & RECTAL SURGERY | Admitting: COLON & RECTAL SURGERY

## 2017-06-08 ENCOUNTER — ANESTHESIA (OUTPATIENT)
Dept: SURGERY | Age: 32
End: 2017-06-08

## 2017-06-08 ENCOUNTER — ANESTHESIA EVENT (OUTPATIENT)
Dept: SURGERY | Age: 32
End: 2017-06-08

## 2017-06-08 VITALS
BODY MASS INDEX: 36.25 KG/M2 | DIASTOLIC BLOOD PRESSURE: 53 MMHG | HEIGHT: 62 IN | SYSTOLIC BLOOD PRESSURE: 105 MMHG | TEMPERATURE: 98.4 F | OXYGEN SATURATION: 100 % | RESPIRATION RATE: 16 BRPM | WEIGHT: 197 LBS | HEART RATE: 120 BPM

## 2017-06-08 LAB
GLUCOSE BLD STRIP.AUTO-MCNC: 129 MG/DL (ref 65–100)
HCT VFR BLD AUTO: 26.5 % (ref 35.8–46.3)
HGB BLD-MCNC: 8.8 G/DL (ref 11.7–15.4)
MAGNESIUM SERPL-MCNC: 1.6 MG/DL (ref 1.8–2.4)

## 2017-06-08 PROCEDURE — 82962 GLUCOSE BLOOD TEST: CPT

## 2017-06-08 PROCEDURE — 74011250636 HC RX REV CODE- 250/636: Performed by: ANESTHESIOLOGY

## 2017-06-08 PROCEDURE — 77030011640 HC PAD GRND REM COVD -A: Performed by: COLON & RECTAL SURGERY

## 2017-06-08 PROCEDURE — 77030013727 HC IRR FAN PULSVC ZIMM -B: Performed by: COLON & RECTAL SURGERY

## 2017-06-08 PROCEDURE — 77030019908 HC STETH ESOPH SIMS -A: Performed by: ANESTHESIOLOGY

## 2017-06-08 PROCEDURE — P9016 RBC LEUKOCYTES REDUCED: HCPCS | Performed by: INTERNAL MEDICINE

## 2017-06-08 PROCEDURE — 74011250636 HC RX REV CODE- 250/636

## 2017-06-08 PROCEDURE — 83735 ASSAY OF MAGNESIUM: CPT | Performed by: INTERNAL MEDICINE

## 2017-06-08 PROCEDURE — 77030019934 HC DRSG VAC ASST KCON -B: Performed by: COLON & RECTAL SURGERY

## 2017-06-08 PROCEDURE — 76010000153 HC OR TIME 1.5 TO 2 HR: Performed by: COLON & RECTAL SURGERY

## 2017-06-08 PROCEDURE — 74011000250 HC RX REV CODE- 250

## 2017-06-08 PROCEDURE — 85018 HEMOGLOBIN: CPT | Performed by: INTERNAL MEDICINE

## 2017-06-08 PROCEDURE — 77030008703 HC TU ET UNCUF COVD -A: Performed by: ANESTHESIOLOGY

## 2017-06-08 PROCEDURE — 77030008477 HC STYL SATN SLP COVD -A: Performed by: ANESTHESIOLOGY

## 2017-06-08 PROCEDURE — 76210000063 HC OR PH I REC FIRST 0.5 HR: Performed by: COLON & RECTAL SURGERY

## 2017-06-08 PROCEDURE — 77030018836 HC SOL IRR NACL ICUM -A: Performed by: COLON & RECTAL SURGERY

## 2017-06-08 PROCEDURE — 76060000034 HC ANESTHESIA 1.5 TO 2 HR: Performed by: COLON & RECTAL SURGERY

## 2017-06-08 PROCEDURE — 77030018717 HC DRSG GRNUFM KCON -B: Performed by: COLON & RECTAL SURGERY

## 2017-06-08 RX ORDER — SODIUM CHLORIDE 0.9 % (FLUSH) 0.9 %
5-10 SYRINGE (ML) INJECTION AS NEEDED
Status: DISCONTINUED | OUTPATIENT
Start: 2017-06-08 | End: 2017-06-08 | Stop reason: HOSPADM

## 2017-06-08 RX ORDER — DIPHENHYDRAMINE HYDROCHLORIDE 50 MG/ML
12.5 INJECTION, SOLUTION INTRAMUSCULAR; INTRAVENOUS ONCE
Status: DISCONTINUED | OUTPATIENT
Start: 2017-06-08 | End: 2017-06-08 | Stop reason: HOSPADM

## 2017-06-08 RX ORDER — OXYCODONE HYDROCHLORIDE 5 MG/1
10 TABLET ORAL
Status: DISCONTINUED | OUTPATIENT
Start: 2017-06-08 | End: 2017-06-08 | Stop reason: HOSPADM

## 2017-06-08 RX ORDER — ONDANSETRON 2 MG/ML
4 INJECTION INTRAMUSCULAR; INTRAVENOUS ONCE
Status: DISCONTINUED | OUTPATIENT
Start: 2017-06-08 | End: 2017-06-08 | Stop reason: HOSPADM

## 2017-06-08 RX ORDER — GLYCOPYRROLATE 0.2 MG/ML
INJECTION INTRAMUSCULAR; INTRAVENOUS AS NEEDED
Status: DISCONTINUED | OUTPATIENT
Start: 2017-06-08 | End: 2017-06-08 | Stop reason: HOSPADM

## 2017-06-08 RX ORDER — OXYCODONE HYDROCHLORIDE 5 MG/1
5 TABLET ORAL
Status: DISCONTINUED | OUTPATIENT
Start: 2017-06-08 | End: 2017-06-08 | Stop reason: HOSPADM

## 2017-06-08 RX ORDER — PROPOFOL 10 MG/ML
INJECTION, EMULSION INTRAVENOUS AS NEEDED
Status: DISCONTINUED | OUTPATIENT
Start: 2017-06-08 | End: 2017-06-08 | Stop reason: HOSPADM

## 2017-06-08 RX ORDER — HYDROMORPHONE HYDROCHLORIDE 2 MG/ML
0.5 INJECTION, SOLUTION INTRAMUSCULAR; INTRAVENOUS; SUBCUTANEOUS
Status: DISCONTINUED | OUTPATIENT
Start: 2017-06-08 | End: 2017-06-08 | Stop reason: HOSPADM

## 2017-06-08 RX ORDER — LIDOCAINE HYDROCHLORIDE 20 MG/ML
INJECTION, SOLUTION EPIDURAL; INFILTRATION; INTRACAUDAL; PERINEURAL AS NEEDED
Status: DISCONTINUED | OUTPATIENT
Start: 2017-06-08 | End: 2017-06-08 | Stop reason: HOSPADM

## 2017-06-08 RX ORDER — SODIUM CHLORIDE, SODIUM LACTATE, POTASSIUM CHLORIDE, CALCIUM CHLORIDE 600; 310; 30; 20 MG/100ML; MG/100ML; MG/100ML; MG/100ML
75 INJECTION, SOLUTION INTRAVENOUS CONTINUOUS
Status: DISCONTINUED | OUTPATIENT
Start: 2017-06-08 | End: 2017-06-08 | Stop reason: HOSPADM

## 2017-06-08 RX ORDER — FENTANYL CITRATE 50 UG/ML
INJECTION, SOLUTION INTRAMUSCULAR; INTRAVENOUS AS NEEDED
Status: DISCONTINUED | OUTPATIENT
Start: 2017-06-08 | End: 2017-06-08 | Stop reason: HOSPADM

## 2017-06-08 RX ORDER — NALOXONE HYDROCHLORIDE 0.4 MG/ML
0.1 INJECTION, SOLUTION INTRAMUSCULAR; INTRAVENOUS; SUBCUTANEOUS AS NEEDED
Status: DISCONTINUED | OUTPATIENT
Start: 2017-06-08 | End: 2017-06-08 | Stop reason: HOSPADM

## 2017-06-08 RX ORDER — ROCURONIUM BROMIDE 10 MG/ML
INJECTION, SOLUTION INTRAVENOUS AS NEEDED
Status: DISCONTINUED | OUTPATIENT
Start: 2017-06-08 | End: 2017-06-08 | Stop reason: HOSPADM

## 2017-06-08 RX ORDER — ONDANSETRON 2 MG/ML
INJECTION INTRAMUSCULAR; INTRAVENOUS AS NEEDED
Status: DISCONTINUED | OUTPATIENT
Start: 2017-06-08 | End: 2017-06-08 | Stop reason: HOSPADM

## 2017-06-08 RX ORDER — NEOSTIGMINE METHYLSULFATE 1 MG/ML
INJECTION INTRAVENOUS AS NEEDED
Status: DISCONTINUED | OUTPATIENT
Start: 2017-06-08 | End: 2017-06-08 | Stop reason: HOSPADM

## 2017-06-08 RX ORDER — SODIUM CHLORIDE, SODIUM LACTATE, POTASSIUM CHLORIDE, CALCIUM CHLORIDE 600; 310; 30; 20 MG/100ML; MG/100ML; MG/100ML; MG/100ML
INJECTION, SOLUTION INTRAVENOUS
Status: DISCONTINUED | OUTPATIENT
Start: 2017-06-08 | End: 2017-06-08 | Stop reason: HOSPADM

## 2017-06-08 RX ORDER — ACETAMINOPHEN 500 MG
500 TABLET ORAL ONCE
Status: DISCONTINUED | OUTPATIENT
Start: 2017-06-08 | End: 2017-06-08 | Stop reason: HOSPADM

## 2017-06-08 RX ADMIN — FENTANYL CITRATE 100 MCG: 50 INJECTION, SOLUTION INTRAMUSCULAR; INTRAVENOUS at 17:26

## 2017-06-08 RX ADMIN — FENTANYL CITRATE 100 MCG: 50 INJECTION, SOLUTION INTRAMUSCULAR; INTRAVENOUS at 17:51

## 2017-06-08 RX ADMIN — ONDANSETRON 4 MG: 2 INJECTION INTRAMUSCULAR; INTRAVENOUS at 18:02

## 2017-06-08 RX ADMIN — PROPOFOL 200 MG: 10 INJECTION, EMULSION INTRAVENOUS at 17:26

## 2017-06-08 RX ADMIN — NEOSTIGMINE METHYLSULFATE 4 MG: 1 INJECTION INTRAVENOUS at 18:49

## 2017-06-08 RX ADMIN — SODIUM CHLORIDE, SODIUM LACTATE, POTASSIUM CHLORIDE, CALCIUM CHLORIDE: 600; 310; 30; 20 INJECTION, SOLUTION INTRAVENOUS at 17:20

## 2017-06-08 RX ADMIN — HYDROMORPHONE HYDROCHLORIDE 0.5 MG: 2 INJECTION, SOLUTION INTRAMUSCULAR; INTRAVENOUS; SUBCUTANEOUS at 19:35

## 2017-06-08 RX ADMIN — ROCURONIUM BROMIDE 40 MG: 10 INJECTION, SOLUTION INTRAVENOUS at 17:26

## 2017-06-08 RX ADMIN — GLYCOPYRROLATE 0.6 MG: 0.2 INJECTION INTRAMUSCULAR; INTRAVENOUS at 18:49

## 2017-06-08 RX ADMIN — LIDOCAINE HYDROCHLORIDE 100 MG: 20 INJECTION, SOLUTION EPIDURAL; INFILTRATION; INTRACAUDAL; PERINEURAL at 17:26

## 2017-06-08 NOTE — PERIOP NOTES
TRANSFER - OUT REPORT:    Verbal report given to Caity Verduzco RN at [facility] on Karl Su  being transferred to [facility] for routine post - op       Report consisted of patients Situation, Background, Assessment and   Recommendations(SBAR). Information from the following report(s) SBAR, Procedure Summary, Intake/Output, Recent Results and Cardiac Rhythm sinus tachycardia was reviewed with the receiving nurse. Lines:   Quad Lumen 05/16/17 Left Subclavian (Active)   Central Line Being Utilized Yes 6/7/2017  7:36 PM   Criteria for Appropriate Use Long term IV/antibiotic administration 6/6/2017  6:22 PM   Site Assessment Clean, dry, & intact 6/7/2017  7:36 PM   Infiltration Assessment 0 6/7/2017  7:36 PM   Affected Extremity/Extremities Color distal to insertion site pink (or appropriate for race) 6/6/2017  6:22 PM   Date of Last Dressing Change 06/01/17 6/5/2017  7:20 AM   Dressing Status Clean, dry, & intact 6/6/2017  6:22 PM   Dressing Type Tape;Transparent 6/5/2017  7:20 AM   Action Taken Dressing reinforced 6/5/2017  7:20 AM   Proximal Hub Color/Line Status Patent 6/5/2017  7:20 AM   Positive Blood Return (Medial Site) Yes 6/5/2017  7:20 AM   Medial 1 Hub Color/Line Status Patent 6/5/2017  7:20 AM   Positive Blood Return (Lateral Site) Yes 6/5/2017  7:20 AM   Medial 2 Hub Color/Line Status Patent 6/5/2017  7:20 AM   Positive Blood Return (Site #3) Yes 6/5/2017  7:20 AM   Distal Hub Color/Line Status Patent 6/5/2017  7:20 AM   Positive Blood Return (Site #4) No 6/3/2017  7:26 PM   Alcohol Cap Used No 6/3/2017  7:26 PM        Opportunity for questions and clarification was provided. Patient transported with:   O2 @ 2 liters    VTE prophylaxis orders have not been written for Karl Su. Patient and family given floor number and nurses name. Family updated re: pt status after security code verified.

## 2017-06-08 NOTE — PERIOP NOTES
3 sheets of VAC Whitefoam dressing cut into 9 pieces have been put into the wounds as follows    7 pieces inserted into the right hip wound , covered in black wound vac foam

## 2017-06-08 NOTE — H&P (VIEW-ONLY)
History and Physical    Patient: Fernanda Arroyo MRN: [id number]  SSN: [id number]    YOB: 1985  Age: 29 y.o. Sex: female      Subjective:      29year old female admitted with DKA, rhabdomyolysis, NALDO, pancreatitis, lactic acidosis, leukocytosis, and sepsis. On CT, she was found to have a large pelvic process (mass vs abscess) with tracking into right buttock. She improved some with supportive care, fluid resuscitation, and antibiotics. She had an IR drain placed into her pelvic fluid collection.      She was taken to the OR 5/19/17 for I&D of necrotic buttock fat. I found tracking into the extraperitoneal pelvic fluid collection. There was no connection to rectum. She was taken 5/22/17 for repeat debridement with much improved appearance of wound. Wound seemed to be improving, but on VAC change 6/1, there was stool in the wound, consistent with rectal wall breakdown. Due to fevers and worsening WBC, she had repeat CT done, showing extensive gas and inflammation in the right buttock and thigh.      Taken for wound exploration, debridement, suture repair of rectum x2, and lap diverting end-loop colostomy 6/2/17. I could not find tracking into the thigh process seen on CT imaging. Ortho has seen patient for ongoing thigh process. Patient with uneventful weekend. Reports some buttock, leg, and abdominal pain. Tolerated some diet. Minimal serous fluid out of ostomy thus far. No n/v. Wound dressing not changed over weekend        Past Medical History:   Diagnosis Date    Ill-defined condition     herpes     Past Surgical History:   Procedure Laterality Date    HX OTHER SURGICAL      pilonidal cyst      No family history on file. Social History   Substance Use Topics    Smoking status: Never Smoker    Smokeless tobacco: Not on file    Alcohol use Yes      Comment: occasional      Prior to Admission medications    Medication Sig Start Date End Date Taking?  Authorizing Provider   0.9% sodium chloride solution 100 mL/hr by IntraVENous route continuous. 6/5/17   Evelyn Gutierrez NP   acetaminophen (TYLENOL) 325 mg tablet Take 2 Tabs by mouth every four (4) hours as needed for Fever (for fever greater than 100.4 F). 6/5/17   Evelyn Gutierrez NP   bisacodyl (DULCOLAX) 5 mg EC tablet Take 1 Tab by mouth daily as needed for Constipation. 6/5/17   Evelyn Gutierrez NP   fluconazole in 0.9% NaCl (DIFLUCAN) 400 mg/200 mL IVPB 200 mL by IntraVENous route every twenty-four (24) hours. 6/5/17   Evelyn Gutierrez NP   HEPARIN SODIUM,PORCINE (HEPARIN, PORCINE,) 5,000 unit/mL injection 1 mL by SubCUTAneous route every eight (8) hours. 6/5/17   Evelyn Gutierrez NP   insulin lispro (HUMALOG) 100 unit/mL injection Very Insulin Resistant  For Blood Sugar (mg/dL) of:              Less than 150 =   0 units  150 -199 =   3 units  200 -249 =   6 units  250 -299 =   9 units  300 -349 =   12 units  350 and above =   15 units and Call Physician  Initiate Hypoglycemic protocol if blood glucose is <70 mg/dL. 6/5/17   Evelyn Gutierrez NP   meropenem 500 mg, ADDaptor 1 Device IVPB 500 mg by IntraVENous route every six (6) hours. 6/5/17   Evelyn Gutierrez NP   morphine 2 mg/mL injection 1 mL by IntraVENous route every three (3) hours as needed. Max Daily Amount: 16 mg. 6/5/17   Evelyn Gutierrez NP   morphine 10 mg/ml soln 0.5 mL by IntraVENous route every four (4) hours as needed. Max Daily Amount: 30 mg. 6/5/17   Evelyn Gutierrez NP   ondansetron (ZOFRAN) 4 mg/2 mL soln 2 mL by IntraVENous route every four (4) hours as needed. 6/5/17   Evelyn Gutierrez NP   oxyCODONE IR (ROXICODONE) 10 mg tab immediate release tablet Take 1 Tab by mouth every four (4) hours as needed. Max Daily Amount: 60 mg. 6/5/17   Evelyn Gutierrez NP   oxyCODONE IR (ROXICODONE) 5 mg immediate release tablet Take 1 Tab by mouth every four (4) hours as needed.  Max Daily Amount: 30 mg. 6/5/17   Evelyn Gutierrez NP   sodium chloride 0.9 % soln 10 mL with promethazine 25 mg/mL soln 12.5 mg 12.5 mg by IntraVENous route every six (6) hours as needed. 6/5/17   Brittney Laughter, NP        No Known Allergies    Review of Systems:  A comprehensive review of systems was negative except for that written in the History of Present Illness. Objective:     Vitals:    06/06/17 1337   BP: 138/71   Pulse: (!) 131   Resp: 18   Temp: 99.4 °F (37.4 °C)   SpO2: 97%   Weight: 197 lb 8.5 oz (89.6 kg)   Height: 5' 2\" (1.575 m)        Physical Exam:  General:  Alert, cooperative, no distress, appears stated age. Eyes:  Conjunctivae/corneas clear. PERRL, EOMs intact. Fundi benign   Ears:  Normal TMs and external ear canals both ears. Nose: Nares normal. Septum midline. Mucosa normal. No drainage or sinus tenderness. Mouth/Throat: Lips, mucosa, and tongue normal. Teeth and gums normal.   Neck: Supple, symmetrical, trachea midline, no adenopathy, thyroid: no enlargment/tenderness/nodules, no carotid bruit and no JVD. Back:   Symmetric, no curvature. ROM normal. No CVA tenderness. Lungs:   Clear to auscultation bilaterally. Heart:  Regular rate and rhythm, S1, S2 normal, no murmur, click, rub or gallop. Abdomen:   soft, mildly tender, nondistended. No peritoneal signs, no rebound, no guarding. Stoma in LLQ is pink, healthy, and budded. Minimal jay fluid in bag.     Buttock--dressing removed. Appears that dressing not changed over weekend. There is again some grayish purulence in the base of the wound. Superficially, the wound looks great with excellent granulation tissue and no necrotic areas. Widely spreading to examine the deeper component shows some continued grayish fluid drainage. Wound packed with gauze today as I would like to clean it up a little before placing VAC again     Extremities: Extremities normal, atraumatic, no cyanosis or edema. Pulses: 2+ and symmetric all extremities.    Skin: Skin color, texture, turgor normal. No rashes or lesions   Lymph nodes: Cervical, supraclavicular, and axillary nodes normal.   Neurologic: CNII-XII intact. Normal strength, sensation and reflexes throughout.                Recent Labs      17  0425 17  0812 17  0746   WBC 5.8 26.9* 26.2*   HGB 9.0* 8.6* 8.2*   HCT 27.6* 26.4* 25.3*   * 442 415               Recent Labs      17  0425 17  0812 17  0746    140 139   K 3.5 3.8 4.1   * 103 100   CO2 20* 25 28   BUN 9 6 7   CREA 0.97 0.34* 0.37*         No results for input(s): AML, LPSE in the last 72 hours.     No results for input(s): PTP, INR, APTT in the last 72 hours.     No lab exists for component: INREXT, INREXT  Last lactate 1.4  Last CRP 18.3  RPR NR  Pelvic fluid culture from IR 17--beta hemolytic strep  AFB smear 17--negative  HSV culture 17--negative  Blood cultures 17--GPR x2 bottles  Blood cultures 17--no growth x 5 days  Blood cultures 17--no growth 5 days  Blood cultures 17--no growth x14 hrs  UA 17--1+ bacteria, neg LE/nitr--cultures with 100k candida glabrata  OR tissue culture 17--beta hemolytic strep  Above labs reviewed by me.     IMAGIN17--CT abd/pelvis: \"IMPRESSION: Large incompletely characterized pelvic mass with apparent involvement/extension into the right gluteal muscles. The findings presumably represent a neoplastic process. Pelvic MRI may be beneficial for further delineation as it relates to other pelvic anatomy. Preferably this would be done with IV contrast.\"  17--CT chest/abd/pelvis: \"IMPRESSION: Indeterminate perirectal mass with extension of heterogeneous, low-attenuation soft tissue into the right buttock an asymmetric edema and muscular swelling and the right hemipelvis. Right inguinal adenopathy is present. This may be a perirectal abscess or necrotic tumor with extrapelvic extension. \"  17--CT guided IR drain placement:   17--MRI pelvis--\"IMPRESSION: Status post right perianal/rectal abscess drainage.  The extensive remaining abscess findings are detailed above. \"  6/1/17--CT abd/pelv--\"IMPRESSION: Extensive complex air/fluid collection involving the right gluteal musculature extending into the right thigh, consistent with myositis and possible gangrene. Probably associated with perforation of the right lateral wall of the inferior rectum.  \"      Assessment:   Pelvic abscess   S/p IR drain 5/18/17   S/p operative I&D, debridement of necrotic tissue 5/19/17   S/p operative I&D, debridement of necrotic tissue 5/22/17   S/p operative I&D, debridement, suture repair of rectum x2, and lap diverting end-loop colostomy 6/2/17  Sepsis, due to above--improved  Recurrent fevers--resolved  Leukocytosis, due to above--resolved  DKA--improved  Rhabdomyolysis--resolved  NALDO--resolved  Pancreatitis--resolved  Anemia--   S/p transfusion 2 units PRBC 5/18/17   S/p transfusion 2 units PRBC 5/31/17  Hypernatremia--resolved  Elevated LFTs--resolved  Hypophosphatemia--resolved       Plan:     --to OR for RIGHT leg exploration, possible I&D      Signed By: Anders Obando MD     June 6, 2017

## 2017-06-08 NOTE — ANESTHESIA PREPROCEDURE EVALUATION
Anesthetic History   No history of anesthetic complications            Review of Systems / Medical History  Patient summary reviewed and pertinent labs reviewed    Pulmonary  Within defined limits                 Neuro/Psych   Within defined limits           Cardiovascular                  Exercise tolerance: >4 METS     GI/Hepatic/Renal         Renal disease: ARF       Endo/Other    Diabetes         Other Findings   Comments: Alcoholic pancreatitis  Chlamydia  Herpes  Pelvic mass           Physical Exam    Airway  Mallampati: I  TM Distance: 4 - 6 cm  Neck ROM: normal range of motion   Mouth opening: Normal     Cardiovascular  Regular rate and rhythm,  S1 and S2 normal,  no murmur, click, rub, or gallop  Rhythm: regular  Rate: normal         Dental  No notable dental hx       Pulmonary  Breath sounds clear to auscultation               Abdominal  GI exam deferred       Other Findings            Anesthetic Plan    ASA: 2  Anesthesia type: general          Induction: Intravenous  Anesthetic plan and risks discussed with: Patient

## 2017-06-08 NOTE — ANESTHESIA POSTPROCEDURE EVALUATION
Post-Anesthesia Evaluation and Assessment    Patient: Sameera Marie MRN: [id number]  SSN: [id number]    YOB: 1985  Age: 29 y.o. Sex: female       Cardiovascular Function/Vital Signs  Visit Vitals    /58    Pulse (!) 115    Temp 36.9 °C (98.4 °F)    Resp 29    Wt 89.4 kg (197 lb)    SpO2 100%    BMI 36.03 kg/m2       Patient is status post general anesthesia for Procedure(s):  RIGHT LATERAL THIGH AND RIGHT BUTTOCKS INCISION AND DEBRIDEMENT. .    Nausea/Vomiting: None    Postoperative hydration reviewed and adequate. Pain:  Pain Scale 1: Numeric (0 - 10) (06/07/17 2052)  Pain Intensity 1: 6 (06/07/17 2037)   Managed    Neurological Status:   Neuro (WDL): Within Defined Limits (06/07/17 2030)   At baseline    Mental Status and Level of Consciousness: Arousable    Pulmonary Status:   O2 Device: Nasal cannula (06/07/17 2030)   Adequate oxygenation and airway patent    Complications related to anesthesia: None    Post-anesthesia assessment completed.  No concerns    Signed By: Berny Wood MD     June 7, 2017

## 2017-06-08 NOTE — BRIEF OP NOTE
BRIEF OPERATIVE NOTE    Date of Procedure: 6/8/2017   Preoperative Diagnosis: Necrotizing Fascitis  Postoperative Diagnosis: Necrotizing Fascitis    Procedure(s):  RIGHT LATERAL THIGH AND RIGHT BUTTOCK INCISION AND DRAINAGE, DEBRIDEMENT OF SKIN, SQ, MUSCLE, FASCIA, PLACEMENT OF Wound Vac   Surgeon(s) and Role:     * Jason Meier MD - Primary         Assistant Staff:       Surgical Staff:  Circ-1: Florian Ferrera, RN  Scrub Tech-1: Mercedez Bryan  Scrub Tech-2: Bessie Hutson  Event Time In   Incision Start 1750   Incision Close 1851     Anesthesia: General   Estimated Blood Loss: 50  Specimens: * No specimens in log *   Findings: very clean wound, no new pockets identified.   Minimal necrotic tissue debrided  Complications: none  Implants: * No implants in log *

## 2017-06-08 NOTE — ANESTHESIA POSTPROCEDURE EVALUATION
Post-Anesthesia Evaluation and Assessment    Patient: Anika Russo MRN: [id number]  SSN: [id number]    YOB: 1985  Age: 29 y.o. Sex: female       Cardiovascular Function/Vital Signs  Visit Vitals    /53    Pulse (!) 120    Temp 36.9 °C (98.4 °F)    Resp 16    Ht 5' 2\" (1.575 m)    Wt 89.4 kg (197 lb)    SpO2 100%    BMI 36.03 kg/m2       Patient is status post general anesthesia for Procedure(s):  RIGHT LATERAL THIGH AND RIGHT BUTTOCK INCISION AND DRAINAGE Wound Vac . Nausea/Vomiting: None    Postoperative hydration reviewed and adequate. Pain:  Pain Scale 1: Numeric (0 - 10) (06/08/17 1934)  Pain Intensity 1: 4 (06/08/17 1934)   Managed    Neurological Status:   Neuro (WDL): Exceptions to WDL (06/08/17 1934)  Neuro  Neurologic State: Drowsy (06/08/17 1934)  Orientation Level: Oriented X4 (06/08/17 1934)  Speech: Clear (06/08/17 1934)  LUE Motor Response: Purposeful (06/08/17 1911)  LLE Motor Response: Purposeful (06/08/17 1911)  RUE Motor Response: Purposeful (06/08/17 1911)  RLE Motor Response: Purposeful (06/08/17 1911)   At baseline    Mental Status and Level of Consciousness: Arousable    Pulmonary Status:   O2 Device: Nasal cannula (06/08/17 1911)   Adequate oxygenation and airway patent    Complications related to anesthesia: None    Post-anesthesia assessment completed.  No concerns    Signed By: Bryanna Hassan MD     June 8, 2017

## 2017-06-08 NOTE — INTERVAL H&P NOTE
H&P Update:  Sahara Lee was seen and examined. History and physical has been reviewed. The patient has been examined.  There have been no significant clinical changes since the completion of the originally dated History and Physical.    Signed By: Lucila Reid MD     June 8, 2017 4:49 PM

## 2017-06-08 NOTE — OP NOTES
[facility]   OPERATIVE REPORT       Name:  Isac Christianson   MR#:  [id number]   :  1985   Account #:  [de-identified]   Date of Adm:  2017       DATE OF SURGERY: 2017    PREOPERATIVE DIAGNOSIS: Necrotizing fasciitis of the right   thigh, buttock and extraperitoneal pelvis. POSTOPERATIVE DIAGNOSIS: Necrotizing fasciitis of the right   thigh, buttock and extraperitoneal pelvis. NAME OF PROCEDURE: Incision, drainage, and debridement of skin,   subcutaneous tissues, muscle and fascia of the right thigh. SURGEON: Romel Luis MD    ASSISTANT: Ruchi Loving DO    ANESTHESIA: General.    BLOOD LOSS: 100 mL. SPECIMENS: None. COMPLICATIONS: None. BRIEF DESCRIPTION OF THE FINDINGS    1. Wound much  this evaluation. 2. A few small pockets were identified, drained and debrided. 3. Additional posterolateral incision was made just above the   popliteal fossa to drain a small cavity seen on CT scan. BRIEF HISTORY: Miss Moo Valle is a 44-year-old female with a   complex necrotizing infection in her right buttock, right   thigh/hip, and extraperitoneal pelvis, who has had multiple   debridement procedures over the last few weeks. She was taken   yesterday for extensive debridement of her right thigh and hip   area and was planned for a recheck today to assure adequate   treatment. She had a CT scan performed earlier today which   showed a few small pockets, but the majority of the problems had   been addressed. The patient was evaluated in the preoperative holding area. We   discussed the planned operation, risks, benefits, and   alternatives. She was brought to the operating room and general   anesthesia was administered. She was placed in left lateral   decubitus position on the OR table. She was held in place with a   beanbag and all pressure points were padded.  The dressing was   removed from yesterday, and the hip, buttock and leg were all   prepped with Betadine in sterile fashion. A time-out was   performed. We explored the wound. It was significantly better looking today   than it had been yesterday. There was much less necrotic tissue   then there was yesterday and the majority of the tissues were   viable and healthy. We gently explored the wound digitally   looking for the small pockets in the tissues that had been   identified on the CT scan. Pockets were identified   anteriorly/superiorly in relation to the greater trochanter   curving up and around the hip joint. This tracked up in the   subfascial plane. Necrotic tissue was debrided with a   combination of sharp excisional debridement using scissors and   ring forceps, as well as a more blunt debridement using a   curette. In the end, almost all necrotic tissue was removed   here. Other areas around this were inspected and were clean and   healthy. Minimal necrotic tissue was sharply excised using   scissors and again the curet. Exploring the wound more   posteriorly, we identified an additional pocket of necrotic   tissue that had not been identified yesterday. Again, this was   debrided sharply in an excisional fashion using the   scissors and a curette. We probed between muscle layers in   multiple other positions looking for other pockets and found   none. We did find a tracking down along the right lateral thigh,   but could not identify tracking all the way down to the cavity   seen behind the popliteal fossa on the CT scan. Dr. Diamond Farmer made a counterincision just lateral and superior to   the popliteal fossa and used cautery to dissect down into this   space. He was able after some time to identify a small pocket of   necrotic tissue and this was sharply debrided in excisional   fashion using scissors. Further exploration of the posterior   thigh and popliteal area revealed no other cavities are pockets.   While Dr. Diamond Farmer had been exploring the popliteal   fossa, I used a combination of sharp scissors dissection and the   curettes to sharply, excisionally debride multiple surfaces in   the upper thigh wound. In the end, there was less than 10%   necrotic tissue at almost all of the tissues were healthy and   nicely viable. All of the wounds were copiously irrigated with PulsaVac lavage   system using 3 L of saline that was bacitracin infused, followed   by a 3 liter bag of sterile saline. All wounds were clean and   healthy. The wound was again packed with Betadine-soaked   Kerlix with a dry gauze being packed into the buttock wound. The   buttock wound was inspected prior to be packed and it looked   healthy and appeared well vascularized. Sterile dressings were applied. The patient was placed back   supine, awakened, extubated, and taken to recovery in stable   condition. We will plan for repeat exam in the operating room   tomorrow, possibly with placement of wound VAC.         Rayo Zhang MD      BS / MP   D:  06/07/2017   19:39   T:  06/08/2017   02:17   Job #:  [id number]     Dr. Leopoldo Barber, inpatient attending

## 2017-06-08 NOTE — OP NOTES
[facility]   OPERATIVE REPORT       Name:  Barry Green   MR#:  [id number]   :  1985   Account #:  [de-identified]   Date of Adm:  2017       PREOPERATIVE DIAGNOSIS: Necrotizing fasciitis, right hip. POSTOPERATIVE DIAGNOSIS: Necrotizing fasciitis, right hip. PROCEDURE: Right buttock  And hip wound exploration for necrotizing fasciitis,   assisting Dr. Claudette Mari. SURGEON: Jessi Cole MD.    ASSISTANT: Dana Waldrop DO.    COMPLICATIONS: None. DISPOSITION: Guarded. DESCRIPTION OF PROCEDURE: This is a 42-year-old female with   necrotizing fasciitis extending to the right hip. She was taken   to surgery yesterday for incision, debridement, and drainage of   necrotizing fasciitis, extensive exploration of the patient's   right buttock, hip and upper thigh with open wound packing with   Betadine gauze which was performed yesterday. She is brought   back to surgery. I am assisting Dr. Claudette Mari. When I entered the   room, the patient was under general anesthesia in the left   lateral decubitus position. Wound packing had been removed by Dr. Claudette Mari and staff. Debridement and exploration were being carried   out by Dr. Claudette Mari in the right hip. I joined them. We continued   debriding necrotic tissue. Overall, the wound looked improved. There was good, healthy, viable muscle. There were some other   areas of abscess that were entered and drained by Dr. Claudette Mari. At that point, I transitioned to a new area in the popliteal   space of the right lower extremity noted on the CAT scan. I made   a skin incision with a #15 scalpel blade. Bovie cauterization   was used to dissect down the subcutaneous tissue to the   popliteal space. We then entered an area of necrotic tissue just   posterior to the popliteal vein. Necrotic tissue was debrided,   and there were no other areas.  The entire popliteal space was   probed with a spinal needle and syringe, and there were no other   areas of fluid collection or drainage. At this point, the   popliteal space was packed and dissection was concluded. We   continued debriding the hip area and thigh muscles, and then at   this point, I broke scrub and Dr. Ramonita Saunders was using a PulsaVac   to irrigate the area and the plan will be to pack the wound and   the new popliteal space. Then, the patient will be managed by Dr. Ramonita Saunders for additional wound observation and exploration. I   assisted Dr. Ramonita Saunders in 90% of the case. ARIVND DIAZ DO DD / Medina Beth   D:  06/07/2017   18:53   T:  06/08/2017   01:55   Job #:  [id number]

## 2017-06-09 ENCOUNTER — HOSPITAL ENCOUNTER (OUTPATIENT)
Dept: GENERAL RADIOLOGY | Age: 32
Discharge: HOME | End: 2017-06-09
Attending: INTERNAL MEDICINE
Payer: SELF-PAY

## 2017-06-09 LAB
ABO + RH BLD: NORMAL
BACTERIA SPEC CULT: NORMAL
BACTERIA SPEC CULT: NORMAL
BASOPHILS # BLD AUTO: 0 K/UL (ref 0–0.2)
BASOPHILS # BLD: 0 % (ref 0–2)
BLD PROD TYP BPU: NORMAL
BLD PROD TYP BPU: NORMAL
BLOOD GROUP ANTIBODIES SERPL: NORMAL
BPU ID: NORMAL
BPU ID: NORMAL
CROSSMATCH RESULT,%XM: NORMAL
CROSSMATCH RESULT,%XM: NORMAL
DIFFERENTIAL METHOD BLD: ABNORMAL
EOSINOPHIL # BLD: 0.3 K/UL (ref 0–0.8)
EOSINOPHIL NFR BLD: 1 % (ref 0.5–7.8)
ERYTHROCYTE [DISTWIDTH] IN BLOOD BY AUTOMATED COUNT: 16.5 % (ref 11.9–14.6)
HCT VFR BLD AUTO: 23.9 % (ref 35.8–46.3)
HGB BLD-MCNC: 8.1 G/DL (ref 11.7–15.4)
IMM GRANULOCYTES # BLD: 0.3 K/UL (ref 0–0.5)
LYMPHOCYTES # BLD AUTO: 8 % (ref 13–44)
LYMPHOCYTES # BLD: 2.2 K/UL (ref 0.5–4.6)
MAGNESIUM SERPL-MCNC: 2.1 MG/DL (ref 1.8–2.4)
MCH RBC QN AUTO: 28.7 PG (ref 26.1–32.9)
MCHC RBC AUTO-ENTMCNC: 33.9 G/DL (ref 31.4–35)
MCV RBC AUTO: 84.8 FL (ref 79.6–97.8)
MONOCYTES # BLD: 2.4 K/UL (ref 0.1–1.3)
MONOCYTES NFR BLD AUTO: 9 % (ref 4–12)
NEUTS SEG # BLD: 22.2 K/UL (ref 1.7–8.2)
NEUTS SEG NFR BLD AUTO: 82 % (ref 43–78)
PHOSPHATE SERPL-MCNC: 1.3 MG/DL (ref 2.5–4.5)
PLATELET # BLD AUTO: 545 K/UL (ref 150–450)
PLATELET COMMENTS,PCOM: SLIGHT
PMV BLD AUTO: 9.1 FL (ref 10.8–14.1)
RBC # BLD AUTO: 2.82 M/UL (ref 4.05–5.25)
RBC MORPH BLD: ABNORMAL
SERVICE CMNT-IMP: NORMAL
SERVICE CMNT-IMP: NORMAL
SPECIMEN EXP DATE BLD: NORMAL
STATUS OF UNIT,%ST: NORMAL
STATUS OF UNIT,%ST: NORMAL
UNIT DIVISION, %UDIV: 0
UNIT DIVISION, %UDIV: 0
VANCOMYCIN TROUGH SERPL-MCNC: 9 UG/ML (ref 5–20)
WBC # BLD AUTO: 27.1 K/UL (ref 4.3–11.1)
WBC MORPH BLD: ABNORMAL

## 2017-06-09 PROCEDURE — 85025 COMPLETE CBC W/AUTO DIFF WBC: CPT | Performed by: INTERNAL MEDICINE

## 2017-06-09 PROCEDURE — 80202 ASSAY OF VANCOMYCIN: CPT | Performed by: INTERNAL MEDICINE

## 2017-06-09 PROCEDURE — 83735 ASSAY OF MAGNESIUM: CPT | Performed by: INTERNAL MEDICINE

## 2017-06-09 PROCEDURE — 71010 XR CHEST PORT: CPT

## 2017-06-09 PROCEDURE — 84100 ASSAY OF PHOSPHORUS: CPT | Performed by: INTERNAL MEDICINE

## 2017-06-09 NOTE — OP NOTES
[address]   OPERATIVE REPORT       Name:  Trupti Inman   MR#:  [id number]   :  1985   Account #:  [de-identified]   Date of Adm:  2017       DATE OF SURGERY: 2017     PREOPERATIVE DIAGNOSIS: Necrotizing fasciitis of the buttock,   extraperitoneal pelvis, and right thigh. POSTPROCEDURE DIAGNOSIS: Necrotizing fasciitis of the buttock,   extraperitoneal pelvis, and right thigh. NAME OF PROCEDURE   1. Debridement of skin, subcutaneous tissues, muscle, fascia in   right thigh wound, measuring 22 x 10 cm.   2. Incision and drainage and debridement of skin, subcutaneous   tissues and fascia of distal right thigh wound, 8 x 3 cm. 3. Placement of wound VAC. SURGEON: Shilpi Sullivan MD    ASSISTANT: None. ANESTHESIA: General.    BLOOD LOSS: 50 mL. SPECIMENS: None. BRIEF DESCRIPTION OF THE FINDINGS: Very clean wound with minimal   necrotic tissue, no new pockets of purulence or necrotic tissues   identified. COMPLICATIONS: None. BRIEF HISTORY: Miss Jean Wick is a 44-year-old female who   presented with a necrotizing infection involving her   extraperitoneal pelvis, right buttock and ischiorectal fossa,   and eventually right thigh. She has undergone multiple   incisions, drainage, debridement procedures. Her last one was   yesterday and her wounds all looked very good. We scheduled   today's intervention to assure that the tissues continue to   appear viable and healthy prior to starting wound VAC therapy. The patient was evaluated in the preoperative holding area. We   discussed the planned operation, risks, benefits and   alternatives. She was brought to the operating room and general   anesthesia was administered. She was placed in the left lateral   decubitus on a beanbag on the OR table. Pressure points were all   padded and she was secured in place. The old dressings were all   removed.  The wounds and operative field was then prepped and   draped with Betadine in a sterile fashion. A time-out was   performed. The patient was already on antibiotics and no   additional dosing was needed. I began by inspecting the wounds. All of the prep was removed   and the wound bases were evaluated. The wounds were very clean   and healthy. There was now minimal necrotic tissue present in   the wounds. A very minimal/light debridement of skin,   subcutaneous tissue, fat, muscle and fascia was performed in   multiple areas to remove essentially all of the necrotic tissue. All of the pockets in the wounds were explored carefully and   there was no new pockets identified. I probed in between muscle   groups and identified no new pockets or tracking. No purulence   or fluid. The incision behind the knee was also inspected and   there was minimal tissue that was debrided at this point as   well, including skin, subcutaneous tissues, and fascia. Pulse lavage system using 3 liters of sterile saline was used to   wash the wound and focused on all tracking pockets. In the end,   the wound is very healthy. We then placed a wound VAC on the hip and right lower leg   wounds. Hitchcock sponges were packed into the right lower leg. White   sponges were cut into strips and 7 separate strips were packed   into different tracking areas in the thigh wound. These were   then covered with gray sponges. The wounds were bridged. We   attempted to bridge back to the buttock wound, but were not able   to obtain a seal because of the close proximity to the anus. Buttock sponges were removed and the VAC was used solely on the   hip and distal thigh. The buttock wound was then packed with a   dry Kerlix. It was covered with a sterile dressing. The patient   was placed back supine, awakened, extubated, and taken to   recovery in stable condition. Sponge, instrument, and needle   counts were correct.         MD PABLITO Kemp / RAZA   D:  06/08/2017   19:04   T:  06/08/2017   23:23   Job #: [id number]

## 2017-06-09 NOTE — ADT AUTH CERT NOTES
Utilization Review           General Surgery or Procedure GRG - Care Day 19 (6/3/2017) by Izzy Bueno RN        Review Status Review Entered       Completed 6/8/2017       Details              Care Day: 19 Care Date: 6/3/2017 Level of Care: Inpatient Floor       Guideline Day 2        Level Of Care       (X) Floor              Clinical Status       (X) * No ICU or intermediate care needs              Interventions       (X) Inpatient interventions continue       6/8/2017 5:01 PM EDT by Randolph Rogers         NS BOLUS,/HR,DIFLUCAN IV, HEPARIN SQ, MS IV X 4, ROXICODONE, ZOSYN IV Q 8,                     6/8/2017 5:01 PM EDT by Randolph Rogers       Subject: Additional Clinical Information         T 100.6, -129, SAT 95% 2L NC    WBC: 26.2 (H)         RBC: 2.89 (L)         HGB: 8.2 (L)         HCT: 25.3 (L)               PLAN: Continue IV antibiotics. Advance diet as tolerated. Repeat white count tomorrow.   Monitor fevers and white count and if patient does not improve consider returning to surgery for wound exploration.              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   Ostomy is pink.   She denies significant pain.   Patient had fever to 100.8.   Her white count is 26,000 today.   She is status post rectal repair and colostomy formation.   She is hemodynamically stable.   She denies any nausea or vomiting.   She denies any needs at this time.                                   * Milestone                  General Surgery or Procedure GRG - Care Day 18 (6/2/2017) by Karen Pulido RN        Review Status Review Entered       Completed 6/2/2017       Details              Care Day: 18 Care Date: 6/2/2017 Level of Care: Inpatient Floor       Guideline Day 1        Level Of Care       (X) OR to ICU or intermediate care [B]       6/2/2017 4:50 PM EDT by Mattie Stern         PACU                     Clinical Status       (X) * Clinical Indications met [C]       (X) * Procedure completed              Interventions       (X) Inpatient interventions as needed       6/2/2017 4:49 PM EDT by Mattie Stern         HEPARIN SQ, NORCO, SSI, MS IV, ZOFRAN IV, ZOSYN IV Q 8, PEPCID, DIFLUCAN, LASIX IV, LR 75/HR                     6/2/2017 4:49 PM EDT by Mattie Stern       Subject: Additional Clinical Information        , /63, PAIN 7/10, 3L NC, SAT 99%, PACU,        BRIEF OPERATIVE NOTE      Date of Procedure: 6/2/2017   Preoperative Diagnosis: Necrotizing buttock and pelvic infection  Postoperative Diagnosis: Same  Procedure(s):  RECTAL EXAM UNDER ANESTHESIA   SHARP EXCISIONAL DEBRIDEMENT OF SKIN, SUBCUTANEOUS, AND FASCIA  SUTURE REPAIR OF RECTAL PERFORATIONS X2  ULTRASOUND EVALUATION OF SOFT TISSUES OF RIGHT THIGH  LAPAROSCOPIC END-LOOP DIVERTING COLOSTOMY                                       * Milestone                  Diabetes - Care Day 17 (6/1/2017) by Fidencio Coyle        Review Status Review Entered       Completed 6/1/2017       Details              Care Day: 17 Care Date: 6/1/2017 Level of Care: Inpatient Floor       Guideline Day 2        Clinical Status       (X) * Alert       ( ) * No precipitating cause identified or cause manageable in outpatient setting       ( ) * Hemodynamic stability       (X) * Acceptable acid-base balance       ( ) * Blood glucose under acceptable control       (X) * Dehydration absent       (X) * Electrolytes acceptable       (X) * Diet tolerated       ( ) * Discharge plans and education understood              Activity       (X) * Ambulatory              Routes       ( ) * Oral hydration, medications, and diet       (X) [place] diet, advance as tolerated.              Interventions       (X) Complete IV volume, water replacement, and electrolyte balance       (X) Diabetic education              Medications       (X) Subcutaneous insulin       ( ) * Outpatient diabetic medication regimen established              6/1/2017 12:53 PM EDT by Skylar Bajwa       Subject: Additional Clinical Information       6/1/17:T 101.1; /57; ; RR 18; 99%LABS: WBC 14.7; H/H 8.4/25.2; ; K 3.4; BUN 5; CREAT 0.40. CONTINUE IV ABX. TACHYCARDIA IS COMPENSATORY TO SEPSIS. PER ATTENDING: There has been a clear change in this wound.   There is now stool present in it, almost undeniably so.   This is a significant change, as both of her operations showed no evidence of connection to the rectum.   This may be consistent with extension of the process to involve the rectal wall and breakdown of it.   Unfortunately, given the low nature of this opening, I believe that a colostomy is appropriate to divert the area.   That being said, with diversion and local wound care, we may be able to get this to heal in and avoid a permanent stoma. MEDS: ZOSYN 3.375G IV Q8HRS; MORPHINE 5MG IV X3; INSULIN COVERAGE; HEPARIN 5000UNITS SC Q8HRS; LASIX 20MG IV Q8HRS; BUMINATE 25G IV Q6HRS                                   * Milestone                  Diabetes - Care Day 16 (5/31/2017) by Ilda Spatz        Review Status Review Entered       Completed 6/1/2017       Details              Care Day: 16 Care Date: 5/31/2017 Level of Care: Inpatient Floor       Guideline Day 2        Clinical Status       (X) * Alert       ( ) * No precipitating cause identified or cause manageable in outpatient setting       ( ) * Hemodynamic stability       (X) * Acceptable acid-base balance       ( ) * Blood glucose under acceptable control       (X) * Dehydration absent       (X) * Electrolytes acceptable       (X) * Diet tolerated       ( ) * Discharge plans and education understood              Activity       (X) * Ambulatory              Routes       ( ) * Oral hydration, medications, and diet       (X) [place] diet, advance as tolerated.              Interventions       (X) Complete IV volume, water replacement, and electrolyte balance       (X) Diabetic education              Medications       (X) Subcutaneous insulin       ( ) * Outpatient diabetic medication regimen established              6/1/2017 12:45 PM EDT by Felipe Tristan       Subject: Additional Clinical Information       5/31/17:T 98.8; /50;  ;  RR 18; 97% 2L NCPE: PT REMAINS IN MILD DISTRESS. Large area of excoriations around her vagina and perineum. Leg swelling - continue low dose lasix and albumin. Anemia: Acute, likely 2/2 hematoma. S/p 2units PBRCS- continue to monitor & will transfuse 2uprbcs today. LABS: WBC 12.3; H/H 6.1/18.8; ; MONOS 16; ABS MONOS 2; CREAT 0.47; CA 7. 6. MEDS: BUMINATE 25G IV Q6HRS; LASIX 20MG IV Q8HRS; HEPARIN 5000UNITS SC Q8HRS; INSULIN COVERAGE; MORPHINE 5MG IV X3; ZOSYN 3.375G IV Q8HRS; MAG SULFATE 1G IV X1, 2G IV X1.

## 2017-06-10 LAB
ANION GAP BLD CALC-SCNC: 8 MMOL/L (ref 7–16)
BACTERIA SPEC CULT: ABNORMAL
BASOPHILS # BLD AUTO: 0 K/UL (ref 0–0.2)
BASOPHILS # BLD: 0 % (ref 0–2)
BUN SERPL-MCNC: 2 MG/DL (ref 6–23)
CALCIUM SERPL-MCNC: 7.2 MG/DL (ref 8.3–10.4)
CHLORIDE SERPL-SCNC: 105 MMOL/L (ref 98–107)
CO2 SERPL-SCNC: 30 MMOL/L (ref 21–32)
CREAT SERPL-MCNC: 0.22 MG/DL (ref 0.6–1)
DIFFERENTIAL METHOD BLD: ABNORMAL
EOSINOPHIL # BLD: 0.2 K/UL (ref 0–0.8)
EOSINOPHIL NFR BLD: 1 % (ref 0.5–7.8)
ERYTHROCYTE [DISTWIDTH] IN BLOOD BY AUTOMATED COUNT: 16.8 % (ref 11.9–14.6)
GLUCOSE SERPL-MCNC: 102 MG/DL (ref 65–100)
GRAM STN SPEC: ABNORMAL
GRAM STN SPEC: ABNORMAL
HCT VFR BLD AUTO: 23.8 % (ref 35.8–46.3)
HGB BLD-MCNC: 7.8 G/DL (ref 11.7–15.4)
IMM GRANULOCYTES # BLD: 0.2 K/UL (ref 0–0.5)
IMM GRANULOCYTES NFR BLD AUTO: 1.1 % (ref 0–5)
LYMPHOCYTES # BLD AUTO: 10 % (ref 13–44)
LYMPHOCYTES # BLD: 2.1 K/UL (ref 0.5–4.6)
MAGNESIUM SERPL-MCNC: 1.7 MG/DL (ref 1.8–2.4)
MCH RBC QN AUTO: 28 PG (ref 26.1–32.9)
MCHC RBC AUTO-ENTMCNC: 32.8 G/DL (ref 31.4–35)
MCV RBC AUTO: 85.3 FL (ref 79.6–97.8)
MONOCYTES # BLD: 2.1 K/UL (ref 0.1–1.3)
MONOCYTES NFR BLD AUTO: 11 % (ref 4–12)
NEUTS SEG # BLD: 15.1 K/UL (ref 1.7–8.2)
NEUTS SEG NFR BLD AUTO: 77 % (ref 43–78)
PHOSPHATE SERPL-MCNC: 2.7 MG/DL (ref 2.5–4.5)
PLATELET # BLD AUTO: 621 K/UL (ref 150–450)
PMV BLD AUTO: 8.7 FL (ref 10.8–14.1)
POTASSIUM SERPL-SCNC: 3.5 MMOL/L (ref 3.5–5.1)
RBC # BLD AUTO: 2.79 M/UL (ref 4.05–5.25)
SERVICE CMNT-IMP: ABNORMAL
SODIUM SERPL-SCNC: 143 MMOL/L (ref 136–145)
WBC # BLD AUTO: 19.6 K/UL (ref 4.3–11.1)

## 2017-06-10 PROCEDURE — 84100 ASSAY OF PHOSPHORUS: CPT | Performed by: INTERNAL MEDICINE

## 2017-06-10 PROCEDURE — 83735 ASSAY OF MAGNESIUM: CPT | Performed by: INTERNAL MEDICINE

## 2017-06-10 PROCEDURE — 85025 COMPLETE CBC W/AUTO DIFF WBC: CPT | Performed by: INTERNAL MEDICINE

## 2017-06-10 PROCEDURE — 80048 BASIC METABOLIC PNL TOTAL CA: CPT | Performed by: INTERNAL MEDICINE

## 2017-06-12 LAB
ALBUMIN SERPL BCP-MCNC: 1.2 G/DL (ref 3.5–5)
ALBUMIN SERPL BCP-MCNC: 1.2 G/DL (ref 3.5–5)
ALBUMIN/GLOB SERPL: 0.3 {RATIO} (ref 1.2–3.5)
ALBUMIN/GLOB SERPL: 0.3 {RATIO} (ref 1.2–3.5)
ALP SERPL-CCNC: 149 U/L (ref 50–136)
ALP SERPL-CCNC: 149 U/L (ref 50–136)
ALT SERPL-CCNC: 17 U/L (ref 12–65)
ALT SERPL-CCNC: 17 U/L (ref 12–65)
ANION GAP BLD CALC-SCNC: 9 MMOL/L (ref 7–16)
AST SERPL W P-5'-P-CCNC: 26 U/L (ref 15–37)
AST SERPL W P-5'-P-CCNC: 26 U/L (ref 15–37)
BASOPHILS # BLD AUTO: 0 K/UL (ref 0–0.2)
BASOPHILS # BLD: 0 % (ref 0–2)
BILIRUB DIRECT SERPL-MCNC: 0.1 MG/DL
BILIRUB SERPL-MCNC: 0.3 MG/DL (ref 0.2–1.1)
BILIRUB SERPL-MCNC: 0.3 MG/DL (ref 0.2–1.1)
BUN SERPL-MCNC: 3 MG/DL (ref 6–23)
CALCIUM SERPL-MCNC: 7.3 MG/DL (ref 8.3–10.4)
CHLORIDE SERPL-SCNC: 104 MMOL/L (ref 98–107)
CO2 SERPL-SCNC: 31 MMOL/L (ref 21–32)
CREAT SERPL-MCNC: 0.3 MG/DL (ref 0.6–1)
CREAT SERPL-MCNC: 0.3 MG/DL (ref 0.6–1)
DIFFERENTIAL METHOD BLD: ABNORMAL
EOSINOPHIL # BLD: 0.2 K/UL (ref 0–0.8)
EOSINOPHIL NFR BLD: 1 % (ref 0.5–7.8)
ERYTHROCYTE [DISTWIDTH] IN BLOOD BY AUTOMATED COUNT: 17 % (ref 11.9–14.6)
GLOBULIN SER CALC-MCNC: 4.1 G/DL (ref 2.3–3.5)
GLOBULIN SER CALC-MCNC: 4.1 G/DL (ref 2.3–3.5)
GLUCOSE SERPL-MCNC: 105 MG/DL (ref 65–100)
HCT VFR BLD AUTO: 23.9 % (ref 35.8–46.3)
HGB BLD-MCNC: 7.6 G/DL (ref 11.7–15.4)
IMM GRANULOCYTES # BLD: 0.3 K/UL (ref 0–0.5)
IMM GRANULOCYTES NFR BLD AUTO: 1.6 % (ref 0–5)
LYMPHOCYTES # BLD AUTO: 12 % (ref 13–44)
LYMPHOCYTES # BLD: 2 K/UL (ref 0.5–4.6)
MAGNESIUM SERPL-MCNC: 1.6 MG/DL (ref 1.8–2.4)
MCH RBC QN AUTO: 27.6 PG (ref 26.1–32.9)
MCHC RBC AUTO-ENTMCNC: 31.8 G/DL (ref 31.4–35)
MCV RBC AUTO: 86.9 FL (ref 79.6–97.8)
MONOCYTES # BLD: 1.7 K/UL (ref 0.1–1.3)
MONOCYTES NFR BLD AUTO: 10 % (ref 4–12)
NEUTS SEG # BLD: 13 K/UL (ref 1.7–8.2)
NEUTS SEG NFR BLD AUTO: 75 % (ref 43–78)
PLATELET # BLD AUTO: 738 K/UL (ref 150–450)
PMV BLD AUTO: 8.3 FL (ref 10.8–14.1)
POTASSIUM SERPL-SCNC: 3.7 MMOL/L (ref 3.5–5.1)
PROT SERPL-MCNC: 5.3 G/DL (ref 6.3–8.2)
PROT SERPL-MCNC: 5.3 G/DL (ref 6.3–8.2)
RBC # BLD AUTO: 2.75 M/UL (ref 4.05–5.25)
SODIUM SERPL-SCNC: 144 MMOL/L (ref 136–145)
WBC # BLD AUTO: 17.1 K/UL (ref 4.3–11.1)

## 2017-06-12 PROCEDURE — 85025 COMPLETE CBC W/AUTO DIFF WBC: CPT | Performed by: INTERNAL MEDICINE

## 2017-06-12 PROCEDURE — 80053 COMPREHEN METABOLIC PANEL: CPT | Performed by: INTERNAL MEDICINE

## 2017-06-12 PROCEDURE — 80076 HEPATIC FUNCTION PANEL: CPT | Performed by: NURSE PRACTITIONER

## 2017-06-12 PROCEDURE — 83735 ASSAY OF MAGNESIUM: CPT | Performed by: INTERNAL MEDICINE

## 2017-06-13 LAB
MAGNESIUM SERPL-MCNC: 2 MG/DL (ref 1.8–2.4)
PHOSPHATE SERPL-MCNC: 3.5 MG/DL (ref 2.5–4.5)

## 2017-06-13 PROCEDURE — 83735 ASSAY OF MAGNESIUM: CPT | Performed by: INTERNAL MEDICINE

## 2017-06-13 PROCEDURE — 84100 ASSAY OF PHOSPHORUS: CPT | Performed by: INTERNAL MEDICINE

## 2017-06-16 LAB
FUNGUS CULTURE, RFCO2T: NEGATIVE
FUNGUS SMEAR, RFCO1T: NORMAL
FUNGUS SPEC CULT: NORMAL
FUNGUS STAIN, 188244: NORMAL
REFLEX TO ID, RFCO3T: NORMAL
SPECIMEN SOURCE: NORMAL
SPECIMEN SOURCE: NORMAL

## 2017-06-19 LAB
ALBUMIN SERPL BCP-MCNC: 1.7 G/DL (ref 3.5–5)
ALBUMIN/GLOB SERPL: 0.3 {RATIO} (ref 1.2–3.5)
ALP SERPL-CCNC: 92 U/L (ref 50–136)
ALT SERPL-CCNC: 11 U/L (ref 12–65)
ANION GAP BLD CALC-SCNC: 10 MMOL/L (ref 7–16)
AST SERPL W P-5'-P-CCNC: 15 U/L (ref 15–37)
BASOPHILS # BLD AUTO: 0 K/UL (ref 0–0.2)
BASOPHILS # BLD: 0 % (ref 0–2)
BILIRUB SERPL-MCNC: 0.4 MG/DL (ref 0.2–1.1)
BUN SERPL-MCNC: 3 MG/DL (ref 6–23)
CALCIUM SERPL-MCNC: 8 MG/DL (ref 8.3–10.4)
CHLORIDE SERPL-SCNC: 103 MMOL/L (ref 98–107)
CO2 SERPL-SCNC: 26 MMOL/L (ref 21–32)
CREAT SERPL-MCNC: 0.26 MG/DL (ref 0.6–1)
DIFFERENTIAL METHOD BLD: ABNORMAL
EOSINOPHIL # BLD: 0.1 K/UL (ref 0–0.8)
EOSINOPHIL NFR BLD: 1 % (ref 0.5–7.8)
ERYTHROCYTE [DISTWIDTH] IN BLOOD BY AUTOMATED COUNT: 16.5 % (ref 11.9–14.6)
GLOBULIN SER CALC-MCNC: 5.2 G/DL (ref 2.3–3.5)
GLUCOSE SERPL-MCNC: 101 MG/DL (ref 65–100)
HCT VFR BLD AUTO: 25.6 % (ref 35.8–46.3)
HGB BLD-MCNC: 8.2 G/DL (ref 11.7–15.4)
IMM GRANULOCYTES # BLD: 0 K/UL (ref 0–0.5)
IMM GRANULOCYTES NFR BLD AUTO: 0.2 % (ref 0–5)
LYMPHOCYTES # BLD AUTO: 20 % (ref 13–44)
LYMPHOCYTES # BLD: 2 K/UL (ref 0.5–4.6)
MAGNESIUM SERPL-MCNC: 1.5 MG/DL (ref 1.8–2.4)
MCH RBC QN AUTO: 27.7 PG (ref 26.1–32.9)
MCHC RBC AUTO-ENTMCNC: 32 G/DL (ref 31.4–35)
MCV RBC AUTO: 86.5 FL (ref 79.6–97.8)
MONOCYTES # BLD: 1.4 K/UL (ref 0.1–1.3)
MONOCYTES NFR BLD AUTO: 13 % (ref 4–12)
NEUTS SEG # BLD: 6.6 K/UL (ref 1.7–8.2)
NEUTS SEG NFR BLD AUTO: 66 % (ref 43–78)
PLATELET # BLD AUTO: 712 K/UL (ref 150–450)
PMV BLD AUTO: 8 FL (ref 10.8–14.1)
POTASSIUM SERPL-SCNC: 3.4 MMOL/L (ref 3.5–5.1)
PROT SERPL-MCNC: 6.9 G/DL (ref 6.3–8.2)
RBC # BLD AUTO: 2.96 M/UL (ref 4.05–5.25)
SODIUM SERPL-SCNC: 139 MMOL/L (ref 136–145)
WBC # BLD AUTO: 10 K/UL (ref 4.3–11.1)

## 2017-06-19 PROCEDURE — 85025 COMPLETE CBC W/AUTO DIFF WBC: CPT | Performed by: INTERNAL MEDICINE

## 2017-06-19 PROCEDURE — 80053 COMPREHEN METABOLIC PANEL: CPT | Performed by: INTERNAL MEDICINE

## 2017-06-19 PROCEDURE — 83735 ASSAY OF MAGNESIUM: CPT | Performed by: INTERNAL MEDICINE

## 2017-06-20 ENCOUNTER — APPOINTMENT (OUTPATIENT)
Dept: CT IMAGING | Age: 32
End: 2017-06-20

## 2017-06-20 LAB
BACTERIA SPEC CULT: ABNORMAL
CREAT SERPL-MCNC: 0.36 MG/DL (ref 0.6–1)
MAGNESIUM SERPL-MCNC: 1.4 MG/DL (ref 1.8–2.4)
PHOSPHATE SERPL-MCNC: 3.6 MG/DL (ref 2.5–4.5)
POTASSIUM SERPL-SCNC: 3.6 MMOL/L (ref 3.5–5.1)
SERVICE CMNT-IMP: ABNORMAL

## 2017-06-20 PROCEDURE — 82565 ASSAY OF CREATININE: CPT | Performed by: NURSE PRACTITIONER

## 2017-06-20 PROCEDURE — 83735 ASSAY OF MAGNESIUM: CPT | Performed by: INTERNAL MEDICINE

## 2017-06-20 PROCEDURE — 73701 CT LOWER EXTREMITY W/DYE: CPT

## 2017-06-20 PROCEDURE — 84132 ASSAY OF SERUM POTASSIUM: CPT | Performed by: INTERNAL MEDICINE

## 2017-06-20 PROCEDURE — 74011000258 HC RX REV CODE- 258: Performed by: INTERNAL MEDICINE

## 2017-06-20 PROCEDURE — 74011636320 HC RX REV CODE- 636/320: Performed by: INTERNAL MEDICINE

## 2017-06-20 PROCEDURE — 84100 ASSAY OF PHOSPHORUS: CPT | Performed by: INTERNAL MEDICINE

## 2017-06-20 RX ORDER — SODIUM CHLORIDE 0.9 % (FLUSH) 0.9 %
10 SYRINGE (ML) INJECTION
Status: COMPLETED | OUTPATIENT
Start: 2017-06-20 | End: 2017-06-20

## 2017-06-20 RX ADMIN — IOPAMIDOL 100 ML: 755 INJECTION, SOLUTION INTRAVENOUS at 16:19

## 2017-06-20 RX ADMIN — SODIUM CHLORIDE 100 ML: 900 INJECTION, SOLUTION INTRAVENOUS at 16:19

## 2017-06-20 RX ADMIN — Medication 10 ML: at 16:19

## 2017-06-22 LAB
MAGNESIUM SERPL-MCNC: 1.6 MG/DL (ref 1.8–2.4)
MAGNESIUM SERPL-MCNC: 1.9 MG/DL (ref 1.8–2.4)

## 2017-06-22 PROCEDURE — 83735 ASSAY OF MAGNESIUM: CPT | Performed by: INTERNAL MEDICINE

## 2017-06-23 LAB
ANAEROBE ID RESULT 1, RAND1T: NORMAL
ANAEROBE ID W/SUSCEPT., ANIDLT: NORMAL
ANTIMICROBIAL SUSCEPT., RAND5T: NORMAL
SPECIMEN SOURCE: NORMAL

## 2017-06-25 ENCOUNTER — ANESTHESIA EVENT (OUTPATIENT)
Dept: SURGERY | Age: 32
End: 2017-06-25

## 2017-06-26 ENCOUNTER — ANESTHESIA (OUTPATIENT)
Dept: SURGERY | Age: 32
End: 2017-06-26

## 2017-06-26 ENCOUNTER — HOSPITAL ENCOUNTER (OUTPATIENT)
Age: 32
Setting detail: OUTPATIENT SURGERY
Discharge: NURSING, REHAB OR LONG-TERM CARE | End: 2017-06-26
Attending: SURGERY | Admitting: SURGERY

## 2017-06-26 VITALS
SYSTOLIC BLOOD PRESSURE: 140 MMHG | TEMPERATURE: 98 F | HEART RATE: 99 BPM | OXYGEN SATURATION: 100 % | RESPIRATION RATE: 13 BRPM | DIASTOLIC BLOOD PRESSURE: 90 MMHG

## 2017-06-26 DIAGNOSIS — M72.6 NECROTIZING FASCIITIS (HCC): Primary | ICD-10-CM

## 2017-06-26 LAB
ALBUMIN SERPL BCP-MCNC: 1.5 G/DL (ref 3.5–5)
ALBUMIN/GLOB SERPL: 0.3 {RATIO} (ref 1.2–3.5)
ALP SERPL-CCNC: 84 U/L (ref 50–136)
ALT SERPL-CCNC: 11 U/L (ref 12–65)
ANION GAP BLD CALC-SCNC: 10 MMOL/L (ref 7–16)
AST SERPL W P-5'-P-CCNC: 16 U/L (ref 15–37)
BASOPHILS # BLD AUTO: 0 K/UL (ref 0–0.2)
BASOPHILS # BLD: 0 % (ref 0–2)
BILIRUB SERPL-MCNC: 0.2 MG/DL (ref 0.2–1.1)
BUN SERPL-MCNC: 6 MG/DL (ref 6–23)
CALCIUM SERPL-MCNC: 8.4 MG/DL (ref 8.3–10.4)
CHLORIDE SERPL-SCNC: 104 MMOL/L (ref 98–107)
CO2 SERPL-SCNC: 27 MMOL/L (ref 21–32)
CREAT SERPL-MCNC: 0.28 MG/DL (ref 0.6–1)
DIFFERENTIAL METHOD BLD: ABNORMAL
EOSINOPHIL # BLD: 0.5 K/UL (ref 0–0.8)
EOSINOPHIL NFR BLD: 6 % (ref 0.5–7.8)
ERYTHROCYTE [DISTWIDTH] IN BLOOD BY AUTOMATED COUNT: 16 % (ref 11.9–14.6)
GLOBULIN SER CALC-MCNC: 5 G/DL (ref 2.3–3.5)
GLUCOSE BLD STRIP.AUTO-MCNC: 120 MG/DL (ref 65–100)
GLUCOSE BLD STRIP.AUTO-MCNC: 131 MG/DL (ref 65–100)
GLUCOSE SERPL-MCNC: 102 MG/DL (ref 65–100)
HCT VFR BLD AUTO: 24.4 % (ref 35.8–46.3)
HGB BLD-MCNC: 7.8 G/DL (ref 11.7–15.4)
IMM GRANULOCYTES # BLD: 0 K/UL (ref 0–0.5)
IMM GRANULOCYTES NFR BLD AUTO: 0.3 % (ref 0–5)
LYMPHOCYTES # BLD AUTO: 20 % (ref 13–44)
LYMPHOCYTES # BLD: 1.9 K/UL (ref 0.5–4.6)
MAGNESIUM SERPL-MCNC: 1.7 MG/DL (ref 1.8–2.4)
MCH RBC QN AUTO: 27.5 PG (ref 26.1–32.9)
MCHC RBC AUTO-ENTMCNC: 32 G/DL (ref 31.4–35)
MCV RBC AUTO: 85.9 FL (ref 79.6–97.8)
MONOCYTES # BLD: 1 K/UL (ref 0.1–1.3)
MONOCYTES NFR BLD AUTO: 11 % (ref 4–12)
NEUTS SEG # BLD: 6 K/UL (ref 1.7–8.2)
NEUTS SEG NFR BLD AUTO: 63 % (ref 43–78)
PHOSPHATE SERPL-MCNC: 4.3 MG/DL (ref 2.5–4.5)
PLATELET # BLD AUTO: 504 K/UL (ref 150–450)
PMV BLD AUTO: 7.9 FL (ref 10.8–14.1)
POTASSIUM SERPL-SCNC: 3.7 MMOL/L (ref 3.5–5.1)
PROT SERPL-MCNC: 6.5 G/DL (ref 6.3–8.2)
RBC # BLD AUTO: 2.84 M/UL (ref 4.05–5.25)
SODIUM SERPL-SCNC: 141 MMOL/L (ref 136–145)
WBC # BLD AUTO: 9.4 K/UL (ref 4.3–11.1)

## 2017-06-26 PROCEDURE — 74011250637 HC RX REV CODE- 250/637: Performed by: ANESTHESIOLOGY

## 2017-06-26 PROCEDURE — 74011000250 HC RX REV CODE- 250

## 2017-06-26 PROCEDURE — 77030031139 HC SUT VCRL2 J&J -A: Performed by: SURGERY

## 2017-06-26 PROCEDURE — 76060000035 HC ANESTHESIA 2 TO 2.5 HR: Performed by: SURGERY

## 2017-06-26 PROCEDURE — 74011250636 HC RX REV CODE- 250/636: Performed by: ANESTHESIOLOGY

## 2017-06-26 PROCEDURE — 77030020508 HC PD GRND GENRTR BAYL -A: Performed by: SURGERY

## 2017-06-26 PROCEDURE — 77030008477 HC STYL SATN SLP COVD -A: Performed by: ANESTHESIOLOGY

## 2017-06-26 PROCEDURE — 76010000108 HC CV SURG 2 TO 2.5 HR: Performed by: SURGERY

## 2017-06-26 PROCEDURE — 85025 COMPLETE CBC W/AUTO DIFF WBC: CPT | Performed by: INTERNAL MEDICINE

## 2017-06-26 PROCEDURE — 77030012411 HC DRN WND CARD -A: Performed by: SURGERY

## 2017-06-26 PROCEDURE — 74011250636 HC RX REV CODE- 250/636

## 2017-06-26 PROCEDURE — 82962 GLUCOSE BLOOD TEST: CPT

## 2017-06-26 PROCEDURE — 77030008771 HC TU NG SALEM SUMP -A: Performed by: ANESTHESIOLOGY

## 2017-06-26 PROCEDURE — 77030019940 HC BLNKT HYPOTHRM STRY -B: Performed by: ANESTHESIOLOGY

## 2017-06-26 PROCEDURE — 77030008703 HC TU ET UNCUF COVD -A: Performed by: ANESTHESIOLOGY

## 2017-06-26 PROCEDURE — 80053 COMPREHEN METABOLIC PANEL: CPT | Performed by: INTERNAL MEDICINE

## 2017-06-26 PROCEDURE — 76210000000 HC OR PH I REC 2 TO 2.5 HR: Performed by: SURGERY

## 2017-06-26 PROCEDURE — 84100 ASSAY OF PHOSPHORUS: CPT | Performed by: INTERNAL MEDICINE

## 2017-06-26 PROCEDURE — 77030002916 HC SUT ETHLN J&J -A: Performed by: SURGERY

## 2017-06-26 PROCEDURE — 77030013727 HC IRR FAN PULSVC ZIMM -B: Performed by: SURGERY

## 2017-06-26 PROCEDURE — 83735 ASSAY OF MAGNESIUM: CPT | Performed by: INTERNAL MEDICINE

## 2017-06-26 RX ORDER — LIDOCAINE HYDROCHLORIDE 20 MG/ML
INJECTION, SOLUTION EPIDURAL; INFILTRATION; INTRACAUDAL; PERINEURAL AS NEEDED
Status: DISCONTINUED | OUTPATIENT
Start: 2017-06-26 | End: 2017-06-26 | Stop reason: HOSPADM

## 2017-06-26 RX ORDER — MIDAZOLAM HYDROCHLORIDE 1 MG/ML
5 INJECTION, SOLUTION INTRAMUSCULAR; INTRAVENOUS ONCE
Status: DISCONTINUED | OUTPATIENT
Start: 2017-06-26 | End: 2017-06-26 | Stop reason: HOSPADM

## 2017-06-26 RX ORDER — FAMOTIDINE 20 MG/1
20 TABLET, FILM COATED ORAL ONCE
Status: DISCONTINUED | OUTPATIENT
Start: 2017-06-26 | End: 2017-06-26 | Stop reason: HOSPADM

## 2017-06-26 RX ORDER — ACETAMINOPHEN 325 MG/1
325 TABLET ORAL
Status: CANCELLED | OUTPATIENT
Start: 2017-06-26

## 2017-06-26 RX ORDER — HYDROMORPHONE HYDROCHLORIDE 2 MG/ML
0.5 INJECTION, SOLUTION INTRAMUSCULAR; INTRAVENOUS; SUBCUTANEOUS
Status: COMPLETED | OUTPATIENT
Start: 2017-06-26 | End: 2017-06-26

## 2017-06-26 RX ORDER — SODIUM CHLORIDE, SODIUM LACTATE, POTASSIUM CHLORIDE, CALCIUM CHLORIDE 600; 310; 30; 20 MG/100ML; MG/100ML; MG/100ML; MG/100ML
75 INJECTION, SOLUTION INTRAVENOUS CONTINUOUS
Status: DISCONTINUED | OUTPATIENT
Start: 2017-06-26 | End: 2017-06-27 | Stop reason: HOSPADM

## 2017-06-26 RX ORDER — CEFAZOLIN SODIUM IN 0.9 % NACL 2 G/50 ML
2 INTRAVENOUS SOLUTION, PIGGYBACK (ML) INTRAVENOUS ONCE
Status: DISCONTINUED | OUTPATIENT
Start: 2017-06-26 | End: 2017-06-26 | Stop reason: HOSPADM

## 2017-06-26 RX ORDER — INSULIN LISPRO 100 [IU]/ML
INJECTION, SOLUTION INTRAVENOUS; SUBCUTANEOUS
Status: CANCELLED | OUTPATIENT
Start: 2017-06-26

## 2017-06-26 RX ORDER — GLYCOPYRROLATE 0.2 MG/ML
INJECTION INTRAMUSCULAR; INTRAVENOUS AS NEEDED
Status: DISCONTINUED | OUTPATIENT
Start: 2017-06-26 | End: 2017-06-26 | Stop reason: HOSPADM

## 2017-06-26 RX ORDER — ROCURONIUM BROMIDE 10 MG/ML
INJECTION, SOLUTION INTRAVENOUS AS NEEDED
Status: DISCONTINUED | OUTPATIENT
Start: 2017-06-26 | End: 2017-06-26 | Stop reason: HOSPADM

## 2017-06-26 RX ORDER — OXYCODONE HYDROCHLORIDE 5 MG/1
5 TABLET ORAL
Status: CANCELLED | OUTPATIENT
Start: 2017-06-26

## 2017-06-26 RX ORDER — DEXAMETHASONE SODIUM PHOSPHATE 4 MG/ML
INJECTION, SOLUTION INTRA-ARTICULAR; INTRALESIONAL; INTRAMUSCULAR; INTRAVENOUS; SOFT TISSUE AS NEEDED
Status: DISCONTINUED | OUTPATIENT
Start: 2017-06-26 | End: 2017-06-26 | Stop reason: HOSPADM

## 2017-06-26 RX ORDER — FENTANYL CITRATE 50 UG/ML
INJECTION, SOLUTION INTRAMUSCULAR; INTRAVENOUS AS NEEDED
Status: DISCONTINUED | OUTPATIENT
Start: 2017-06-26 | End: 2017-06-26 | Stop reason: HOSPADM

## 2017-06-26 RX ORDER — NEOSTIGMINE METHYLSULFATE 1 MG/ML
INJECTION INTRAVENOUS AS NEEDED
Status: DISCONTINUED | OUTPATIENT
Start: 2017-06-26 | End: 2017-06-26 | Stop reason: HOSPADM

## 2017-06-26 RX ORDER — OXYCODONE HYDROCHLORIDE 5 MG/1
10 TABLET ORAL
Status: CANCELLED | OUTPATIENT
Start: 2017-06-26

## 2017-06-26 RX ORDER — KETOROLAC TROMETHAMINE 30 MG/ML
30 INJECTION, SOLUTION INTRAMUSCULAR; INTRAVENOUS AS NEEDED
Status: CANCELLED | OUTPATIENT
Start: 2017-06-26 | End: 2017-07-01

## 2017-06-26 RX ORDER — FLUCONAZOLE 100 MG/1
200 TABLET ORAL DAILY
Status: CANCELLED | OUTPATIENT
Start: 2017-06-27

## 2017-06-26 RX ORDER — HYDROCODONE BITARTRATE AND ACETAMINOPHEN 5; 325 MG/1; MG/1
2 TABLET ORAL AS NEEDED
Status: DISCONTINUED | OUTPATIENT
Start: 2017-06-26 | End: 2017-06-27 | Stop reason: HOSPADM

## 2017-06-26 RX ORDER — MORPHINE SULFATE 10 MG/ML
5 INJECTION, SOLUTION INTRAMUSCULAR; INTRAVENOUS
Status: CANCELLED | OUTPATIENT
Start: 2017-06-26

## 2017-06-26 RX ORDER — SODIUM CHLORIDE, SODIUM LACTATE, POTASSIUM CHLORIDE, CALCIUM CHLORIDE 600; 310; 30; 20 MG/100ML; MG/100ML; MG/100ML; MG/100ML
75 INJECTION, SOLUTION INTRAVENOUS CONTINUOUS
Status: DISCONTINUED | OUTPATIENT
Start: 2017-06-26 | End: 2017-06-26 | Stop reason: HOSPADM

## 2017-06-26 RX ORDER — POLYETHYLENE GLYCOL 3350 17 G/17G
17 POWDER, FOR SOLUTION ORAL
Status: CANCELLED | OUTPATIENT
Start: 2017-06-26

## 2017-06-26 RX ORDER — BISACODYL 5 MG
5 TABLET, DELAYED RELEASE (ENTERIC COATED) ORAL
Status: CANCELLED | OUTPATIENT
Start: 2017-06-26

## 2017-06-26 RX ORDER — MIDAZOLAM HYDROCHLORIDE 1 MG/ML
2 INJECTION, SOLUTION INTRAMUSCULAR; INTRAVENOUS
Status: DISCONTINUED | OUTPATIENT
Start: 2017-06-26 | End: 2017-06-26 | Stop reason: HOSPADM

## 2017-06-26 RX ORDER — LANOLIN ALCOHOL/MO/W.PET/CERES
400 CREAM (GRAM) TOPICAL DAILY
Status: CANCELLED | OUTPATIENT
Start: 2017-06-27

## 2017-06-26 RX ORDER — HYDROMORPHONE HYDROCHLORIDE 2 MG/ML
2 INJECTION, SOLUTION INTRAMUSCULAR; INTRAVENOUS; SUBCUTANEOUS AS NEEDED
Status: CANCELLED | OUTPATIENT
Start: 2017-06-26

## 2017-06-26 RX ORDER — OXYCODONE HYDROCHLORIDE 5 MG/1
5 TABLET ORAL
Status: DISCONTINUED | OUTPATIENT
Start: 2017-06-26 | End: 2017-06-27 | Stop reason: HOSPADM

## 2017-06-26 RX ORDER — LIDOCAINE HYDROCHLORIDE 10 MG/ML
0.1 INJECTION INFILTRATION; PERINEURAL AS NEEDED
Status: DISCONTINUED | OUTPATIENT
Start: 2017-06-26 | End: 2017-06-26 | Stop reason: HOSPADM

## 2017-06-26 RX ORDER — FUROSEMIDE 40 MG/1
40 TABLET ORAL DAILY
Status: CANCELLED | OUTPATIENT
Start: 2017-06-27

## 2017-06-26 RX ORDER — FAMOTIDINE 20 MG/1
20 TABLET, FILM COATED ORAL 2 TIMES DAILY
Status: CANCELLED | OUTPATIENT
Start: 2017-06-26

## 2017-06-26 RX ORDER — ONDANSETRON 2 MG/ML
INJECTION INTRAMUSCULAR; INTRAVENOUS AS NEEDED
Status: DISCONTINUED | OUTPATIENT
Start: 2017-06-26 | End: 2017-06-26 | Stop reason: HOSPADM

## 2017-06-26 RX ORDER — PROPOFOL 10 MG/ML
INJECTION, EMULSION INTRAVENOUS AS NEEDED
Status: DISCONTINUED | OUTPATIENT
Start: 2017-06-26 | End: 2017-06-26 | Stop reason: HOSPADM

## 2017-06-26 RX ORDER — MORPHINE SULFATE 2 MG/ML
2 INJECTION, SOLUTION INTRAMUSCULAR; INTRAVENOUS
Status: CANCELLED | OUTPATIENT
Start: 2017-06-26

## 2017-06-26 RX ORDER — FENTANYL CITRATE 50 UG/ML
100 INJECTION, SOLUTION INTRAMUSCULAR; INTRAVENOUS ONCE
Status: DISCONTINUED | OUTPATIENT
Start: 2017-06-26 | End: 2017-06-26 | Stop reason: HOSPADM

## 2017-06-26 RX ADMIN — NEOSTIGMINE METHYLSULFATE 3 MG: 1 INJECTION INTRAVENOUS at 18:17

## 2017-06-26 RX ADMIN — ONDANSETRON 4 MG: 2 INJECTION INTRAMUSCULAR; INTRAVENOUS at 18:06

## 2017-06-26 RX ADMIN — FENTANYL CITRATE 100 MCG: 50 INJECTION, SOLUTION INTRAMUSCULAR; INTRAVENOUS at 16:37

## 2017-06-26 RX ADMIN — GLYCOPYRROLATE 0.4 MG: 0.2 INJECTION INTRAMUSCULAR; INTRAVENOUS at 18:17

## 2017-06-26 RX ADMIN — SODIUM CHLORIDE, SODIUM LACTATE, POTASSIUM CHLORIDE, AND CALCIUM CHLORIDE: 600; 310; 30; 20 INJECTION, SOLUTION INTRAVENOUS at 16:28

## 2017-06-26 RX ADMIN — HYDROMORPHONE HYDROCHLORIDE 0.5 MG: 2 INJECTION, SOLUTION INTRAMUSCULAR; INTRAVENOUS; SUBCUTANEOUS at 19:04

## 2017-06-26 RX ADMIN — HYDROMORPHONE HYDROCHLORIDE 0.5 MG: 2 INJECTION, SOLUTION INTRAMUSCULAR; INTRAVENOUS; SUBCUTANEOUS at 18:54

## 2017-06-26 RX ADMIN — DEXAMETHASONE SODIUM PHOSPHATE 4 MG: 4 INJECTION, SOLUTION INTRA-ARTICULAR; INTRALESIONAL; INTRAMUSCULAR; INTRAVENOUS; SOFT TISSUE at 17:10

## 2017-06-26 RX ADMIN — PROPOFOL 200 MG: 10 INJECTION, EMULSION INTRAVENOUS at 16:37

## 2017-06-26 RX ADMIN — FENTANYL CITRATE 50 MCG: 50 INJECTION, SOLUTION INTRAMUSCULAR; INTRAVENOUS at 17:26

## 2017-06-26 RX ADMIN — HYDROMORPHONE HYDROCHLORIDE 0.5 MG: 2 INJECTION, SOLUTION INTRAMUSCULAR; INTRAVENOUS; SUBCUTANEOUS at 18:59

## 2017-06-26 RX ADMIN — ROCURONIUM BROMIDE 30 MG: 10 INJECTION, SOLUTION INTRAVENOUS at 16:37

## 2017-06-26 RX ADMIN — FENTANYL CITRATE 50 MCG: 50 INJECTION, SOLUTION INTRAMUSCULAR; INTRAVENOUS at 17:02

## 2017-06-26 RX ADMIN — HYDROMORPHONE HYDROCHLORIDE 0.5 MG: 2 INJECTION, SOLUTION INTRAMUSCULAR; INTRAVENOUS; SUBCUTANEOUS at 19:09

## 2017-06-26 RX ADMIN — LIDOCAINE HYDROCHLORIDE 80 MG: 20 INJECTION, SOLUTION EPIDURAL; INFILTRATION; INTRACAUDAL; PERINEURAL at 16:37

## 2017-06-26 RX ADMIN — OXYCODONE HYDROCHLORIDE 5 MG: 5 TABLET ORAL at 20:55

## 2017-06-26 NOTE — H&P
Calvin Monroe    2017    Date of Admission:  2017      Subjective:     Patient is a 29 y.o.  female presents with perirectal abscess and necrotizing fasciitis of her Right hip and Right Lower extremity. She is a patient of Dr. Mely Valiente who has left [place] for [place] and I have taken over her care. Her wounds were observed to be pink and healthy with wound vac on Wednesday last week. She has been afebrile and is here today for wound closure. No new developments are reported. The chart has been reviewed. .    Patient Active Problem List    Diagnosis Date Noted    Septic shock ([address] 2017    Pelvic mass 2017    DKA (diabetic ketoacidoses) ([address] 2017    Acute alcoholic pancreatitis     Hyponatremia 2017    Hyperkalemia 2017    Acute renal failure (ARF) ([address] 2017    Rhabdomyosarcoma of flank ([address] 2017    Sepsis ([address] 2017    Chlamydia infection 2017    Herpes simplex vulvovaginitis 2017     Past Medical History:   Diagnosis Date    Acute kidney injury ([address]     Alpha-streptococcal sepsis ([address]     Candida glabrata infection     Chlamydia     DKA (diabetic ketoacidoses) ([address]     Elevated liver enzymes     Escherichia coli infection     Herpes simplex     History of creation of ostomy     Hypernatremia     Hypophosphatemia     Leukocytosis     Pancreatitis     Pelvic abscess in female     Rhabdomyolysis     Septic shock (HCC)       Past Surgical History:   Procedure Laterality Date    HX OTHER SURGICAL      pilonidal cyst      Prior to Admission Medications   Prescriptions Last Dose Informant Patient Reported? Taking? DEXTROSE 50 % IN WATER, D50W, IV   Yes No   Sig: by IntraVENous route as needed. HYDROmorphone, PF, (DILAUDID, PF,) 2 mg/mL injection   Yes No   Si mg by IntraVENous route as needed.  Prior to dressing change   acetaminophen (TYLENOL) 325 mg tablet   Yes No   Sig: Take 325 mg by mouth every eight (8) hours as needed for Pain. bisacodyl (DULCOLAX) 5 mg EC tablet   No No   Sig: Take 1 Tab by mouth daily as needed for Constipation. famotidine (PEPCID) 20 mg tablet   Yes No   Sig: Take 20 mg by mouth two (2) times a day. fluconazole (DIFLUCAN) 200 mg tablet   Yes No   Sig: Take 200 mg by mouth daily. FDA advises cautious prescribing of oral fluconazole in pregnancy. furosemide (LASIX) 40 mg tablet   Yes No   Sig: Take 40 mg by mouth daily. glucagon (GLUCAGEN) 1 mg injection   Yes No   Si mg by IntraMUSCular route as needed for Hypoglycemia. insulin lispro (HUMALOG) 100 unit/mL injection   No No   Sig: Very Insulin Resistant  For Blood Sugar (mg/dL) of:              Less than 150 =   0 units  150 -199 =   3 units  200 -249 =   6 units  250 -299 =   9 units  300 -349 =   12 units  350 and above =   15 units and Call Physician  Initiate Hypoglycemic protocol if blood glucose is <70 mg/dL. ketorolac (TORADOL) 30 mg/mL (1 mL) injection   Yes No   Si mg by IntraVENous route as needed. Prior to dressing changes   lidocaine, PF, (XYLOCAINE) 10 mg/mL (1 %) injection   Yes No   Si mL by IntraVENous route as needed. magnesium oxide (MAG-OX) 400 mg tablet   Yes No   Sig: Take 400 mg by mouth daily. meropenem 500 mg, ADDaptor 1 Device IVPB   No No   Si mg by IntraVENous route every six (6) hours. morphine 10 mg/ml soln   No No   Si.5 mL by IntraVENous route every four (4) hours as needed. Max Daily Amount: 30 mg.   morphine 2 mg/mL injection   No No   Si mL by IntraVENous route every three (3) hours as needed. Max Daily Amount: 16 mg.   oxyCODONE IR (ROXICODONE) 10 mg tab immediate release tablet   No No   Sig: Take 1 Tab by mouth every four (4) hours as needed. Max Daily Amount: 60 mg.   oxyCODONE IR (ROXICODONE) 5 mg immediate release tablet   No No   Sig: Take 1 Tab by mouth every four (4) hours as needed. Max Daily Amount: 30 mg. polyethylene glycol (MIRALAX) 17 gram packet   Yes No   Sig: Take 17 g by mouth nightly. Facility-Administered Medications: None     No Known Allergies   Social History   Substance Use Topics    Smoking status: Never Smoker    Smokeless tobacco: Not on file    Alcohol use Yes      Comment: occasional      Social History     Social History Narrative     No family history on file. Current Facility-Administered Medications   Medication Dose Route Frequency    lidocaine (XYLOCAINE) 10 mg/mL (1 %) injection 0.1 mL  0.1 mL SubCUTAneous PRN    lactated Ringers infusion  75 mL/hr IntraVENous CONTINUOUS    famotidine (PEPCID) tablet 20 mg  20 mg Oral ONCE    fentaNYL citrate (PF) injection 100 mcg  100 mcg IntraVENous ONCE    midazolam (VERSED) injection 2 mg  2 mg IntraVENous ONCE PRN    midazolam (VERSED) injection 5 mg  5 mg IntraVENous ONCE    ceFAZolin in 0.9% NS (ANCEF) IVPB soln 2 g  2 g IntraVENous ONCE       Review of Systems  A comprehensive review of systems was negative except for that written in the HPI.     Objective:     Vitals:    06/26/17 1337   BP: 147/83   Pulse: (!) 111   Resp: 18   Temp: 98.6 °F (37 °C)   SpO2: 100%     PHYSICAL EXAM   Physical Examination: General appearance - alert, well appearing, and in no distress  Mental status - alert, oriented to person, place, and time  Eyes - pupils equal and reactive, extraocular eye movements intact  Nose - normal and patent, no erythema, discharge or polyps  Neck - supple, no significant adenopathy  Chest - clear to auscultation, no wheezes, rales or rhonchi, symmetric air entry  Heart - normal rate, regular rhythm, normal S1, S2, no murmurs, rubs, clicks or gallops  Abdomen - soft, nontender, nondistended, no masses or organomegaly  Neurological - alert, oriented, normal speech, no focal findings or movement disorder noted  Musculoskeletal - no joint tenderness, deformity or swelling  Extremities - peripheral pulses normal, no pedal edema, no clubbing or cyanosis  Skin - wounds on right hip measure approximately 12 by 8 inches. RLE wound 6 by 2 inches. Perirectal wound 8cm. No necrosis. Recent Labs      06/26/17   0600   WBC  9.4   HGB  7.8*   HCT  24.4*   PLT  504*     Recent Labs      06/26/17   0600   NA  141   K  3.7   CL  104   GLU  102*   CO2  27   BUN  6   CREA  0.28*   MG  1.7*   PHOS  4.3     No results for input(s): PH, PCO2, PO2, HCO3 in the last 72 hours. Assessment:     Hospital Problems  Date Reviewed: 6/6/2017    None        Plan:   Wound irrigation and closure.       Angelica Gaitan, DO

## 2017-06-26 NOTE — ANESTHESIA POSTPROCEDURE EVALUATION
Post-Anesthesia Evaluation and Assessment    Patient: Anju Riley MRN: [id number]  SSN: [id number]    YOB: 1985  Age: 29 y.o. Sex: female       Cardiovascular Function/Vital Signs  Visit Vitals    /76    Pulse 93    Temp 36.9 °C (98.5 °F)    Resp 12    SpO2 100%       Patient is status post general anesthesia for Procedure(s):  WOUND CLOSURE OF RIGHT THIGH / BUTTOCKS / RIGHT KNEE . Nausea/Vomiting: None    Postoperative hydration reviewed and adequate. Pain:  Pain Scale 1: Numeric (0 - 10) (06/26/17 1337)  Pain Intensity 1: 3 (06/26/17 1337)   Managed    Neurological Status:   Neuro (WDL): Within Defined Limits (06/26/17 1338)   At baseline    Mental Status and Level of Consciousness: Arousable    Pulmonary Status:   O2 Device: Nasal cannula (06/26/17 1846)   Adequate oxygenation and airway patent    Complications related to anesthesia: None    Post-anesthesia assessment completed.  No concerns    Signed By: Reyna Leung MD     June 26, 2017

## 2017-06-26 NOTE — BRIEF OP NOTE
BRIEF OPERATIVE NOTE    Date of Procedure: 6/26/2017   Preoperative Diagnosis: Necrotizing fasciitis ([facility] [M72.6]  Postoperative Diagnosis: Necrotizing fasciitis ([facility] [M72.6]    Procedure(s):  WOUND CLOSURE OF RIGHT THIGH / BUTTOCKS / RIGHT KNEE   Surgeon(s) and Role:     * Magan Rodriguez, DO - Primary         Assistant Staff:       Surgical Staff:  Circ-1: Lacey Chapman RN  Scrub Tech-1: Mariza Iyer  Scrub Tech-Relief: Michelle Magana  Event Time In   Incision Start 1711   Incision Close      Anesthesia: General   Estimated Blood Loss: Minimal  Specimens: * No specimens in log *   Findings: Wounds with healthy pink viable muscle and subcutaneous tissue.    Complications: none  Implants: * No implants in log Shan Lam, [address] [facility]

## 2017-06-27 NOTE — PERIOP NOTES
TRANSFER - OUT REPORT:    Verbal report given to [facility] Dewy Rose RN(name) on Karl Su  being transferred to [facility](unit) for routine post - op       Report consisted of patients Situation, Background, Assessment and   Recommendations(SBAR). Information from the following report(s) SBAR, Kardex, OR Summary, Intake/Output and MAR was reviewed with the receiving nurse. Lines:   Quad Lumen 05/16/17 Left Subclavian (Active)   Central Line Being Utilized Yes 6/8/2017  7:34 PM   Criteria for Appropriate Use Long term IV/antibiotic administration 6/8/2017  7:34 PM   Site Assessment Clean, dry, & intact 6/8/2017  7:34 PM   Infiltration Assessment 0 6/7/2017  7:36 PM   Affected Extremity/Extremities Color distal to insertion site pink (or appropriate for race) 6/6/2017  6:22 PM   Date of Last Dressing Change 06/01/17 6/5/2017  7:20 AM   Dressing Status Clean, dry, & intact 6/6/2017  6:22 PM   Dressing Type Tape;Transparent 6/5/2017  7:20 AM   Action Taken Dressing reinforced 6/5/2017  7:20 AM   Proximal Hub Color/Line Status Eston Shames; Infusing 6/8/2017  7:34 PM   Positive Blood Return (Medial Site) Yes 6/5/2017  7:20 AM   Medial 1 Hub Color/Line Status Patent 6/5/2017  7:20 AM   Positive Blood Return (Lateral Site) Yes 6/5/2017  7:20 AM   Medial 2 Hub Color/Line Status Patent 6/5/2017  7:20 AM   Positive Blood Return (Site #3) Yes 6/5/2017  7:20 AM   Distal Hub Color/Line Status Patent 6/5/2017  7:20 AM   Positive Blood Return (Site #4) No 6/3/2017  7:26 PM   Alcohol Cap Used No 6/3/2017  7:26 PM        Opportunity for questions and clarification was provided. Patient transported with:       VTE prophylaxis orders have been written for Karl Su. Patient and family given floor number and nurses name. Family updated re: pt status after security code verified.

## 2017-06-27 NOTE — OP NOTES
[address]   OPERATIVE REPORT       Name:  Jannette Kent   MR#:  [id number]   :  1985   Account #:  [de-identified]   Date of Adm:  2017       DATE OF SURGERY: 2017     PREOPERATIVE DIAGNOSIS: Necrotizing fasciitis. POSTOPERATIVE DIAGNOSIS: Necrotizing fasciitis. PROCEDURE: Complex wound closure, 3 layers x3. ANESTHESIA: General endotracheal.    SURGEON: Lyndsay Beckham DO    ASSISTANT: None. COMPLICATIONS: None. DISPOSITION: Stable. COUNTS: Sponge count, needle count, instrument count all correct   x3. DESCRIPTION OF PROCEDURE: This is a 68-year-old female   previously of Dr. Amparo Sullivan with necrotizing fasciitis and   perirectal abscess. She has 3 wounds that need to be addressed   at surgery; 1 in the perirectal area, measuring 10 cm; 1 along   the right hip, measuring 20 cm; and 1 along the popliteal space,   measuring 10 cm. Consent was obtained by describing the   procedure to the patient, including the potential complications   to include infection, bleeding, placement of drains, and   possible wound VAC. Consent was obtained, placed upon the chart. She was administered meropenem IV preoperatively. She was taken   to the operative suite, placed in a supine position. General   anesthesia was initiated without complications. She was prepped   and draped in the usual sterile fashion. A time-out was taken to   confirm the patient's name, proper procedure. She was   transferred to a left lateral decubitus position, and prepped   and draped in the usual sterile fashion, and secured with a bean   bag. Following this, a PulsaVac was used and 3 liters of normal   saline was used to irrigate each of the wounds. The wounds were as   described measurements above. The upper right lower extremity   incision measuring 20 cm was deep and the hip joint was exposed   along the tendons. Each of these pockets was irrigated with   saline until clear.  Popliteal space was irrigated with saline   until clear, as well as the perirectal abscess. The wound was   clean with pink, viable tissue; and there was no slough or   exudate. There was no evidence of necrotic tissue at any of the   planes. At this point, we decided to begin closing. A 1/2 inch   Penrose drain was placed adjacent to the hip joint and then the   musculature was closed with a 2-0 Vicryl in the deep layer. The   Penrose drain was brought out the inferior portion of the wound   and sutured into place. We then closed the second layer with 2-0   Vicryl in an interrupted fashion. Then, along the gluteal fold,   another 1/2 inch Penrose drain was placed into the deep tissues   and brought out inferiorly adjacent to the previously placed   drain. We closed this layer in the deep tissues with a 2-0   Vicryl. We then reapproximated the subcutaneous tissue with 2-0   Vicryl in an interrupted fashion, leaving the skin to be closed   with a 2-0 nylon in a vertical mattress fashion. The wound had a   nice closure with minimal tension. The wound will be addressed   at the completion of the case. Popliteal space was then   addressed next. We placed 1/4 inch Penrose drain in the deep   space and brought this out inferiorly, suturing into place with   2-0 nylon. We then closed the deep tissues with a 2-0 Vicryl for   the first layer. We then closed the subcutaneous tissue with 2-0   Vicryl in an interrupted fashion and the skin edges were then   closed with a 2-0 nylon in a vertical mattress fashion. Once   again, the tissues were healthy, beefy pink, with no exudate or   necrosis. The closure had a nice tensionless repair. The   perirectal wound was then addressed next. We reapproximated the   skin using a 2-0 nylon in a vertical mattress fashion. We then   closed the subcutaneous space along the deep fascia with a 2-0   Vicryl in an interrupted fashion for the entire length of the   wound.  We did place a 1/4 inch Penrose drain and brought this   out inferiorly, and placed this in the deep space and brought   this out inferiorly, suturing it into place with 2-0 nylon. Once   the subcutaneous tissue was reapproximated, the skin was closed   with a 2-0 nylon in a vertical mattress fashion. I was pleased   with this closure. There was minimal tension, good skin   approximation. The tissues were healthy and pink with no exudate   or necrosis. The wound ran adjacent to the anus, and the closure   in this area looked very healthy and viable. At this point, we   concluded the case. We placed 4 drains total. Each of the wounds   were dressed with an ABD and drain sponges. She tolerated the   procedure well without immediate complications. She will be   transferred back to [facility] for further recovery. FINDINGS: A 27-year-old female with history of necrotizing   fasciitis and 3 open wounds. Each wound was closed in 3 layers   with 2-0 Vicryls and 2-0 nylon in a vertical mattress. The hip   wound measured 20 cm. The popliteal wound was 10 cm, and the   perirectal wound was 10 cm. She tolerated the procedure well. ARVIND DIAZ DO DD / CASSANDRA   D:  06/26/2017   18:24   T:  06/27/2017   06:17   Job #:  [id number]

## 2017-07-02 LAB
ACID FAST STN SPEC: NEGATIVE
MYCOBACTERIUM SPEC QL CULT: NEGATIVE
SPECIMEN PREPARATION: NORMAL
SPECIMEN SOURCE: NORMAL

## 2017-07-03 LAB
ALBUMIN SERPL BCP-MCNC: 2 G/DL (ref 3.5–5)
ALBUMIN/GLOB SERPL: 0.4 {RATIO} (ref 1.2–3.5)
ALP SERPL-CCNC: 105 U/L (ref 50–136)
ALT SERPL-CCNC: 14 U/L (ref 12–65)
ANION GAP BLD CALC-SCNC: 10 MMOL/L (ref 7–16)
AST SERPL W P-5'-P-CCNC: 18 U/L (ref 15–37)
BASOPHILS # BLD AUTO: 0 K/UL (ref 0–0.2)
BASOPHILS # BLD: 0 % (ref 0–2)
BILIRUB DIRECT SERPL-MCNC: <0.1 MG/DL
BILIRUB SERPL-MCNC: 0.2 MG/DL (ref 0.2–1.1)
BUN SERPL-MCNC: 5 MG/DL (ref 6–23)
CALCIUM SERPL-MCNC: 8.7 MG/DL (ref 8.3–10.4)
CHLORIDE SERPL-SCNC: 103 MMOL/L (ref 98–107)
CO2 SERPL-SCNC: 27 MMOL/L (ref 21–32)
CREAT SERPL-MCNC: 0.33 MG/DL (ref 0.6–1)
DIFFERENTIAL METHOD BLD: ABNORMAL
EOSINOPHIL # BLD: 0.5 K/UL (ref 0–0.8)
EOSINOPHIL NFR BLD: 4 % (ref 0.5–7.8)
ERYTHROCYTE [DISTWIDTH] IN BLOOD BY AUTOMATED COUNT: 15.6 % (ref 11.9–14.6)
GLOBULIN SER CALC-MCNC: 5.1 G/DL (ref 2.3–3.5)
GLUCOSE SERPL-MCNC: 115 MG/DL (ref 65–100)
HCT VFR BLD AUTO: 25.6 % (ref 35.8–46.3)
HGB BLD-MCNC: 8.3 G/DL (ref 11.7–15.4)
IMM GRANULOCYTES # BLD: 0 K/UL (ref 0–0.5)
IMM GRANULOCYTES NFR BLD AUTO: 0.2 % (ref 0–5)
LYMPHOCYTES # BLD AUTO: 23 % (ref 13–44)
LYMPHOCYTES # BLD: 3 K/UL (ref 0.5–4.6)
MAGNESIUM SERPL-MCNC: 1.5 MG/DL (ref 1.8–2.4)
MCH RBC QN AUTO: 28 PG (ref 26.1–32.9)
MCHC RBC AUTO-ENTMCNC: 32.4 G/DL (ref 31.4–35)
MCV RBC AUTO: 86.5 FL (ref 79.6–97.8)
MONOCYTES # BLD: 0.2 K/UL (ref 0.1–1.3)
MONOCYTES NFR BLD AUTO: 1 % (ref 4–12)
NEUTS SEG # BLD: 9.4 K/UL (ref 1.7–8.2)
NEUTS SEG NFR BLD AUTO: 72 % (ref 43–78)
PLATELET # BLD AUTO: 521 K/UL (ref 150–450)
PMV BLD AUTO: 8.2 FL (ref 10.8–14.1)
POTASSIUM SERPL-SCNC: 3.5 MMOL/L (ref 3.5–5.1)
PROT SERPL-MCNC: 7.1 G/DL (ref 6.3–8.2)
RBC # BLD AUTO: 2.96 M/UL (ref 4.05–5.25)
SODIUM SERPL-SCNC: 140 MMOL/L (ref 136–145)
WBC # BLD AUTO: 13.2 K/UL (ref 4.3–11.1)

## 2017-07-03 PROCEDURE — 83735 ASSAY OF MAGNESIUM: CPT | Performed by: NURSE PRACTITIONER

## 2017-07-03 PROCEDURE — 82248 BILIRUBIN DIRECT: CPT | Performed by: NURSE PRACTITIONER

## 2017-07-03 PROCEDURE — 85025 COMPLETE CBC W/AUTO DIFF WBC: CPT | Performed by: NURSE PRACTITIONER

## 2017-07-03 PROCEDURE — 80053 COMPREHEN METABOLIC PANEL: CPT | Performed by: NURSE PRACTITIONER

## 2017-07-04 LAB
BASOPHILS # BLD AUTO: 0 K/UL (ref 0–0.2)
BASOPHILS # BLD: 0 % (ref 0–2)
DIFFERENTIAL METHOD BLD: ABNORMAL
EOSINOPHIL # BLD: 0.5 K/UL (ref 0–0.8)
EOSINOPHIL NFR BLD: 4 % (ref 0.5–7.8)
ERYTHROCYTE [DISTWIDTH] IN BLOOD BY AUTOMATED COUNT: 15.4 % (ref 11.9–14.6)
HCT VFR BLD AUTO: 25.1 % (ref 35.8–46.3)
HGB BLD-MCNC: 8.2 G/DL (ref 11.7–15.4)
IMM GRANULOCYTES # BLD: 0 K/UL (ref 0–0.5)
IMM GRANULOCYTES NFR BLD AUTO: 0.3 % (ref 0–5)
LYMPHOCYTES # BLD AUTO: 14 % (ref 13–44)
LYMPHOCYTES # BLD: 1.7 K/UL (ref 0.5–4.6)
MAGNESIUM SERPL-MCNC: 1.7 MG/DL (ref 1.8–2.4)
MCH RBC QN AUTO: 27.7 PG (ref 26.1–32.9)
MCHC RBC AUTO-ENTMCNC: 32.7 G/DL (ref 31.4–35)
MCV RBC AUTO: 84.8 FL (ref 79.6–97.8)
MONOCYTES # BLD: 0.8 K/UL (ref 0.1–1.3)
MONOCYTES NFR BLD AUTO: 7 % (ref 4–12)
NEUTS SEG # BLD: 8.8 K/UL (ref 1.7–8.2)
NEUTS SEG NFR BLD AUTO: 75 % (ref 43–78)
PLATELET # BLD AUTO: 532 K/UL (ref 150–450)
PMV BLD AUTO: 8.2 FL (ref 10.8–14.1)
POTASSIUM SERPL-SCNC: 3.6 MMOL/L (ref 3.5–5.1)
RBC # BLD AUTO: 2.96 M/UL (ref 4.05–5.25)
WBC # BLD AUTO: 11.8 K/UL (ref 4.3–11.1)

## 2017-07-04 PROCEDURE — 83735 ASSAY OF MAGNESIUM: CPT | Performed by: INTERNAL MEDICINE

## 2017-07-04 PROCEDURE — 84132 ASSAY OF SERUM POTASSIUM: CPT | Performed by: INTERNAL MEDICINE

## 2017-07-04 PROCEDURE — 85025 COMPLETE CBC W/AUTO DIFF WBC: CPT | Performed by: INTERNAL MEDICINE

## 2017-07-28 ENCOUNTER — HOSPITAL ENCOUNTER (OUTPATIENT)
Dept: CT IMAGING | Age: 32
Discharge: HOME | End: 2017-07-28
Attending: SURGERY
Payer: SELF-PAY

## 2017-07-28 ENCOUNTER — HOSPITAL ENCOUNTER (OUTPATIENT)
Dept: SURGERY | Age: 32
Discharge: HOME | End: 2017-07-28
Attending: SURGERY
Payer: SELF-PAY

## 2017-07-28 VITALS
SYSTOLIC BLOOD PRESSURE: 145 MMHG | TEMPERATURE: 97.9 F | HEART RATE: 116 BPM | BODY MASS INDEX: 31.83 KG/M2 | DIASTOLIC BLOOD PRESSURE: 84 MMHG | OXYGEN SATURATION: 100 % | WEIGHT: 173 LBS | HEIGHT: 62 IN

## 2017-07-28 VITALS — HEIGHT: 62 IN | BODY MASS INDEX: 32.57 KG/M2 | WEIGHT: 177 LBS

## 2017-07-28 DIAGNOSIS — L02.91 ABSCESS: ICD-10-CM

## 2017-07-28 LAB
ATRIAL RATE: 119 BPM
CALCULATED P AXIS, ECG09: 58 DEGREES
CALCULATED R AXIS, ECG10: 21 DEGREES
CALCULATED T AXIS, ECG11: 1 DEGREES
DIAGNOSIS, 93000: NORMAL
GLUCOSE BLD STRIP.AUTO-MCNC: 152 MG/DL (ref 65–100)
P-R INTERVAL, ECG05: 122 MS
Q-T INTERVAL, ECG07: 330 MS
QRS DURATION, ECG06: 80 MS
QTC CALCULATION (BEZET), ECG08: 464 MS
VENTRICULAR RATE, ECG03: 119 BPM

## 2017-07-28 PROCEDURE — 93005 ELECTROCARDIOGRAM TRACING: CPT | Performed by: ANESTHESIOLOGY

## 2017-07-28 PROCEDURE — 74011636320 HC RX REV CODE- 636/320: Performed by: SURGERY

## 2017-07-28 PROCEDURE — 74177 CT ABD & PELVIS W/CONTRAST: CPT

## 2017-07-28 PROCEDURE — 74011000258 HC RX REV CODE- 258: Performed by: SURGERY

## 2017-07-28 PROCEDURE — 82962 GLUCOSE BLOOD TEST: CPT

## 2017-07-28 RX ORDER — SODIUM CHLORIDE 0.9 % (FLUSH) 0.9 %
10 SYRINGE (ML) INJECTION
Status: COMPLETED | OUTPATIENT
Start: 2017-07-28 | End: 2017-07-28

## 2017-07-28 RX ADMIN — Medication 10 ML: at 10:10

## 2017-07-28 RX ADMIN — DIATRIZOATE MEGLUMINE AND DIATRIZOATE SODIUM 15 ML: 660; 100 LIQUID ORAL; RECTAL at 10:10

## 2017-07-28 RX ADMIN — IOPAMIDOL 100 ML: 755 INJECTION, SOLUTION INTRAVENOUS at 10:10

## 2017-07-28 RX ADMIN — SODIUM CHLORIDE 100 ML: 900 INJECTION, SOLUTION INTRAVENOUS at 10:10

## 2017-07-28 NOTE — PERIOP NOTES
Patient had a PAT phone assessment on 7/26/17 and was scheduled for surgery on 7/27/17. Surgery was canceled due to insurance problem but has now been rescheduled for 7/31/17. Patient was scheduled for a walk in PAT appointment today. Modified assessment completed due to recent assessment 2 days ago. Patient denied any change in medical history. SQBS checked and was 152. Vital signs within normal limited except elevated HR (113-120). Dr. Celia Peraza called and requested an EKG be done today. After EKG completed, Dr. Celia Peraza was called with results. No further orders were received. EKG dated 5/17/17 printed and on chart if needed. Pre-op instructions given with bar of antibacterial soap. Patient states has med instructions given by nurse on 7/26/17. Instructed that if needed she may take a percocet morning of surgery with a sip of water. Potassium result of 3.6 on 7/4/17 printed and on chart for reference if needed.

## 2017-07-30 ENCOUNTER — ANESTHESIA EVENT (OUTPATIENT)
Dept: SURGERY | Age: 32
End: 2017-07-30
Payer: SELF-PAY

## 2017-07-31 ENCOUNTER — HOSPITAL ENCOUNTER (OUTPATIENT)
Age: 32
Setting detail: OBSERVATION
Discharge: HOME | End: 2017-08-04
Attending: SURGERY | Admitting: SURGERY
Payer: SELF-PAY

## 2017-07-31 ENCOUNTER — ANESTHESIA (OUTPATIENT)
Dept: SURGERY | Age: 32
End: 2017-07-31
Payer: SELF-PAY

## 2017-07-31 DIAGNOSIS — L02.415 ABSCESS OF RIGHT HIP: Primary | ICD-10-CM

## 2017-07-31 LAB
GLUCOSE BLD STRIP.AUTO-MCNC: 132 MG/DL (ref 65–100)
HCG UR QL: NEGATIVE

## 2017-07-31 PROCEDURE — 77030008477 HC STYL SATN SLP COVD -A: Performed by: ANESTHESIOLOGY

## 2017-07-31 PROCEDURE — 82962 GLUCOSE BLOOD TEST: CPT

## 2017-07-31 PROCEDURE — 77030008703 HC TU ET UNCUF COVD -A: Performed by: ANESTHESIOLOGY

## 2017-07-31 PROCEDURE — 77030019934 HC DRSG VAC ASST KCON -B

## 2017-07-31 PROCEDURE — 77030020782 HC GWN BAIR PAWS FLX 3M -B: Performed by: ANESTHESIOLOGY

## 2017-07-31 PROCEDURE — 76010000138 HC OR TIME 0.5 TO 1 HR: Performed by: SURGERY

## 2017-07-31 PROCEDURE — 77030019952 HC CANSTR VAC ASST KCON -B

## 2017-07-31 PROCEDURE — 74011250636 HC RX REV CODE- 250/636

## 2017-07-31 PROCEDURE — 77030018836 HC SOL IRR NACL ICUM -A: Performed by: SURGERY

## 2017-07-31 PROCEDURE — 74750000023 HC WOUND THERAPY

## 2017-07-31 PROCEDURE — 77030011640 HC PAD GRND REM COVD -A: Performed by: SURGERY

## 2017-07-31 PROCEDURE — 74011250636 HC RX REV CODE- 250/636: Performed by: ANESTHESIOLOGY

## 2017-07-31 PROCEDURE — 74011000258 HC RX REV CODE- 258: Performed by: SURGERY

## 2017-07-31 PROCEDURE — 74011000250 HC RX REV CODE- 250

## 2017-07-31 PROCEDURE — 94760 N-INVAS EAR/PLS OXIMETRY 1: CPT

## 2017-07-31 PROCEDURE — 76210000016 HC OR PH I REC 1 TO 1.5 HR: Performed by: SURGERY

## 2017-07-31 PROCEDURE — 77030002916 HC SUT ETHLN J&J -A: Performed by: SURGERY

## 2017-07-31 PROCEDURE — 74011000250 HC RX REV CODE- 250: Performed by: SURGERY

## 2017-07-31 PROCEDURE — 77030012411 HC DRN WND CARD -A: Performed by: SURGERY

## 2017-07-31 PROCEDURE — 81025 URINE PREGNANCY TEST: CPT

## 2017-07-31 PROCEDURE — 99218 HC RM OBSERVATION: CPT

## 2017-07-31 PROCEDURE — 76060000033 HC ANESTHESIA 1 TO 1.5 HR: Performed by: SURGERY

## 2017-07-31 PROCEDURE — 74011250636 HC RX REV CODE- 250/636: Performed by: SURGERY

## 2017-07-31 PROCEDURE — 97605 NEG PRS WND THER DME<=50SQCM: CPT

## 2017-07-31 RX ORDER — BUPIVACAINE HYDROCHLORIDE 2.5 MG/ML
INJECTION, SOLUTION EPIDURAL; INFILTRATION; INTRACAUDAL AS NEEDED
Status: DISCONTINUED | OUTPATIENT
Start: 2017-07-31 | End: 2017-07-31 | Stop reason: HOSPADM

## 2017-07-31 RX ORDER — SUCCINYLCHOLINE CHLORIDE 20 MG/ML
INJECTION INTRAMUSCULAR; INTRAVENOUS AS NEEDED
Status: DISCONTINUED | OUTPATIENT
Start: 2017-07-31 | End: 2017-07-31 | Stop reason: HOSPADM

## 2017-07-31 RX ORDER — OXYCODONE AND ACETAMINOPHEN 5; 325 MG/1; MG/1
1 TABLET ORAL
Status: DISCONTINUED | OUTPATIENT
Start: 2017-07-31 | End: 2017-08-01

## 2017-07-31 RX ORDER — CEFAZOLIN SODIUM IN 0.9 % NACL 2 G/50 ML
2 INTRAVENOUS SOLUTION, PIGGYBACK (ML) INTRAVENOUS ONCE
Status: COMPLETED | OUTPATIENT
Start: 2017-07-31 | End: 2017-07-31

## 2017-07-31 RX ORDER — KETOROLAC TROMETHAMINE 30 MG/ML
30 INJECTION, SOLUTION INTRAMUSCULAR; INTRAVENOUS
Status: DISPENSED | OUTPATIENT
Start: 2017-07-31 | End: 2017-08-01

## 2017-07-31 RX ORDER — SODIUM CHLORIDE 0.9 % (FLUSH) 0.9 %
5-10 SYRINGE (ML) INJECTION AS NEEDED
Status: DISCONTINUED | OUTPATIENT
Start: 2017-07-31 | End: 2017-07-31 | Stop reason: HOSPADM

## 2017-07-31 RX ORDER — SODIUM CHLORIDE 0.9 % (FLUSH) 0.9 %
5-10 SYRINGE (ML) INJECTION AS NEEDED
Status: DISCONTINUED | OUTPATIENT
Start: 2017-07-31 | End: 2017-08-04 | Stop reason: HOSPADM

## 2017-07-31 RX ORDER — SODIUM CHLORIDE, SODIUM LACTATE, POTASSIUM CHLORIDE, CALCIUM CHLORIDE 600; 310; 30; 20 MG/100ML; MG/100ML; MG/100ML; MG/100ML
1000 INJECTION, SOLUTION INTRAVENOUS CONTINUOUS
Status: DISCONTINUED | OUTPATIENT
Start: 2017-07-31 | End: 2017-07-31 | Stop reason: HOSPADM

## 2017-07-31 RX ORDER — ONDANSETRON 2 MG/ML
4 INJECTION INTRAMUSCULAR; INTRAVENOUS ONCE
Status: DISCONTINUED | OUTPATIENT
Start: 2017-07-31 | End: 2017-07-31 | Stop reason: HOSPADM

## 2017-07-31 RX ORDER — DIPHENHYDRAMINE HYDROCHLORIDE 50 MG/ML
12.5 INJECTION, SOLUTION INTRAMUSCULAR; INTRAVENOUS
Status: DISCONTINUED | OUTPATIENT
Start: 2017-07-31 | End: 2017-08-04 | Stop reason: HOSPADM

## 2017-07-31 RX ORDER — ONDANSETRON 2 MG/ML
INJECTION INTRAMUSCULAR; INTRAVENOUS AS NEEDED
Status: DISCONTINUED | OUTPATIENT
Start: 2017-07-31 | End: 2017-07-31 | Stop reason: HOSPADM

## 2017-07-31 RX ORDER — LIDOCAINE HYDROCHLORIDE 10 MG/ML
0.1 INJECTION INFILTRATION; PERINEURAL AS NEEDED
Status: DISCONTINUED | OUTPATIENT
Start: 2017-07-31 | End: 2017-07-31 | Stop reason: HOSPADM

## 2017-07-31 RX ORDER — NEOSTIGMINE METHYLSULFATE 1 MG/ML
INJECTION INTRAVENOUS AS NEEDED
Status: DISCONTINUED | OUTPATIENT
Start: 2017-07-31 | End: 2017-07-31 | Stop reason: HOSPADM

## 2017-07-31 RX ORDER — MIDAZOLAM HYDROCHLORIDE 1 MG/ML
2 INJECTION, SOLUTION INTRAMUSCULAR; INTRAVENOUS
Status: DISCONTINUED | OUTPATIENT
Start: 2017-07-31 | End: 2017-07-31 | Stop reason: HOSPADM

## 2017-07-31 RX ORDER — SODIUM CHLORIDE AND POTASSIUM CHLORIDE .9; .15 G/100ML; G/100ML
SOLUTION INTRAVENOUS CONTINUOUS
Status: DISPENSED | OUTPATIENT
Start: 2017-07-31 | End: 2017-08-01

## 2017-07-31 RX ORDER — EPINEPHRINE 1 MG/ML
INJECTION, SOLUTION, CONCENTRATE INTRAVENOUS AS NEEDED
Status: DISCONTINUED | OUTPATIENT
Start: 2017-07-31 | End: 2017-07-31 | Stop reason: HOSPADM

## 2017-07-31 RX ORDER — HYDROMORPHONE HYDROCHLORIDE 2 MG/ML
0.5 INJECTION, SOLUTION INTRAMUSCULAR; INTRAVENOUS; SUBCUTANEOUS
Status: DISCONTINUED | OUTPATIENT
Start: 2017-07-31 | End: 2017-07-31 | Stop reason: HOSPADM

## 2017-07-31 RX ORDER — NALOXONE HYDROCHLORIDE 0.4 MG/ML
0.1 INJECTION, SOLUTION INTRAMUSCULAR; INTRAVENOUS; SUBCUTANEOUS AS NEEDED
Status: DISCONTINUED | OUTPATIENT
Start: 2017-07-31 | End: 2017-07-31 | Stop reason: HOSPADM

## 2017-07-31 RX ORDER — FENTANYL CITRATE 50 UG/ML
INJECTION, SOLUTION INTRAMUSCULAR; INTRAVENOUS AS NEEDED
Status: DISCONTINUED | OUTPATIENT
Start: 2017-07-31 | End: 2017-07-31 | Stop reason: HOSPADM

## 2017-07-31 RX ORDER — LIDOCAINE HYDROCHLORIDE 20 MG/ML
INJECTION, SOLUTION EPIDURAL; INFILTRATION; INTRACAUDAL; PERINEURAL AS NEEDED
Status: DISCONTINUED | OUTPATIENT
Start: 2017-07-31 | End: 2017-07-31 | Stop reason: HOSPADM

## 2017-07-31 RX ORDER — OXYCODONE HYDROCHLORIDE 5 MG/1
10 TABLET ORAL
Status: DISCONTINUED | OUTPATIENT
Start: 2017-07-31 | End: 2017-07-31 | Stop reason: HOSPADM

## 2017-07-31 RX ORDER — OXYCODONE HYDROCHLORIDE 5 MG/1
5 TABLET ORAL
Status: DISCONTINUED | OUTPATIENT
Start: 2017-07-31 | End: 2017-07-31 | Stop reason: HOSPADM

## 2017-07-31 RX ORDER — SODIUM CHLORIDE, SODIUM LACTATE, POTASSIUM CHLORIDE, CALCIUM CHLORIDE 600; 310; 30; 20 MG/100ML; MG/100ML; MG/100ML; MG/100ML
75 INJECTION, SOLUTION INTRAVENOUS CONTINUOUS
Status: DISCONTINUED | OUTPATIENT
Start: 2017-07-31 | End: 2017-07-31

## 2017-07-31 RX ORDER — ENOXAPARIN SODIUM 100 MG/ML
40 INJECTION SUBCUTANEOUS EVERY 24 HOURS
Status: DISCONTINUED | OUTPATIENT
Start: 2017-07-31 | End: 2017-08-04 | Stop reason: HOSPADM

## 2017-07-31 RX ORDER — HYDROMORPHONE HYDROCHLORIDE 1 MG/ML
1 INJECTION, SOLUTION INTRAMUSCULAR; INTRAVENOUS; SUBCUTANEOUS
Status: DISCONTINUED | OUTPATIENT
Start: 2017-07-31 | End: 2017-08-04 | Stop reason: HOSPADM

## 2017-07-31 RX ORDER — ACETAMINOPHEN 500 MG
500 TABLET ORAL ONCE
Status: DISCONTINUED | OUTPATIENT
Start: 2017-07-31 | End: 2017-07-31 | Stop reason: HOSPADM

## 2017-07-31 RX ORDER — SODIUM CHLORIDE 0.9 % (FLUSH) 0.9 %
5-10 SYRINGE (ML) INJECTION EVERY 8 HOURS
Status: DISCONTINUED | OUTPATIENT
Start: 2017-07-31 | End: 2017-08-04 | Stop reason: HOSPADM

## 2017-07-31 RX ORDER — ONDANSETRON 2 MG/ML
4 INJECTION INTRAMUSCULAR; INTRAVENOUS
Status: DISCONTINUED | OUTPATIENT
Start: 2017-07-31 | End: 2017-08-04 | Stop reason: HOSPADM

## 2017-07-31 RX ORDER — NALOXONE HYDROCHLORIDE 0.4 MG/ML
0.4 INJECTION, SOLUTION INTRAMUSCULAR; INTRAVENOUS; SUBCUTANEOUS AS NEEDED
Status: DISCONTINUED | OUTPATIENT
Start: 2017-07-31 | End: 2017-08-04 | Stop reason: HOSPADM

## 2017-07-31 RX ORDER — SODIUM CHLORIDE 0.9 % (FLUSH) 0.9 %
5-10 SYRINGE (ML) INJECTION EVERY 8 HOURS
Status: DISCONTINUED | OUTPATIENT
Start: 2017-07-31 | End: 2017-07-31 | Stop reason: HOSPADM

## 2017-07-31 RX ORDER — FENTANYL CITRATE 50 UG/ML
100 INJECTION, SOLUTION INTRAMUSCULAR; INTRAVENOUS AS NEEDED
Status: DISCONTINUED | OUTPATIENT
Start: 2017-07-31 | End: 2017-07-31 | Stop reason: HOSPADM

## 2017-07-31 RX ORDER — GLYCOPYRROLATE 0.2 MG/ML
INJECTION INTRAMUSCULAR; INTRAVENOUS AS NEEDED
Status: DISCONTINUED | OUTPATIENT
Start: 2017-07-31 | End: 2017-07-31 | Stop reason: HOSPADM

## 2017-07-31 RX ORDER — DIPHENHYDRAMINE HYDROCHLORIDE 50 MG/ML
12.5 INJECTION, SOLUTION INTRAMUSCULAR; INTRAVENOUS ONCE
Status: DISCONTINUED | OUTPATIENT
Start: 2017-07-31 | End: 2017-07-31 | Stop reason: HOSPADM

## 2017-07-31 RX ORDER — VECURONIUM BROMIDE FOR INJECTION 1 MG/ML
INJECTION, POWDER, LYOPHILIZED, FOR SOLUTION INTRAVENOUS AS NEEDED
Status: DISCONTINUED | OUTPATIENT
Start: 2017-07-31 | End: 2017-07-31 | Stop reason: HOSPADM

## 2017-07-31 RX ORDER — SODIUM CHLORIDE, SODIUM LACTATE, POTASSIUM CHLORIDE, CALCIUM CHLORIDE 600; 310; 30; 20 MG/100ML; MG/100ML; MG/100ML; MG/100ML
75 INJECTION, SOLUTION INTRAVENOUS CONTINUOUS
Status: DISCONTINUED | OUTPATIENT
Start: 2017-07-31 | End: 2017-07-31 | Stop reason: HOSPADM

## 2017-07-31 RX ADMIN — HYDROMORPHONE HYDROCHLORIDE 1 MG: 1 INJECTION, SOLUTION INTRAMUSCULAR; INTRAVENOUS; SUBCUTANEOUS at 23:13

## 2017-07-31 RX ADMIN — SODIUM CHLORIDE, SODIUM LACTATE, POTASSIUM CHLORIDE, AND CALCIUM CHLORIDE 75 ML/HR: 600; 310; 30; 20 INJECTION, SOLUTION INTRAVENOUS at 10:15

## 2017-07-31 RX ADMIN — VECURONIUM BROMIDE FOR INJECTION 3 MG: 1 INJECTION, POWDER, LYOPHILIZED, FOR SOLUTION INTRAVENOUS at 11:43

## 2017-07-31 RX ADMIN — FENTANYL CITRATE 100 MCG: 50 INJECTION, SOLUTION INTRAMUSCULAR; INTRAVENOUS at 11:40

## 2017-07-31 RX ADMIN — MIDAZOLAM HYDROCHLORIDE 2 MG: 1 INJECTION, SOLUTION INTRAMUSCULAR; INTRAVENOUS at 11:31

## 2017-07-31 RX ADMIN — HYDROMORPHONE HYDROCHLORIDE 0.5 MG: 2 INJECTION, SOLUTION INTRAMUSCULAR; INTRAVENOUS; SUBCUTANEOUS at 12:55

## 2017-07-31 RX ADMIN — HYDROMORPHONE HYDROCHLORIDE 1 MG: 1 INJECTION, SOLUTION INTRAMUSCULAR; INTRAVENOUS; SUBCUTANEOUS at 18:52

## 2017-07-31 RX ADMIN — PIPERACILLIN SODIUM,TAZOBACTAM SODIUM 3.38 G: 3; .375 INJECTION, POWDER, FOR SOLUTION INTRAVENOUS at 15:09

## 2017-07-31 RX ADMIN — SODIUM CHLORIDE, SODIUM LACTATE, POTASSIUM CHLORIDE, AND CALCIUM CHLORIDE: 600; 310; 30; 20 INJECTION, SOLUTION INTRAVENOUS at 12:25

## 2017-07-31 RX ADMIN — Medication 5 ML: at 23:15

## 2017-07-31 RX ADMIN — ONDANSETRON 4 MG: 2 INJECTION INTRAMUSCULAR; INTRAVENOUS at 12:10

## 2017-07-31 RX ADMIN — HYDROMORPHONE HYDROCHLORIDE 0.5 MG: 2 INJECTION, SOLUTION INTRAMUSCULAR; INTRAVENOUS; SUBCUTANEOUS at 13:05

## 2017-07-31 RX ADMIN — GLYCOPYRROLATE 0.4 MG: 0.2 INJECTION INTRAMUSCULAR; INTRAVENOUS at 12:25

## 2017-07-31 RX ADMIN — HYDROMORPHONE HYDROCHLORIDE 0.5 MG: 2 INJECTION, SOLUTION INTRAMUSCULAR; INTRAVENOUS; SUBCUTANEOUS at 13:00

## 2017-07-31 RX ADMIN — SODIUM CHLORIDE AND POTASSIUM CHLORIDE: 9; 1.49 INJECTION, SOLUTION INTRAVENOUS at 15:09

## 2017-07-31 RX ADMIN — PIPERACILLIN SODIUM,TAZOBACTAM SODIUM 3.38 G: 3; .375 INJECTION, POWDER, FOR SOLUTION INTRAVENOUS at 22:05

## 2017-07-31 RX ADMIN — LIDOCAINE HYDROCHLORIDE 100 MG: 20 INJECTION, SOLUTION EPIDURAL; INFILTRATION; INTRACAUDAL; PERINEURAL at 11:56

## 2017-07-31 RX ADMIN — CEFAZOLIN 2 G: 1 INJECTION, POWDER, FOR SOLUTION INTRAMUSCULAR; INTRAVENOUS; PARENTERAL at 11:40

## 2017-07-31 RX ADMIN — NEOSTIGMINE METHYLSULFATE 3 MG: 1 INJECTION INTRAVENOUS at 12:25

## 2017-07-31 RX ADMIN — SUCCINYLCHOLINE CHLORIDE 200 MG: 20 INJECTION INTRAMUSCULAR; INTRAVENOUS at 11:56

## 2017-07-31 RX ADMIN — ENOXAPARIN SODIUM 40 MG: 40 INJECTION SUBCUTANEOUS at 22:04

## 2017-07-31 RX ADMIN — HYDROMORPHONE HYDROCHLORIDE 0.5 MG: 2 INJECTION, SOLUTION INTRAMUSCULAR; INTRAVENOUS; SUBCUTANEOUS at 13:13

## 2017-07-31 NOTE — PROGRESS NOTES
Assessment complete via flow sheet. Pt A&Ox3. Respirations even and unlabored, CTA. S1 S2 auscultated. Pt on room air. Pt reports pain level of 0 out of 10. Bowel sounds active, abdomen soft, pt has ostomy. Pt has wound vac to right hip. Denies other needs. Bed in lowest position, side rails up 3, call bell in reach. Instructed to call for assistance. Pt verbalized understanding. Pt oriented to room, plan of care reviewed with patient.

## 2017-07-31 NOTE — H&P
Cherylene Foy    7/31/2017    Date of Admission:  7/31/2017      Subjective:     Patient is a 29 y.o.  female presents with Chronic perirectal wound and new Right hip abscess from chronic wound. CT shows fluid collection and air around hip wound. Patient admitted for perirectal wound closure and hip wound I and D. 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    Sepsis ([address] 05/16/2017    Chlamydia infection 05/16/2017    Herpes simplex vulvovaginitis 05/16/2017     Past Medical History:   Diagnosis Date    Acute kidney injury ([address]     Alpha-streptococcal sepsis ([address]     Candida glabrata infection     Chlamydia     DKA (diabetic ketoacidoses) ([address]     Elevated liver enzymes     Escherichia coli infection     Herpes simplex     History of creation of ostomy     Hypernatremia     Hypophosphatemia     Leukocytosis     Necrotizing fasciitis ([address]     Pancreatitis     patient denies     Pelvic abscess in female     Rhabdomyolysis     Septic shock ([address]     Type 2 diabetes mellitus (HCC)     insulin PRN: avg. 120-140: pt denies episodes of hypo: A1c unknown      Past Surgical History:   Procedure Laterality Date    HX COLOSTOMY  06/02/2017    HX OTHER SURGICAL      pilonidal cyst, multiple times    HX OTHER SURGICAL  05/2017-06/2017    I&D's x5      Prior to Admission Medications   Prescriptions Last Dose Informant Patient Reported? Taking?   amoxicillin-clavulanate (AUGMENTIN) 875-125 mg per tablet 7/30/2017 at Unknown time  No Yes   Sig: Take 1 Tab by mouth every twelve (12) hours for 7 days.    bisacodyl (DULCOLAX) 5 mg EC tablet Not Taking at Unknown time  No No Sig: Take 1 Tab by mouth daily as needed for Constipation. furosemide (LASIX) 40 mg tablet 7/30/2017 at Unknown time  Yes Yes   Sig: Take 40 mg by mouth daily. ibuprofen 200 mg cap Not Taking at Unknown time  Yes No   Sig: Take  by mouth as needed. insulin lispro (HUMALOG) 100 unit/mL injection Not Taking at Unknown time  No No   Sig: Very Insulin Resistant  For Blood Sugar (mg/dL) of:              Less than 150 =   0 units  150 -199 =   3 units  200 -249 =   6 units  250 -299 =   9 units  300 -349 =   12 units  350 and above =   15 units and Call Physician  Initiate Hypoglycemic protocol if blood glucose is <70 mg/dL. Patient taking differently: by SubCUTAneous route as needed. Very Insulin Resistant  For Blood Sugar (mg/dL) of:              Less than 150 =   0 units  150 -199 =   3 units  200 -249 =   6 units  250 -299 =   9 units  300 -349 =   12 units  350 and above =   15 units and Call Physician  Initiate Hypoglycemic protocol if blood glucose is <70 mg/dL. magnesium oxide (MAG-OX) 400 mg tablet 7/30/2017 at Unknown time  Yes Yes   Sig: Take 400 mg by mouth daily. oxyCODONE-acetaminophen (PERCOCET) 5-325 mg per tablet 7/30/2017 at Unknown time  No Yes   Sig: Take 2 Tabs by mouth every four (4) hours as needed for Pain. Max Daily Amount: 12 Tabs.       Facility-Administered Medications: None     No Known Allergies   Social History   Substance Use Topics    Smoking status: Never Smoker    Smokeless tobacco: Never Used    Alcohol use No      Social History     Social History Narrative     Family History   Problem Relation Age of Onset    No Known Problems Mother     Diabetes Father         Current Facility-Administered Medications   Medication Dose Route Frequency    ceFAZolin in 0.9% NS (ANCEF) IVPB soln 2 g  2 g IntraVENous ONCE    lactated Ringers infusion  75 mL/hr IntraVENous CONTINUOUS    lidocaine (XYLOCAINE) 10 mg/mL (1 %) injection 0.1 mL  0.1 mL SubCUTAneous PRN    lactated Ringers infusion 1,000 mL  1,000 mL IntraVENous CONTINUOUS    lactated ringers bolus infusion 500 mL  500 mL IntraVENous ONCE    sodium chloride (NS) flush 5-10 mL  5-10 mL IntraVENous Q8H    sodium chloride (NS) flush 5-10 mL  5-10 mL IntraVENous PRN    fentaNYL citrate (PF) injection 100 mcg  100 mcg IntraVENous PRN    midazolam (VERSED) injection 2 mg  2 mg IntraVENous ONCE PRN       Review of Systems  A comprehensive review of systems was negative except for that written in the HPI. Objective:     Vitals:    07/31/17 0927   BP: (!) 147/93   Pulse: (!) 126   Resp: 16   Temp: 98.4 °F (36.9 °C)   SpO2: 100%   Weight: 174 lb (78.9 kg)   Height: 5' 2\" (1.575 m)     PHYSICAL EXAM   Physical Examination: General appearance - alert, well appearing, and in no distress  Mental status - alert, oriented to person, place, and time  Eyes - pupils equal and reactive, extraocular eye movements intact  Nose - normal and patent, no erythema, discharge or polyps  Neck - supple, no significant adenopathy  Chest - clear to auscultation, no wheezes, rales or rhonchi, symmetric air entry  Heart - normal rate, regular rhythm, normal S1, S2, no murmurs, rubs, clicks or gallops  Abdomen - soft, nontender, nondistended, no masses or organomegaly  Neurological - alert, oriented, normal speech, no focal findings or movement disorder noted  Musculoskeletal - no joint tenderness, deformity or swelling  Extremities - peripheral pulses normal, no pedal edema, no clubbing or cyanosis  Skin - normal coloration and turgor, no rashes, no suspicious skin lesions noted      No results for input(s): WBC, HGB, HCT, PLT, HGBEXT, HCTEXT, PLTEXT in the last 72 hours. No results for input(s): NA, K, CL, GLU, CO2, BUN, CREA, MG, PHOS, TROIQ, INR, BNPP in the last 72 hours. No lab exists for component: TROIP, INREXT  No results for input(s): PH, PCO2, PO2, HCO3 in the last 72 hours.     Assessment:     Hospital Problems  Date Reviewed: 6/6/2017    None Plan:   Incision and drainage of hip abscess and closure of perirectal wound.       Delta Gaitan,

## 2017-07-31 NOTE — PROGRESS NOTES
TRANSFER - IN REPORT:    Verbal report received from PACU(name) on [place]  being received from PACU(unit) for routine progression of care      Report consisted of patients Situation, Background, Assessment and   Recommendations(SBAR). Information from the following report(s) SBAR, OR Summary, Intake/Output and MAR was reviewed with the receiving nurse. Opportunity for questions and clarification was provided. Assessment completed upon patients arrival to unit and care assumed.

## 2017-07-31 NOTE — BRIEF OP NOTE
BRIEF OPERATIVE NOTE    Date of Procedure: 7/31/2017   Preoperative Diagnosis: Perirectal abscess [K61.1]  Abscess [L02.91]  Postoperative Diagnosis: Perirectal abscess [K61.1]  Abscess [L02.91]    Procedure(s):  PERIRECTAL WOUND CLOSURE  INCISION AND DRAINAGE RIGHT HIP ABSCESS  Surgeon(s) and Role:     * Gilberto Rayo, DO - Primary         Assistant Staff:       Surgical Staff:  Circ-1: Duy Gill RN  Circ-Relief: Lary Valle RN  Scrub Tech-1: Lucie Villa  Event Time In   Incision Start 1204   Incision Close 1224     Anesthesia: General   Estimated Blood Loss: Minimal  Specimens: * No specimens in log *   Findings: Chronic perirectal wound 10 cm by 3cm closed with 2-0 nylon. Perirectal wound significantly improved and clean. Right hip abscess with anterior and posterior proximal femur tracking. Wound irrigated and packed.     Complications: none  Implants: * No implants in log *    Gilberto Rayo, [id number] [facility]

## 2017-07-31 NOTE — OP NOTES
[address]   OPERATIVE REPORT       Name:  Az Mcneill   MR#:  [id number]   :  1985   Account #:  [de-identified]   Date of Adm:  2017       DATE OF SURGERY: 2017. PREOPERATIVE DIAGNOSES:    1. Chronic perirectal wound 10 cm. 2. Right hip abscess. POSTOPERATIVE DIAGNOSES:   1. Chronic perirectal wound 10 cm. 2. Right hip abscess. PROCEDURE:   1. Closure of perirectal wound. 2. Incision and drainage of the hip abscess with a #10 scalpel   blade. ANESTHESIA: General endotracheal.    SURGEON: Sweetie Barba DO.    ASSISTANT: None. COMPLICATIONS: None. DISPOSITION: Stable. COUNTS: Sponge count, needle count, instrument count, all   correct x3. DESCRIPTION OF PROCEDURE: This is a 74-year-old female status   post necrotizing fasciitis and perirectal abscess with wound   problems, she has a cm chronic perirectal wound and she has   developed a new right hip abscess status post closure of her   wound. Consent was obtained by describing the procedure to the   patient, including the potential complications to include   infection, bleeding and possible drain. Consent was obtained,   placed upon the chart. She was administered Ancef 2 grams IV   preoperatively, taken to the operative suite in the supine   position. General anesthesia was initiated without   complications, she was transferred to left lateral decubitus   position and then prepped and draped in the usual sterile   fashion. Time-out was taken to confirm the patient's name,   proper procedure. Following this, the perirectal wound was   explored. The wound measured 10 cm in length and approximately 3   cm wide. It was very superficial, tracking down to and adjacent   to the anus. At this point, we scrubbed the wound bed and then   ablated the tissue using spot cauterization. We then closed the   skin edges using a 2-0 nylon in a vertical mattress fashion.  The   wound had a nice tensionless repair and this portion of the   procedure was concluded. A sterile dressing would be applied at   the completion of the case. Following this, there was a small   opening on the right hip. This area was opened with a #10   scalpel blade. We dissected down and found the abscess cavity   that tracked anteriorly just anterior to the proximal femur and   also posteriorly. This was the same planes that were encountered   on her previous necrotizing fasciitis wounds. The drainage was   clear and serous. The wound was explored in all directions, but   there were 2 main areas, one anterior to the femur and one   tracking posteriorly. At this point, we copiously irrigated with   saline until clear. The wound was packed with a Betadine-soaked   Kerlix dressing. Sterile dressing was applied to both areas and   the patient would be extubated, transferred to recovery stable. FINDINGS: A 61-year-old female with a new right hip abscess,   wound exploration with #10 scalpel blade, and wound packing with   Betadine-soaked Kerlix and a perirectal wound that was chronic   in nature was closed with a 2-0 nylon. She tolerated the   procedure well with no immediate complications. ARVIND Morales DD / Alec Baldwin   D:  07/31/2017   12:33   T:  07/31/2017   14:17   Job #:  [id number]

## 2017-07-31 NOTE — PERIOP NOTES
TRANSFER - OUT REPORT:    Verbal report given to Marisela Hidalgo RN(name) on Eric Cruz  being transferred to Med/Surg(unit) for routine post - op       Report consisted of patients Situation, Background, Assessment and   Recommendations(SBAR). Information from the following report(s) SBAR, Kardex, OR Summary, Procedure Summary, Intake/Output and MAR was reviewed with the receiving nurse. Opportunity for questions and clarification was provided.       Patient transported with:   O2 @ 2 liters  Tech

## 2017-07-31 NOTE — WOUND CARE
Patient seen for wound VAC start to right hip. Updated patient and nurse. Home VAC form completed and given to . Wound bleeding some, may benefit from saline irrigations to help prevent blood clotting the sponge. Staff and patient aware. Home VAC dressings per surgeon order. Wound team will follow while in acute care.

## 2017-07-31 NOTE — IP AVS SNAPSHOT
[address] [place]  
[phone] Patient: Eleanor Stapleton MRN: [id number]  Current Discharge Medication List  
  
CONTINUE these medications which have CHANGED Dose & Instructions Dispensing Information Comments Morning Noon Evening Bedtime  
 insulin lispro 100 unit/mL injection Commonly known as:  HUMALOG What changed:   
- how to take this - when to take this 
- reasons to take this 
- additional instructions Your last dose was: Your next dose is:    
   
   
 Very Insulin Resistant For Blood Sugar (mg/dL) of:             Less than 150 =   0 units 150 -199 =   3 units 200 -249 =   6 units 250 -299 =   9 units 300 -349 =   12 units 350 and above =   15 units and Call Physician Initiate Hypoglycemic protocol if blood glucose is <70 mg/dL. Quantity:  1 Vial  
Refills:  0  
     
   
   
   
  
 * oxyCODONE-acetaminophen 5-325 mg per tablet Commonly known as:  PERCOCET What changed:  Another medication with the same name was added. Make sure you understand how and when to take each. Your last dose was: Your next dose is:    
   
   
 Dose:  2 Tab Take 2 Tabs by mouth every four (4) hours as needed for Pain. Max Daily Amount: 12 Tabs. Quantity:  40 Tab Refills:  0  
     
   
   
   
  
 * oxyCODONE-acetaminophen 5-325 mg per tablet Commonly known as:  PERCOCET What changed: You were already taking a medication with the same name, and this prescription was added. Make sure you understand how and when to take each. Your last dose was: Your next dose is:    
   
   
 Dose:  2 Tab Take 2 Tabs by mouth every four (4) hours as needed for Pain. Max Daily Amount: 12 Tabs. Quantity:  40 Tab Refills:  0  
     
   
   
   
  
 * Notice:   This list has 2 medication(s) that are the same as other medications prescribed for you. Read the directions carefully, and ask your doctor or other care provider to review them with you. CONTINUE these medications which have NOT CHANGED Dose & Instructions Dispensing Information Comments Morning Noon Evening Bedtime  
 amoxicillin-clavulanate 875-125 mg per tablet Commonly known as:  AUGMENTIN Your last dose was: Your next dose is:    
   
   
 Dose:  1 Tab Take 1 Tab by mouth every twelve (12) hours for 7 days. Quantity:  14 Tab Refills:  0  
     
   
   
   
  
 bisacodyl 5 mg EC tablet Commonly known as:  DULCOLAX Your last dose was: Your next dose is:    
   
   
 Dose:  5 mg Take 1 Tab by mouth daily as needed for Constipation. Quantity:  1 Tab Refills:  0  
     
   
   
   
  
 ibuprofen 200 mg Cap Your last dose was: Your next dose is: Take  by mouth as needed. Refills:  0  
     
   
   
   
  
 LASIX 40 mg tablet Generic drug:  furosemide Your last dose was: Your next dose is:    
   
   
 Dose:  40 mg Take 40 mg by mouth daily. Refills:  0  
     
   
   
   
  
 magnesium oxide 400 mg tablet Commonly known as:  MAG-OX Your last dose was: Your next dose is:    
   
   
 Dose:  400 mg Take 400 mg by mouth daily. Refills:  0 Where to Get Your Medications Information on where to get these meds will be given to you by the nurse or doctor. ! Ask your nurse or doctor about these medications  
  amoxicillin-clavulanate 875-125 mg per tablet  
 oxyCODONE-acetaminophen 5-325 mg per tablet

## 2017-07-31 NOTE — ANESTHESIA POSTPROCEDURE EVALUATION
Post-Anesthesia Evaluation and Assessment    Patient: Parul Bae MRN: [id number]  SSN: [id number]    YOB: 1985  Age: 29 y.o. Sex: female       Cardiovascular Function/Vital Signs  Visit Vitals    /75    Pulse 79    Temp 36.7 °C (98 °F)    Resp 16    Ht 5' 2\" (1.575 m)    Wt 78.9 kg (174 lb)    SpO2 100%    BMI 31.83 kg/m2       Patient is status post general anesthesia for Procedure(s):  PERIRECTAL WOUND CLOSURE  INCISION AND DRAINAGE RIGHT HIP ABSCESS. Nausea/Vomiting: None    Postoperative hydration reviewed and adequate. Pain:  Pain Scale 1: Numeric (0 - 10) (07/31/17 1313)  Pain Intensity 1: 4 (07/31/17 1313)   Managed    Neurological Status:   Neuro (WDL): Exceptions to WDL (07/31/17 1240)  Neuro  Neurologic State: Drowsy (07/31/17 1310)  Cognition: Follows commands (07/31/17 1240)  LUE Motor Response: Purposeful (07/31/17 1240)  LLE Motor Response: Purposeful (07/31/17 1240)  RUE Motor Response: Purposeful (07/31/17 1240)  RLE Motor Response: Purposeful (07/31/17 1240)   At baseline    Mental Status and Level of Consciousness: Arousable    Pulmonary Status:   O2 Device: Nasal cannula (07/31/17 1255)   Adequate oxygenation and airway patent    Complications related to anesthesia: None    Post-anesthesia assessment completed.  No concerns    Signed By: Raman Parada MD     July 31, 2017

## 2017-07-31 NOTE — IP AVS SNAPSHOT
Ramy Ramirez 
 
 
 [id number] 
[phone] Patient: Ladarius Wallace MRN: [id number] :[birth year] You are allergic to the following No active allergies Recent Documentation Height Weight BMI OB Status Smoking Status 1.575 m 78.9 kg 31.83 kg/m2 Having regular periods Never Smoker Emergency Contacts Name Discharge Info Relation Home Work Mobile [address] [facility] CAREGIVER [3] Spouse [3]  [phone] Zev Medina CAREGIVER [3] Mother [14]   [phone] About your hospitalization You were admitted on:  2017 You last received care in the:  [address] You were discharged on:  2017 Unit phone number:  [phone] Why you were hospitalized Your primary diagnosis was:  Abscess Of Hip, Right Providers Seen During Your Hospitalizations Provider Role Specialty Primary office phone Gilberto Rayo DO Attending Provider General Surgery [phone] Your Primary Care Physician (PCP) Primary Care Physician Office Phone Office Fax Lazaro Osuna [phone] Follow-up Information Follow up With Details Comments Contact Info Yari Gaitan DO   [facility] [address] [address] 
[phone] Romero Tobin MD   [address] [address] 
[phone] Your Appointments 2017  8:50 AM EDT Global Post Op with Yari Gaitan DO  
[facility] ([facility]) [address] [place] [id number] [phone] Current Discharge Medication List  
  
CONTINUE these medications which have CHANGED Dose & Instructions Dispensing Information Comments Morning Noon Evening Bedtime  
 insulin lispro 100 unit/mL injection Commonly known as:  HUMALOG What changed:   
- how to take this - when to take this 
- reasons to take this 
- additional instructions Your last dose was: Your next dose is:    
   
   
 Very Insulin Resistant For Blood Sugar (mg/dL) of:             Less than 150 =   0 units 150 -199 =   3 units 200 -249 =   6 units 250 -299 =   9 units 300 -349 =   12 units 350 and above =   15 units and Call Physician Initiate Hypoglycemic protocol if blood glucose is <70 mg/dL. Quantity:  1 Vial  
Refills:  0  
     
   
   
   
  
 * oxyCODONE-acetaminophen 5-325 mg per tablet Commonly known as:  PERCOCET What changed:  Another medication with the same name was added. Make sure you understand how and when to take each. Your last dose was: Your next dose is:    
   
   
 Dose:  2 Tab Take 2 Tabs by mouth every four (4) hours as needed for Pain. Max Daily Amount: 12 Tabs. Quantity:  40 Tab Refills:  0  
     
   
   
   
  
 * oxyCODONE-acetaminophen 5-325 mg per tablet Commonly known as:  PERCOCET What changed: You were already taking a medication with the same name, and this prescription was added. Make sure you understand how and when to take each. Your last dose was: Your next dose is:    
   
   
 Dose:  2 Tab Take 2 Tabs by mouth every four (4) hours as needed for Pain. Max Daily Amount: 12 Tabs. Quantity:  40 Tab Refills:  0  
     
   
   
   
  
 * Notice: This list has 2 medication(s) that are the same as other medications prescribed for you. Read the directions carefully, and ask your doctor or other care provider to review them with you. CONTINUE these medications which have NOT CHANGED Dose & Instructions Dispensing Information Comments Morning Noon Evening Bedtime  
 amoxicillin-clavulanate 875-125 mg per tablet Commonly known as:  AUGMENTIN  
   
 Your last dose was: Your next dose is:    
   
   
 Dose:  1 Tab Take 1 Tab by mouth every twelve (12) hours for 7 days. Quantity:  14 Tab Refills:  0  
     
   
   
   
  
 bisacodyl 5 mg EC tablet Commonly known as:  DULCOLAX Your last dose was: Your next dose is:    
   
   
 Dose:  5 mg Take 1 Tab by mouth daily as needed for Constipation. Quantity:  1 Tab Refills:  0  
     
   
   
   
  
 ibuprofen 200 mg Cap Your last dose was: Your next dose is: Take  by mouth as needed. Refills:  0  
     
   
   
   
  
 LASIX 40 mg tablet Generic drug:  furosemide Your last dose was: Your next dose is:    
   
   
 Dose:  40 mg Take 40 mg by mouth daily. Refills:  0  
     
   
   
   
  
 magnesium oxide 400 mg tablet Commonly known as:  MAG-OX Your last dose was: Your next dose is:    
   
   
 Dose:  400 mg Take 400 mg by mouth daily. Refills:  0 Where to Get Your Medications Information on where to get these meds will be given to you by the nurse or doctor. ! Ask your nurse or doctor about these medications  
  amoxicillin-clavulanate 875-125 mg per tablet  
 oxyCODONE-acetaminophen 5-325 mg per tablet Discharge Instructions DISCHARGE SUMMARY from Nurse The following personal items are in your possession at time of discharge: 
 
Dental Appliances: None Visual Aid: None PATIENT INSTRUCTIONS: 
 
After general anesthesia or intravenous sedation, for 24 hours or while taking prescription Narcotics: · Limit your activities · Do not drive and operate hazardous machinery · Do not make important personal or business decisions · Do  not drink alcoholic beverages · If you have not urinated within 8 hours after discharge, please contact your surgeon on call. Report the following to your surgeon: · Excessive pain, swelling, redness or odor of or around the surgical area · Temperature over 100.5 · Nausea and vomiting lasting longer than 4 hours or if unable to take medications · Any signs of decreased circulation or nerve impairment to extremity: change in color, persistent  numbness, tingling, coldness or increase pain · Any questions What to do at Home: 
Recommended activity: Ambulate in house, If you experience any of the above symptoms please notify MD. 
 
 
*  Please give a list of your current medications to your Primary Care Provider. *  Please update this list whenever your medications are discontinued, doses are 
    changed, or new medications (including over-the-counter products) are added. *  Please carry medication information at all times in case of emergency situations. These are general instructions for a healthy lifestyle: No smoking/ No tobacco products/ Avoid exposure to second hand smoke Surgeon General's Warning:  Quitting smoking now greatly reduces serious risk to your health. Obesity, smoking, and sedentary lifestyle greatly increases your risk for illness A healthy diet, regular physical exercise & weight monitoring are important for maintaining a healthy lifestyle You may be retaining fluid if you have a history of heart failure or if you experience any of the following symptoms:  Weight gain of 3 pounds or more overnight or 5 pounds in a week, increased swelling in our hands or feet or shortness of breath while lying flat in bed. Please call your doctor as soon as you notice any of these symptoms; do not wait until your next office visit. Recognize signs and symptoms of STROKE: 
 
F-face looks uneven A-arms unable to move or move unevenly S-speech slurred or non-existent T-time-call 911 as soon as signs and symptoms begin-DO NOT go Back to bed or wait to see if you get better-TIME IS BRAIN. Warning Signs of HEART ATTACK Call 911 if you have these symptoms: 
? Chest discomfort. Most heart attacks involve discomfort in the center of the chest that lasts more than a few minutes, or that goes away and comes back. It can feel like uncomfortable pressure, squeezing, fullness, or pain. ? Discomfort in other areas of the upper body. Symptoms can include pain or discomfort in one or both arms, the back, neck, jaw, or stomach. ? Shortness of breath with or without chest discomfort. ? Other signs may include breaking out in a cold sweat, nausea, or lightheadedness. Don't wait more than five minutes to call [address]! Fast action can save your life. Calling 911 is almost always the fastest way to get lifesaving treatment. Emergency Medical Services staff can begin treatment when they arrive  up to an hour sooner than if someone gets to the hospital by car. The discharge information has been reviewed with the patient. The patient verbalized understanding. Discharge medications reviewed with the patient and appropriate educational materials and side effects teaching were provided. Discharge Instructions Attachments/References ABSCESS: SKIN (ENGLISH) HAND-WASHING (ENGLISH) SECONDHAND SMOKE (ENGLISH) Discharge Orders None [email or web address] Announcement We are excited to announce that we are making your provider's discharge notes available to you in Userlike Live Chat. You will see these notes when they are completed and signed by the physician that discharged you from your recent hospital stay. If you have any questions or concerns about any information you see in User[employer] Live Chat, please call the Health Information Department where you were seen or reach out to your Primary Care Provider for more information about your plan of care. Introducing [facility]! Dear Braxton Knutson: 
Thank you for requesting a User[employer] Live Chat account.   Our records indicate that you already have an active [employer] account. You can access your account anytime at [email or web address] Did you know that you can access your hospital and ER discharge instructions at any time in [employer]? You can also review all of your test results from your hospital stay or ER visit. Additional Information If you have questions, please visit the Frequently Asked Questions section of the [employer] website at [email or web address] Remember, [employer] is NOT to be used for urgent needs. For medical emergencies, dial 911. Now available from your iPhone and Android! General Information Please provide this summary of care documentation to your next provider. Patient Signature:  ____________________________________________________________ Date:  ____________________________________________________________  
  
Leonid Finger Provider Signature:  ____________________________________________________________ Date:  ____________________________________________________________ More Information Skin Abscess: Care Instructions Your Care Instructions A skin abscess is a bacterial infection that forms a pocket of pus. A boil is a kind of skin abscess. The doctor may have cut an opening in the abscess so that the pus can drain out. You may have gauze in the cut so that the abscess will stay open and keep draining. You may need antibiotics. You will need to follow up with your doctor to make sure the infection has gone away. The doctor has checked you carefully, but problems can develop later. If you notice any problems or new symptoms, get medical treatment right away. Follow-up care is a key part of your treatment and safety. Be sure to make and go to all appointments, and call your doctor if you are having problems. It's also a good idea to know your test results and keep a list of the medicines you take. How can you care for yourself at home? · Apply warm and dry compresses, a heating pad set on low, or a hot water bottle 3 or 4 times a day for pain. Keep a cloth between the heat source and your skin. · If your doctor prescribed antibiotics, take them as directed. Do not stop taking them just because you feel better. You need to take the full course of antibiotics. · Take pain medicines exactly as directed. ¨ If the doctor gave you a prescription medicine for pain, take it as prescribed. ¨ If you are not taking a prescription pain medicine, ask your doctor if you can take an over-the-counter medicine. · Keep your bandage clean and dry. Change the bandage whenever it gets wet or dirty, or at least one time a day. · If the abscess was packed with gauze: 
¨ Keep follow-up appointments to have the gauze changed or removed. If the doctor instructed you to remove the gauze, gently pull out all of the gauze when your doctor tells you to. ¨ After the gauze is removed, soak the area in warm water for 15 to 20 minutes 2 times a day, until the wound closes. When should you call for help? Call your doctor now or seek immediate medical care if: 
· You have signs of worsening infection, such as: 
¨ Increased pain, swelling, warmth, or redness. ¨ Red streaks leading from the infected skin. ¨ Pus draining from the wound. ¨ A fever. Watch closely for changes in your health, and be sure to contact your doctor if: 
· You do not get better as expected. Where can you learn more? Go to http://shelby-anastacio.info/. Enter B358 in the search box to learn more about \"Skin Abscess: Care Instructions. \" Current as of: October 13, 2016 Content Version: 11.3 © 9187-7301 [employer]. Care instructions adapted under license by [employer] (which disclaims liability or warranty for this information).  If you have questions about a medical condition or this instruction, always ask your healthcare professional. [address] any warranty or liability for your use of this information. Hand-Washing: Care Instructions Your Care Instructions It is important for caregivers to wash their hands properly. This is the single best way to prevent the spread of infections. Hand-washing can help keep you from getting sick. It is easy, doesn't cost much, and it works. Make sure that you and your caregivers follow safe hand-washing routines. Caregivers may include health care workers or family members at home or in a care facility. You can talk to them about this information on hand-washing. Follow-up care is a key part of your treatment and safety. Be sure to make and go to all appointments, and call your doctor if you are having problems. It's also a good idea to know your test results and keep a list of the medicines you take. How can you care for yourself at home? · Caregivers should wash their hands with soap and water: ¨ When their hands are dirty, especially after being exposed to body fluids. This includes blood. ¨ When their hands may have been exposed to germs that could spread infection. ¨ After they touch broken skin, sores, or wound bandages. ¨ After they use the bathroom. · At other times, caregivers can use an alcohol-based gel  or soap and water to clean hands. This should be done: ¨ Before and after any contact with you. ¨ After they take off gloves. ¨ Before they handle a device that touches your body (even if gloves are used). ¨ After they touch any objects near you, such as medical equipment, lights, or doorknobs. ¨ Before they handle medicine or prepare food. Proper hand-washing for caregivers · When using an alcohol-based gel , fill your palm with the gel. Then spread it all over your hands. Rub your hands together until they are dry. · When washing hands with soap and water: ¨ Get your hands wet. Then use enough soap to cover your whole hands. ¨ Rub your hands together hard, in a Mcgrath. Be sure that you cover all surfaces. Rub your wrists, the backs of your hands, between your fingers, and under your fingernails. Wash your hands for at least 30 seconds. ¨ Rinse your hands with water. But don't use hot water. It may irritate your hands. ¨ Use a paper towel to hold the faucet handle when you turn off the water. ¨ Dry your hands well with a paper towel. Don't use towels that have been used by others or used more than once. · If you use bar soap, use small bars. Set the soap on a rack that lets water drain. Where can you learn more? Go to http://shelby-anastacio.info/. Enter V248 in the search box to learn more about \"Hand-Washing: Care Instructions. \" Current as of: March 3, 2017 Content Version: 11.3 © 1021-1089 [employer]. Care instructions adapted under license by [employer] (which disclaims liability or warranty for this information). If you have questions about a medical condition or this instruction, always ask your healthcare professional. [address] [address] any warranty or liability for your use of this information. Secondhand Smoke: Care Instructions Your Care Instructions Secondhand smoke comes from the burning end of a cigarette, cigar, or pipe and the smoke that a smoker exhales. The smoke contains nicotine and many other harmful chemicals. Breathing secondhand smoke can cause or worsen health problems including cancer, asthma, coronary artery disease, and respiratory infections. It can make your eyes and nose burn and cause a sore throat. Secondhand smoke is especially bad for babies and young children whose lungs are still developing.  Babies whose parents smoke are more likely to have ear infections, pneumonia, and bronchitis in the first few years of their lives. Secondhand smoke can make asthma symptoms worse in children. If you are pregnant, it is important that you not smoke and that you avoid secondhand smoke. You are more likely to give birth to a baby who weighs less than expected (low birth weight) if you smoke. And your baby may have a greater risk for sudden infant death syndrome (SIDS). Babies whose mothers are exposed to secondhand smoke during pregnancy have a higher risk for health problems. Follow-up care is a key part of your treatment and safety. Be sure to make and go to all appointments, and call your doctor if you are having problems. It's also a good idea to know your test results and keep a list of the medicines you take. How can you care for yourself at home? · Do not smoke or let anyone else smoke in your home. If people must smoke, ask them to go outside. · If people do smoke in your home, choose a room where you can open a window or use a fan to get the smoke outside. · Do not let anyone smoke in your car. If someone must smoke, pull over in a safe place and let him or her smoke away from the car. · Ask your employer to make sure that you have a smoke-free work area. · Make sure that your children are not exposed to secondhand smoke at day care, school, and after-school programs. · Try to choose nonsmoking bars, restaurants, and other public places when you go out. · Help your family and friends who smoke to quit by encouraging them to try. Tell them about treatment resources. Having support from others often helps. · If you smoke, quit. Quitting is hard, but there are ways to boost your chance of quitting tobacco for good. ¨ Use nicotine gum, patches, or lozenges. Call a quitline. Ask your doctor about stop-smoking programs and medicines. ¨ Keep trying. When should you call for help? Watch closely for changes in your health, and be sure to contact your doctor if you have any problems. Where can you learn more? Go to http://shelby-anastacio.info/. Enter L004 in the search box to learn more about \"Secondhand Smoke: Care Instructions. \" Current as of: March 20, 2017 Content Version: 11.3 © 3148-5593 [employer]. Care instructions adapted under license by [employer] (which disclaims liability or warranty for this information). If you have questions about a medical condition or this instruction, always ask your healthcare professional. [address] any warranty or liability for your use of this information.

## 2017-07-31 NOTE — ANESTHESIA PREPROCEDURE EVALUATION
Anesthetic History   No history of anesthetic complications            Review of Systems / Medical History  Patient summary reviewed and pertinent labs reviewed    Pulmonary  Within defined limits                 Neuro/Psych   Within defined limits           Cardiovascular                  Exercise tolerance: >4 METS  Comments: Tachycardia   GI/Hepatic/Renal         Renal disease: ARF       Endo/Other    Diabetes         Other Findings   Comments: Alcoholic pancreatitis  Chlamydia  Herpes  Pelvic mass           Physical Exam    Airway  Mallampati: I  TM Distance: 4 - 6 cm  Neck ROM: normal range of motion   Mouth opening: Normal     Cardiovascular  Regular rate and rhythm,  S1 and S2 normal,  no murmur, click, rub, or gallop  Rhythm: regular  Rate: normal         Dental  No notable dental hx       Pulmonary  Breath sounds clear to auscultation               Abdominal  GI exam deferred       Other Findings            Anesthetic Plan    ASA: 2  Anesthesia type: general          Induction: Intravenous  Anesthetic plan and risks discussed with: Patient

## 2017-07-31 NOTE — PROGRESS NOTES
Assisted pt to bathroom, pt able to void but missed hat. Rectal dressing saturated with urine, are cleaned dressing changed.

## 2017-07-31 NOTE — PROGRESS NOTES
07/31/17 1638   Oxygen Therapy   O2 Sat (%) 100 %   Pulse via Oximetry 114 beats per minute   O2 Device Room air   O2 Flow Rate (L/min) 0 l/min   Pt instructed in the use of Incentive Spirometry. No distress noted

## 2017-08-01 ENCOUNTER — HOME HEALTH ADMISSION (OUTPATIENT)
Dept: HOME HEALTH SERVICES | Facility: HOME HEALTH | Age: 32
End: 2017-08-01
Payer: SELF-PAY

## 2017-08-01 LAB
EST. AVERAGE GLUCOSE BLD GHB EST-MCNC: 120 MG/DL
GLUCOSE BLD STRIP.AUTO-MCNC: 120 MG/DL (ref 65–100)
GLUCOSE BLD STRIP.AUTO-MCNC: 121 MG/DL (ref 65–100)
HBA1C MFR BLD: 5.8 % (ref 4.8–6)

## 2017-08-01 PROCEDURE — 36416 COLLJ CAPILLARY BLOOD SPEC: CPT | Performed by: SURGERY

## 2017-08-01 PROCEDURE — 74011000258 HC RX REV CODE- 258: Performed by: SURGERY

## 2017-08-01 PROCEDURE — 74011250637 HC RX REV CODE- 250/637: Performed by: SURGERY

## 2017-08-01 PROCEDURE — 74750000023 HC WOUND THERAPY

## 2017-08-01 PROCEDURE — 99218 HC RM OBSERVATION: CPT

## 2017-08-01 PROCEDURE — 82962 GLUCOSE BLOOD TEST: CPT

## 2017-08-01 PROCEDURE — 74011250636 HC RX REV CODE- 250/636: Performed by: SURGERY

## 2017-08-01 PROCEDURE — 83036 HEMOGLOBIN GLYCOSYLATED A1C: CPT | Performed by: SURGERY

## 2017-08-01 RX ORDER — OXYCODONE AND ACETAMINOPHEN 5; 325 MG/1; MG/1
1 TABLET ORAL ONCE
Status: COMPLETED | OUTPATIENT
Start: 2017-08-01 | End: 2017-08-01

## 2017-08-01 RX ORDER — OXYCODONE AND ACETAMINOPHEN 10; 325 MG/1; MG/1
1 TABLET ORAL
Status: DISCONTINUED | OUTPATIENT
Start: 2017-08-01 | End: 2017-08-04 | Stop reason: HOSPADM

## 2017-08-01 RX ADMIN — PIPERACILLIN SODIUM,TAZOBACTAM SODIUM 3.38 G: 3; .375 INJECTION, POWDER, FOR SOLUTION INTRAVENOUS at 07:26

## 2017-08-01 RX ADMIN — PIPERACILLIN SODIUM,TAZOBACTAM SODIUM 3.38 G: 3; .375 INJECTION, POWDER, FOR SOLUTION INTRAVENOUS at 14:17

## 2017-08-01 RX ADMIN — PIPERACILLIN SODIUM,TAZOBACTAM SODIUM 3.38 G: 3; .375 INJECTION, POWDER, FOR SOLUTION INTRAVENOUS at 22:38

## 2017-08-01 RX ADMIN — KETOROLAC TROMETHAMINE 30 MG: 30 INJECTION, SOLUTION INTRAMUSCULAR at 11:44

## 2017-08-01 RX ADMIN — ENOXAPARIN SODIUM 40 MG: 40 INJECTION SUBCUTANEOUS at 21:13

## 2017-08-01 RX ADMIN — HYDROMORPHONE HYDROCHLORIDE 1 MG: 1 INJECTION, SOLUTION INTRAMUSCULAR; INTRAVENOUS; SUBCUTANEOUS at 19:58

## 2017-08-01 RX ADMIN — HYDROMORPHONE HYDROCHLORIDE 1 MG: 1 INJECTION, SOLUTION INTRAMUSCULAR; INTRAVENOUS; SUBCUTANEOUS at 03:25

## 2017-08-01 RX ADMIN — Medication 10 ML: at 05:54

## 2017-08-01 RX ADMIN — HYDROMORPHONE HYDROCHLORIDE 1 MG: 1 INJECTION, SOLUTION INTRAMUSCULAR; INTRAVENOUS; SUBCUTANEOUS at 14:17

## 2017-08-01 RX ADMIN — OXYCODONE HYDROCHLORIDE AND ACETAMINOPHEN 1 TABLET: 10; 325 TABLET ORAL at 17:36

## 2017-08-01 RX ADMIN — OXYCODONE AND ACETAMINOPHEN 1 TABLET: 5; 325 TABLET ORAL at 11:42

## 2017-08-01 RX ADMIN — HYDROMORPHONE HYDROCHLORIDE 1 MG: 1 INJECTION, SOLUTION INTRAMUSCULAR; INTRAVENOUS; SUBCUTANEOUS at 07:26

## 2017-08-01 RX ADMIN — Medication 10 ML: at 14:14

## 2017-08-01 RX ADMIN — OXYCODONE AND ACETAMINOPHEN 1 TABLET: 5; 325 TABLET ORAL at 10:24

## 2017-08-01 RX ADMIN — Medication 10 ML: at 11:45

## 2017-08-01 RX ADMIN — SODIUM CHLORIDE AND POTASSIUM CHLORIDE: 9; 1.49 INJECTION, SOLUTION INTRAVENOUS at 02:33

## 2017-08-01 NOTE — PROGRESS NOTES
Assessment complete via flow sheet. Pt A&Ox3. Assisted pt up to bathroom, pt tolerated well. Right perirectal dressing changed. Respirations even and unlabored, CTA. S1 S2 auscultated. Pt on room air. Pt reports pain level of 5 out of 10, dilaudid 1 mg IVPS. Bowel sounds active, abdomen soft, pt has established ostomy LUQ. Denies other needs. Bed in lowest position, side rails up 3, call bell in reach. Instructed to call for assistance. Pt verbalized understanding. Family at bedside, plan of care reviewed with patient.

## 2017-08-01 NOTE — PROGRESS NOTES
Problem: Interdisciplinary Rounds  Goal: Interdisciplinary Rounds  Interdisciplinary team rounds were held 8/1/2017 with the following team members:Care Management, Nursing, Nutrition, Pharmacy and Physician and the patient. Plan of care discussed. See clinical pathway and/or care plan for interventions and desired outcomes.

## 2017-08-01 NOTE — PROGRESS NOTES
[address]  [address] [facility], [address] [place] [address], [address]      PLAN:Consult Physical Therapy  Home wound vac ordered  Home health evaluation. Percocet 10 one PO Q 4 hours prn pain  Continue Zosyn      ASSESSMENT:  Admit Date: 7/31/2017   1 Day Post-Op  Procedure(s):  PERIRECTAL WOUND CLOSURE  INCISION AND DRAINAGE RIGHT HIP ABSCESS    Principal Problem:    Abscess of hip, right (7/31/2017)         SUBJECTIVE:Pain controlled with Percocet 10. Wound vac arrangements. Patient wants to go home. OBJECTIVE:  Constitutional: Alert oriented cooperative patient in no acute distress; appears stated age   Visit Vitals    BP (!) 142/91 (BP 1 Location: Right arm, BP Patient Position: At rest)  Comment: RN notified    Pulse (!) 105  Comment: RN notified    Temp 98.1 °F (36.7 °C)    Resp 18    Ht 5' 2\" (1.575 m)    Wt 174 lb (78.9 kg)    LMP 06/17/2017    SpO2 97%    BMI 31.83 kg/m2     Eyes:Sclera are clear. ENMT: no external lesions gross hearing normal; no obvious neck masses, no ear or lip lesions  CV: RRR. Normal perfusion  Resp: No JVD. Breathing is  non-labored; no audible wheezing. GI: soft. Musculoskeletal: unremarkable with normal function. No embolic signs or cyanosis. Neuro:  Oriented; moves all 4; no focal deficits  Psychiatric: normal affect and mood, no memory impairment  Wound: vac in place. No drainage or leak.     Patient Vitals for the past 24 hrs:   BP Temp Pulse Resp SpO2   08/01/17 1133 (!) 142/91 98.1 °F (36.7 °C) (!) 105 18 97 %   08/01/17 0745 145/90 98.8 °F (37.1 °C) (!) 110 20 98 %   07/31/17 2220 144/80 98.7 °F (37.1 °C) (!) 107 20 100 %   07/31/17 2000 137/75 98.2 °F (36.8 °C) (!) 105 18 100 %   07/31/17 1638 - - - - 100 %   07/31/17 1340 133/81 - 81 14 100 %   07/31/17 1325 128/76 - 76 14 100 %     Labs:  No results for input(s): WBC, HGB, PLT, NA, K, CL, CO2, BUN, CREA, GLU, PTP, INR, APTT, TBIL, TBILI, CBIL, SGOT, GPT, ALT, AP, AML, LPSE, LCAD, NH4, TROPT, TROIQ, HGBEXT, PLTEXT in the last 72 hours.     No lab exists for component: INREXT      Mariaa Gaitan, DO

## 2017-08-01 NOTE — PROGRESS NOTES
Shift assessment  Complete. Patient alert and oriented times three. Patient breath sounds clear and non-labored. Bowel sounds slow and active in all 4 quadrants. Denies pain at this time. 18G IV in left hand infusing at this time. Bed in low position, call light within reach. Will monitor.

## 2017-08-01 NOTE — PROGRESS NOTES
Care Management Interventions  Mode of Transport at Discharge: Other (see comment)  Transition of Care Consult (CM Consult): [address]: Yes  Current Support Network: Lives with Spouse  Discharge Location  Discharge Placement: Home with home health    Wound vac arrived this am. Luis Fernando asked to accompany rep while in pt's room. Luis Fernando informed pt that hhc Rn would change dressing Mon, Wed, and Friday. Luis Fernando informed Md that pt's dressing would need to be changed at hospital on Friday and pt would need to be connected to her at home wound vac and then she could be d/c. [facility] prepared to see her Monday. Reyna Woodruff received call this am from [facility] rep. Pt has been approved for tim wound vac. Luis Fernando requested vac be delivered by the end of day tomorrow as pt's d/c is planned for Friday and hospital staff needs to put wound vac on so hhc Rn will be able to wait till Mon to see pt at home and change vac dressing. Sw to cont to follow and assist. Jeni aMhoney/JAYLIN      Referral per MD.  Pt needs home wound vac. Pt is self-pay. LUIS FERNANDO spoke with pt who is A&O. Per pt neither her nor her  are currently employed. Pt completed the [facility] application form. LUIS FERNANDO discussed process of home wound vac and [facility]. LUIS FERNANDO made referral to [facility], per Hortensia Dow [facility] would be able to see pt on Monday Aug 7th for start of care. Pt could d/c on Friday ( if has home wound vac approved ) after dressing change and will be seen by [facility] on Monday. LUIS FERNANDO faxed clinical & orders to [facility].

## 2017-08-02 LAB
GLUCOSE BLD STRIP.AUTO-MCNC: 101 MG/DL (ref 65–100)
GLUCOSE BLD STRIP.AUTO-MCNC: 130 MG/DL (ref 65–100)
GLUCOSE BLD STRIP.AUTO-MCNC: 134 MG/DL (ref 65–100)
GLUCOSE BLD STRIP.AUTO-MCNC: 144 MG/DL (ref 65–100)

## 2017-08-02 PROCEDURE — 99218 HC RM OBSERVATION: CPT

## 2017-08-02 PROCEDURE — 74011250636 HC RX REV CODE- 250/636: Performed by: SURGERY

## 2017-08-02 PROCEDURE — 74750000023 HC WOUND THERAPY

## 2017-08-02 PROCEDURE — 82962 GLUCOSE BLOOD TEST: CPT

## 2017-08-02 PROCEDURE — 74011250637 HC RX REV CODE- 250/637: Performed by: SURGERY

## 2017-08-02 PROCEDURE — 97605 NEG PRS WND THER DME<=50SQCM: CPT

## 2017-08-02 PROCEDURE — 77030010522

## 2017-08-02 PROCEDURE — 97162 PT EVAL MOD COMPLEX 30 MIN: CPT

## 2017-08-02 PROCEDURE — 74011000258 HC RX REV CODE- 258: Performed by: SURGERY

## 2017-08-02 PROCEDURE — 77030019934 HC DRSG VAC ASST KCON -B

## 2017-08-02 RX ADMIN — Medication 10 ML: at 14:42

## 2017-08-02 RX ADMIN — Medication 10 ML: at 21:44

## 2017-08-02 RX ADMIN — OXYCODONE HYDROCHLORIDE AND ACETAMINOPHEN 1 TABLET: 10; 325 TABLET ORAL at 07:14

## 2017-08-02 RX ADMIN — PIPERACILLIN SODIUM,TAZOBACTAM SODIUM 3.38 G: 3; .375 INJECTION, POWDER, FOR SOLUTION INTRAVENOUS at 14:41

## 2017-08-02 RX ADMIN — OXYCODONE HYDROCHLORIDE AND ACETAMINOPHEN 1 TABLET: 10; 325 TABLET ORAL at 11:17

## 2017-08-02 RX ADMIN — OXYCODONE HYDROCHLORIDE AND ACETAMINOPHEN 1 TABLET: 10; 325 TABLET ORAL at 19:41

## 2017-08-02 RX ADMIN — HYDROMORPHONE HYDROCHLORIDE 1 MG: 1 INJECTION, SOLUTION INTRAMUSCULAR; INTRAVENOUS; SUBCUTANEOUS at 00:13

## 2017-08-02 RX ADMIN — Medication 10 ML: at 00:13

## 2017-08-02 RX ADMIN — PIPERACILLIN SODIUM,TAZOBACTAM SODIUM 3.38 G: 3; .375 INJECTION, POWDER, FOR SOLUTION INTRAVENOUS at 07:14

## 2017-08-02 RX ADMIN — PIPERACILLIN SODIUM,TAZOBACTAM SODIUM 3.38 G: 3; .375 INJECTION, POWDER, FOR SOLUTION INTRAVENOUS at 23:26

## 2017-08-02 RX ADMIN — OXYCODONE HYDROCHLORIDE AND ACETAMINOPHEN 1 TABLET: 10; 325 TABLET ORAL at 15:37

## 2017-08-02 RX ADMIN — HYDROMORPHONE HYDROCHLORIDE 1 MG: 1 INJECTION, SOLUTION INTRAMUSCULAR; INTRAVENOUS; SUBCUTANEOUS at 12:49

## 2017-08-02 RX ADMIN — HYDROMORPHONE HYDROCHLORIDE 1 MG: 1 INJECTION, SOLUTION INTRAMUSCULAR; INTRAVENOUS; SUBCUTANEOUS at 21:37

## 2017-08-02 RX ADMIN — Medication 10 ML: at 05:38

## 2017-08-02 RX ADMIN — HYDROMORPHONE HYDROCHLORIDE 1 MG: 1 INJECTION, SOLUTION INTRAMUSCULAR; INTRAVENOUS; SUBCUTANEOUS at 08:24

## 2017-08-02 RX ADMIN — HYDROMORPHONE HYDROCHLORIDE 1 MG: 1 INJECTION, SOLUTION INTRAMUSCULAR; INTRAVENOUS; SUBCUTANEOUS at 04:19

## 2017-08-02 RX ADMIN — ENOXAPARIN SODIUM 40 MG: 40 INJECTION SUBCUTANEOUS at 21:37

## 2017-08-02 RX ADMIN — HYDROMORPHONE HYDROCHLORIDE 1 MG: 1 INJECTION, SOLUTION INTRAMUSCULAR; INTRAVENOUS; SUBCUTANEOUS at 16:55

## 2017-08-02 NOTE — PROGRESS NOTES
AM Assessment. Pt. A/O X4. Lungs clear. Respirations even and unlabored. Abdomen soft and non tender. Bowel sounds active X4 quads. Wound vac on right hip noted. Dry dressing on right buttocks CDI. Pt. Has no complaints at this time. Call light within reach.

## 2017-08-02 NOTE — WOUND CARE
Wound VAC dressing change to right hip wound, wound base pink granulating, tunnel unable to fully visualize base, [id number]. Will monitor.

## 2017-08-02 NOTE — PROGRESS NOTES
Problem: Mobility Impaired (Adult and Pediatric)  Goal: *Acute Goals and Plan of Care (Insert Text)  STG:  (1.)Ms. Nazanin Cisse will move from supine to sit and sit to supine with INDEPENDENT within 1-2 day(s). (2.)Ms. Nazanin Cisse will transfer from bed to chair and chair to bed with INDEPENDENT using the least restrictive device within 1-2 day(s). (3.)Ms. Nazanin Cisse will ambulate with INDEPENDENT for  feet with the least restrictive device within 1-2 day(s). Outcome: Progressing Towards Goal      PHYSICAL THERAPY: INITIAL ASSESSMENT 8/2/2017  OBSERVATION: Hospital Day: 3  Payor: SELF PAY / Plan: [facility] SELF PAY / Product Type: Self Pay /      NAME/AGE/GENDER: Dave Mcknight is a 29 y.o. female    PRIMARY DIAGNOSIS: Perirectal abscess [K61.1]  Abscess [L02.91] Abscess of hip, right Abscess of hip, right  Procedure(s) (LRB):  PERIRECTAL WOUND CLOSURE (N/A)  INCISION AND DRAINAGE RIGHT HIP ABSCESS (Right)  2 Days Post-Op  ICD-10: Treatment Diagnosis:       · Difficulty in walking, Not elsewhere classified (R26.2)  · Other abnormalities of gait and mobility (R26.89)   Precaution/Allergies:  Review of patient's allergies indicates no known allergies. ASSESSMENT:      Ms. Nazanin Cisse presents with decreased independence with bed mobility, transfers, gait, and therapeutic exercise. Therapy will maximize independence with functional mobility. Pt's main concern is apparent foot drop in right lower extremity. This section established at most recent assessment   PROBLEM LIST (Impairments causing functional limitations):  1. Decreased Strength  2. Decreased ADL/Functional Activities  3. Decreased Ambulation Ability/Technique  4. Decreased Balance  5. Increased Pain  6. Decreased Activity Tolerance  7. Decreased Flexibility/Joint Mobility    INTERVENTIONS PLANNED: (Benefits and precautions of physical therapy have been discussed with the patient.)  1. Bed Mobility  2. Gait Training  3.  Therapeutic Activites  4. Therapeutic Exercise/Strengthening  5. Transfer Training  6. Group Therapy      TREATMENT PLAN: Frequency/Duration: daily for duration of hospital stay  Rehabilitation Potential For Stated Goals: GOOD      RECOMMENDED REHABILITATION/EQUIPMENT: (at time of discharge pending progress): Home Health: Physical Therapy. HISTORY:   History of Present Injury/Illness (Reason for Referral):  Decreased mobility ability  Past Medical History/Comorbidities:   Ms. Caren Wilburn  has a past medical history of Acute kidney injury ([address] Alpha-streptococcal sepsis ([address] Candida glabrata infection; Chlamydia; DKA (diabetic ketoacidoses) ([address] Elevated liver enzymes; Escherichia coli infection; Herpes simplex; History of creation of ostomy; Hypernatremia; Hypophosphatemia; Leukocytosis; Necrotizing fasciitis ([address] Pancreatitis; Pelvic abscess in female; Rhabdomyolysis; Septic shock ([address] and Type 2 diabetes mellitus ([address] She also has no past medical history of Adverse effect of anesthesia; Aneurysm ([address] Arrhythmia; Arthritis; Asthma; Autoimmune disease ([address] CAD (coronary artery disease); Chronic kidney disease; Chronic obstructive pulmonary disease ([address] Chronic pain; Coagulation disorder ([address] Difficult intubation; Endocarditis; GERD (gastroesophageal reflux disease); Heart failure ([address] Hypertension; Ill-defined condition; Liver disease; Malignant hyperthermia due to anesthesia; Morbid obesity ([address] Nausea & vomiting; Nicotine vapor product user; Non-nicotine vapor product user; Pseudocholinesterase deficiency; Psychiatric disorder; PUD (peptic ulcer disease); Rheumatic fever; Seizures ([address] Sleep apnea; Stroke [facility]); Thromboembolus ([address] or Thyroid disease. Ms. Caren Wilburn  has a past surgical history that includes other surgical; colostomy (06/02/2017); and other surgical (05/2017-06/2017).   Social History/Living Environment:   Home Environment: Private residence  # Steps to Enter: 8  One/Two Story Residence: One story  Living Alone: No  Support Systems: Spouse/Significant Other/Partner  Patient Expects to be Discharged to[de-identified] Private residence  Current DME Used/Available at Home: None  Tub or Shower Type: Tub/Shower combination  Prior Level of Function/Work/Activity:  Independent with ambulation and ADLs. Number of Personal Factors/Comorbidities that affect the Plan of Care: 1-2: MODERATE COMPLEXITY   EXAMINATION:   Most Recent Physical Functioning:   Gross Assessment:  AROM: Within functional limits  Strength: Within functional limits  Coordination: Within functional limits               Posture:  Posture (WDL): Within defined limits  Balance:  Sitting: Intact  Standing: Intact Bed Mobility:  Supine to Sit: Supervision  Wheelchair Mobility:     Transfers:  Sit to Stand: Independent  Stand to Sit: Independent  Gait:     Gait Abnormalities: Foot drop (right foot drop)  Distance (ft):  (75)  Assistive Device:  (IV pole)  Ambulation - Level of Assistance: Modified independent       Body Structures Involved:  1. Bones  2. Joints  3. Muscles Body Functions Affected:  1. Neuromusculoskeletal  2. Movement Related Activities and Participation Affected:  1. Mobility   Number of elements that affect the Plan of Care: 4+: HIGH COMPLEXITY   CLINICAL PRESENTATION:   Presentation: Evolving clinical presentation with changing clinical characteristics: MODERATE COMPLEXITY   CLINICAL DECISION MAKIN [facility] Inpatient Short Form  How much difficulty does the patient currently have. .. Unable A Lot A Little None   1. Turning over in bed (including adjusting bedclothes, sheets and blankets)? [ ] 1   [ ] 2   [ ] 3   [X] 4   2. Sitting down on and standing up from a chair with arms ( e.g., wheelchair, bedside commode, etc.)   [ ] 1   [ ] 2   [ ] 3   [X] 4   3. Moving from lying on back to sitting on the side of the bed?    [ ] 1   [ ] 2   [ ] 3   [X] 4   How much help from another person does the patient currently need. .. Total A Lot A Little None   4. Moving to and from a bed to a chair (including a wheelchair)? [ ] 1   [ ] 2   [X] 3   [ ] 4   5. Need to walk in hospital room? [ ] 1   [ ] 2   [X] 3   [ ] 4   6. Climbing 3-5 steps with a railing? [ ] 1   [ ] 2   [X] 3   [ ] 4   © 2007, Trustees of [address] [place] [address] [address] [id number], under license to [employer]. All rights reserved    Score:  Initial: 21 Most Recent: X (Date: -- )     Interpretation of Tool:  Represents activities that are increasingly more difficult (i.e. Bed mobility, Transfers, Gait). Score 24 23 22-20 19-15 14-10 9-7 6       Modifier CH CI CJ CK CL CM CN         · Mobility - Walking and Moving Around:               - CURRENT STATUS:    CJ - 20%-39% impaired, limited or restricted               - GOAL STATUS:           CJ - 20%-39% impaired, limited or restricted               - D/C STATUS:                       CJ - 20%-39% impaired, limited or restricted  Payor: SELF PAY / Plan: [facility] SELF PAY / Product Type: Self Pay /       Medical Necessity:     · Skilled intervention continues to be required due to decreased mobility ability. Reason for Services/Other Comments:  · Patient continues to require skilled intervention due to decreased mobility ability. Use of outcome tool(s) and clinical judgement create a POC that gives a: Questionable prediction of patient's progress: MODERATE COMPLEXITY                 TREATMENT:   (In addition to Assessment/Re-Assessment sessions the following treatments were rendered)   Pre-treatment Symptoms/Complaints:  No complaints  Pain: Initial:   Pain Intensity 1: 0  Post Session:  0      Assessment/Reassessment only, no treatment provided today     Braces/Orthotics/Lines/Etc:   · O2 Device: Room air  Treatment/Session Assessment:    · Response to Treatment:  No complaints.  Pt aware that therapy is working on a consult for orthotic for foot drop.  · Interdisciplinary Collaboration:  · Physical Therapist  · Registered Nurse  · After treatment position/precautions:  · sitting edge of bed  · Compliance with Program/Exercises: compliant most of the time. · Recommendations/Intent for next treatment session: \"Next visit will focus on advancements to more challenging activities and reduction in assistance provided\".   Total Treatment Duration:  PT Patient Time In/Time Out  Time In: 1100  Time Out: 5000 [facility],

## 2017-08-02 NOTE — PROGRESS NOTES
[address]  [address] [facility], [address] [place] [address], [address]      PLAN:Home wound vac to arrive tomorrow. Vac dressing will be changed on Friday prior to discharge. Cont Zosyn  PT and OT  Home health arrangements      ASSESSMENT:  Admit Date: 7/31/2017   2 Days Post-Op  Procedure(s):  PERIRECTAL WOUND CLOSURE  INCISION AND DRAINAGE RIGHT HIP ABSCESS    Principal Problem:    Abscess of hip, right (7/31/2017)         [address] well. No pain. No fever. Cont PT and OT. OBJECTIVE:  Constitutional: Alert oriented cooperative patient in no acute distress; appears stated age   Visit Vitals    /85 (BP 1 Location: Right arm, BP Patient Position: At rest)  Comment: primary RN notified    Pulse (!) 103    Temp 96.7 °F (35.9 °C)    Resp 19    Ht 5' 2\" (1.575 m)    Wt 174 lb (78.9 kg)    LMP 06/17/2017    SpO2 100%    BMI 31.83 kg/m2     Eyes:Sclera are clear. ENMT: no external lesions gross hearing normal; no obvious neck masses, no ear or lip lesions  CV: RRR. Normal perfusion  Resp: No JVD. Breathing is  non-labored; no audible wheezing. GI: soft    Musculoskeletal: unremarkable with normal function. No embolic signs or cyanosis.    Neuro:  Oriented; moves all 4; no focal deficits  Psychiatric: normal affect and mood, no memory impairment      Patient Vitals for the past 24 hrs:   BP Temp Pulse Resp SpO2   08/02/17 1120 151/85 96.7 °F (35.9 °C) (!) 103 19 100 %   08/02/17 0818 (!) 150/99 97.8 °F (36.6 °C) (!) 106 20 98 %   08/02/17 0340 (!) 157/93 96.6 °F (35.9 °C) (!) 102 20 98 %   08/01/17 2301 144/76 97.1 °F (36.2 °C) 90 24 99 %   08/01/17 1854 (!) 158/98 97.8 °F (36.6 °C) (!) 101 16 99 %   08/01/17 1448 148/87 97.7 °F (36.5 °C) 93 18 100 %     Labs:  No results for input(s): WBC, HGB, PLT, NA, K, CL, CO2, BUN, CREA, GLU, PTP, INR, APTT, TBIL, TBILI, CBIL, SGOT, GPT, ALT, AP, AML, LPSE, LCAD, NH4, TROPT, TROIQ, HGBEXT, PLTEXT in the last 72 hours.    No lab exists for component: INREXT      Damaso Gaitan, DO

## 2017-08-02 NOTE — PROGRESS NOTES
Shift assessment complete. Patient alert and oriented times 3. Lung sounds clear, even and non-labored. Patient has a colostomy. Denies pain at this time. All safety measures in place. Will monitor.

## 2017-08-03 LAB — GLUCOSE BLD STRIP.AUTO-MCNC: 139 MG/DL (ref 65–100)

## 2017-08-03 PROCEDURE — 74011250636 HC RX REV CODE- 250/636: Performed by: SURGERY

## 2017-08-03 PROCEDURE — 74750000023 HC WOUND THERAPY

## 2017-08-03 PROCEDURE — 74011250637 HC RX REV CODE- 250/637: Performed by: SURGERY

## 2017-08-03 PROCEDURE — 82962 GLUCOSE BLOOD TEST: CPT

## 2017-08-03 PROCEDURE — 74011000258 HC RX REV CODE- 258: Performed by: SURGERY

## 2017-08-03 PROCEDURE — 99218 HC RM OBSERVATION: CPT

## 2017-08-03 RX ADMIN — OXYCODONE HYDROCHLORIDE AND ACETAMINOPHEN 1 TABLET: 10; 325 TABLET ORAL at 23:02

## 2017-08-03 RX ADMIN — OXYCODONE HYDROCHLORIDE AND ACETAMINOPHEN 1 TABLET: 10; 325 TABLET ORAL at 04:07

## 2017-08-03 RX ADMIN — OXYCODONE HYDROCHLORIDE AND ACETAMINOPHEN 1 TABLET: 10; 325 TABLET ORAL at 13:58

## 2017-08-03 RX ADMIN — PIPERACILLIN SODIUM,TAZOBACTAM SODIUM 3.38 G: 3; .375 INJECTION, POWDER, FOR SOLUTION INTRAVENOUS at 14:00

## 2017-08-03 RX ADMIN — PIPERACILLIN SODIUM,TAZOBACTAM SODIUM 3.38 G: 3; .375 INJECTION, POWDER, FOR SOLUTION INTRAVENOUS at 23:04

## 2017-08-03 RX ADMIN — HYDROMORPHONE HYDROCHLORIDE 1 MG: 1 INJECTION, SOLUTION INTRAMUSCULAR; INTRAVENOUS; SUBCUTANEOUS at 15:45

## 2017-08-03 RX ADMIN — PIPERACILLIN SODIUM,TAZOBACTAM SODIUM 3.38 G: 3; .375 INJECTION, POWDER, FOR SOLUTION INTRAVENOUS at 07:33

## 2017-08-03 RX ADMIN — HYDROMORPHONE HYDROCHLORIDE 1 MG: 1 INJECTION, SOLUTION INTRAMUSCULAR; INTRAVENOUS; SUBCUTANEOUS at 07:34

## 2017-08-03 RX ADMIN — HYDROMORPHONE HYDROCHLORIDE 1 MG: 1 INJECTION, SOLUTION INTRAMUSCULAR; INTRAVENOUS; SUBCUTANEOUS at 20:02

## 2017-08-03 RX ADMIN — Medication 10 ML: at 05:54

## 2017-08-03 RX ADMIN — Medication 10 ML: at 23:03

## 2017-08-03 RX ADMIN — OXYCODONE HYDROCHLORIDE AND ACETAMINOPHEN 1 TABLET: 10; 325 TABLET ORAL at 09:29

## 2017-08-03 RX ADMIN — ENOXAPARIN SODIUM 40 MG: 40 INJECTION SUBCUTANEOUS at 23:01

## 2017-08-03 RX ADMIN — OXYCODONE HYDROCHLORIDE AND ACETAMINOPHEN 1 TABLET: 10; 325 TABLET ORAL at 18:16

## 2017-08-03 RX ADMIN — HYDROMORPHONE HYDROCHLORIDE 1 MG: 1 INJECTION, SOLUTION INTRAMUSCULAR; INTRAVENOUS; SUBCUTANEOUS at 11:57

## 2017-08-03 RX ADMIN — HYDROMORPHONE HYDROCHLORIDE 1 MG: 1 INJECTION, SOLUTION INTRAMUSCULAR; INTRAVENOUS; SUBCUTANEOUS at 02:20

## 2017-08-03 NOTE — PROGRESS NOTES
Shift assessment complete. Patient alert and oriented times 3. Lung sounds clear, even and non-labored. Bowels sound active and ostomy dry and intact. Patient reports pain at this time 6 out 10 on numeric pain scale. Percocet 10 given for pain. Bed in low position, side rails up times 2, and call light within reach. Will monitor.

## 2017-08-03 NOTE — PROGRESS NOTES
Doing well. No new complaints. Will send home after home wound vac change tomorrow. Hope for PT to deliver RLE boot prior to DC.     Para Bassem Gaitan, DO

## 2017-08-03 NOTE — PROGRESS NOTES
Physical therapy note: checked on pt for physical therapy and pt reporting some pain this morning and wanting her medicine to kick in a little more, she was also a little drowsy and tired as well. Will try and check back on as schedule allows. Thanks.  Elizabeht Calixto, PT

## 2017-08-03 NOTE — PROGRESS NOTES
AM Assessment. Pt. Alert and oriented X4. Lungs clear. Respirations even and unlabored. Abdomen soft and non tender. Bowel sounds active X4 quads. Wound vac on right hip noted. Dry dressing on right buttocks CDI. Pt. Has no complaints at this time. Call light within reach.

## 2017-08-04 VITALS
BODY MASS INDEX: 32.02 KG/M2 | OXYGEN SATURATION: 96 % | HEART RATE: 107 BPM | TEMPERATURE: 97.1 F | WEIGHT: 174 LBS | SYSTOLIC BLOOD PRESSURE: 174 MMHG | HEIGHT: 62 IN | RESPIRATION RATE: 18 BRPM | DIASTOLIC BLOOD PRESSURE: 85 MMHG

## 2017-08-04 PROCEDURE — 77030019934 HC DRSG VAC ASST KCON -B

## 2017-08-04 PROCEDURE — 97530 THERAPEUTIC ACTIVITIES: CPT

## 2017-08-04 PROCEDURE — 74011000258 HC RX REV CODE- 258: Performed by: SURGERY

## 2017-08-04 PROCEDURE — 74011250636 HC RX REV CODE- 250/636: Performed by: SURGERY

## 2017-08-04 PROCEDURE — 74011250637 HC RX REV CODE- 250/637: Performed by: SURGERY

## 2017-08-04 PROCEDURE — 97605 NEG PRS WND THER DME<=50SQCM: CPT

## 2017-08-04 PROCEDURE — 99218 HC RM OBSERVATION: CPT

## 2017-08-04 PROCEDURE — 74750000023 HC WOUND THERAPY

## 2017-08-04 RX ORDER — AMOXICILLIN AND CLAVULANATE POTASSIUM 875; 125 MG/1; MG/1
1 TABLET, FILM COATED ORAL EVERY 12 HOURS
Qty: 14 TAB | Refills: 0 | Status: SHIPPED | OUTPATIENT
Start: 2017-08-04 | End: 2017-08-11

## 2017-08-04 RX ORDER — OXYCODONE AND ACETAMINOPHEN 5; 325 MG/1; MG/1
2 TABLET ORAL
Qty: 40 TAB | Refills: 0 | Status: SHIPPED | OUTPATIENT
Start: 2017-08-04 | End: 2017-09-15

## 2017-08-04 RX ADMIN — HYDROMORPHONE HYDROCHLORIDE 1 MG: 1 INJECTION, SOLUTION INTRAMUSCULAR; INTRAVENOUS; SUBCUTANEOUS at 09:23

## 2017-08-04 RX ADMIN — Medication 10 ML: at 05:28

## 2017-08-04 RX ADMIN — HYDROMORPHONE HYDROCHLORIDE 1 MG: 1 INJECTION, SOLUTION INTRAMUSCULAR; INTRAVENOUS; SUBCUTANEOUS at 05:28

## 2017-08-04 RX ADMIN — OXYCODONE HYDROCHLORIDE AND ACETAMINOPHEN 1 TABLET: 10; 325 TABLET ORAL at 15:55

## 2017-08-04 RX ADMIN — OXYCODONE HYDROCHLORIDE AND ACETAMINOPHEN 1 TABLET: 10; 325 TABLET ORAL at 08:02

## 2017-08-04 RX ADMIN — PIPERACILLIN SODIUM,TAZOBACTAM SODIUM 3.38 G: 3; .375 INJECTION, POWDER, FOR SOLUTION INTRAVENOUS at 08:02

## 2017-08-04 RX ADMIN — OXYCODONE HYDROCHLORIDE AND ACETAMINOPHEN 1 TABLET: 10; 325 TABLET ORAL at 12:09

## 2017-08-04 RX ADMIN — HYDROMORPHONE HYDROCHLORIDE 1 MG: 1 INJECTION, SOLUTION INTRAMUSCULAR; INTRAVENOUS; SUBCUTANEOUS at 01:08

## 2017-08-04 NOTE — PROGRESS NOTES
Per MD pt stable for d/c. Pt had home wound vac placed today by wound care nurse. SW spoke with pt and confirmed her d/c address and that [facility] would see her on Monday.

## 2017-08-04 NOTE — PROGRESS NOTES
[facility] August 4, 2017       RE: Eric Cruz      To Whom It May Concern,    This is to certify that Eric Cruz may may return to work on 9/4/2017. Please feel free to contact my office if you have any questions or concerns. Thank you for your assistance in this matter.       Sincerely,  Wally Gaitan, DO

## 2017-08-04 NOTE — PROGRESS NOTES
Assessment complete via flow sheet. Patient alert and oriented X 4. Respirations even and unlabored. Breath sounds clear. Abd soft and non tender. Wound vac to the right outer thigh. SCD's in place. Patient instructed to call with needs. Bed low/locked with call light with in reach.

## 2017-08-04 NOTE — PROGRESS NOTES
PT attempted to get pt up with AFO & proper foot wear, but pt declined to try gait with foot wear just delivered & new AFO.  Jude Holt, PT, LEANNE

## 2017-08-04 NOTE — DISCHARGE INSTRUCTIONS
DISCHARGE SUMMARY from Nurse    The following personal items are in your possession at time of discharge:    Dental Appliances: None  Visual Aid: None                            PATIENT INSTRUCTIONS:    After general anesthesia or intravenous sedation, for 24 hours or while taking prescription Narcotics:  · Limit your activities  · Do not drive and operate hazardous machinery  · Do not make important personal or business decisions  · Do  not drink alcoholic beverages  · If you have not urinated within 8 hours after discharge, please contact your surgeon on call. Report the following to your surgeon:  · Excessive pain, swelling, redness or odor of or around the surgical area  · Temperature over 100.5  · Nausea and vomiting lasting longer than 4 hours or if unable to take medications  · Any signs of decreased circulation or nerve impairment to extremity: change in color, persistent  numbness, tingling, coldness or increase pain  · Any questions        What to do at Home:  Recommended activity: Ambulate in house,     If you experience any of the above symptoms please notify MD.      *  Please give a list of your current medications to your Primary Care Provider. *  Please update this list whenever your medications are discontinued, doses are      changed, or new medications (including over-the-counter products) are added. *  Please carry medication information at all times in case of emergency situations. These are general instructions for a healthy lifestyle:    No smoking/ No tobacco products/ Avoid exposure to second hand smoke    Surgeon General's Warning:  Quitting smoking now greatly reduces serious risk to your health.     Obesity, smoking, and sedentary lifestyle greatly increases your risk for illness    A healthy diet, regular physical exercise & weight monitoring are important for maintaining a healthy lifestyle    You may be retaining fluid if you have a history of heart failure or if you experience any of the following symptoms:  Weight gain of 3 pounds or more overnight or 5 pounds in a week, increased swelling in our hands or feet or shortness of breath while lying flat in bed. Please call your doctor as soon as you notice any of these symptoms; do not wait until your next office visit. Recognize signs and symptoms of STROKE:    F-face looks uneven    A-arms unable to move or move unevenly    S-speech slurred or non-existent    T-time-call 911 as soon as signs and symptoms begin-DO NOT go       Back to bed or wait to see if you get better-TIME IS BRAIN. Warning Signs of HEART ATTACK     Call 911 if you have these symptoms:   Chest discomfort. Most heart attacks involve discomfort in the center of the chest that lasts more than a few minutes, or that goes away and comes back. It can feel like uncomfortable pressure, squeezing, fullness, or pain.  Discomfort in other areas of the upper body. Symptoms can include pain or discomfort in one or both arms, the back, neck, jaw, or stomach.  Shortness of breath with or without chest discomfort.  Other signs may include breaking out in a cold sweat, nausea, or lightheadedness. Don't wait more than five minutes to call 911 - MINUTES MATTER! Fast action can save your life. Calling 911 is almost always the fastest way to get lifesaving treatment. Emergency Medical Services staff can begin treatment when they arrive -- up to an hour sooner than if someone gets to the hospital by car. The discharge information has been reviewed with the patient. The patient verbalized understanding. Discharge medications reviewed with the patient and appropriate educational materials and side effects teaching were provided.

## 2017-08-04 NOTE — WOUND CARE
Wound VAC dressing change, wound with pink/red granular base, [id number], small amount serosanguineous drainage, no odor. Home machine connected for anticipated discharge today.

## 2017-08-04 NOTE — PROGRESS NOTES
[address]  Face to Face Encounter    Patients Name: Marcene Shone    YOB: 1985    Ordering Physician:  Deirdre Mejía    Primary Diagnosis: Perirectal abscess [K61.1]  Abscess [L02.91]    Date of Face to Face:   8/4/2017                                  Face to Face Encounter findings are related to primary reason for home care:   yes. 1. I certify that the patient needs intermittent care as follows: skilled nursing care:  wound care    2. I certify that this patient is homebound, that is: 1) patient requires the use of a indep device, special transportation, or assistance of another to leave the home; or 2) patient's condition makes leaving the home medically contraindicated; and 3) patient has a normal inability to leave the home and leaving the home requires considerable and taxing effort. Patient may leave the home for infrequent and short duration for medical reasons, and occasional absences for non-medical reasons. Homebound status is due to the following functional limitations: Patient currently under activity restrictions secondary to recent surgical procedure, this hinders their ability to safely leave the home. 3. I certify that this patient is under my care and that I, or a nurse practitioner or  [id number], or clinical nurse specialist, or certified nurse midwife, working with me, had a Face-to-Face Encounter that meets the physician Face-to-Face Encounter requirements. The following are the clinical findings from the [address] [place] [address] encounter that support the need for skilled services and is a summary of the encounter:  See Progress Notes     See attached progess note      LUDY Vilchis  8/4/2017      THE FOLLOWING TO BE COMPLETED BY THE COMMUNITY PHYSICIAN:    I concur with the findings described above from the [facility] encounter that this patient is homebound and in need of a skilled service.     Certifying Physician: _____________________________________ Printed Certifying Physician Name: _____________________________________    Date: _________________

## 2017-08-04 NOTE — PROGRESS NOTES
Problem: Mobility Impaired (Adult and Pediatric)  Goal: *Acute Goals and Plan of Care (Insert Text)  STG:  (1.)Ms. Chacha Lane will move from supine to sit and sit to supine with INDEPENDENT within 1-2 day(s). (2.)Ms. Chacha Lane will transfer from bed to chair and chair to bed with INDEPENDENT using the least restrictive device within 1-2 day(s). (3.)Ms. Chacha Lane will ambulate with INDEPENDENT for  feet with the least restrictive device within 1-2 day(s). Outcome: Progressing Towards Goal      PHYSICAL THERAPY: Daily Note, Discharge, Treatment Day: 1st and AM 8/4/2017  OBSERVATION: Hospital Day: 5  Payor: SELF PAY / Plan: [facility] SELF PAY / Product Type: Self Pay /      NAME/AGE/GENDER: Isaias Smith is a 29 y.o. female    PRIMARY DIAGNOSIS: Perirectal abscess [K61.1]  Abscess [L02.91] Abscess of hip, right Abscess of hip, right  Procedure(s) (LRB):  PERIRECTAL WOUND CLOSURE (N/A)  INCISION AND DRAINAGE RIGHT HIP ABSCESS (Right)  4 Days Post-Op  ICD-10: Treatment Diagnosis:       · Difficulty in walking, Not elsewhere classified (R26.2)  · Other abnormalities of gait and mobility (R26.89)   Precaution/Allergies:  Review of patient's allergies indicates no known allergies. ASSESSMENT:      Ms. Chacha Lane received an AFO for right drop foot but the foot wear pt had currently was not appropriate, after trial, to allow AFO to lift & stabilize right ankle. This section established at most recent assessment   PROBLEM LIST (Impairments causing functional limitations):  1. Decreased Strength  2. Decreased ADL/Functional Activities  3. Decreased Ambulation Ability/Technique  4. Decreased Balance  5. Increased Pain  6. Decreased Activity Tolerance  7. Decreased Flexibility/Joint Mobility    INTERVENTIONS PLANNED: (Benefits and precautions of physical therapy have been discussed with the patient.)  1. Bed Mobility  2. Gait Training  3. Therapeutic Activites  4. Therapeutic Exercise/Strengthening  5.  Transfer Training  6. Group Therapy      TREATMENT PLAN: Frequency/Duration: daily for duration of hospital stay  Rehabilitation Potential For Stated Goals: GOOD      RECOMMENDED REHABILITATION/EQUIPMENT: (at time of discharge pending progress): Home Health: Physical Therapy. HISTORY:   History of Present Injury/Illness (Reason for Referral):  Decreased mobility ability  Past Medical History/Comorbidities:   Ms. Jorge Alberto Wheeler  has a past medical history of Acute kidney injury ([address] Alpha-streptococcal sepsis ([address] Candida glabrata infection; Chlamydia; DKA (diabetic ketoacidoses) ([address] Elevated liver enzymes; Escherichia coli infection; Herpes simplex; History of creation of ostomy; Hypernatremia; Hypophosphatemia; Leukocytosis; Necrotizing fasciitis ([address] Pancreatitis; Pelvic abscess in female; Rhabdomyolysis; Septic shock ([address] and Type 2 diabetes mellitus ([address] She also has no past medical history of Adverse effect of anesthesia; Aneurysm ([address] Arrhythmia; Arthritis; Asthma; Autoimmune disease ([address] CAD (coronary artery disease); Chronic kidney disease; Chronic obstructive pulmonary disease ([address] Chronic pain; Coagulation disorder ([address] Difficult intubation; Endocarditis; GERD (gastroesophageal reflux disease); Heart failure ([address] Hypertension; Ill-defined condition; Liver disease; Malignant hyperthermia due to anesthesia; Morbid obesity ([address] Nausea & vomiting; Nicotine vapor product user; Non-nicotine vapor product user; Pseudocholinesterase deficiency; Psychiatric disorder; PUD (peptic ulcer disease); Rheumatic fever; Seizures ([address] Sleep apnea; Stroke [facility]); Thromboembolus ([address] or Thyroid disease. Ms. Jorge Alberto Wheeler  has a past surgical history that includes other surgical; colostomy (06/02/2017); and other surgical (05/2017-06/2017).   Social History/Living Environment:   Home Environment: Private residence  # Steps to Enter: 8  One/Two Story Residence: One story  Living Alone: No  Support Systems: Spouse/Significant Other/Partner  Patient Expects to be Discharged to[de-identified] Private residence  Current DME Used/Available at Home: None  Tub or Shower Type: Tub/Shower combination  Prior Level of Function/Work/Activity:  Independent with ambulation and ADLs. Number of Personal Factors/Comorbidities that affect the Plan of Care: 1-2: MODERATE COMPLEXITY   EXAMINATION:   Most Recent Physical Functioning:   Gross Assessment:   0/5 right ankle, 3 to 3-/5 throughout other joints / limbs                    Balance:  Sitting: Intact; Without support  Standing: Impaired; With support (cane) Bed Mobility:  Supine to Sit: Modified independent  Sit to Supine: Modified independent  Scooting: Independent       Transfers:  Sit to Stand: Stand-by asssistance  Stand to Sit: Stand-by asssistance  Bed to Chair: Stand-by asssistance (with cane & AFO)  Duration: 15 Minutes (extra time to work through activity noted)  Gait:     Gait Abnormalities: Steppage gait (mild unsteadiness)  Distance (ft): 30 Feet (ft)  Assistive Device: Cane, straight (AFO on right)  Ambulation - Level of Assistance: Stand-by asssistance       Body Structures Involved:  1. Bones  2. Joints  3. Muscles Body Functions Affected:  1. Neuromusculoskeletal  2. Movement Related Activities and Participation Affected:  1. Mobility   Number of elements that affect the Plan of Care: 4+: HIGH COMPLEXITY   CLINICAL PRESENTATION:   Presentation: Evolving clinical presentation with changing clinical characteristics: MODERATE COMPLEXITY   CLINICAL DECISION MAKIN [facility] Mobility Inpatient Short Form  How much difficulty does the patient currently have. .. Unable A Lot A Little None   1. Turning over in bed (including adjusting bedclothes, sheets and blankets)? [ ] 1   [ ] 2   [ ] 3   [X] 4   2. Sitting down on and standing up from a chair with arms ( e.g., wheelchair, bedside commode, etc.)   [ ] 1   [ ] 2   [ ] 3   [X] 4   3. Moving from lying on back to sitting on the side of the bed? [ ] 1   [ ] 2   [ ] 3   [X] 4   How much help from another person does the patient currently need. .. Total A Lot A Little None   4. Moving to and from a bed to a chair (including a wheelchair)? [ ] 1   [ ] 2   [X] 3   [ ] 4   5. Need to walk in hospital room? [ ] 1   [ ] 2   [X] 3   [ ] 4   6. Climbing 3-5 steps with a railing? [ ] 1   [ ] 2   [X] 3   [ ] 4   © 2007, Trustees of [address] [place] [address] [address] [address], under license to [employer]. All rights reserved    Score:  Initial: 21 Most Recent: X (Date: -- )     Interpretation of Tool:  Represents activities that are increasingly more difficult (i.e. Bed mobility, Transfers, Gait). Score 24 23 22-20 19-15 14-10 9-7 6       Modifier CH CI CJ CK CL CM CN         · Mobility - Walking and Moving Around:               - CURRENT STATUS:    CJ - 20%-39% impaired, limited or restricted               - GOAL STATUS:           CJ - 20%-39% impaired, limited or restricted               - D/C STATUS:                       CJ - 20%-39% impaired, limited or restricted  Payor: SELF PAY / Plan: [facility] SELF PAY / Product Type: Self Pay /       Medical Necessity:     · Skilled intervention continues to be required due to decreased mobility ability. Reason for Services/Other Comments:  · Patient continues to require skilled intervention due to decreased mobility ability.    Use of outcome tool(s) and clinical judgement create a POC that gives a: Questionable prediction of patient's progress: MODERATE COMPLEXITY                 TREATMENT:   (In addition to Assessment/Re-Assessment sessions the following treatments were rendered)   Pre-treatment Symptoms/Complaints:  Pt reported right hip discomfort  Pain: Initial: visual scale  Pain Intensity 1: 3  Pain Location 1: Leg  Pain Orientation 1: Right  Pain Intervention(s) 1: Repositioned  Post Session:  3/10      Therapeutic Activity: (  15 Minutes (extra time to work through activity noted) ):  Therapeutic activities including bed mobility, transfers, balancing with AFO & short distance with cane & AFO to improve mobility, strength, balance, coordination and dynamic movement of leg - bilateral to improve functional WB potential.  Required minimal cues to promote proper technique. Braces/Orthotics/Lines/Etc:   · IV & wound vac  Treatment/Session Assessment:    · Response to Treatment: AFO did not provide stability due to improper foot wear  · Interdisciplinary Collaboration:  · Registered Nurse  · After treatment position/precautions:  · Supine in bed, Bed/Chair-wheels locked, Bed in low position, Call light within reach, RN notified and Nurse at bedside  · Compliance with Program/Exercises: compliant most of the time. · Recommendations/Intent for next treatment session: \"Next visit will focus on advancements to more challenging activities and reduction in assistance provided\".   Total Treatment Duration:PT Patient Time In/Time Out  Time In: 0830  Time Out: Paulita Lefort, PT

## 2017-08-04 NOTE — PROGRESS NOTES
Dilaudid 1mg given slowly IV for 7/10 rt leg pain wound vac change. Safety measures in place. Continue to monitor.

## 2017-08-04 NOTE — PROGRESS NOTES
Am assessment completed. Alert and oriented x 3, lungs clear, breathing non-labored. Bowel sounds + q 4 continent of bowel and bladder. Verbalizes needs well. Takes medications whole with thin liquids. Ambulates with 1P assist. Has wound vac to rt thigh. Safety measures in place. Continue to monitor.

## 2017-08-05 ENCOUNTER — HOME CARE VISIT (OUTPATIENT)
Dept: HOME HEALTH SERVICES | Facility: HOME HEALTH | Age: 32
End: 2017-08-05

## 2017-08-07 ENCOUNTER — HOME CARE VISIT (OUTPATIENT)
Dept: SCHEDULING | Facility: HOME HEALTH | Age: 32
End: 2017-08-07
Payer: SELF-PAY

## 2017-08-07 VITALS
OXYGEN SATURATION: 99 % | TEMPERATURE: 98.5 F | HEIGHT: 62 IN | SYSTOLIC BLOOD PRESSURE: 150 MMHG | HEART RATE: 114 BPM | RESPIRATION RATE: 10 BRPM | DIASTOLIC BLOOD PRESSURE: 78 MMHG | WEIGHT: 174 LBS | BODY MASS INDEX: 32.02 KG/M2

## 2017-08-07 PROCEDURE — 400013 HH SOC

## 2017-08-07 PROCEDURE — G0299 HHS/HOSPICE OF RN EA 15 MIN: HCPCS

## 2017-08-09 ENCOUNTER — HOME CARE VISIT (OUTPATIENT)
Dept: SCHEDULING | Facility: HOME HEALTH | Age: 32
End: 2017-08-09
Payer: SELF-PAY

## 2017-08-09 ENCOUNTER — HOME CARE VISIT (OUTPATIENT)
Dept: HOME HEALTH SERVICES | Facility: HOME HEALTH | Age: 32
End: 2017-08-09
Payer: SELF-PAY

## 2017-08-09 VITALS
SYSTOLIC BLOOD PRESSURE: 110 MMHG | DIASTOLIC BLOOD PRESSURE: 70 MMHG | OXYGEN SATURATION: 99 % | HEART RATE: 106 BPM | RESPIRATION RATE: 16 BRPM | TEMPERATURE: 98 F

## 2017-08-09 PROCEDURE — G0299 HHS/HOSPICE OF RN EA 15 MIN: HCPCS

## 2017-08-10 ENCOUNTER — HOME CARE VISIT (OUTPATIENT)
Dept: HOME HEALTH SERVICES | Facility: HOME HEALTH | Age: 32
End: 2017-08-10
Payer: SELF-PAY

## 2017-08-11 ENCOUNTER — HOME CARE VISIT (OUTPATIENT)
Dept: HOME HEALTH SERVICES | Facility: HOME HEALTH | Age: 32
End: 2017-08-11
Payer: SELF-PAY

## 2017-08-11 ENCOUNTER — HOME CARE VISIT (OUTPATIENT)
Dept: SCHEDULING | Facility: HOME HEALTH | Age: 32
End: 2017-08-11
Payer: SELF-PAY

## 2017-08-11 VITALS
HEART RATE: 110 BPM | OXYGEN SATURATION: 99 % | DIASTOLIC BLOOD PRESSURE: 70 MMHG | TEMPERATURE: 98 F | SYSTOLIC BLOOD PRESSURE: 130 MMHG | RESPIRATION RATE: 16 BRPM

## 2017-08-11 PROCEDURE — G0299 HHS/HOSPICE OF RN EA 15 MIN: HCPCS

## 2017-08-14 ENCOUNTER — HOME CARE VISIT (OUTPATIENT)
Dept: HOME HEALTH SERVICES | Facility: HOME HEALTH | Age: 32
End: 2017-08-14
Payer: SELF-PAY

## 2017-08-15 ENCOUNTER — HOME CARE VISIT (OUTPATIENT)
Dept: SCHEDULING | Facility: HOME HEALTH | Age: 32
End: 2017-08-15
Payer: SELF-PAY

## 2017-10-09 PROBLEM — G62.9 AXONAL POLYNEUROPATHY: Status: ACTIVE | Noted: 2017-10-09

## 2017-10-09 PROBLEM — G57.01 NEUROPATHY OF RIGHT SCIATIC NERVE: Status: ACTIVE | Noted: 2017-10-09

## 2017-10-16 ENCOUNTER — HOSPITAL ENCOUNTER (OUTPATIENT)
Dept: PHYSICAL THERAPY | Age: 32
Discharge: HOME | End: 2017-10-16
Attending: PSYCHIATRY & NEUROLOGY
Payer: SELF-PAY

## 2017-10-16 NOTE — PROGRESS NOTES
Peggy Yost  : 1985 [facility], [address] [facility], [address]  [phone]   [phone]          OUTPATIENT DAILY NOTE    NAME/AGE/GENDER: Peggy Yost is a 29 y.o. female. DATE: 10/16/2017    SUBJECTIVE:  Patient cancelled due to unknown reasons.     Roxann Hanson DPT

## 2017-10-23 ENCOUNTER — HOSPITAL ENCOUNTER (OUTPATIENT)
Dept: PHYSICAL THERAPY | Age: 32
Discharge: HOME | End: 2017-10-23
Attending: PSYCHIATRY & NEUROLOGY
Payer: SELF-PAY

## 2017-10-23 PROCEDURE — 97110 THERAPEUTIC EXERCISES: CPT

## 2017-10-23 PROCEDURE — 97161 PT EVAL LOW COMPLEX 20 MIN: CPT

## 2017-10-23 NOTE — THERAPY EVALUATION
Peggy Yost  : 1985  Payor: Ashlie Avendaño / Plan: [place] / Product Type: Commerical /  [address]  at [facility]  [address], [address], [address]  [phone]   [phone]         OUTPATIENT PHYSICAL THERAPY:Initial Assessment 10/23/2017    ICD-10: Treatment Diagnosis: Foot drop, right foot (M21.371)  Difficulty in walking, not elsewhere classified (R26.2)  Precautions/Allergies:   Review of patient's allergies indicates no known allergies. Fall Risk Score: 0 (? 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 Robert Durbin presents to physical therapy with R foot drop after several hip surgeries over the summer. She is now abulating with a R toe off brace but still with a steppage gait pattern. She has weakness in her R dorsiflexors, plantarflexors, inverters, and evertors. She is unable to perform single leg balance on the R and is at a high risk of falls. She would benefit from skilled PT to address the problem list and goals below. Return of LE strength is expected to be slow due to the slow regeneration of nerves. Treatment will work to establish a detailed HEP that the patient can continue on her own once reaching her max rehab potential in order to continue to improve. PROBLEM LIST (Impacting functional limitations):  1. Decreased Strength  2. Decreased ADL/Functional Activities  3. Decreased Ambulation Ability/Technique  4. Decreased Balance  5. Decreased Flexibility/Joint Mobility  6. Decreased [place] with Home Exercise Program INTERVENTIONS PLANNED:  1. Balance Exercise  2.  Gait Training  3. Home Exercise Program (HEP)  4. Manual Therapy  5. Neuromuscular Re-education/Strengthening  6. Therapeutic Activites  7. Therapeutic Exercise/Strengthening   TREATMENT PLAN:  Effective Dates: 10/23/17 TO 12/23/17. Frequency/Duration: 2 times a week for 8 weeks  GOALS: (Goals have been discussed and agreed upon with patient.)  Discharge Goals: Time Frame: 8 weeks  1. Patient will be independent with HEP. 2. Patient will have a decrease on the TUG to 10 seconds in order to normalize gait pattern. 3. Patient will demonstrate ambulating 150' with no steppage gait pattern in order decrease her risk of falls. 4. Patient will demonstrate tandem stance x 30 seconds in order to decrease her risk of falls. 5. Patient will be compliant with proper footwear in order to maximize the benefit of her brace. 6. Patient will demonstrate single leg balance x 5 seconds on the R in order to improve her functional balance. Rehabilitation Potential For Stated Goals: Good  Regarding [facility] therapy, I certify that the treatment plan above will be carried out by a therapist or under their direction. Thank you for this referral,  Mike Franklin DPT, NCS     Referring Physician Signature: Kathi Stiles MD              Date                    HISTORY:   Patient Stated Goal:        I want to be able to play tennis and ride my bike. History of Present Injury/Illness (Reason for Referral):  Patient developed drop foot. She said initially she didn't have any feeling but now she has feeling. She can move it up just slightly now. She has been wearing the elaina Toe Off. It occurred in June. It happened right after her last hip surgery. Says it happened slowly over the course of a few days. No falls. Drives but has been using her left foot more. States she wants to be able to do yoga, ride on the [address], and play tennis.    Past Medical History/Comorbidities:   Ms. 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 She also has no past medical history of Adverse effect of anesthesia; Aneurysm ([address] Arrhythmia; Arthritis; Asthma; Autoimmune disease ([address] CAD (coronary artery disease); Chronic kidney disease; Chronic obstructive pulmonary disease ([address] Chronic pain; Coagulation disorder ([address] Difficult intubation; Endocarditis; GERD (gastroesophageal reflux disease); Heart failure ([address] Hypertension; Ill-defined condition; Liver disease; Malignant hyperthermia due to anesthesia; Morbid obesity ([address] Nausea & vomiting; Nicotine vapor product user; Non-nicotine vapor product user; Pseudocholinesterase deficiency; Psychiatric disorder; PUD (peptic ulcer disease); Rheumatic fever; Seizures ([address] Sleep apnea; Stroke [facility]); Thromboembolus ([address] or Thyroid disease. Ms. Letitia Kemp  has a past surgical history that includes other surgical; colostomy (06/02/2017); and other surgical (05/2017-06/2017). Social History/Living Environment:     lives with , 2 story home, bedroom downstairs. Independent   Prior Level of Function/Work/Activity:  Was working in mental health. Some college. Current Medications:    Current Outpatient Prescriptions:     insulin aspart (NOVOLOG) 100 unit/mL injection, 100 Units by SubCUTAneous route daily as needed (sliding scale insulin).  Blood sugar sliding scale 0-70     0 units   0 units 151-200  2 units 201-250  4 units 251-300  6 units 301-350  8 units 351-400  10 units >400       12 units and call physician, Disp: , Rfl:     ibuprofen 200 mg cap, Take  by mouth as needed. , Disp: , Rfl:     magnesium oxide (MAG-OX) 400 mg tablet, Take 400 mg by mouth daily. , Disp: , Rfl:     furosemide (LASIX) 40 mg tablet, Take 40 mg by mouth daily. , Disp: , Rfl:     bisacodyl (DULCOLAX) 5 mg EC tablet, Take 1 Tab by mouth daily as needed for Constipation. , Disp: 1 Tab, Rfl: 0    insulin lispro (HUMALOG) 100 unit/mL injection, Very Insulin Resistant For Blood Sugar (mg/dL) of:             Less than 150 =   0 units 150 -199 =   3 units 200 -249 =   6 units 250 -299 =   9 units 300 -349 =   12 units 350 and above =   15 units and Call Physician Initiate Hypoglycemic protocol if blood glucose is <70 mg/dL. , Disp: 1 Vial, Rfl: 0     Date Last Reviewed:  10/23/2017   Number of Personal Factors/Comorbidities that affect the Plan of Care: 1-2: MODERATE COMPLEXITY   EXAMINATION:   Observation/Orthostatic Postural Assessment:          Patient arrives with R Louie Toe Off brace on R foot, appears to be hitting just below her knee. genurecurvatum B. Slipper shoes. Mental Status:          WFL  Palpation:          No spasticity.   Pain with passive dorsiflexion/plantarflexion at end range   Sensation:         Decrease protective sensation R heel and mid foot   Skin Integrity:          intact  Vision:          WFL  Balance:          Unable to single leg balance on R, poor tandem stance     Lower Extremity:   Strength PROM   Action R L R  L    Hip Flexion 4      Hip Extension       Hip Abduction 4-      Hip Adduction       Knee Flexion       Knee Extension       Dorsi Flexion 0      Plantar Flexion 3+      Inversion 4      Eversion 2-                Upper Extremity:         Grossly 5/5  Functional Mobility:         Gait/Ambulation:  Ambulates with a steppage gait pattern on the R, no heel strike, forefoot contact first, decreased gait speed, decreased step length, symmetrical stance time   With brace: Still slight steppage gait, mild circumduction on R         Transfers:  Slow, independent         Bed Mobility:  Independent         Stairs:  NT        Wheelchair:  NT              Body Structures Involved:  1. Nerves  2. Bones  3. Joints  4. Muscles Body Functions Affected:  1. Sensory/Pain  2. Neuromusculoskeletal  3. Movement Related Activities and Participation Affected:  1. Mobility  2. Domestic Life  3. Interpersonal Interactions and Relationships  4. Community, Social and [place]   Number of elements that affect the Plan of Care: 4+: HIGH COMPLEXITY   CLINICAL PRESENTATION:   Presentation: Stable and uncomplicated: LOW COMPLEXITY   CLINICAL DECISION MAKING:   Outcome Measure: Tool Used: Timed Up and Go (TUG)  Score:  Initial: 20 seconds Most Recent: X seconds (Date: -- )   Interpretation of Score: The test measures, in seconds, the time taken by an individual to stand up from a standard arm chair (seat height 46 cm [18 in], arm height 65 cm [25.6 in]), walk a distance of 3 meters (118 in, approx 10 ft), turn, walk back to the chair and sit down. If the individual takes longer than 14 seconds to complete TUG, this indicates risk for falls. Medical Necessity:   · Patient is expected to demonstrate progress in strength, balance and functional technique to improve safety during ADLs and IADLs. · Patient demonstrates good rehab potential due to higher previous functional level. Reason for Services/Other Comments:  · Patient continues to require modification of therapeutic interventions to increase complexity of exercises. Use of outcome tool(s) and clinical judgement create a POC that gives a: Clear prediction of patient's progress: LOW COMPLEXITY   TREATMENT:   (In addition to Assessment/Re-Assessment sessions the following treatments were rendered)  Pre Treatment Symptoms: see above    Therapeutic Exercise: ( 15 minutes):  Exercises per grid below to improve mobility, strength and balance. Required moderate visual, verbal and tactile cues to promote proper body alignment and promote proper body mechanics. Progressed complexity of movement as indicated. Date:  10/23/17 Date:   Date:     Activity/Exercise Parameters Parameters Parameters   Standing hip abduction X 20 reps yellow band     Standing hip extension X 20 reps yellow band     Heel raises X 20 reps     Tandem stance X 30 sec hold B     Single leg balance 2 x 10 sec R                       Treatment/Session Assessment:  See assessment above. · Pain/ Symptoms: Initial:   0/10 Post Session:  0/10 ·   Compliance with Program/Exercises: Will assess as treatment progresses. · Recommendations/Intent for next treatment session: \"Next visit will focus on advancements to more challenging activities and reduction in assistance provided\".   Total Treatment Duration:PT Patient Time In/Time Out  Time In: 1300  Time Out: JUVENAL [address], DPT

## 2017-10-24 NOTE — PROGRESS NOTES
Ambulatory/Rehab Services H2 Model Falls Risk Assessment    Risk Factor Pts. ·   Confusion/Disorientation/Impulsivity  []    4 ·   Symptomatic Depression  []   2 ·   Altered Elimination  []   1 ·   Dizziness/Vertigo  []   1 ·   Gender (Male)  []   1 ·   Any administered antiepileptics (anticonvulsants):  []   2 ·   Any administered benzodiazepines:  []   1 ·   Visual Impairment (specify):  []   1 ·   Portable Oxygen Use  []   1 ·   Orthostatic ? BP  []   1 ·   History of Recent Falls (within 3 mos.)  []   5     Ability to Rise from Chair (choose one) Pts. ·   Ability to rise in a single movement  [x]   0 ·   Pushes up, successful in one attempt  []   1 ·   Multiple attempts, but successful  []   3 ·   Unable to rise without assistance  []   4   Total: (5 or greater = High Risk) 0     Falls Prevention Plan:   []                Physical Limitations to Exercise (specify):   []                Mobility Assistance Device (type):   []                Exercise/Equipment Adaptation (specify):    ©2010 [facility] [address] Patent #[id number].  Federal Law prohibits the replication, distribution or use without written permission from [facility] [employer]

## 2017-10-30 ENCOUNTER — HOSPITAL ENCOUNTER (OUTPATIENT)
Dept: PHYSICAL THERAPY | Age: 32
Discharge: HOME | End: 2017-10-30
Attending: PSYCHIATRY & NEUROLOGY
Payer: SELF-PAY

## 2017-10-30 PROCEDURE — 97110 THERAPEUTIC EXERCISES: CPT

## 2017-10-30 NOTE — PROGRESS NOTES
Javon Trevino  : 1985  Payor: Romulo Steele / Plan: [place] / Product Type: Commerical /  [address] [place]  at [facility], [facility], [address]  [phone]   [phone]         OUTPATIENT PHYSICAL THERAPY:Daily Note 10/30/2017    ICD-10: Treatment Diagnosis: Foot drop, right foot (M21.371)  Difficulty in walking, not elsewhere classified (R26.2)  Precautions/Allergies:   Review of patient's allergies indicates no known allergies. Fall Risk Score: 0 (? 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 Sabino Ibarra presents to physical therapy with R foot drop after several hip surgeries over the summer. She is now abulating with a R toe off brace but still with a steppage gait pattern. She has weakness in her R dorsiflexors, plantarflexors, inverters, and evertors. She is unable to perform single leg balance on the R and is at a high risk of falls. She would benefit from skilled PT to address the problem list and goals below. Return of LE strength is expected to be slow due to the slow regeneration of nerves. Treatment will work to establish a detailed HEP that the patient can continue on her own once reaching her max rehab potential in order to continue to improve. PROBLEM LIST (Impacting functional limitations):  1. Decreased Strength  2. Decreased ADL/Functional Activities  3. Decreased Ambulation Ability/Technique  4. Decreased Balance  5. Decreased Flexibility/Joint Mobility  6. Decreased [place] with Home Exercise Program INTERVENTIONS PLANNED:  1. Balance Exercise  2. Gait Training  3.  Home Exercise Program (HEP)  4. Manual Therapy  5. Neuromuscular Re-education/Strengthening  6. Therapeutic Activites  7. Therapeutic Exercise/Strengthening   TREATMENT PLAN:  Effective Dates: 10/23/17 TO 12/23/17. Frequency/Duration: 2 times a week for 8 weeks  GOALS: (Goals have been discussed and agreed upon with patient.)  Discharge Goals: Time Frame: 8 weeks  1. Patient will be independent with HEP. 2. Patient will have a decrease on the TUG to 10 seconds in order to normalize gait pattern. 3. Patient will demonstrate ambulating 150' with no steppage gait pattern in order decrease her risk of falls. 4. Patient will demonstrate tandem stance x 30 seconds in order to decrease her risk of falls. 5. Patient will be compliant with proper footwear in order to maximize the benefit of her brace. 6. Patient will demonstrate single leg balance x 5 seconds on the R in order to improve her functional balance. Rehabilitation Potential For Stated Goals: Good  Regarding [facility] therapy, I certify that the treatment plan above will be carried out by a therapist or under their direction. Thank you for this referral,  Lori Santana DPT, NCS     Referring Physician Signature: Gavino Aguirre MD              Date                    HISTORY:   Patient Stated Goal:        I want to be able to play tennis and ride my bike. History of Present Injury/Illness (Reason for Referral):  Patient developed drop foot. She said initially she didn't have any feeling but now she has feeling. She can move it up just slightly now. She has been wearing the elaina Toe Off. It occurred in June. It happened right after her last hip surgery. Says it happened slowly over the course of a few days. No falls. Drives but has been using her left foot more. States she wants to be able to do yoga, ride on the [address], and play tennis.    Past Medical History/Comorbidities:   Ms. 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 She also has no past medical history of Adverse effect of anesthesia; Aneurysm ([address] Arrhythmia; Arthritis; Asthma; Autoimmune disease ([address] CAD (coronary artery disease); Chronic kidney disease; Chronic obstructive pulmonary disease ([address] Chronic pain; Coagulation disorder ([address] Difficult intubation; Endocarditis; GERD (gastroesophageal reflux disease); Heart failure ([address] Hypertension; Ill-defined condition; Liver disease; Malignant hyperthermia due to anesthesia; Morbid obesity ([address] Nausea & vomiting; Nicotine vapor product user; Non-nicotine vapor product user; Pseudocholinesterase deficiency; Psychiatric disorder; PUD (peptic ulcer disease); Rheumatic fever; Seizures ([address] Sleep apnea; Stroke [facility]); Thromboembolus ([address] or Thyroid disease. Ms. Sonal Roman  has a past surgical history that includes other surgical; colostomy (06/02/2017); and other surgical (05/2017-06/2017). Social History/Living Environment:     lives with , 2 story home, bedroom downstairs. Independent   Prior Level of Function/Work/Activity:  Was working in mental health. Some college. Current Medications:    Current Outpatient Prescriptions:     insulin aspart (NOVOLOG) 100 unit/mL injection, 100 Units by SubCUTAneous route daily as needed (sliding scale insulin).  Blood sugar sliding scale 0-70     0 units   0 units 151-200  2 units 201-250  4 units 251-300  6 units 301-350  8 units 351-400  10 units >400       12 units and call physician, Disp: , Rfl:     ibuprofen 200 mg cap, Take  by mouth as needed. , Disp: , Rfl:     magnesium oxide (MAG-OX) 400 mg tablet, Take 400 mg by mouth daily. , Disp: , Rfl:     furosemide (LASIX) 40 mg tablet, Take 40 mg by mouth daily. , Disp: , Rfl:     bisacodyl (DULCOLAX) 5 mg EC tablet, Take 1 Tab by mouth daily as needed for Constipation. , Disp: 1 Tab, Rfl: 0    insulin lispro (HUMALOG) 100 unit/mL injection, Very Insulin Resistant For Blood Sugar (mg/dL) of:             Less than 150 =   0 units 150 -199 =   3 units 200 -249 =   6 units 250 -299 =   9 units 300 -349 =   12 units 350 and above =   15 units and Call Physician Initiate Hypoglycemic protocol if blood glucose is <70 mg/dL. , Disp: 1 Vial, Rfl: 0     Date Last Reviewed:  10/30/2017   Number of Personal Factors/Comorbidities that affect the Plan of Care: 1-2: MODERATE COMPLEXITY   EXAMINATION:   Observation/Orthostatic Postural Assessment:          Patient arrives with R Louie Toe Off brace on R foot, appears to be hitting just below her knee. genurecurvatum B. Slipper shoes. Mental Status:          WFL  Palpation:          No spasticity.   Pain with passive dorsiflexion/plantarflexion at end range   Sensation:         Decrease protective sensation R heel and mid foot   Skin Integrity:          intact  Vision:          WFL  Balance:          Unable to single leg balance on R, poor tandem stance     Lower Extremity:   Strength PROM   Action R L R  L    Hip Flexion 4      Hip Extension       Hip Abduction 4-      Hip Adduction       Knee Flexion       Knee Extension       Dorsi Flexion 0      Plantar Flexion 3+      Inversion 4      Eversion 2-                Upper Extremity:         Grossly 5/5  Functional Mobility:         Gait/Ambulation:  Ambulates with a steppage gait pattern on the R, no heel strike, forefoot contact first, decreased gait speed, decreased step length, symmetrical stance time   With brace:  Still slight steppage gait, mild circumduction on R         Transfers:  Slow, independent         Bed Mobility:  Independent         Stairs:  NT        Wheelchair:  NT              Body Structures Involved:  1. Nerves  2. Bones  3. Joints  4. Muscles Body Functions Affected:  1. Sensory/Pain  2. Neuromusculoskeletal  3. Movement Related Activities and Participation Affected:  1. Mobility  2. Domestic Life  3. Interpersonal Interactions and Relationships  4. Community, Social and [place]   Number of elements that affect the Plan of Care: 4+: HIGH COMPLEXITY   CLINICAL PRESENTATION:   Presentation: Stable and uncomplicated: LOW COMPLEXITY   CLINICAL DECISION MAKING:   Outcome Measure: Tool Used: Timed Up and Go (TUG)  Score:  Initial: 20 seconds Most Recent: X seconds (Date: -- )   Interpretation of Score: The test measures, in seconds, the time taken by an individual to stand up from a standard arm chair (seat height 46 cm [18 in], arm height 65 cm [25.6 in]), walk a distance of 3 meters (118 in, approx 10 ft), turn, walk back to the chair and sit down. If the individual takes longer than 14 seconds to complete TUG, this indicates risk for falls. Medical Necessity:   · Patient is expected to demonstrate progress in strength, balance and functional technique to improve safety during ADLs and IADLs. · Patient demonstrates good rehab potential due to higher previous functional level. Reason for Services/Other Comments:  · Patient continues to require modification of therapeutic interventions to increase complexity of exercises.    Use of outcome tool(s) and clinical judgement create a POC that gives a: Clear prediction of patient's progress: LOW COMPLEXITY   TREATMENT:   (In addition to Assessment/Re-Assessment sessions the following treatments were rendered)  Pre Treatment Symptoms: patient reports no pain but states that she has had difficulty with the single leg balance exercise    Therapeutic Exercise: ( 40 minutes):  Exercises per grid below to improve mobility, strength and balance. Required moderate visual, verbal and tactile cues to promote proper body alignment and promote proper body mechanics. Progressed complexity of movement as indicated. Date:  10/23/17 Date:  10/30/17 Date:     Activity/Exercise Parameters Parameters Parameters   Standing hip abduction X 20 reps yellow band X 20 reps B yellow band    Standing hip extension X 20 reps yellow band X 20 reps B yellow band    Standing hip flexion  X 20 reps B yellow band    Heel raises X 20 reps X 20 reps     Tandem stance X 30 sec hold B 2 x 30 sec on blue foam     Single leg balance 2 x 10 sec R 3 x 1 5 sec hold R    physiostep  X 5 minutes level 2    Calf stretch on incline board  2 X 60 sec hold     Stance on foam  Narrow GRACIE with head turns x 20 sec    rockerboard   Static hold and controlled rocking x 60 sec each direction     Marching gait  X 50' each direction                 Treatment/Session Assessment:  Patient continues to ambulate with poor gait pattern and AFO seems to be poor fitting. Will call [facility] to see if they can assist us. · Pain/ Symptoms: Initial:   0/10 Post Session:  0/10 ·   Compliance with Program/Exercises: Will assess as treatment progresses. · Recommendations/Intent for next treatment session: \"Next visit will focus on advancements to more challenging activities and reduction in assistance provided\".   Total Treatment Duration:PT Patient Time In/Time Out  Time In: 2014  Time Out: Mehdi Diaz 69, DPT

## 2017-11-02 ENCOUNTER — HOSPITAL ENCOUNTER (OUTPATIENT)
Dept: PHYSICAL THERAPY | Age: 32
Discharge: HOME | End: 2017-11-02
Attending: PSYCHIATRY & NEUROLOGY
Payer: COMMERCIAL

## 2017-11-02 PROCEDURE — 97110 THERAPEUTIC EXERCISES: CPT

## 2017-11-02 NOTE — PROGRESS NOTES
Ara Falling  : 1985  Payor: Gabriel Smith / Plan: [place] / Product Type: Commerical /  [address]  at [address] [facility], [facility], [address]  [phone]   [phone]         OUTPATIENT PHYSICAL THERAPY:Daily Note 2017    ICD-10: Treatment Diagnosis: Foot drop, right foot (M21.371)  Difficulty in walking, not elsewhere classified (R26.2)  Precautions/Allergies:   Review of patient's allergies indicates no known allergies. Fall Risk Score: 0 (? 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 Taj White presents to physical therapy with R foot drop after several hip surgeries over the summer. She is now abulating with a R toe off brace but still with a steppage gait pattern. She has weakness in her R dorsiflexors, plantarflexors, inverters, and evertors. She is unable to perform single leg balance on the R and is at a high risk of falls. She would benefit from skilled PT to address the problem list and goals below. Return of LE strength is expected to be slow due to the slow regeneration of nerves. Treatment will work to establish a detailed HEP that the patient can continue on her own once reaching her max rehab potential in order to continue to improve. PROBLEM LIST (Impacting functional limitations):  1. Decreased Strength  2. Decreased ADL/Functional Activities  3. Decreased Ambulation Ability/Technique  4. Decreased Balance  5. Decreased Flexibility/Joint Mobility  6. Decreased [place] with Home Exercise Program INTERVENTIONS PLANNED:  1. Balance Exercise  2. Gait Training  3.  Home Exercise Program (HEP)  4. Manual Therapy  5. Neuromuscular Re-education/Strengthening  6. Therapeutic Activites  7. Therapeutic Exercise/Strengthening   TREATMENT PLAN:  Effective Dates: 10/23/17 TO 12/23/17. Frequency/Duration: 2 times a week for 8 weeks  GOALS: (Goals have been discussed and agreed upon with patient.)  Discharge Goals: Time Frame: 8 weeks  1. Patient will be independent with HEP. 2. Patient will have a decrease on the TUG to 10 seconds in order to normalize gait pattern. 3. Patient will demonstrate ambulating 150' with no steppage gait pattern in order decrease her risk of falls. 4. Patient will demonstrate tandem stance x 30 seconds in order to decrease her risk of falls. 5. Patient will be compliant with proper footwear in order to maximize the benefit of her brace. 6. Patient will demonstrate single leg balance x 5 seconds on the R in order to improve her functional balance. Rehabilitation Potential For Stated Goals: Good  Regarding [facility] therapy, I certify that the treatment plan above will be carried out by a therapist or under their direction. Thank you for this referral,  Hoa Pardo DPT, NCS     Referring Physician Signature: Enrique Stanley MD              Date                    HISTORY:   Patient Stated Goal:        I want to be able to play tennis and ride my bike. History of Present Injury/Illness (Reason for Referral):  Patient developed drop foot. She said initially she didn't have any feeling but now she has feeling. She can move it up just slightly now. She has been wearing the elaina Toe Off. It occurred in June. It happened right after her last hip surgery. Says it happened slowly over the course of a few days. No falls. Drives but has been using her left foot more. States she wants to be able to do yoga, ride on the [address], and play tennis.    Past Medical History/Comorbidities:   Ms. 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 She also has no past medical history of Adverse effect of anesthesia; Aneurysm ([address] Arrhythmia; Arthritis; Asthma; Autoimmune disease ([address] CAD (coronary artery disease); Chronic kidney disease; Chronic obstructive pulmonary disease ([address] Chronic pain; Coagulation disorder ([address] Difficult intubation; Endocarditis; GERD (gastroesophageal reflux disease); Heart failure ([address] Hypertension; Ill-defined condition; Liver disease; Malignant hyperthermia due to anesthesia; Morbid obesity ([address] Nausea & vomiting; Nicotine vapor product user; Non-nicotine vapor product user; Pseudocholinesterase deficiency; Psychiatric disorder; PUD (peptic ulcer disease); Rheumatic fever; Seizures ([address] Sleep apnea; Stroke [facility]); Thromboembolus ([address] or Thyroid disease. Ms. Haresh Joyner  has a past surgical history that includes other surgical; colostomy (06/02/2017); and other surgical (05/2017-06/2017). Social History/Living Environment:     lives with , 2 story home, bedroom downstairs. Independent   Prior Level of Function/Work/Activity:  Was working in mental health. Some college. Current Medications:    Current Outpatient Prescriptions:     insulin aspart (NOVOLOG) 100 unit/mL injection, 100 Units by SubCUTAneous route daily as needed (sliding scale insulin).  Blood sugar sliding scale 0-70     0 units   0 units 151-200  2 units 201-250  4 units 251-300  6 units 301-350  8 units 351-400  10 units >400       12 units and call physician, Disp: , Rfl:     ibuprofen 200 mg cap, Take  by mouth as needed. , Disp: , Rfl:     magnesium oxide (MAG-OX) 400 mg tablet, Take 400 mg by mouth daily. , Disp: , Rfl:     furosemide (LASIX) 40 mg tablet, Take 40 mg by mouth daily. , Disp: , Rfl:     bisacodyl (DULCOLAX) 5 mg EC tablet, Take 1 Tab by mouth daily as needed for Constipation. , Disp: 1 Tab, Rfl: 0    insulin lispro (HUMALOG) 100 unit/mL injection, Very Insulin Resistant For Blood Sugar (mg/dL) of:             Less than 150 =   0 units 150 -199 =   3 units 200 -249 =   6 units 250 -299 =   9 units 300 -349 =   12 units 350 and above =   15 units and Call Physician Initiate Hypoglycemic protocol if blood glucose is <70 mg/dL. , Disp: 1 Vial, Rfl: 0     Date Last Reviewed:  11/2/2017   Number of Personal Factors/Comorbidities that affect the Plan of Care: 1-2: MODERATE COMPLEXITY   EXAMINATION:   Observation/Orthostatic Postural Assessment:          Patient arrives with R Louie Toe Off brace on R foot, appears to be hitting just below her knee. genurecurvatum B. Slipper shoes. Mental Status:          WFL  Palpation:          No spasticity.   Pain with passive dorsiflexion/plantarflexion at end range   Sensation:         Decrease protective sensation R heel and mid foot   Skin Integrity:          intact  Vision:          WFL  Balance:          Unable to single leg balance on R, poor tandem stance     Lower Extremity:   Strength PROM   Action R L R  L    Hip Flexion 4      Hip Extension       Hip Abduction 4-      Hip Adduction       Knee Flexion       Knee Extension       Dorsi Flexion 0      Plantar Flexion 3+      Inversion 4      Eversion 2-                Upper Extremity:         Grossly 5/5  Functional Mobility:         Gait/Ambulation:  Ambulates with a steppage gait pattern on the R, no heel strike, forefoot contact first, decreased gait speed, decreased step length, symmetrical stance time   With brace:  Still slight steppage gait, mild circumduction on R         Transfers:  Slow, independent         Bed Mobility:  Independent         Stairs:  NT        Wheelchair:  NT              Body Structures Involved:  1. Nerves  2. Bones  3. Joints  4. Muscles Body Functions Affected:  1. Sensory/Pain  2. Neuromusculoskeletal  3. Movement Related Activities and Participation Affected:  1. Mobility  2. Domestic Life  3. Interpersonal Interactions and Relationships  4. Community, Social and [place]   Number of elements that affect the Plan of Care: 4+: HIGH COMPLEXITY   CLINICAL PRESENTATION:   Presentation: Stable and uncomplicated: LOW COMPLEXITY   CLINICAL DECISION MAKING:   Outcome Measure: Tool Used: Timed Up and Go (TUG)  Score:  Initial: 20 seconds Most Recent: X seconds (Date: -- )   Interpretation of Score: The test measures, in seconds, the time taken by an individual to stand up from a standard arm chair (seat height 46 cm [18 in], arm height 65 cm [25.6 in]), walk a distance of 3 meters (118 in, approx 10 ft), turn, walk back to the chair and sit down. If the individual takes longer than 14 seconds to complete TUG, this indicates risk for falls. Medical Necessity:   · Patient is expected to demonstrate progress in strength, balance and functional technique to improve safety during ADLs and IADLs. · Patient demonstrates good rehab potential due to higher previous functional level. Reason for Services/Other Comments:  · Patient continues to require modification of therapeutic interventions to increase complexity of exercises. Use of outcome tool(s) and clinical judgement create a POC that gives a: Clear prediction of patient's progress: LOW COMPLEXITY   TREATMENT:   (In addition to Assessment/Re-Assessment sessions the following treatments were rendered)  Pre Treatment Symptoms: patient reports no pain. Says she single leg balance is getting slightly easier but still difficult.       Therapeutic Exercise: ( 40 minutes):  Exercises per grid below to improve mobility, strength and balance. Required moderate visual, verbal and tactile cues to promote proper body alignment and promote proper body mechanics. Progressed complexity of movement as indicated. Date:  10/23/17 Date:  10/30/17 Date:     Activity/Exercise Parameters Parameters Parameters   Standing hip abduction X 20 reps yellow band X 20 reps B yellow band X 20 reps B red band    Standing hip extension X 20 reps yellow band X 20 reps B yellow band X 20 reps B red band   Standing hip flexion  X 20 reps B yellow band X 20 reps B red band   Heel raises X 20 reps X 20 reps  X 20 reps    Tandem stance X 30 sec hold B 2 x 30 sec on blue foam  2 x 30 sec on blue foam with head turns    Single leg balance 2 x 10 sec R 3 x 1 5 sec hold R    physiostep  X 5 minutes level 2 X 5 minutes level 2    Calf stretch on incline board  2 X 60 sec hold  X 60 sec hold    Stance on foam  Narrow GRACIE with head turns x 20 sec    rockerboard   Static hold and controlled rocking x 60 sec each direction     Marching gait  X 50' each direction     Ambulation    Education on hip/knee release. Improved ambulation after this         Treatment/Session Assessment:  Patient was 20 minutes late for treatment today. Patient was given the information for [facility] as Reinaldo Nunez said he would try and cut down her. Will assess at next visit. She continues to benefit from skilled PT to improve balance and functional mobility of R ankle. · Pain/ Symptoms: Initial:   0/10 Post Session:  0/10 ·   Compliance with Program/Exercises: Will assess as treatment progresses. · Recommendations/Intent for next treatment session: \"Next visit will focus on advancements to more challenging activities and reduction in assistance provided\".   Total Treatment Duration:PT Patient Time In/Time Out  Time In: 1405  Time Out: Mehdi Diaz 69, DPT

## 2017-11-06 ENCOUNTER — HOSPITAL ENCOUNTER (OUTPATIENT)
Dept: PHYSICAL THERAPY | Age: 32
Discharge: HOME | End: 2017-11-06
Attending: PSYCHIATRY & NEUROLOGY
Payer: COMMERCIAL

## 2017-11-06 PROCEDURE — 97110 THERAPEUTIC EXERCISES: CPT

## 2017-11-06 NOTE — PROGRESS NOTES
Briana Karlainderjit  : 1985  Payor: Blane Figueroa / Plan: [place] / Product Type: Commerical /  [address]  at [facility]  [address] [facility], [place] [address] [address] [facility]  [phone]   [phone]         OUTPATIENT PHYSICAL THERAPY:Daily Note 2017    ICD-10: Treatment Diagnosis: Foot drop, right foot (M21.371)  Difficulty in walking, not elsewhere classified (R26.2)  Precautions/Allergies:   Review of patient's allergies indicates no known allergies. Fall Risk Score: 0 (? 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 Javi Lincoln presents to physical therapy with R foot drop after several hip surgeries over the summer. She is now abulating with a R toe off brace but still with a steppage gait pattern. She has weakness in her R dorsiflexors, plantarflexors, inverters, and evertors. She is unable to perform single leg balance on the R and is at a high risk of falls. She would benefit from skilled PT to address the problem list and goals below. Return of LE strength is expected to be slow due to the slow regeneration of nerves. Treatment will work to establish a detailed HEP that the patient can continue on her own once reaching her max rehab potential in order to continue to improve. PROBLEM LIST (Impacting functional limitations):  1. Decreased Strength  2. Decreased ADL/Functional Activities  3. Decreased Ambulation Ability/Technique  4. Decreased Balance  5. Decreased Flexibility/Joint Mobility  6. Decreased [place] with Home Exercise Program INTERVENTIONS PLANNED:  1. Balance Exercise  2. Gait Training  3.  Home Exercise Program (HEP)  4. Manual Therapy  5. Neuromuscular Re-education/Strengthening  6. Therapeutic Activites  7. Therapeutic Exercise/Strengthening   TREATMENT PLAN:  Effective Dates: 10/23/17 TO 12/23/17. Frequency/Duration: 2 times a week for 8 weeks  GOALS: (Goals have been discussed and agreed upon with patient.)  Discharge Goals: Time Frame: 8 weeks  1. Patient will be independent with HEP. 2. Patient will have a decrease on the TUG to 10 seconds in order to normalize gait pattern. 3. Patient will demonstrate ambulating 150' with no steppage gait pattern in order decrease her risk of falls. 4. Patient will demonstrate tandem stance x 30 seconds in order to decrease her risk of falls. 5. Patient will be compliant with proper footwear in order to maximize the benefit of her brace. 6. Patient will demonstrate single leg balance x 5 seconds on the R in order to improve her functional balance. Rehabilitation Potential For Stated Goals: Good  Regarding Arpan Eliceo therapy, I certify that the treatment plan above will be carried out by a therapist or under their direction. Thank you for this referral,  Jaspreet Arnold DPT, NCS     Referring Physician Signature: Constantine Coulter MD              Date                    HISTORY:   Patient Stated Goal:        I want to be able to play tennis and ride my bike. History of Present Injury/Illness (Reason for Referral):  Patient developed drop foot. She said initially she didn't have any feeling but now she has feeling. She can move it up just slightly now. She has been wearing the elaina Toe Off. It occurred in June. It happened right after her last hip surgery. Says it happened slowly over the course of a few days. No falls. Drives but has been using her left foot more. States she wants to be able to do yoga, ride on the [address], and play tennis.    Past Medical History/Comorbidities:   Ms. 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 She also has no past medical history of Adverse effect of anesthesia; Aneurysm ([address] Arrhythmia; Arthritis; Asthma; Autoimmune disease ([address] CAD (coronary artery disease); Chronic kidney disease; Chronic obstructive pulmonary disease ([address] Chronic pain; Coagulation disorder ([address] Difficult intubation; Endocarditis; GERD (gastroesophageal reflux disease); Heart failure ([address] Hypertension; Ill-defined condition; Liver disease; Malignant hyperthermia due to anesthesia; Morbid obesity ([address] Nausea & vomiting; Nicotine vapor product user; Non-nicotine vapor product user; Pseudocholinesterase deficiency; Psychiatric disorder; PUD (peptic ulcer disease); Rheumatic fever; Seizures ([address] Sleep apnea; Stroke [facility]); Thromboembolus ([address] or Thyroid disease. Ms. Robi Whittaker  has a past surgical history that includes other surgical; colostomy (06/02/2017); and other surgical (05/2017-06/2017). Social History/Living Environment:     lives with , 2 story home, bedroom downstairs. Independent   Prior Level of Function/Work/Activity:  Was working in mental health. Some college. Current Medications:    Current Outpatient Prescriptions:     insulin aspart (NOVOLOG) 100 unit/mL injection, 100 Units by SubCUTAneous route daily as needed (sliding scale insulin).  Blood sugar sliding scale 0-70     0 units   0 units 151-200  2 units 201-250  4 units 251-300  6 units 301-350  8 units 351-400  10 units >400       12 units and call physician, Disp: , Rfl:     ibuprofen 200 mg cap, Take  by mouth as needed. , Disp: , Rfl:     magnesium oxide (MAG-OX) 400 mg tablet, Take 400 mg by mouth daily. , Disp: , Rfl:     furosemide (LASIX) 40 mg tablet, Take 40 mg by mouth daily. , Disp: , Rfl:     bisacodyl (DULCOLAX) 5 mg EC tablet, Take 1 Tab by mouth daily as needed for Constipation. , Disp: 1 Tab, Rfl: 0    insulin lispro (HUMALOG) 100 unit/mL injection, Very Insulin Resistant For Blood Sugar (mg/dL) of:             Less than 150 =   0 units 150 -199 =   3 units 200 -249 =   6 units 250 -299 =   9 units 300 -349 =   12 units 350 and above =   15 units and Call Physician Initiate Hypoglycemic protocol if blood glucose is <70 mg/dL. , Disp: 1 Vial, Rfl: 0     Date Last Reviewed:  11/6/2017   Number of Personal Factors/Comorbidities that affect the Plan of Care: 1-2: MODERATE COMPLEXITY   EXAMINATION:   Observation/Orthostatic Postural Assessment:          Patient arrives with R Louie Toe Off brace on R foot, appears to be hitting just below her knee. genurecurvatum B. Slipper shoes. Mental Status:          WFL  Palpation:          No spasticity.   Pain with passive dorsiflexion/plantarflexion at end range   Sensation:         Decrease protective sensation R heel and mid foot   Skin Integrity:          intact  Vision:          WFL  Balance:          Unable to single leg balance on R, poor tandem stance     Lower Extremity:   Strength PROM   Action R L R  L    Hip Flexion 4      Hip Extension       Hip Abduction 4-      Hip Adduction       Knee Flexion       Knee Extension       Dorsi Flexion 0      Plantar Flexion 3+      Inversion 4      Eversion 2-                Upper Extremity:         Grossly 5/5  Functional Mobility:         Gait/Ambulation:  Ambulates with a steppage gait pattern on the R, no heel strike, forefoot contact first, decreased gait speed, decreased step length, symmetrical stance time   With brace:  Still slight steppage gait, mild circumduction on R         Transfers:  Slow, independent         Bed Mobility:  Independent         Stairs:  NT        Wheelchair:  NT              Body Structures Involved:  1. Nerves  2. Bones  3. Joints  4. Muscles Body Functions Affected:  1. Sensory/Pain  2. Neuromusculoskeletal  3. Movement Related Activities and Participation Affected:  1. Mobility  2. Domestic Life  3. Interpersonal Interactions and Relationships  4. Community, Social and [place]   Number of elements that affect the Plan of Care: 4+: HIGH COMPLEXITY   CLINICAL PRESENTATION:   Presentation: Stable and uncomplicated: LOW COMPLEXITY   CLINICAL DECISION MAKING:   Outcome Measure: Tool Used: Timed Up and Go (TUG)  Score:  Initial: 20 seconds Most Recent: X seconds (Date: -- )   Interpretation of Score: The test measures, in seconds, the time taken by an individual to stand up from a standard arm chair (seat height 46 cm [18 in], arm height 65 cm [25.6 in]), walk a distance of 3 meters (118 in, approx 10 ft), turn, walk back to the chair and sit down. If the individual takes longer than 14 seconds to complete TUG, this indicates risk for falls. Medical Necessity:   · Patient is expected to demonstrate progress in strength, balance and functional technique to improve safety during ADLs and IADLs. · Patient demonstrates good rehab potential due to higher previous functional level. Reason for Services/Other Comments:  · Patient continues to require modification of therapeutic interventions to increase complexity of exercises. Use of outcome tool(s) and clinical judgement create a POC that gives a: Clear prediction of patient's progress: LOW COMPLEXITY   TREATMENT:   (In addition to Assessment/Re-Assessment sessions the following treatments were rendered)  Pre Treatment Symptoms: patient reports she forgot to call Leonora Tamez about the brace. No pain.   She tried to find some tennis shoes over the weekend but none would fit her brace. Therapeutic Exercise: ( 30 minutes):  Exercises per grid below to improve mobility, strength and balance. Required moderate visual, verbal and tactile cues to promote proper body alignment and promote proper body mechanics. Progressed complexity of movement as indicated. Date:  10/23/17 Date:  10/30/17 Date:   Date:  11/6/17   Activity/Exercise Parameters Parameters Parameters Parameters    Standing hip abduction X 20 reps yellow band X 20 reps B yellow band X 20 reps B red band     Standing hip extension X 20 reps yellow band X 20 reps B yellow band X 20 reps B red band    Standing hip flexion  X 20 reps B yellow band X 20 reps B red band    Heel raises X 20 reps X 20 reps  X 20 reps     Tandem stance X 30 sec hold B 2 x 30 sec on blue foam  2 x 30 sec on blue foam with head turns     Single leg balance 2 x 10 sec R 3 x 1 5 sec hold R     physiostep  X 5 minutes level 2 X 5 minutes level 2  X 5 minutes level 3   Calf stretch on incline board  2 X 60 sec hold  X 60 sec hold  X 60 sec hold   Stance on foam  Narrow GRACIE with head turns x 20 sec     rockerboard   Static hold and controlled rocking x 60 sec each direction   Static hold and controlled rocking x 60 sec each direction   Marching gait  X 50' each direction      Ambulation    Education on hip/knee release. Improved ambulation after this Education on hip/knee release. Verbal cues for increased R step length    Seated BAPS board    X 2 minutes   Seated ABC's     Demonstrated for HEP         Treatment/Session Assessment:  Patient was 15 minutes late for treatment today. She was again educated to try and get her brace cut down. She was also told that she can attend therapy in [facility] as she is consistently late because she is driving here from [facility]. She has chosen to continue here. She continues to benefit from skilled PT to improve the functional mobility of her foot.    · Pain/ Symptoms: Initial:   0/10 Post Session: 0/10 ·   Compliance with Program/Exercises: Will assess as treatment progresses. · Recommendations/Intent for next treatment session: \"Next visit will focus on advancements to more challenging activities and reduction in assistance provided\".   Total Treatment Duration:PT Patient Time In/Time Out  Time In: 0900  Time Out: Jovanni Castle DPT

## 2017-11-07 NOTE — PROGRESS NOTES
Outpatient Rehab Services  Referral Form/Physician Order  Central Scheduling Fax: [phone]  Speak to a :  Call [phone]   Please review and co-sign (electronically) OR sign and return to the below indicated clinic's fax number if you agree with this request.  Thank you! 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.01] Special Precautions:   Frequency:  [] to be determined by therapist after evaluation   OR   ____ X per week X ____ weeks    Services    [] Evaluate & Treat  [x] Special Orders____R AFO____________________   [] Physical Therapy  [] Occupational Therapy   [] Speech Therapy  [] MBS w/ Speech Therapy    Specialized Programs    [] Aquatic Therapy [] Lymphedema [] Oncology Rehab   [] Balance Rehab [] Parkinson's Program [] Osteoporosis   [] Fibromyalgia [] Motion Analysis [] Spine Rehab   [] Industrial Rehab [] Hand & Upper Extremity [] Sports Injury Rehab    [] Urinary Incontinence     Locations    @ [address] [place] [address] [address] [facility]  Ph: [phone]  Fax: [phone] @ [address] [place] [address], [address] [place] [address],[address]  [place], [address] [facility] [facility] [address]  Ph: [phone]  Fax: [phone] @ [facility], [address] [place], [address] [place] [address]  Ph: [phone]  Fax: [phone]        @ [facility] [address]  Ph: [phone]  Fax: [phone] @ [address] [place] [address], [facility] Evan  [place], [address] [facility] [address]   Ph: [phone]  Fax: [phone] @ [address]  [place], [address]  Ph: [phone]  Fax: [phone]        @ [address] [place] [address]  [address] [facility] Danae  [place], [address] [facility]  Ph: [phone]  Fax: [phone] @ [address] [place] [address]  Ph: [phone]  Fax: [phone] @ [address] [place] [address] Magali Chowdary   Ph: [phone]         @ [address]  Ph: [phone]  Fax: [phone]      This section is not needed if signing electronically   I certify that I have examined the above patient and outpatient rehab services are medically necessary.    ___________________________________________           Signature of Physician/Provider    ___________________________________________            Practice Name   ______________________   Date    ______________________   Referral Contact

## 2017-11-09 ENCOUNTER — HOSPITAL ENCOUNTER (OUTPATIENT)
Dept: PHYSICAL THERAPY | Age: 32
Discharge: HOME | End: 2017-11-09
Attending: PSYCHIATRY & NEUROLOGY
Payer: COMMERCIAL

## 2017-11-09 PROCEDURE — 97110 THERAPEUTIC EXERCISES: CPT

## 2017-11-09 NOTE — PROGRESS NOTES
Harjeet Pepe  : 1985  Payor: Elsi Jensen / Plan: [place] / Product Type: Commerical /  [address]  at [address] [facility], 101 [facility], [address]  [phone]   [phone]         OUTPATIENT PHYSICAL THERAPY:Daily Note 2017    ICD-10: Treatment Diagnosis: Foot drop, right foot (M21.371)  Difficulty in walking, not elsewhere classified (R26.2)  Precautions/Allergies:   Review of patient's allergies indicates no known allergies. Fall Risk Score: 0 (? 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 Jonathan Boateng presents to physical therapy with R foot drop after several hip surgeries over the summer. She is now abulating with a R toe off brace but still with a steppage gait pattern. She has weakness in her R dorsiflexors, plantarflexors, inverters, and evertors. She is unable to perform single leg balance on the R and is at a high risk of falls. She would benefit from skilled PT to address the problem list and goals below. Return of LE strength is expected to be slow due to the slow regeneration of nerves. Treatment will work to establish a detailed HEP that the patient can continue on her own once reaching her max rehab potential in order to continue to improve. PROBLEM LIST (Impacting functional limitations):  1. Decreased Strength  2. Decreased ADL/Functional Activities  3. Decreased Ambulation Ability/Technique  4. Decreased Balance  5. Decreased Flexibility/Joint Mobility  6. Decreased [place] with Home Exercise Program INTERVENTIONS PLANNED:  1. Balance Exercise  2. Gait Training  3.  Home Exercise Program (HEP)  4. Manual Therapy  5. Neuromuscular Re-education/Strengthening  6. Therapeutic Activites  7. Therapeutic Exercise/Strengthening   TREATMENT PLAN:  Effective Dates: 10/23/17 TO 12/23/17. Frequency/Duration: 2 times a week for 8 weeks  GOALS: (Goals have been discussed and agreed upon with patient.)  Discharge Goals: Time Frame: 8 weeks  1. Patient will be independent with HEP. 2. Patient will have a decrease on the TUG to 10 seconds in order to normalize gait pattern. 3. Patient will demonstrate ambulating 150' with no steppage gait pattern in order decrease her risk of falls. 4. Patient will demonstrate tandem stance x 30 seconds in order to decrease her risk of falls. 5. Patient will be compliant with proper footwear in order to maximize the benefit of her brace. 6. Patient will demonstrate single leg balance x 5 seconds on the R in order to improve her functional balance. Rehabilitation Potential For Stated Goals: Good  Regarding Nusrat Cuenca therapy, I certify that the treatment plan above will be carried out by a therapist or under their direction. Thank you for this referral,  Hugo Ridley DPT, NCS     Referring Physician Signature: Juan Daniel Otero MD              Date                    HISTORY:   Patient Stated Goal:        I want to be able to play tennis and ride my bike. History of Present Injury/Illness (Reason for Referral):  Patient developed drop foot. She said initially she didn't have any feeling but now she has feeling. She can move it up just slightly now. She has been wearing the elaina Toe Off. It occurred in June. It happened right after her last hip surgery. Says it happened slowly over the course of a few days. No falls. Drives but has been using her left foot more. States she wants to be able to do yoga, ride on the [address], and play tennis.    Past Medical History/Comorbidities:   Ms. 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 She also has no past medical history of Adverse effect of anesthesia; Aneurysm ([address] Arrhythmia; Arthritis; Asthma; Autoimmune disease ([address] CAD (coronary artery disease); Chronic kidney disease; Chronic obstructive pulmonary disease ([address] Chronic pain; Coagulation disorder ([address] Difficult intubation; Endocarditis; GERD (gastroesophageal reflux disease); Heart failure ([address] Hypertension; Ill-defined condition; Liver disease; Malignant hyperthermia due to anesthesia; Morbid obesity ([address] Nausea & vomiting; Nicotine vapor product user; Non-nicotine vapor product user; Pseudocholinesterase deficiency; Psychiatric disorder; PUD (peptic ulcer disease); Rheumatic fever; Seizures ([address] Sleep apnea; Stroke [facility]); Thromboembolus ([address] or Thyroid disease. Ms. Ruth Edwards  has a past surgical history that includes other surgical; colostomy (06/02/2017); and other surgical (05/2017-06/2017). Social History/Living Environment:     lives with , 2 story home, bedroom downstairs. Independent   Prior Level of Function/Work/Activity:  Was working in mental health. Some college. Current Medications:    Current Outpatient Prescriptions:     insulin aspart (NOVOLOG) 100 unit/mL injection, 100 Units by SubCUTAneous route daily as needed (sliding scale insulin).  Blood sugar sliding scale 0-70     0 units   0 units 151-200  2 units 201-250  4 units 251-300  6 units 301-350  8 units 351-400  10 units >400       12 units and call physician, Disp: , Rfl:     ibuprofen 200 mg cap, Take  by mouth as needed. , Disp: , Rfl:     magnesium oxide (MAG-OX) 400 mg tablet, Take 400 mg by mouth daily. , Disp: , Rfl:     furosemide (LASIX) 40 mg tablet, Take 40 mg by mouth daily. , Disp: , Rfl:     bisacodyl (DULCOLAX) 5 mg EC tablet, Take 1 Tab by mouth daily as needed for Constipation. , Disp: 1 Tab, Rfl: 0    insulin lispro (HUMALOG) 100 unit/mL injection, Very Insulin Resistant For Blood Sugar (mg/dL) of:             Less than 150 =   0 units 150 -199 =   3 units 200 -249 =   6 units 250 -299 =   9 units 300 -349 =   12 units 350 and above =   15 units and Call Physician Initiate Hypoglycemic protocol if blood glucose is <70 mg/dL. , Disp: 1 Vial, Rfl: 0     Date Last Reviewed:  11/9/2017   Number of Personal Factors/Comorbidities that affect the Plan of Care: 1-2: MODERATE COMPLEXITY   EXAMINATION:   Observation/Orthostatic Postural Assessment:          Patient arrives with R Louie Toe Off brace on R foot, appears to be hitting just below her knee. genurecurvatum B. Slipper shoes. Mental Status:          WFL  Palpation:          No spasticity.   Pain with passive dorsiflexion/plantarflexion at end range   Sensation:         Decrease protective sensation R heel and mid foot   Skin Integrity:          intact  Vision:          WFL  Balance:          Unable to single leg balance on R, poor tandem stance     Lower Extremity:   Strength PROM   Action R L R  L    Hip Flexion 4      Hip Extension       Hip Abduction 4-      Hip Adduction       Knee Flexion       Knee Extension       Dorsi Flexion 0      Plantar Flexion 3+      Inversion 4      Eversion 2-                Upper Extremity:         Grossly 5/5  Functional Mobility:         Gait/Ambulation:  Ambulates with a steppage gait pattern on the R, no heel strike, forefoot contact first, decreased gait speed, decreased step length, symmetrical stance time   With brace:  Still slight steppage gait, mild circumduction on R         Transfers:  Slow, independent         Bed Mobility:  Independent         Stairs:  NT        Wheelchair:  NT              Body Structures Involved:  1. Nerves  2. Bones  3. Joints  4. Muscles Body Functions Affected:  1. Sensory/Pain  2. Neuromusculoskeletal  3. Movement Related Activities and Participation Affected:  1. Mobility  2. Domestic Life  3. Interpersonal Interactions and Relationships  4. Community, Social and [place]   Number of elements that affect the Plan of Care: 4+: HIGH COMPLEXITY   CLINICAL PRESENTATION:   Presentation: Stable and uncomplicated: LOW COMPLEXITY   CLINICAL DECISION MAKING:   Outcome Measure: Tool Used: Timed Up and Go (TUG)  Score:  Initial: 20 seconds Most Recent: X seconds (Date: -- )   Interpretation of Score: The test measures, in seconds, the time taken by an individual to stand up from a standard arm chair (seat height 46 cm [18 in], arm height 65 cm [25.6 in]), walk a distance of 3 meters (118 in, approx 10 ft), turn, walk back to the chair and sit down. If the individual takes longer than 14 seconds to complete TUG, this indicates risk for falls. Medical Necessity:   · Patient is expected to demonstrate progress in strength, balance and functional technique to improve safety during ADLs and IADLs. · Patient demonstrates good rehab potential due to higher previous functional level. Reason for Services/Other Comments:  · Patient continues to require modification of therapeutic interventions to increase complexity of exercises. Use of outcome tool(s) and clinical judgement create a POC that gives a: Clear prediction of patient's progress: LOW COMPLEXITY   TREATMENT:   (In addition to Assessment/Re-Assessment sessions the following treatments were rendered)  Pre Treatment Symptoms: patient reports slight ankle pain on the R today but did not give a number rating.        Therapeutic Exercise: ( 40 minutes):  Exercises per grid below to improve mobility, strength and balance. Required moderate visual, verbal and tactile cues to promote proper body alignment and promote proper body mechanics. Progressed complexity of movement as indicated. Date:  10/30/17 Date:   Date:  11/6/17 Date:  11/9/17   Activity/Exercise Parameters Parameters Parameters  Parameters   Standing hip abduction X 20 reps B yellow band X 20 reps B red band   X 20 reps L with no hand hold   Standing hip extension X 20 reps B yellow band X 20 reps B red band  X 20 reps L with no hand hold   Standing hip flexion X 20 reps B yellow band X 20 reps B red band     Heel raises X 20 reps  X 20 reps      Tandem stance 2 x 30 sec on blue foam  2 x 30 sec on blue foam with head turns   2 x 30 sec B   Single leg balance 3 x 1 5 sec hold R   2 x 15 sec on R    physiostep X 5 minutes level 2 X 5 minutes level 2  X 5 minutes level 3 X 5 minutes    Calf stretch on incline board 2 X 60 sec hold  X 60 sec hold  X 60 sec hold    Stance on foam Narrow GRACIE with head turns x 20 sec      rockerboard  Static hold and controlled rocking x 60 sec each direction   Static hold and controlled rocking x 60 sec each direction Static hold x 90 seconds each direction    Marching gait X 50' each direction       Ambulation   Education on hip/knee release. Improved ambulation after this Education on hip/knee release. Verbal cues for increased R step length  2 x 200' with verbal and visual cues to correct gait pattern    Seated BAPS board   X 2 minutes    Seated ABC's    Demonstrated for HEP          Treatment/Session Assessment:  trish from [place] [address] came to todays session. He is going to put patient in a custom articulating AFO. Patient is to call his office and make and appointment. We will hold further therapy until she has her new brace. She continues to have a steppage gait pattern and inversion with the current AFO.   Patient to continue her HEP and call when she has her new brace. · Pain/ Symptoms: Initial:   0/10 Post Session:  0/10 ·   Compliance with Program/Exercises: Will assess as treatment progresses. · Recommendations/Intent for next treatment session: \"Next visit will focus on advancements to more challenging activities and reduction in assistance provided\".   Total Treatment Duration:PT Patient Time In/Time Out  Time In: 0945  Time Out: [facility]

## 2018-02-06 NOTE — PROGRESS NOTES
Amish Veras  : 1985  Payor: KATELYN RULE / [facility] [address] [facility] / Product Type: PPO /  [address]  at 614 [facility], 101 [facility], [place] [address] [address] [facility]  [phone]   [phone]         OUTPATIENT PHYSICAL THERAPY:Discontinuation Summary 2018    ICD-10: Treatment Diagnosis: Foot drop, right foot (M21.371)  Difficulty in walking, not elsewhere classified (R26.2)  Precautions/Allergies:   Review of patient's allergies indicates no known allergies. Fall Risk Score: 0 (? 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 Treatment has been discontinued at this time due to patient was place on hold to get a brace and I do no believe she ever followed up with them and patient failing to return for additional treatment. The below goals were met prior to discontinuation. Some goals were not met due to not being reassessed. Thank you for this referral.         PROBLEM LIST (Impacting functional limitations):  1. Decreased Strength  2. Decreased ADL/Functional Activities  3. Decreased Ambulation Ability/Technique  4. Decreased Balance  5. Decreased Flexibility/Joint Mobility  6. Decreased [place] with Home Exercise Program INTERVENTIONS PLANNED:  1. Balance Exercise  2. Gait Training  3. Home Exercise Program (HEP)  4. Manual Therapy  5. Neuromuscular Re-education/Strengthening  6. Therapeutic Activites  7. Therapeutic Exercise/Strengthening   TREATMENT PLAN:  Effective Dates: 10/23/17 TO 17.   Frequency/Duration: 2 times a week for 8 weeks  GOALS: (Goals have been discussed and agreed upon with patient.)  Discharge Goals: Time Frame: 8 weeks- GOALS NOT REASSESSED   1. Patient will be independent with HEP. 2. Patient will have a decrease on the TUG to 10 seconds in order to normalize gait pattern. 3. Patient will demonstrate ambulating 150' with no steppage gait pattern in order decrease her risk of falls. 4. Patient will demonstrate tandem stance x 30 seconds in order to decrease her risk of falls. 5. Patient will be compliant with proper footwear in order to maximize the benefit of her brace. 6. Patient will demonstrate single leg balance x 5 seconds on the R in order to improve her functional balance. Rehabilitation Potential For Stated Goals: Good  Regarding Sena Fairchild therapy, I certify that the treatment plan above will be carried out by a therapist or under their direction.   Thank you for this referral,  Cecile Curtis, GIUSEPPET, NCS

## 2018-07-23 ENCOUNTER — ANESTHESIA EVENT (OUTPATIENT)
Dept: SURGERY | Age: 33
DRG: 331 | End: 2018-07-23
Payer: SELF-PAY

## 2018-07-23 ENCOUNTER — HOSPITAL ENCOUNTER (OUTPATIENT)
Dept: SURGERY | Age: 33
Discharge: HOME | End: 2018-07-23
Payer: SELF-PAY

## 2018-07-23 VITALS
TEMPERATURE: 98 F | BODY MASS INDEX: 39.94 KG/M2 | HEART RATE: 125 BPM | DIASTOLIC BLOOD PRESSURE: 100 MMHG | WEIGHT: 217.06 LBS | HEIGHT: 62 IN | RESPIRATION RATE: 18 BRPM | SYSTOLIC BLOOD PRESSURE: 154 MMHG | OXYGEN SATURATION: 98 %

## 2018-07-23 LAB
ATRIAL RATE: 121 BPM
CALCULATED P AXIS, ECG09: 67 DEGREES
CALCULATED R AXIS, ECG10: 32 DEGREES
CALCULATED T AXIS, ECG11: 22 DEGREES
DIAGNOSIS, 93000: NORMAL
GLUCOSE BLD STRIP.AUTO-MCNC: 322 MG/DL (ref 65–100)
P-R INTERVAL, ECG05: 140 MS
Q-T INTERVAL, ECG07: 326 MS
QRS DURATION, ECG06: 90 MS
QTC CALCULATION (BEZET), ECG08: 462 MS
VENTRICULAR RATE, ECG03: 121 BPM

## 2018-07-23 PROCEDURE — 93005 ELECTROCARDIOGRAM TRACING: CPT | Performed by: ANESTHESIOLOGY

## 2018-07-23 PROCEDURE — 82962 GLUCOSE BLOOD TEST: CPT

## 2018-07-23 NOTE — PERIOP NOTES
Patient verified name, , and surgery as listed in [facility]. Patient provided medical/health information and PTA medications to the best of their ability. TYPE  CASE:3  Orders per surgeon:not Received   Labs per surgeon:none. Labs per anesthesia protocol: cbc,bmp  WNL T/SDOS, SQBS 32, Dr Alaina Winston notified. Ethelene Gauss EKG  :  18  Dr Alaina Winston notified of pt Hx, he does not need to see pt at this time. Pt instructed on benefits of glucose control before and after surgery. Patient provided with and instructed on education handouts including Guide to Surgery, blood transfusions, pain management, and hand hygiene for the family and community, and [facility] brochure.     hibiclens and instructions given per hospital policy. Instructed patient to continue previous medications as prescribed prior to surgery unless otherwise directed and to take the following medications the day of surgery according to anesthesia guidelines : none . Instructed patient to hold  the following medications: none. Original medication prescription bottles not visualized during patient appointment. Patient teach back successful and patient demonstrates knowledge of instruction.

## 2018-07-27 ENCOUNTER — ANESTHESIA (OUTPATIENT)
Dept: SURGERY | Age: 33
DRG: 331 | End: 2018-07-27
Payer: SELF-PAY

## 2018-07-27 ENCOUNTER — HOSPITAL ENCOUNTER (INPATIENT)
Age: 33
LOS: 2 days | Discharge: HOME | DRG: 331 | End: 2018-07-29
Attending: SURGERY | Admitting: SURGERY
Payer: SELF-PAY

## 2018-07-27 DIAGNOSIS — Z93.3 COLOSTOMY IN PLACE (HCC): Primary | ICD-10-CM

## 2018-07-27 LAB
ALBUMIN SERPL-MCNC: 3.4 G/DL (ref 3.5–5)
ALBUMIN/GLOB SERPL: 0.8 {RATIO} (ref 1.2–3.5)
ALP SERPL-CCNC: 91 U/L (ref 50–136)
ALT SERPL-CCNC: 22 U/L (ref 12–65)
ANION GAP SERPL CALC-SCNC: 11 MMOL/L (ref 7–16)
ANION GAP SERPL CALC-SCNC: 8 MMOL/L (ref 7–16)
AST SERPL-CCNC: 13 U/L (ref 15–37)
BASOPHILS # BLD: 0 K/UL (ref 0–0.2)
BASOPHILS NFR BLD: 0 % (ref 0–2)
BILIRUB SERPL-MCNC: 0.8 MG/DL (ref 0.2–1.1)
BUN SERPL-MCNC: 10 MG/DL (ref 6–23)
BUN SERPL-MCNC: 9 MG/DL (ref 6–23)
CALCIUM SERPL-MCNC: 8 MG/DL (ref 8.3–10.4)
CALCIUM SERPL-MCNC: 8.9 MG/DL (ref 8.3–10.4)
CHLORIDE SERPL-SCNC: 102 MMOL/L (ref 98–107)
CHLORIDE SERPL-SCNC: 107 MMOL/L (ref 98–107)
CO2 SERPL-SCNC: 23 MMOL/L (ref 21–32)
CO2 SERPL-SCNC: 24 MMOL/L (ref 21–32)
CREAT SERPL-MCNC: 0.6 MG/DL (ref 0.6–1)
CREAT SERPL-MCNC: 0.71 MG/DL (ref 0.6–1)
DIFFERENTIAL METHOD BLD: ABNORMAL
EOSINOPHIL # BLD: 0.2 K/UL (ref 0–0.8)
EOSINOPHIL NFR BLD: 2 % (ref 0.5–7.8)
ERYTHROCYTE [DISTWIDTH] IN BLOOD BY AUTOMATED COUNT: 12.8 % (ref 11.9–14.6)
GLOBULIN SER CALC-MCNC: 4.3 G/DL (ref 2.3–3.5)
GLUCOSE BLD STRIP.AUTO-MCNC: 145 MG/DL (ref 65–100)
GLUCOSE BLD STRIP.AUTO-MCNC: 160 MG/DL (ref 65–100)
GLUCOSE BLD STRIP.AUTO-MCNC: 188 MG/DL (ref 65–100)
GLUCOSE BLD STRIP.AUTO-MCNC: 208 MG/DL (ref 65–100)
GLUCOSE BLD STRIP.AUTO-MCNC: 221 MG/DL (ref 65–100)
GLUCOSE BLD STRIP.AUTO-MCNC: 221 MG/DL (ref 65–100)
GLUCOSE BLD STRIP.AUTO-MCNC: 265 MG/DL (ref 65–100)
GLUCOSE BLD STRIP.AUTO-MCNC: 92 MG/DL (ref 65–100)
GLUCOSE SERPL-MCNC: 173 MG/DL (ref 65–100)
GLUCOSE SERPL-MCNC: 242 MG/DL (ref 65–100)
HCG UR QL: NEGATIVE
HCT VFR BLD AUTO: 40.4 % (ref 35.8–46.3)
HGB BLD-MCNC: 13.9 G/DL (ref 11.7–15.4)
IMM GRANULOCYTES # BLD: 0 K/UL (ref 0–0.5)
IMM GRANULOCYTES NFR BLD AUTO: 0 % (ref 0–5)
LYMPHOCYTES # BLD: 2.5 K/UL (ref 0.5–4.6)
LYMPHOCYTES NFR BLD: 27 % (ref 13–44)
MCH RBC QN AUTO: 28.6 PG (ref 26.1–32.9)
MCHC RBC AUTO-ENTMCNC: 34.4 G/DL (ref 31.4–35)
MCV RBC AUTO: 83.1 FL (ref 79.6–97.8)
MONOCYTES # BLD: 0.6 K/UL (ref 0.1–1.3)
MONOCYTES NFR BLD: 6 % (ref 4–12)
NEUTS SEG # BLD: 5.9 K/UL (ref 1.7–8.2)
NEUTS SEG NFR BLD: 65 % (ref 43–78)
PLATELET # BLD AUTO: 391 K/UL (ref 150–450)
PMV BLD AUTO: 9.5 FL (ref 10.8–14.1)
POTASSIUM SERPL-SCNC: 3.5 MMOL/L (ref 3.5–5.1)
POTASSIUM SERPL-SCNC: 4 MMOL/L (ref 3.5–5.1)
PROT SERPL-MCNC: 7.7 G/DL (ref 6.3–8.2)
RBC # BLD AUTO: 4.86 M/UL (ref 4.05–5.25)
SODIUM SERPL-SCNC: 137 MMOL/L (ref 136–145)
SODIUM SERPL-SCNC: 138 MMOL/L (ref 136–145)
WBC # BLD AUTO: 9.1 K/UL (ref 4.3–11.1)

## 2018-07-27 PROCEDURE — 74011000250 HC RX REV CODE- 250: Performed by: ANESTHESIOLOGY

## 2018-07-27 PROCEDURE — 74011250636 HC RX REV CODE- 250/636

## 2018-07-27 PROCEDURE — 77030018836 HC SOL IRR NACL ICUM -A: Performed by: SURGERY

## 2018-07-27 PROCEDURE — 77030019908 HC STETH ESOPH SIMS -A: Performed by: ANESTHESIOLOGY

## 2018-07-27 PROCEDURE — 0DQB4ZZ REPAIR ILEUM, PERCUTANEOUS ENDOSCOPIC APPROACH: ICD-10-PCS | Performed by: SURGERY

## 2018-07-27 PROCEDURE — 74011250636 HC RX REV CODE- 250/636: Performed by: ANESTHESIOLOGY

## 2018-07-27 PROCEDURE — 74011250637 HC RX REV CODE- 250/637: Performed by: SURGERY

## 2018-07-27 PROCEDURE — 77030008703 HC TU ET UNCUF COVD -A: Performed by: ANESTHESIOLOGY

## 2018-07-27 PROCEDURE — 76060000035 HC ANESTHESIA 2 TO 2.5 HR: Performed by: SURGERY

## 2018-07-27 PROCEDURE — 77030034850: Performed by: SURGERY

## 2018-07-27 PROCEDURE — 77030008756 HC TU IRR SUC STRY -B: Performed by: SURGERY

## 2018-07-27 PROCEDURE — 77030032490 HC SLV COMPR SCD KNE COVD -B: Performed by: SURGERY

## 2018-07-27 PROCEDURE — 82962 GLUCOSE BLOOD TEST: CPT

## 2018-07-27 PROCEDURE — 77030035220 HC TRCR ENDOSC BLNTPRT ANCHR COVD -B: Performed by: SURGERY

## 2018-07-27 PROCEDURE — 74011636637 HC RX REV CODE- 636/637: Performed by: NURSE PRACTITIONER

## 2018-07-27 PROCEDURE — 77030031139 HC SUT VCRL2 J&J -A: Performed by: SURGERY

## 2018-07-27 PROCEDURE — 80053 COMPREHEN METABOLIC PANEL: CPT | Performed by: ANESTHESIOLOGY

## 2018-07-27 PROCEDURE — 77030002996 HC SUT SLK J&J -A: Performed by: SURGERY

## 2018-07-27 PROCEDURE — 77030034818 HC STPLR INT GIA COVD -C: Performed by: SURGERY

## 2018-07-27 PROCEDURE — 74011250637 HC RX REV CODE- 250/637: Performed by: ANESTHESIOLOGY

## 2018-07-27 PROCEDURE — 74011000258 HC RX REV CODE- 258: Performed by: SURGERY

## 2018-07-27 PROCEDURE — 74011000250 HC RX REV CODE- 250

## 2018-07-27 PROCEDURE — 77030020782 HC GWN BAIR PAWS FLX 3M -B: Performed by: ANESTHESIOLOGY

## 2018-07-27 PROCEDURE — 77030025240 HC RELD STPL GIA 2 COVD -C: Performed by: SURGERY

## 2018-07-27 PROCEDURE — 77030008522 HC TBNG INSUF LAPRO STRY -B: Performed by: SURGERY

## 2018-07-27 PROCEDURE — 77030035048 HC TRCR ENDOSC OPTCL COVD -B: Performed by: SURGERY

## 2018-07-27 PROCEDURE — 77030008467 HC STPLR SKN COVD -B: Performed by: SURGERY

## 2018-07-27 PROCEDURE — 85025 COMPLETE CBC W/AUTO DIFF WBC: CPT | Performed by: ANESTHESIOLOGY

## 2018-07-27 PROCEDURE — 74011250636 HC RX REV CODE- 250/636: Performed by: SURGERY

## 2018-07-27 PROCEDURE — 74011636637 HC RX REV CODE- 636/637: Performed by: ANESTHESIOLOGY

## 2018-07-27 PROCEDURE — 76010000171 HC OR TIME 2 TO 2.5 HR INTENSV-TIER 1: Performed by: SURGERY

## 2018-07-27 PROCEDURE — 81025 URINE PREGNANCY TEST: CPT

## 2018-07-27 PROCEDURE — 77030010285 HC STPLR INT PRSTRNG COVD -B: Performed by: SURGERY

## 2018-07-27 PROCEDURE — 77030008477 HC STYL SATN SLP COVD -A: Performed by: ANESTHESIOLOGY

## 2018-07-27 PROCEDURE — 77030011640 HC PAD GRND REM COVD -A: Performed by: SURGERY

## 2018-07-27 PROCEDURE — 77030031753 HC SHR ENDO COAG HARM J&J -E: Performed by: SURGERY

## 2018-07-27 PROCEDURE — 76210000017 HC OR PH I REC 1.5 TO 2 HR: Performed by: SURGERY

## 2018-07-27 PROCEDURE — 77030027138 HC INCENT SPIROMETER -A

## 2018-07-27 PROCEDURE — 77030002966 HC SUT PDS J&J -A: Performed by: SURGERY

## 2018-07-27 PROCEDURE — 65270000029 HC RM PRIVATE

## 2018-07-27 PROCEDURE — 77030035051: Performed by: SURGERY

## 2018-07-27 RX ORDER — DEXAMETHASONE SODIUM PHOSPHATE 4 MG/ML
INJECTION, SOLUTION INTRA-ARTICULAR; INTRALESIONAL; INTRAMUSCULAR; INTRAVENOUS; SOFT TISSUE AS NEEDED
Status: DISCONTINUED | OUTPATIENT
Start: 2018-07-27 | End: 2018-07-27 | Stop reason: HOSPADM

## 2018-07-27 RX ORDER — SODIUM CHLORIDE 9 MG/ML
50 INJECTION, SOLUTION INTRAVENOUS CONTINUOUS
Status: DISCONTINUED | OUTPATIENT
Start: 2018-07-27 | End: 2018-07-27 | Stop reason: HOSPADM

## 2018-07-27 RX ORDER — GLYCOPYRROLATE 0.2 MG/ML
INJECTION INTRAMUSCULAR; INTRAVENOUS AS NEEDED
Status: DISCONTINUED | OUTPATIENT
Start: 2018-07-27 | End: 2018-07-27 | Stop reason: HOSPADM

## 2018-07-27 RX ORDER — OXYCODONE AND ACETAMINOPHEN 5; 325 MG/1; MG/1
1 TABLET ORAL
Status: DISCONTINUED | OUTPATIENT
Start: 2018-07-27 | End: 2018-07-29 | Stop reason: HOSPADM

## 2018-07-27 RX ORDER — MORPHINE SULFATE 2 MG/ML
2 INJECTION, SOLUTION INTRAMUSCULAR; INTRAVENOUS
Status: DISCONTINUED | OUTPATIENT
Start: 2018-07-27 | End: 2018-07-29 | Stop reason: HOSPADM

## 2018-07-27 RX ORDER — SODIUM CHLORIDE 0.9 % (FLUSH) 0.9 %
5-10 SYRINGE (ML) INJECTION AS NEEDED
Status: DISCONTINUED | OUTPATIENT
Start: 2018-07-27 | End: 2018-07-27 | Stop reason: HOSPADM

## 2018-07-27 RX ORDER — POTASSIUM CHLORIDE 14.9 MG/ML
INJECTION INTRAVENOUS AS NEEDED
Status: DISCONTINUED | OUTPATIENT
Start: 2018-07-27 | End: 2018-07-27 | Stop reason: HOSPADM

## 2018-07-27 RX ORDER — FENTANYL CITRATE 50 UG/ML
INJECTION, SOLUTION INTRAMUSCULAR; INTRAVENOUS AS NEEDED
Status: DISCONTINUED | OUTPATIENT
Start: 2018-07-27 | End: 2018-07-27 | Stop reason: HOSPADM

## 2018-07-27 RX ORDER — CEFOXITIN 2 G/1
INJECTION, POWDER, FOR SOLUTION INTRAVENOUS AS NEEDED
Status: DISCONTINUED | OUTPATIENT
Start: 2018-07-27 | End: 2018-07-27 | Stop reason: HOSPADM

## 2018-07-27 RX ORDER — ROCURONIUM BROMIDE 10 MG/ML
INJECTION, SOLUTION INTRAVENOUS AS NEEDED
Status: DISCONTINUED | OUTPATIENT
Start: 2018-07-27 | End: 2018-07-27 | Stop reason: HOSPADM

## 2018-07-27 RX ORDER — HYDROCODONE BITARTRATE AND ACETAMINOPHEN 5; 325 MG/1; MG/1
1 TABLET ORAL AS NEEDED
Status: DISCONTINUED | OUTPATIENT
Start: 2018-07-27 | End: 2018-07-27 | Stop reason: HOSPADM

## 2018-07-27 RX ORDER — CEFOXITIN 2 G/1
2 INJECTION, POWDER, FOR SOLUTION INTRAVENOUS ONCE
Status: DISCONTINUED | OUTPATIENT
Start: 2018-07-27 | End: 2018-07-27 | Stop reason: SDUPTHER

## 2018-07-27 RX ORDER — ACETAMINOPHEN 500 MG
1000 TABLET ORAL
Status: DISCONTINUED | OUTPATIENT
Start: 2018-07-27 | End: 2018-07-27 | Stop reason: HOSPADM

## 2018-07-27 RX ORDER — SODIUM CHLORIDE AND POTASSIUM CHLORIDE .9; .15 G/100ML; G/100ML
SOLUTION INTRAVENOUS CONTINUOUS
Status: DISCONTINUED | OUTPATIENT
Start: 2018-07-27 | End: 2018-07-28

## 2018-07-27 RX ORDER — MIDAZOLAM HYDROCHLORIDE 1 MG/ML
2 INJECTION, SOLUTION INTRAMUSCULAR; INTRAVENOUS
Status: COMPLETED | OUTPATIENT
Start: 2018-07-27 | End: 2018-07-27

## 2018-07-27 RX ORDER — KETAMINE HYDROCHLORIDE 100 MG/ML
INJECTION, SOLUTION INTRAMUSCULAR; INTRAVENOUS AS NEEDED
Status: DISCONTINUED | OUTPATIENT
Start: 2018-07-27 | End: 2018-07-27 | Stop reason: HOSPADM

## 2018-07-27 RX ORDER — HYDROMORPHONE HYDROCHLORIDE 2 MG/ML
0.5 INJECTION, SOLUTION INTRAMUSCULAR; INTRAVENOUS; SUBCUTANEOUS
Status: DISCONTINUED | OUTPATIENT
Start: 2018-07-27 | End: 2018-07-27 | Stop reason: HOSPADM

## 2018-07-27 RX ORDER — NEOSTIGMINE METHYLSULFATE 1 MG/ML
INJECTION INTRAVENOUS AS NEEDED
Status: DISCONTINUED | OUTPATIENT
Start: 2018-07-27 | End: 2018-07-27 | Stop reason: HOSPADM

## 2018-07-27 RX ORDER — SODIUM CHLORIDE, SODIUM LACTATE, POTASSIUM CHLORIDE, CALCIUM CHLORIDE 600; 310; 30; 20 MG/100ML; MG/100ML; MG/100ML; MG/100ML
150 INJECTION, SOLUTION INTRAVENOUS CONTINUOUS
Status: DISCONTINUED | OUTPATIENT
Start: 2018-07-27 | End: 2018-07-27 | Stop reason: HOSPADM

## 2018-07-27 RX ORDER — LIDOCAINE HYDROCHLORIDE 10 MG/ML
0.1 INJECTION INFILTRATION; PERINEURAL AS NEEDED
Status: DISCONTINUED | OUTPATIENT
Start: 2018-07-27 | End: 2018-07-27 | Stop reason: HOSPADM

## 2018-07-27 RX ORDER — FENTANYL CITRATE 50 UG/ML
100 INJECTION, SOLUTION INTRAMUSCULAR; INTRAVENOUS ONCE
Status: DISCONTINUED | OUTPATIENT
Start: 2018-07-27 | End: 2018-07-27 | Stop reason: HOSPADM

## 2018-07-27 RX ORDER — DIPHENHYDRAMINE HYDROCHLORIDE 50 MG/ML
12.5 INJECTION, SOLUTION INTRAMUSCULAR; INTRAVENOUS
Status: DISCONTINUED | OUTPATIENT
Start: 2018-07-27 | End: 2018-07-29 | Stop reason: HOSPADM

## 2018-07-27 RX ORDER — INSULIN LISPRO 100 [IU]/ML
INJECTION, SOLUTION INTRAVENOUS; SUBCUTANEOUS
Status: DISCONTINUED | OUTPATIENT
Start: 2018-07-27 | End: 2018-07-29

## 2018-07-27 RX ORDER — ONDANSETRON 2 MG/ML
INJECTION INTRAMUSCULAR; INTRAVENOUS AS NEEDED
Status: DISCONTINUED | OUTPATIENT
Start: 2018-07-27 | End: 2018-07-27 | Stop reason: HOSPADM

## 2018-07-27 RX ORDER — ONDANSETRON 2 MG/ML
4 INJECTION INTRAMUSCULAR; INTRAVENOUS
Status: DISCONTINUED | OUTPATIENT
Start: 2018-07-27 | End: 2018-07-29 | Stop reason: HOSPADM

## 2018-07-27 RX ORDER — SODIUM CHLORIDE 0.9 % (FLUSH) 0.9 %
5-10 SYRINGE (ML) INJECTION AS NEEDED
Status: DISCONTINUED | OUTPATIENT
Start: 2018-07-27 | End: 2018-07-29 | Stop reason: HOSPADM

## 2018-07-27 RX ORDER — ESMOLOL HYDROCHLORIDE 10 MG/ML
INJECTION INTRAVENOUS AS NEEDED
Status: DISCONTINUED | OUTPATIENT
Start: 2018-07-27 | End: 2018-07-27 | Stop reason: HOSPADM

## 2018-07-27 RX ORDER — LIDOCAINE HYDROCHLORIDE 20 MG/ML
INJECTION, SOLUTION EPIDURAL; INFILTRATION; INTRACAUDAL; PERINEURAL AS NEEDED
Status: DISCONTINUED | OUTPATIENT
Start: 2018-07-27 | End: 2018-07-27 | Stop reason: HOSPADM

## 2018-07-27 RX ORDER — NALOXONE HYDROCHLORIDE 0.4 MG/ML
0.4 INJECTION, SOLUTION INTRAMUSCULAR; INTRAVENOUS; SUBCUTANEOUS AS NEEDED
Status: DISCONTINUED | OUTPATIENT
Start: 2018-07-27 | End: 2018-07-29 | Stop reason: HOSPADM

## 2018-07-27 RX ORDER — KETOROLAC TROMETHAMINE 30 MG/ML
30 INJECTION, SOLUTION INTRAMUSCULAR; INTRAVENOUS
Status: DISPENSED | OUTPATIENT
Start: 2018-07-27 | End: 2018-07-28

## 2018-07-27 RX ORDER — FAMOTIDINE 20 MG/1
20 TABLET, FILM COATED ORAL ONCE
Status: COMPLETED | OUTPATIENT
Start: 2018-07-27 | End: 2018-07-27

## 2018-07-27 RX ORDER — SODIUM CHLORIDE 0.9 % (FLUSH) 0.9 %
5-10 SYRINGE (ML) INJECTION EVERY 8 HOURS
Status: DISCONTINUED | OUTPATIENT
Start: 2018-07-27 | End: 2018-07-29 | Stop reason: HOSPADM

## 2018-07-27 RX ORDER — SODIUM CHLORIDE 0.9 % (FLUSH) 0.9 %
5-10 SYRINGE (ML) INJECTION EVERY 8 HOURS
Status: DISCONTINUED | OUTPATIENT
Start: 2018-07-27 | End: 2018-07-27 | Stop reason: HOSPADM

## 2018-07-27 RX ORDER — PROPOFOL 10 MG/ML
INJECTION, EMULSION INTRAVENOUS AS NEEDED
Status: DISCONTINUED | OUTPATIENT
Start: 2018-07-27 | End: 2018-07-27 | Stop reason: HOSPADM

## 2018-07-27 RX ADMIN — ROCURONIUM BROMIDE 50 MG: 10 INJECTION, SOLUTION INTRAVENOUS at 07:49

## 2018-07-27 RX ADMIN — KETAMINE HYDROCHLORIDE 40 MG: 100 INJECTION, SOLUTION INTRAMUSCULAR; INTRAVENOUS at 08:54

## 2018-07-27 RX ADMIN — INSULIN HUMAN 5 UNITS: 100 INJECTION, SOLUTION PARENTERAL at 07:05

## 2018-07-27 RX ADMIN — HUMAN INSULIN 5 UNITS: 100 INJECTION, SOLUTION SUBCUTANEOUS at 08:00

## 2018-07-27 RX ADMIN — PROPOFOL 150 MG: 10 INJECTION, EMULSION INTRAVENOUS at 07:49

## 2018-07-27 RX ADMIN — SODIUM CHLORIDE, SODIUM LACTATE, POTASSIUM CHLORIDE, AND CALCIUM CHLORIDE: 600; 310; 30; 20 INJECTION, SOLUTION INTRAVENOUS at 08:30

## 2018-07-27 RX ADMIN — Medication 10 ML: at 14:00

## 2018-07-27 RX ADMIN — LIDOCAINE HYDROCHLORIDE 100 MG: 20 INJECTION, SOLUTION EPIDURAL; INFILTRATION; INTRACAUDAL; PERINEURAL at 07:49

## 2018-07-27 RX ADMIN — MIDAZOLAM HYDROCHLORIDE 2 MG: 1 INJECTION, SOLUTION INTRAMUSCULAR; INTRAVENOUS at 07:00

## 2018-07-27 RX ADMIN — GLYCOPYRROLATE 0.4 MG: 0.2 INJECTION INTRAMUSCULAR; INTRAVENOUS at 09:30

## 2018-07-27 RX ADMIN — ESMOLOL HYDROCHLORIDE 20 MG: 10 INJECTION INTRAVENOUS at 07:46

## 2018-07-27 RX ADMIN — HYDROMORPHONE HYDROCHLORIDE 0.5 MG: 2 INJECTION, SOLUTION INTRAMUSCULAR; INTRAVENOUS; SUBCUTANEOUS at 10:08

## 2018-07-27 RX ADMIN — NEOSTIGMINE METHYLSULFATE 3 MG: 1 INJECTION INTRAVENOUS at 09:30

## 2018-07-27 RX ADMIN — INSULIN HUMAN 5 UNITS: 100 INJECTION, SOLUTION PARENTERAL at 07:00

## 2018-07-27 RX ADMIN — FENTANYL CITRATE 100 MCG: 50 INJECTION, SOLUTION INTRAMUSCULAR; INTRAVENOUS at 07:49

## 2018-07-27 RX ADMIN — CEFOXITIN 2 G: 2 INJECTION, POWDER, FOR SOLUTION INTRAVENOUS at 08:15

## 2018-07-27 RX ADMIN — OXYCODONE HYDROCHLORIDE AND ACETAMINOPHEN 1 TABLET: 5; 325 TABLET ORAL at 13:21

## 2018-07-27 RX ADMIN — SODIUM CHLORIDE AND POTASSIUM CHLORIDE: 9; 1.49 INJECTION, SOLUTION INTRAVENOUS at 12:08

## 2018-07-27 RX ADMIN — CEFOXITIN SODIUM 2 G: 2 POWDER, FOR SOLUTION INTRAVENOUS at 07:06

## 2018-07-27 RX ADMIN — POTASSIUM CHLORIDE 20 MEQ: 14.9 INJECTION INTRAVENOUS at 08:00

## 2018-07-27 RX ADMIN — CEFOXITIN SODIUM 2 G: 2 POWDER, FOR SOLUTION INTRAVENOUS at 13:22

## 2018-07-27 RX ADMIN — MORPHINE SULFATE 2 MG: 2 INJECTION, SOLUTION INTRAMUSCULAR; INTRAVENOUS at 12:10

## 2018-07-27 RX ADMIN — SODIUM CHLORIDE 3.25 MG: 9 INJECTION INTRAMUSCULAR; INTRAVENOUS; SUBCUTANEOUS at 10:30

## 2018-07-27 RX ADMIN — KETAMINE HYDROCHLORIDE 40 MG: 100 INJECTION, SOLUTION INTRAMUSCULAR; INTRAVENOUS at 07:49

## 2018-07-27 RX ADMIN — FAMOTIDINE 20 MG: 20 TABLET ORAL at 07:06

## 2018-07-27 RX ADMIN — INSULIN LISPRO 6 UNITS: 100 INJECTION, SOLUTION INTRAVENOUS; SUBCUTANEOUS at 18:13

## 2018-07-27 RX ADMIN — DEXAMETHASONE SODIUM PHOSPHATE 4 MG: 4 INJECTION, SOLUTION INTRA-ARTICULAR; INTRALESIONAL; INTRAMUSCULAR; INTRAVENOUS; SOFT TISSUE at 08:28

## 2018-07-27 RX ADMIN — SODIUM CHLORIDE, SODIUM LACTATE, POTASSIUM CHLORIDE, AND CALCIUM CHLORIDE 150 ML/HR: 600; 310; 30; 20 INJECTION, SOLUTION INTRAVENOUS at 07:09

## 2018-07-27 RX ADMIN — ONDANSETRON 4 MG: 2 INJECTION INTRAMUSCULAR; INTRAVENOUS at 09:27

## 2018-07-27 RX ADMIN — OXYCODONE HYDROCHLORIDE AND ACETAMINOPHEN 1 TABLET: 5; 325 TABLET ORAL at 22:04

## 2018-07-27 RX ADMIN — MORPHINE SULFATE 2 MG: 2 INJECTION, SOLUTION INTRAMUSCULAR; INTRAVENOUS at 16:07

## 2018-07-27 RX ADMIN — ROCURONIUM BROMIDE 10 MG: 10 INJECTION, SOLUTION INTRAVENOUS at 08:30

## 2018-07-27 RX ADMIN — ROCURONIUM BROMIDE 10 MG: 10 INJECTION, SOLUTION INTRAVENOUS at 09:03

## 2018-07-27 RX ADMIN — CEFOXITIN SODIUM 2 G: 2 POWDER, FOR SOLUTION INTRAVENOUS at 18:03

## 2018-07-27 RX ADMIN — HYDROMORPHONE HYDROCHLORIDE 0.5 MG: 2 INJECTION, SOLUTION INTRAMUSCULAR; INTRAVENOUS; SUBCUTANEOUS at 10:12

## 2018-07-27 NOTE — ANESTHESIA PREPROCEDURE EVALUATION
Anesthetic History   No history of anesthetic complications            Review of Systems / Medical History  Patient summary reviewed and pertinent labs reviewed    Pulmonary  Within defined limits                 Neuro/Psych   Within defined limits           Cardiovascular                  Exercise tolerance: >4 METS  Comments: Tachycardia   GI/Hepatic/Renal         Renal disease (improved)       Endo/Other    Diabetes: poorly controlled, using insulin    Morbid obesity     Other Findings   Comments: Alcoholic pancreatitis  Chlamydia  Herpes  Pelvic mass           Physical Exam    Airway  Mallampati: I  TM Distance: 4 - 6 cm  Neck ROM: normal range of motion   Mouth opening: Normal     Cardiovascular    Rhythm: regular  Rate: abnormal        Comments: Tachy.  Dental  No notable dental hx       Pulmonary  Breath sounds clear to auscultation               Abdominal  GI exam deferred       Other Findings            Anesthetic Plan    ASA: 3  Anesthesia type: general          Induction: Intravenous  Anesthetic plan and risks discussed with: Patient

## 2018-07-27 NOTE — H&P
Vincenzo Otto DO  [address], [address] [facility]  Marcella More[place]agnieszka Thacker  Phone [phone]   Fax [phone]        Date of visit: 18        Primary/Requesting provider: Toshia Garces MD          Chief Complaint   Patient presents with    Follow-up       schedule colostomy closure              Name: Fareed Moran      MRN: [id number]       : 1985       Age: 34 y.o. Sex: female      PCP: Toshia Garces MD         CC:         Chief Complaint   Patient presents with    Follow-up       schedule colostomy closure         HPI:      Fareed Moran is a 34 y.o. female who presents for evaluation of colostomy closure. This is a 68-year-old female previous patient of Dr. Pako Crow with necrotizing fasciitis of the right hip with diverting colostomy for wound healing purposes. Patient has been scheduled before for colostomy closure but her insurance would not cover the procedure. Today she returns the office to discuss colostomy closure. She did reports no problems. She denies any fevers nausea vomiting or wound problems. She continues to improve with her right lower extremity neurologic deficiencies. She reports no wound problems from her necrotizing fasciitis. Her ostomy is functioning well. She is here today to discuss colostomy closure. .      PMH:          Past Medical History:   Diagnosis Date    Acute kidney injury ([address]      Alpha-streptococcal sepsis ([address]      Axonal polyneuropathy 10/9/2017    Candida glabrata infection      Chlamydia      DKA (diabetic ketoacidoses) ([address]      Elevated liver enzymes      Escherichia coli infection      Herpes simplex      History of creation of ostomy ([address]      Hypernatremia      Hypophosphatemia      Leukocytosis      Necrotizing fasciitis (HCC)      Neuropathy of right sciatic nerve 10/9/2017    Pancreatitis       patient denies     Pelvic abscess in female      Rhabdomyolysis      Septic shock (HCC)      Type 2 diabetes mellitus ([facility]       insulin PRN: avg. 120-140: pt denies episodes of hypo: A1c unknown         PSH:           Past Surgical History:   Procedure Laterality Date    HX COLOSTOMY   06/02/2017    HX OTHER SURGICAL         pilonidal cyst, multiple times    HX OTHER SURGICAL   05/2017-06/2017     I&D's x5         MEDS:          Current Outpatient Prescriptions   Medication Sig    magnesium citrate solution Take 296 mL by mouth now for 1 dose. Take one bottle at 13:00 and and the second bottle at 17:00 the day before surgery.  neomycin (MYCIFRADIN) 500 mg tablet Take 2 Tabs by mouth three (3) times daily for 1 day. Take 1,000 mg by mouth at 13:00, 15:00 and 22:00 the day before surgery.  erythromycin base (E-MYCIN) 250 mg tablet Take 4 Tabs by mouth three (3) times daily for 1 day. Take 4 tablets by mouth at 13:00, 15:00 and 22:00 the day before surgery.  insulin aspart (NOVOLOG) 100 unit/mL injection 100 Units by SubCUTAneous route daily as needed (sliding scale insulin). Blood sugar sliding scale  0-70     0 units    0 units  151-200  2 units  201-250  4 units  251-300  6 units  301-350  8 units  351-400  10 units  >400       12 units and call physician    ibuprofen 200 mg cap Take  by mouth as needed.  insulin lispro (HUMALOG) 100 unit/mL injection Very Insulin Resistant  For Blood Sugar (mg/dL) of:              Less than 150 =   0 units  150 -199 =   3 units  200 -249 =   6 units  250 -299 =   9 units  300 -349 =   12 units  350 and above =   15 units and Call Physician  Initiate Hypoglycemic protocol if blood glucose is <70 mg/dL.  magnesium oxide (MAG-OX) 400 mg tablet Take 400 mg by mouth daily.  furosemide (LASIX) 40 mg tablet Take 40 mg by mouth daily.     bisacodyl (DULCOLAX) 5 mg EC tablet Take 1 Tab by mouth daily as needed for Constipation.      No current facility-administered medications for this visit.          ALLERGIES:       No Known Allergies     SH:    Social History   Substance Use Topics    Smoking status: Never Smoker    Smokeless tobacco: Never Used    Alcohol use No         FH:           Family History   Problem Relation Age of Onset    No Known Problems Mother      Diabetes Father              Review of Systems   HENT: Negative. Eyes: Negative. Gastrointestinal: Negative. Genitourinary: Negative. Musculoskeletal: Negative. Skin:        colostomy   Neurological: Negative. Endo/Heme/Allergies: Negative. Psychiatric/Behavioral: Negative.             Physical Exam:           Visit Vitals    BP (!) 149/102    Pulse (!) 125    Ht 5' 2\" (1.575 m)    Wt 214 lb (97.1 kg)    BMI 39.14 kg/m2            Physical Exam   Abdominal:             Assessment/Plan:  Gregg Ryan is a 34 y.o. female who has signs and symptoms consistent with colostomy in place. Patient will be scheduled for laparoscopic colostomy closure. She was given detailed instructions about proper bowel preparation using a [employer] prep. The prep consists of being on a clear liquid diet the day before surgery with mag citrate being taken at 1 PM and 5 PM the day before surgery. She will also take erythromycin and neomycin base at 1 PM, 3 PM, and 10:00 PM at night. We briefly discussed the surgery in detail. We will attempt to repair this laparoscopically but there is always a possibility that an open procedure will be recommended. I also told the patient that she should expect 3-5 days in the hospital.  All of her questions were answered to her satisfaction.         ICD-10-CM ICD-9-CM     1. Colostomy in place [facility]) Z93.3 V44. 3           I went through the risks of bleeding, infection and anesthesia.      Counseling time:counseling time more than 50% of visit: 30 minutes were utilized in office visit today 50% of time was used to discuss bowel preparation details of surgery and postoperative expectations.     Signed: Arianne Vegas DO

## 2018-07-27 NOTE — IP AVS SNAPSHOT
Chino Ego 
 
 
 [address] [facility] [address] [facility] 
[phone] Patient: Griffin Anthony MRN: [id number] NFR:6/7/3698 About your hospitalization You were admitted on:  July 27, 2018 You last received care in the:  [facility] SURGICAL You were discharged on:  July 29, 2018 Why you were hospitalized Your primary diagnosis was:  Not on File Follow-up Information Follow up With Details Comments Contact Info None   None ([phone]) Patient stated that they have no PCP Timothy Echavarria DO  Call Kandy's office for appt Monday [phone] for 7-10 days [address] [facility] [address] [phone] Your Scheduled Appointments Monday August 13, 2018  2:15 PM EDT Global Post Op with Lata Gaitan DO  
[facility] ([facility]) [address] [id number] [phone] Discharge Orders None A check tyree indicates which time of day the medication should be taken. My Medications START taking these medications Instructions Each Dose to Equal  
 Morning Noon Evening Bedtime  
 oxyCODONE-acetaminophen 5-325 mg per tablet Commonly known as:  PERCOCET Your last dose was: Your next dose is: Take 1-2 Tabs by mouth every four (4) hours as needed. Max Daily Amount: 12 Tabs. 1-2 Tab CONTINUE taking these medications Instructions Each Dose to Equal  
 Morning Noon Evening Bedtime  
 insulin lispro 100 unit/mL injection Commonly known as:  HUMALOG Your last dose was:     
   
Your next dose is:    
   
   
 Very Insulin Resistant For Blood Sugar (mg/dL) of:             Less than 150 =   0 units 150 -199 =   3 units 200 -249 =   6 units 250 -299 =   9 units 300 -349 =   12 units 350 and above =   15 units and Call Physician Initiate Hypoglycemic protocol if blood glucose is <70 mg/dL. Where to Get Your Medications Information on where to get these meds will be given to you by the nurse or doctor. ! Ask your nurse or doctor about these medications  
  oxyCODONE-acetaminophen 5-325 mg per tablet Opioid Education Prescription Opioids: What You Need to Know: 
 
Prescription opioids can be used to help relieve moderate-to-severe pain and are often prescribed following a surgery or injury, or for certain health conditions. These medications can be an important part of treatment but also come with serious risks. Opioids are strong pain medicines. Examples include hydrocodone, oxycodone, fentanyl, and morphine. Heroin is an example of an illegal opioid. It is important to work with your health care provider to make sure you are getting the safest, most effective care. WHAT ARE THE RISKS AND SIDE EFFECTS OF OPIOID USE? Prescription opioids carry serious risks of addiction and overdose, especially with prolonged use. An opioid overdose, often marked by slow breathing, can cause sudden death. The use of prescription opioids can have a number of side effects as well, even when taken as directed. · Tolerance-meaning you might need to take more of a medication for the same pain relief · Physical dependence-meaning you have symptoms of withdrawal when the medication is stopped. Withdrawal symptoms can include nausea, sweating, chills, diarrhea, stomach cramps, and muscle aches. Withdrawal can last up to several weeks, depending on which drug you took and how long you took it. · Increased sensitivity to pain · Constipation · Nausea, vomiting, and dry mouth · Sleepiness and dizziness · Confusion · Depression · Low levels of testosterone that can result in lower sex drive, energy, and strength · Itching and sweating RISKS ARE GREATER WITH:      
 · History of drug misuse, substance use disorder, or overdose · Mental health conditions (such as depression or anxiety) · Sleep apnea · Older age (72 years or older) · Pregnancy Avoid alcohol while taking prescription opioids. Also, unless specifically advised by your health care provider, medications to avoid include: · Benzodiazepines (such as Xanax or Valium) · Muscle relaxants (such as Soma or Flexeril) · Hypnotics (such as Ambien or Lunesta) · Other prescription opioids KNOW YOUR OPTIONS Talk to your health care provider about ways to manage your pain that don't involve prescription opioids. Some of these options may actually work better and have fewer risks and side effects. Options may include: 
· Pain relievers such as acetaminophen, ibuprofen, and naproxen · Some medications that are also used for depression or seizures · Physical therapy and exercise · Counseling to help patients learn how to cope better with triggers of pain and stress. · Application of heat or cold compress · Massage therapy · Relaxation techniques Be Informed Make sure you know the name of your medication, how much and how often to take it, and its potential risks & side effects. IF YOU ARE PRESCRIBED OPIOIDS FOR PAIN: 
· Never take opioids in greater amounts or more often than prescribed. Remember the goal is not to be pain-free but to manage your pain at a tolerable level. · Follow up with your primary care provider to: · Work together to create a plan on how to manage your pain. · Talk about ways to help manage your pain that don't involve prescription opioids. · Talk about any and all concerns and side effects. · Help prevent misuse and abuse. · Never sell or share prescription opioids · Help prevent misuse and abuse. · Store prescription opioids in a secure place and out of reach of others (this may include visitors, children, friends, and family). · Safely dispose of unused/unwanted prescription opioids: Find your community drug take-back program or your pharmacy mail-back program, or flush them down the toilet, following guidance from the Food and Drug Administration (www.fda.gov/Drugs/ResourcesForYou). · Visit www.cdc.gov/drugoverdose to learn about the risks of opioid abuse and overdose. · If you believe you may be struggling with addiction, tell your health care provider and ask for guidance or call [employer] at 3-161-379-FBXC. Discharge Instructions Learning About Stoma Reversal Surgery What is a stoma reversal? 
A stoma reversal is surgery to attach your bowel together after a colostomy or ileostomy (also called ostomies). During ostomy surgery, the bowel was  and attached to an opening made in the skin of your belly. The opening is called a stoma. Stool passes through the stoma and out of your body. Ostomy surgery can be permanent or temporary. It depends on the reason for the surgery. A stoma reversal can be done if there is a large enough section of healthy bowel left to be rejoined. A temporary ostomy may be used for certain health problems. These include problems such as bowel cancer, ulcerative colitis, Crohn's disease, or bowel injuries. How is a stoma reversal done? A stoma reversal is done after the original surgery has healed. The doctor rejoins the ends of the bowel that were . The bowel is stitched or stapled back together. The part of the belly where the stoma was is then closed with stitches. How the stoma reversal is done depends on what type of ostomy surgery you had. One type involves making a large cut (incision). This way takes longer to heal. The other type uses smaller cuts. It doesn't take as long to heal. 
When is a stoma reversal done? The stoma is closed after you've healed from the original surgery.  This most often takes at least 6 to 8 weeks. But in some cases it can take up to 12 months. Your bowel and anal muscles need to be working for the reversal to work well. What can you expect after a stoma reversal? 
It's common to have problems with how the bowel works after a stoma reversal. This is because part of the bowel has been removed. You may have symptoms such as loose stool, incontinence, sudden bowel urges, and pain. Other risks include infection in the belly and blockage or scar tissue in the bowel. You may have the same precautions you had after your ostomy. Your doctor will want you to avoid bending, heavy lifting, and other strenuous activities. Your doctor can tell you when it's okay to return to your activities and routines, such as driving. This may take up to several weeks or months. Caring for yourself at home Your doctor may recommend things you can do at home to help improve how your bowel works. You may be told to: · Change your diet. · Eat small, frequent meals. · Drink plenty of fluids. · Keep a food diary. You can use it to track what you eat and how it affects you. As your bowel heals, you may work with a dietitian to know what foods are best. Your doctor may recommend walking or doing pelvic floor exercises. They may help improve your bowel function. You also may take medicines for diarrhea or use creams to help with soreness. Coping with bowel problems Dealing with bowel problems can be hard. Many people feel embarrassed or frustrated at times. But your care team can help. You can talk with your doctor or other members of your care team about these issues. They can help you seek support and learn ways to cope. Follow-up care is a key part of your treatment and safety. Be sure to make and go to all appointments, and call your doctor if you are having problems. It's also a good idea to know your test results and keep a list of the medicines you take. Where can you learn more? Go to http://shelby-anastacio.info/. Enter [id number] in the search box to learn more about \"Learning About Stoma Reversal Surgery. \" Current as of: May 12, 2017 Content Version: 11.7 © 0719-3908 [employer]. Care instructions adapted under license by [employer] (which disclaims liability or warranty for this information). If you have questions about a medical condition or this instruction, always ask your healthcare professional. [address] any warranty or liability for your use of this information. Learning About Stoma Reversal Surgery What is a stoma reversal? 
A stoma reversal is surgery to attach your bowel together after a colostomy or ileostomy (also called ostomies). During ostomy surgery, the bowel was  and attached to an opening made in the skin of your belly. The opening is called a stoma. Stool passes through the stoma and out of your body. Ostomy surgery can be permanent or temporary. It depends on the reason for the surgery. A stoma reversal can be done if there is a large enough section of healthy bowel left to be rejoined. A temporary ostomy may be used for certain health problems. These include problems such as bowel cancer, ulcerative colitis, Crohn's disease, or bowel injuries. How is a stoma reversal done? A stoma reversal is done after the original surgery has healed. The doctor rejoins the ends of the bowel that were . The bowel is stitched or stapled back together. The part of the belly where the stoma was is then closed with stitches. How the stoma reversal is done depends on what type of ostomy surgery you had. One type involves making a large cut (incision). This way takes longer to heal. The other type uses smaller cuts. It doesn't take as long to heal. 
When is a stoma reversal done? The stoma is closed after you've healed from the original surgery.  This Go to http://shelby-anastacio.info/. Enter [id number] in the search box to learn more about \"Learning About Stoma Reversal Surgery. \" Current as of: May 12, 2017 Content Version: 11.7 © 3319-7877 [employer]. Care instructions adapted under license by [employer] (which disclaims liability or warranty for this information). If you have questions about a medical condition or this instruction, always ask your healthcare professional. [address] any warranty or liability for your use of this information. OPHTHONIX Announcement We are excited to announce that we are making your provider's discharge notes available to you in OPHTHONIX. You will see these notes when they are completed and signed by the physician that discharged you from your recent hospital stay. If you have any questions or concerns about any information you see in OPHTHONIX, please call the Health Information Department where you were seen or reach out to your Primary Care Provider for more information about your plan of care. Introducing [facility]! Dear Sharmin Fabian: 
Thank you for requesting a OPHTHONIX account. Our records indicate that you already have an active OPHTHONIX account. You can access your account anytime at [email or web address] Did you know that you can access your hospital and ER discharge instructions at any time in OPHTHONIX? You can also review all of your test results from your hospital stay or ER visit. Additional Information If you have questions, please visit the Frequently Asked Questions section of the OPHTHONIX website at [email or web address] Remember, OPHTHONIX is NOT to be used for urgent needs. For medical emergencies, dial 911. Now available from your iPhone and Android! Introducing Agustín Jones As a [employer] patient, I wanted to make you aware of our electronic visit tool called Agustín Jones. HSTYLE 24/7 allows you to connect within minutes with a medical provider 24 hours a day, seven days a week via a mobile device or tablet or logging into a secure website from your computer. You can access RVXdebrafin from anywhere in the [place]. A virtual visit might be right for you when you have a simple condition and feel like you just dont want to get out of bed, or cant get away from work for an appointment, when your regular [facility] [facility] provider is not available (evenings, weekends or holidays), or when youre out of town and need minor care. Electronic visits cost only $49 and if the [employer] provider determines a prescription is needed to treat your condition, one can be electronically transmitted to a nearby pharmacy*. Please take a moment to enroll today if you have not already done so. The enrollment process is free and takes just a few minutes. To enroll, please download the [employer] tisha to your tablet or phone, or visit [email or web address] to enroll on your computer. And, as an [address] [place] [address] patient with a [employer] account, the results of your visits will be scanned into your electronic medical record and your primary care provider will be able to view the scanned results. We urge you to continue to see your regular HSTYLE provider for your ongoing medical care. And while your primary care provider may not be the one available when you seek a Agustín Smartsydebrafin virtual visit, the peace of mind you get from getting a real diagnosis real time can be priceless. For more information on Agustín Smartsysabrina, view our Frequently Asked Questions (FAQs) at [email or web address]. Sincerely, 
 
Agnes Torres MD 
Chief Medical Officer Nelsy[employer] Sag[facility] Frye *:  certain medications cannot be prescribed via Agustín Jones Providers Seen During Your Hospitalization Provider Specialty Primary office phone John Martinez [facility] [phone] Your Primary Care Physician (PCP) Primary Care Physician Office Phone Office Fax NONE ** None ** ** None ** You are allergic to the following No active allergies Recent Documentation Height Weight BMI OB Status Smoking Status 1.575 m 97.1 kg 39.14 kg/m2 Having regular periods Never Smoker Emergency Contacts Name Discharge Info Relation Home Work Mobile [address] [facility] CAREGIVER [3] Spouse [3] [phone] Bisi Fuentes CAREGIVER [3] Mother [14]   [phone] Patient Belongings The following personal items are in your possession at time of discharge: 
  Dental Appliances: None  Visual Aid: None      Home Medications: None   Jewelry: None  Clothing: Shirt, Pants, Undergarments, Footwear    Other Valuables: Cell Phone, Silveira-Katz Please provide this summary of care documentation to your next provider. Signatures-by signing, you are acknowledging that this After Visit Summary has been reviewed with you and you have received a copy. Patient Signature:  ____________________________________________________________ Date:  ____________________________________________________________  
  
[employer] Provider Signature:  ____________________________________________________________ Date:  ____________________________________________________________

## 2018-07-27 NOTE — IP AVS SNAPSHOT
[address] 
[phone] Patient: Donna Jones MRN: [id number] NOT:6/4/0999 A check tyree indicates which time of day the medication should be taken. My Medications START taking these medications Instructions Each Dose to Equal  
 Morning Noon Evening Bedtime  
 oxyCODONE-acetaminophen 5-325 mg per tablet Commonly known as:  PERCOCET Your last dose was: Your next dose is: Take 1-2 Tabs by mouth every four (4) hours as needed. Max Daily Amount: 12 Tabs. 1-2 Tab CONTINUE taking these medications Instructions Each Dose to Equal  
 Morning Noon Evening Bedtime  
 insulin lispro 100 unit/mL injection Commonly known as:  HUMALOG Your last dose was: Your next dose is:    
   
   
 Very Insulin Resistant For Blood Sugar (mg/dL) of:             Less than 150 =   0 units 150 -199 =   3 units 200 -249 =   6 units 250 -299 =   9 units 300 -349 =   12 units 350 and above =   15 units and Call Physician Initiate Hypoglycemic protocol if blood glucose is <70 mg/dL. Where to Get Your Medications Information on where to get these meds will be given to you by the nurse or doctor. ! Ask your nurse or doctor about these medications  
  oxyCODONE-acetaminophen 5-325 mg per tablet

## 2018-07-27 NOTE — PROGRESS NOTES
Spiritual Care visit. Initial Visit, Pre Surgery Consult. Visit and prayer before patient goes to surgery.     Visit by Giovana Peters M.Ed., Th.B. ,Staff

## 2018-07-27 NOTE — ANESTHESIA POSTPROCEDURE EVALUATION
Post-Anesthesia Evaluation and Assessment Patient: Emperatriz Marie MRN: [id number]  SSN: [id number] YOB: 1985  Age: 34 y.o. Sex: female Cardiovascular Function/Vital Signs Visit Vitals  /65  Pulse 87  Temp 37 °C (98.6 °F)  Resp 16  
 Ht 5' 2\" (1.575 m)  Wt 97.1 kg (214 lb)  SpO2 100%  BMI 39.14 kg/m2 Patient is status post general anesthesia for Procedure(s): 
COLOSTOMY CLOSURE  LAPAROSCOPIC. Nausea/Vomiting: None Postoperative hydration reviewed and adequate. Pain: 
Pain Scale 1: Numeric (0 - 10) (07/27/18 1008) Pain Intensity 1: 8 (07/27/18 1008) Managed Neurological Status:  
Neuro (WDL): Within Defined Limits (07/27/18 0949) At baseline Mental Status and Level of Consciousness: Arousable Pulmonary Status:  
O2 Device: Nasal cannula (07/27/18 0949) Adequate oxygenation and airway patent Complications related to anesthesia: None Post-anesthesia assessment completed. No concerns. Some perioperative increased bs treated with iv and sq insulin. Patient instructed to watch bs more carefully in the future. Signed By: Rodrick Armendariz MD   
 July 27, 2018

## 2018-07-27 NOTE — OP NOTES
[facility] REPORT    Oswaldo Both  MR#: [id number]  : 1985  ACCOUNT #: [de-identified]   DATE OF SERVICE: 2018    PREOPERATIVE DIAGNOSIS:  Colostomy in place. POSTOPERATIVE DIAGNOSIS:  Colostomy in place. PROCEDURE PERFORMED:  Colostomy closure. ANESTHESIA:  General endotracheal.    SURGEON:  Mónica Solis DO    ASSISTANT:  Blair Soto. COMPLICATIONS:  None. CONDITION:  Stable. COUNTS:  Sponge count, needle count and instrument count all correct x3. IMPLANTS:  None. SPECIMENS REMOVED:  None. ESTIMATED BLOOD LOSS:  Minimal.    DESCRIPTION OF PROCEDURE:  This is a 68-year-old female status post diverting colostomy for a complex perirectal wound. She was prepared for colostomy closure. Consent was obtained by describing the procedure to the patient including potential complications to include infection, bleeding, possible open procedure. Consent was obtained, placed in chart. She was administered a standard Brenner-Mickey prep using mag citrate, erythromycin, neomycin base the day prior to surgery and then she was administered Mefoxin 2 grams IV preoperatively, taken to the operating suite and placed in a supine position. General anesthesia was initiated without complication. She was transferred to lithotomy and prepped and draped in usual sterile fashion and timeout was taken to confirm the patient and proper procedure. Following this, the ostomy was closed with a 2-0 silk suture in running fashion. We then obtained a #15 scalpel blade and created an elliptical incision. We then dissected the subcutaneous tissue down to the fascia, freeing the colon into the peritoneum. It was at this point we identified a dual lumen and a loop end ileostomy and at this point, we freshened the edges of the colon and divided the end using a pursestring device.   We then identified each lumen and performed a side-to-side stapled anastomosis using an 80 ZACKARY stapler blue load. We then closed the enterotomy using an 80 ZACKARY blue load. At this point, we oversewed the distal edge using a 3-0 silk suture in interrupted fashion. The anastomosis was then placed into the peritoneum safely. At this point, we copiously irrigated with saline until clear and reapproximated the fascial edges with a #1 PDS in a figure-of-eight fashion x4. We irrigated once again with saline until clear, reapproximated skin edges with skin staples, concluded the case. She tolerated the procedure well without immediate complications. FINDINGS:  A 31-year-old female with a diverting colostomy for a complex rectal wound. Colostomy closure was performed with a side-to-side stapled anastomosis. There were no other additional incisions were required. She tolerated the procedure well. DO TISHA COTE /   D: 07/27/2018 09:42     T: 07/27/2018 10:17  JOB #: [id number]

## 2018-07-27 NOTE — BRIEF OP NOTE
BRIEF OPERATIVE NOTE    Date of Procedure: 7/27/2018   Preoperative Diagnosis: Colostomy in place [facility]) [Z93.3]  Postoperative Diagnosis: Colostomy in place [facility]) [Z93.3]    Procedure(s):  COLOSTOMY CLOSURE  LAPAROSCOPIC  Surgeon(s) and Role:     * Noreen Varela, DO - Primary         Surgical Assistant: Blair Soto    Surgical Staff:  Circ-1: Shorty Monterroso RN  Scrub Tech-1: Jeanine Richmond  Scrub Tech-2: Hilton Anglin  Event Time In   Incision Start 2326   Incision Close 0932     Anesthesia: General   Estimated Blood Loss: Minimal  Specimens: * No specimens in log *   Findings: Colostomy with end mucous fistula. Side to side stapled anastomosis.      Complications: none  Implants: * No implants in log Sean Simmons Quiet, [address] [facility]

## 2018-07-27 NOTE — PROGRESS NOTES
END OF SHIFT NOTE:    INTAKE/OUTPUT     Voiding: NO  Catheter: NO  Drain:              Flatus: Patient does not have flatus present. Stool:  0 occurrences. Characteristics:       Emesis: 0 occurrences. Characteristics:        VITAL SIGNS  Patient Vitals for the past 12 hrs:   Temp Pulse Resp BP SpO2   07/27/18 1522 98.6 °F (37 °C) (!) 104 20 144/79 100 %   07/27/18 1208 98.4 °F (36.9 °C) (!) 113 19 133/84 100 %   07/27/18 1128 - 94 18 139/72 100 %   07/27/18 1123 - (!) 104 18 140/74 100 %   07/27/18 1118 - (!) 103 18 142/76 100 %   07/27/18 1113 - 94 18 134/67 100 %   07/27/18 1108 - (!) 104 18 138/70 100 %   07/27/18 1103 - (!) 104 18 140/72 100 %   07/27/18 1059 - (!) 103 18 141/78 100 %   07/27/18 1053 98.5 °F (36.9 °C) (!) 105 16 138/68 100 %   07/27/18 1048 - (!) 105 16 132/69 100 %   07/27/18 1043 - 100 18 141/67 100 %   07/27/18 1038 - (!) 106 18 139/68 100 %   07/27/18 1033 - 96 16 133/67 100 %   07/27/18 1028 - 93 16 137/68 100 %   07/27/18 1023 - 95 16 132/65 100 %   07/27/18 1018 - 86 16 126/62 100 %   07/27/18 1013 - 88 16 127/64 100 %   07/27/18 1012 - 85 18 129/66 100 %   07/27/18 1003 - 87 16 120/65 100 %   07/27/18 0958 - 92 18 118/67 100 %   07/27/18 0953 - 86 16 119/71 100 %   07/27/18 0949 98.6 °F (37 °C) 81 17 119/82 97 %       Pain Assessment  Pain Intensity 1: 0 (07/27/18 1418)  Pain Location 1: Abdomen  Pain Intervention(s) 1: Medication (see MAR)  Patient Stated Pain Goal: 3    Ambulating  Yes    Shift report given to oncoming nurse at the bedside.     Evelia Sharp

## 2018-07-28 LAB
EST. AVERAGE GLUCOSE BLD GHB EST-MCNC: 306 MG/DL
GLUCOSE BLD STRIP.AUTO-MCNC: 193 MG/DL (ref 65–100)
GLUCOSE BLD STRIP.AUTO-MCNC: 196 MG/DL (ref 65–100)
GLUCOSE BLD STRIP.AUTO-MCNC: 202 MG/DL (ref 65–100)
GLUCOSE BLD STRIP.AUTO-MCNC: 225 MG/DL (ref 65–100)
HBA1C MFR BLD: 12.3 % (ref 4.8–6)

## 2018-07-28 PROCEDURE — 74011636637 HC RX REV CODE- 636/637: Performed by: NURSE PRACTITIONER

## 2018-07-28 PROCEDURE — 74011250637 HC RX REV CODE- 250/637: Performed by: SURGERY

## 2018-07-28 PROCEDURE — 83036 HEMOGLOBIN GLYCOSYLATED A1C: CPT | Performed by: INTERNAL MEDICINE

## 2018-07-28 PROCEDURE — 65270000029 HC RM PRIVATE

## 2018-07-28 PROCEDURE — 74011000258 HC RX REV CODE- 258: Performed by: SURGERY

## 2018-07-28 PROCEDURE — 82962 GLUCOSE BLOOD TEST: CPT

## 2018-07-28 PROCEDURE — 36415 COLL VENOUS BLD VENIPUNCTURE: CPT | Performed by: INTERNAL MEDICINE

## 2018-07-28 PROCEDURE — 74011250636 HC RX REV CODE- 250/636: Performed by: SURGERY

## 2018-07-28 RX ADMIN — KETOROLAC TROMETHAMINE 30 MG: 30 INJECTION, SOLUTION INTRAMUSCULAR at 08:33

## 2018-07-28 RX ADMIN — INSULIN LISPRO 3 UNITS: 100 INJECTION, SOLUTION INTRAVENOUS; SUBCUTANEOUS at 18:58

## 2018-07-28 RX ADMIN — OXYCODONE HYDROCHLORIDE AND ACETAMINOPHEN 1 TABLET: 5; 325 TABLET ORAL at 16:15

## 2018-07-28 RX ADMIN — CEFOXITIN SODIUM 2 G: 2 POWDER, FOR SOLUTION INTRAVENOUS at 18:54

## 2018-07-28 RX ADMIN — OXYCODONE HYDROCHLORIDE AND ACETAMINOPHEN 1 TABLET: 5; 325 TABLET ORAL at 22:44

## 2018-07-28 RX ADMIN — CEFOXITIN SODIUM 2 G: 2 POWDER, FOR SOLUTION INTRAVENOUS at 08:30

## 2018-07-28 RX ADMIN — SODIUM CHLORIDE AND POTASSIUM CHLORIDE: 9; 1.49 INJECTION, SOLUTION INTRAVENOUS at 00:25

## 2018-07-28 RX ADMIN — CEFOXITIN SODIUM 2 G: 2 POWDER, FOR SOLUTION INTRAVENOUS at 12:45

## 2018-07-28 RX ADMIN — Medication 10 ML: at 22:00

## 2018-07-28 RX ADMIN — MORPHINE SULFATE 2 MG: 2 INJECTION, SOLUTION INTRAMUSCULAR; INTRAVENOUS at 00:30

## 2018-07-28 RX ADMIN — INSULIN LISPRO 3 UNITS: 100 INJECTION, SOLUTION INTRAVENOUS; SUBCUTANEOUS at 08:33

## 2018-07-28 RX ADMIN — INSULIN LISPRO 3 UNITS: 100 INJECTION, SOLUTION INTRAVENOUS; SUBCUTANEOUS at 11:38

## 2018-07-28 RX ADMIN — CEFOXITIN SODIUM 2 G: 2 POWDER, FOR SOLUTION INTRAVENOUS at 00:25

## 2018-07-28 NOTE — PROGRESS NOTES
PLAN:  IV Abx - Mefoxin  DC IVF  Pain/ nausea control  Advance to GI Soft  OOB/ ambulate in dominique  IS  SCD    ASSESSMENT:  Admit Date: 7/27/2018   1 Day Post-Op  Procedure(s):  COLOSTOMY CLOSURE  LAPAROSCOPIC    Principal Problem:    Colostomy in place [facility]) (7/27/2018)         SUBJECTIVE:  Pt up walking in halls. No complaints. Tolerating Full Liquids. +flatus/+BMx2. No nausea/ vomiting. AF, NAD. OBJECTIVE:  Constitutional: Alert, cooperative, no acute distress; appears stated age   Visit Vitals    /79    Pulse (!) 109    Temp 98.3 °F (36.8 °C)    Resp 20    Ht 5' 2\" (1.575 m)    Wt 214 lb (97.1 kg)    SpO2 99%    BMI 39.14 kg/m2     Eyes:Sclera are clear. ENMT: no external lesions gross hearing normal; no obvious neck masses, no ear or lip lesions  CV: RRR. Normal perfusion  Resp: No JVD. Breathing is  non-labored; no audible wheezing. GI: soft and non-distended, appropriately tender, Dressing c/d/i     Musculoskeletal: unremarkable with normal function. No embolic signs or cyanosis.    Neuro:  Oriented; moves all 4; no focal deficits  Psychiatric: normal affect and mood, no memory impairment      Patient Vitals for the past 24 hrs:   BP Temp Pulse Resp SpO2   07/28/18 0731 132/79 98.3 °F (36.8 °C) (!) 109 20 99 %   07/28/18 0351 136/83 98.2 °F (36.8 °C) 100 18 98 %   07/27/18 2313 125/81 98.9 °F (37.2 °C) (!) 113 18 98 %   07/27/18 1917 134/85 98.4 °F (36.9 °C) (!) 119 18 99 %   07/27/18 1522 144/79 98.6 °F (37 °C) (!) 104 20 100 %   07/27/18 1208 133/84 98.4 °F (36.9 °C) (!) 113 19 100 %   07/27/18 1128 139/72 - 94 18 100 %   07/27/18 1123 140/74 - (!) 104 18 100 %   07/27/18 1118 142/76 - (!) 103 18 100 %   07/27/18 1113 134/67 - 94 18 100 %   07/27/18 1108 138/70 - (!) 104 18 100 %   07/27/18 1103 140/72 - (!) 104 18 100 %   07/27/18 1059 141/78 - (!) 103 18 100 %   07/27/18 1053 138/68 98.5 °F (36.9 °C) (!) 105 16 100 %   07/27/18 1048 132/69 - (!) 105 16 100 %   07/27/18 1043 141/67 - 100 18 100 %   07/27/18 1038 139/68 - (!) 106 18 100 %   07/27/18 1033 133/67 - 96 16 100 %   07/27/18 1028 137/68 - 93 16 100 %   07/27/18 1023 132/65 - 95 16 100 %   07/27/18 1018 126/62 - 86 16 100 %   07/27/18 1013 127/64 - 88 16 100 %   07/27/18 1012 129/66 - 85 18 100 %   07/27/18 1003 120/65 - 87 16 100 %   07/27/18 0958 118/67 - 92 18 100 %   07/27/18 0953 119/71 - 86 16 100 %   07/27/18 0949 119/82 98.6 °F (37 °C) 81 17 97 %     Labs:  Recent Labs      07/27/18   1009  07/27/18   0704   WBC   --   9.1   HGB   --   13.9   PLT   --   391   NA  138  137   K  4.0  3.5   CL  107  102   CO2  23  24   BUN  10  9   CREA  0.71  0.60   GLU  173*  242*   TBILI   --   0.8   SGOT   --   13*   ALT   --   22   AP   --   91         Signed: MALLORY Pires-BC

## 2018-07-29 VITALS
HEART RATE: 111 BPM | DIASTOLIC BLOOD PRESSURE: 70 MMHG | TEMPERATURE: 98.6 F | RESPIRATION RATE: 18 BRPM | SYSTOLIC BLOOD PRESSURE: 133 MMHG | OXYGEN SATURATION: 98 % | BODY MASS INDEX: 39.38 KG/M2 | HEIGHT: 62 IN | WEIGHT: 214 LBS

## 2018-07-29 LAB
GLUCOSE BLD STRIP.AUTO-MCNC: 170 MG/DL (ref 65–100)
GLUCOSE BLD STRIP.AUTO-MCNC: 204 MG/DL (ref 65–100)

## 2018-07-29 PROCEDURE — 74011636637 HC RX REV CODE- 636/637: Performed by: NURSE PRACTITIONER

## 2018-07-29 PROCEDURE — 82962 GLUCOSE BLOOD TEST: CPT

## 2018-07-29 PROCEDURE — 74011000258 HC RX REV CODE- 258: Performed by: SURGERY

## 2018-07-29 PROCEDURE — 74011636637 HC RX REV CODE- 636/637: Performed by: INTERNAL MEDICINE

## 2018-07-29 PROCEDURE — 74011250637 HC RX REV CODE- 250/637: Performed by: SURGERY

## 2018-07-29 PROCEDURE — 74011250636 HC RX REV CODE- 250/636: Performed by: SURGERY

## 2018-07-29 RX ORDER — OXYCODONE AND ACETAMINOPHEN 5; 325 MG/1; MG/1
1-2 TABLET ORAL
Qty: 40 TAB | Refills: 0 | Status: SHIPPED
Start: 2018-07-29

## 2018-07-29 RX ORDER — INSULIN LISPRO 100 [IU]/ML
INJECTION, SOLUTION INTRAVENOUS; SUBCUTANEOUS
Status: DISCONTINUED | OUTPATIENT
Start: 2018-07-29 | End: 2018-07-29 | Stop reason: HOSPADM

## 2018-07-29 RX ADMIN — MORPHINE SULFATE 2 MG: 2 INJECTION, SOLUTION INTRAMUSCULAR; INTRAVENOUS at 00:10

## 2018-07-29 RX ADMIN — INSULIN LISPRO 6 UNITS: 100 INJECTION, SOLUTION INTRAVENOUS; SUBCUTANEOUS at 09:16

## 2018-07-29 RX ADMIN — CEFOXITIN SODIUM 2 G: 2 POWDER, FOR SOLUTION INTRAVENOUS at 06:06

## 2018-07-29 RX ADMIN — INSULIN LISPRO 3 UNITS: 100 INJECTION, SOLUTION INTRAVENOUS; SUBCUTANEOUS at 12:36

## 2018-07-29 RX ADMIN — OXYCODONE HYDROCHLORIDE AND ACETAMINOPHEN 1 TABLET: 5; 325 TABLET ORAL at 09:23

## 2018-07-29 RX ADMIN — CEFOXITIN SODIUM 2 G: 2 POWDER, FOR SOLUTION INTRAVENOUS at 00:10

## 2018-07-29 RX ADMIN — MORPHINE SULFATE 2 MG: 2 INJECTION, SOLUTION INTRAMUSCULAR; INTRAVENOUS at 06:56

## 2018-07-29 RX ADMIN — Medication 10 ML: at 06:00

## 2018-07-29 NOTE — PROGRESS NOTES
Discharge instructions reviewed with patient, verbalized understanding. Copy of instructions and RX given to patient. Stated she has humulog insulin at home and also lancets, and glucometer to check blood sugars.  Instructions included diet, exercise, medications, side effect of medications , wound care, and s&s to report to MD.

## 2018-07-29 NOTE — PROGRESS NOTES
PLAN:  GI Soft  DC Home today  Follow up in office with Dr. Sabino Cherry 7-10 days    ASSESSMENT:  Admit Date: 7/27/2018   2 Day Post-Op  Procedure(s):  COLOSTOMY CLOSURE  LAPAROSCOPIC    Principal Problem:    Colostomy in place [facility]) (7/27/2018)         SUBJECTIVE:  Pt awake in bed. No complaints. Has been up walking in halls. Tolerating soft diet, No nausea/ vomiting. +flatus/+BM (several). AF, NAD. OBJECTIVE:  Constitutional: Alert, cooperative, no acute distress; appears stated age   Visit Vitals    /70    Pulse (!) 111  Comment: nurse notified    Temp 98.6 °F (37 °C)    Resp 18    Ht 5' 2\" (1.575 m)    Wt 214 lb (97.1 kg)    SpO2 98%    BMI 39.14 kg/m2     Eyes:Sclera are clear. ENMT: no external lesions gross hearing normal; no obvious neck masses, no ear or lip lesions  CV: RRR. Normal perfusion  Resp: No JVD. Breathing is  non-labored; no audible wheezing. GI: soft and non-distended, appropriately tender, post op wound c/d/i     Musculoskeletal: unremarkable with normal function. No embolic signs or cyanosis.    Neuro:  Oriented; moves all 4; no focal deficits  Psychiatric: normal affect and mood, no memory impairment      Patient Vitals for the past 24 hrs:   BP Temp Pulse Resp SpO2   07/29/18 1057 133/70 98.6 °F (37 °C) (!) 111 18 98 %   07/29/18 0639 115/75 98.8 °F (37.1 °C) (!) 105 18 98 %   07/29/18 0418 119/76 98.8 °F (37.1 °C) (!) 109 18 97 %   07/29/18 0020 104/69 98.9 °F (37.2 °C) (!) 107 - 100 %   07/28/18 1528 114/78 98.8 °F (37.1 °C) (!) 102 20 98 %     Labs:    Recent Labs      07/27/18   1009  07/27/18   0704   WBC   --   9.1   HGB   --   13.9   PLT   --   391   NA  138  137   K  4.0  3.5   CL  107  102   CO2  23  24   BUN  10  9   CREA  0.71  0.60   GLU  173*  242*   TBILI   --   0.8   SGOT   --   13*   ALT   --   22   AP   --   91         Signed: Miguel Del Cid, SERGOP-BC

## 2018-07-29 NOTE — DISCHARGE INSTRUCTIONS
Learning About Stoma Reversal Surgery  What is a stoma reversal?  A stoma reversal is surgery to attach your bowel together after a colostomy or ileostomy (also called ostomies). During ostomy surgery, the bowel was  and attached to an opening made in the skin of your belly. The opening is called a stoma. Stool passes through the stoma and out of your body. Ostomy surgery can be permanent or temporary. It depends on the reason for the surgery. A stoma reversal can be done if there is a large enough section of healthy bowel left to be rejoined. A temporary ostomy may be used for certain health problems. These include problems such as bowel cancer, ulcerative colitis, Crohn's disease, or bowel injuries. How is a stoma reversal done? A stoma reversal is done after the original surgery has healed. The doctor rejoins the ends of the bowel that were . The bowel is stitched or stapled back together. The part of the belly where the stoma was is then closed with stitches. How the stoma reversal is done depends on what type of ostomy surgery you had. One type involves making a large cut (incision). This way takes longer to heal. The other type uses smaller cuts. It doesn't take as long to heal.  When is a stoma reversal done? The stoma is closed after you've healed from the original surgery. This most often takes at least 6 to 8 weeks. But in some cases it can take up to 12 months. Your bowel and anal muscles need to be working for the reversal to work well. What can you expect after a stoma reversal?  It's common to have problems with how the bowel works after a stoma reversal. This is because part of the bowel has been removed. You may have symptoms such as loose stool, incontinence, sudden bowel urges, and pain. Other risks include infection in the belly and blockage or scar tissue in the bowel. You may have the same precautions you had after your ostomy.  Your doctor will want you to avoid bending, heavy lifting, and other strenuous activities. Your doctor can tell you when it's okay to return to your activities and routines, such as driving. This may take up to several weeks or months. Caring for yourself at home  Your doctor may recommend things you can do at home to help improve how your bowel works. You may be told to:  · Change your diet. · Eat small, frequent meals. · Drink plenty of fluids. · Keep a food diary. You can use it to track what you eat and how it affects you. As your bowel heals, you may work with a dietitian to know what foods are best. Your doctor may recommend walking or doing pelvic floor exercises. They may help improve your bowel function. You also may take medicines for diarrhea or use creams to help with soreness. Coping with bowel problems  Dealing with bowel problems can be hard. Many people feel embarrassed or frustrated at times. But your care team can help. You can talk with your doctor or other members of your care team about these issues. They can help you seek support and learn ways to cope. Follow-up care is a key part of your treatment and safety. Be sure to make and go to all appointments, and call your doctor if you are having problems. It's also a good idea to know your test results and keep a list of the medicines you take. Where can you learn more? Go to http://shelby-anastacio.info/. Enter [id number] in the search box to learn more about \"Learning About Stoma Reversal Surgery. \"  Current as of: May 12, 2017  Content Version: 11.7  © 6969-1399 [employer]. Care instructions adapted under license by [employer] (which disclaims liability or warranty for this information). If you have questions about a medical condition or this instruction, always ask your healthcare professional. [address] any warranty or liability for your use of this information.        Learning About Stoma Reversal Surgery  What is a stoma reversal?  A stoma reversal is surgery to attach your bowel together after a colostomy or ileostomy (also called ostomies). During ostomy surgery, the bowel was  and attached to an opening made in the skin of your belly. The opening is called a stoma. Stool passes through the stoma and out of your body. Ostomy surgery can be permanent or temporary. It depends on the reason for the surgery. A stoma reversal can be done if there is a large enough section of healthy bowel left to be rejoined. A temporary ostomy may be used for certain health problems. These include problems such as bowel cancer, ulcerative colitis, Crohn's disease, or bowel injuries. How is a stoma reversal done? A stoma reversal is done after the original surgery has healed. The doctor rejoins the ends of the bowel that were . The bowel is stitched or stapled back together. The part of the belly where the stoma was is then closed with stitches. How the stoma reversal is done depends on what type of ostomy surgery you had. One type involves making a large cut (incision). This way takes longer to heal. The other type uses smaller cuts. It doesn't take as long to heal.  When is a stoma reversal done? The stoma is closed after you've healed from the original surgery. This most often takes at least 6 to 8 weeks. But in some cases it can take up to 12 months. Your bowel and anal muscles need to be working for the reversal to work well. What can you expect after a stoma reversal?  It's common to have problems with how the bowel works after a stoma reversal. This is because part of the bowel has been removed. You may have symptoms such as loose stool, incontinence, sudden bowel urges, and pain. Other risks include infection in the belly and blockage or scar tissue in the bowel. You may have the same precautions you had after your ostomy.  Your doctor will want you to avoid bending, heavy lifting, and other strenuous activities. Your doctor can tell you when it's okay to return to your activities and routines, such as driving. This may take up to several weeks or months. Caring for yourself at home  Your doctor may recommend things you can do at home to help improve how your bowel works. You may be told to:  · Change your diet. · Eat small, frequent meals. · Drink plenty of fluids. · Keep a food diary. You can use it to track what you eat and how it affects you. As your bowel heals, you may work with a dietitian to know what foods are best. Your doctor may recommend walking or doing pelvic floor exercises. They may help improve your bowel function. You also may take medicines for diarrhea or use creams to help with soreness. Coping with bowel problems  Dealing with bowel problems can be hard. Many people feel embarrassed or frustrated at times. But your care team can help. You can talk with your doctor or other members of your care team about these issues. They can help you seek support and learn ways to cope. Follow-up care is a key part of your treatment and safety. Be sure to make and go to all appointments, and call your doctor if you are having problems. It's also a good idea to know your test results and keep a list of the medicines you take. Where can you learn more? Go to http://shelby-anastacio.info/. Enter [id number] in the search box to learn more about \"Learning About Stoma Reversal Surgery. \"  Current as of: May 12, 2017  Content Version: 11.7  © 4200-1860 [employer]. Care instructions adapted under license by [employer] (which disclaims liability or warranty for this information). If you have questions about a medical condition or this instruction, always ask your healthcare professional. [address] any warranty or liability for your use of this information.

## 2018-07-29 NOTE — DISCHARGE SUMMARY
Physician Discharge Summary     Patient ID:  Brenda Smith  [id number]  34 y.o.  1985    Allergies: Review of patient's allergies indicates no known allergies. HPI: Eduardo Ku Y 47 y.o. female who presents for evaluation of colostomy closure.  This is a 53-year-old female previous patient of Dr. Alexey Sainz with necrotizing fasciitis of the right hip with diverting colostomy for wound healing purposes.  Patient has been scheduled before for colostomy closure but her insurance would not cover the procedure.  Today she returns the office to discuss colostomy closure.  She did reports no problems.  She denies any fevers nausea vomiting or wound problems.  She continues to improve with her right lower extremity neurologic deficiencies.  She reports no wound problems from her necrotizing fasciitis.  Her ostomy is functioning well. Doretha Davey is here today to discuss colostomy closure. Pt underwent colostomy closure 7/27/18 by Dr. Anita Henry.     Armando Courtney Date: 7/27/2018    Discharge Date: 7/29/2018    * Admission Diagnoses: Colostomy in place [facility]) [Z93.3]    * Discharge Diagnoses:    Hospital Problems as of 7/29/2018  Date Reviewed: 7/27/2018          Codes Class Noted - Resolved POA    * (Principal)Colostomy in place [facility]) ICD-10-CM: Z93.3  ICD-9-CM: V44.3  7/27/2018 - Present Unknown               Admission Condition: Stable    * Discharge Condition: good    * Procedures: Procedure(s):  [place] Course:   Normal hospital course for this procedure. Consults: Hospitalist    Significant Diagnostic Studies: labs    * Disposition: Home    Discharge Medications:   Current Discharge Medication List      START taking these medications    Details   oxyCODONE-acetaminophen (PERCOCET) 5-325 mg per tablet Take 1-2 Tabs by mouth every four (4) hours as needed. Max Daily Amount: 12 Tabs.   Qty: 40 Tab, Refills: 0    Associated Diagnoses: [facility] in [facility] ([facility]         CONTINUE these medications which have NOT CHANGED    Details   insulin lispro (HUMALOG) 100 unit/mL injection Very Insulin Resistant  For Blood Sugar (mg/dL) of:              Less than 150 =   0 units  150 -199 =   3 units  200 -249 =   6 units  250 -299 =   9 units  300 -349 =   12 units  350 and above =   15 units and Call Physician  Initiate Hypoglycemic protocol if blood glucose is <70 mg/dL. Qty: 1 Vial, Refills: 0             * Follow-up Care/Patient Instructions: Activity: Activity as tolerated  Diet: Soft Diet  Wound Care: Keep wound clean and dry    Follow-up Information     Follow up With Details Comments Contact Info    None   None ([phone]) Patient stated that they have no PCP          Discharge Instructions/Follow-up Plans:   MD Instructions:     Follow-up with Dr. Na Moreno in 7-10 days. Keep incisions clean and dry, may remain uncovered. Do not apply lotions, creams or ointments to incisions.     Diet - as tolerated - Soft foods diet. Activity - ambulate - as tolerated - no heavy lifting >10lb. May shower - no tub baths or soaking/submerging.     No driving while taking narcotics. Do not drink alcohol while taking narcotics.   Resume other home medications.      If problems or questions arise, please call our office at [phone].     Greater than 30 minutes were spent discharging the patient       Signed:  JANINA Myles  7/29/2018  12:39 PM

## 2018-07-29 NOTE — PROGRESS NOTES
Progress Note    Patient: Junior Clayton MRN: [id number]  SSN: [id number]    YOB: 1985  Age: 34 y.o. Sex: female      Admit Date: 7/27/2018    LOS: 2 days     Subjective:     Patient seen and examined at bedside, reports that she is feeling better. Appetite remains poor. Mild abdominal discomfort    Current Facility-Administered Medications   Medication Dose Route Frequency    insulin lispro (HUMALOG) injection   SubCUTAneous AC&HS    sodium chloride (NS) flush 5-10 mL  5-10 mL IntraVENous Q8H    sodium chloride (NS) flush 5-10 mL  5-10 mL IntraVENous PRN    oxyCODONE-acetaminophen (PERCOCET) 5-325 mg per tablet 1 Tab  1 Tab Oral Q4H PRN    morphine injection 2 mg  2 mg IntraVENous Q4H PRN    naloxone (NARCAN) injection 0.4 mg  0.4 mg IntraVENous PRN    ondansetron (ZOFRAN) injection 4 mg  4 mg IntraVENous Q4H PRN    diphenhydrAMINE (BENADRYL) injection 12.5 mg  12.5 mg IntraVENous Q4H PRN       Objective:     Vitals:    07/28/18 1528 07/29/18 0020 07/29/18 0418 07/29/18 0639   BP: 114/78 104/69 119/76 115/75   Pulse: (!) 102 (!) 107 (!) 109 (!) 105   Resp: 20  18 18   Temp: 98.8 °F (37.1 °C) 98.9 °F (37.2 °C) 98.8 °F (37.1 °C) 98.8 °F (37.1 °C)   SpO2: 98% 100% 97% 98%   Weight:       Height:             Intake and Output:  Current Shift:    Last three shifts: 07/27 1901 - 07/29 0700  In: 1866 [I.V.:1866]  Out: -     Physical Exam:   General:  Alert, cooperative, no distress, appears stated age. Eyes:  Conjunctivae/corneas clear. PERRL, EOMs intact. Fundi benign   Ears:  Normal TMs and external ear canals both ears. Nose: Nares normal. Septum midline. Mucosa normal. No drainage or sinus tenderness. Mouth/Throat: Lips, mucosa, and tongue normal. Teeth and gums normal.   Neck: Supple, symmetrical, trachea midline, no adenopathy, thyroid: no enlargment/tenderness/nodules, no carotid bruit and no JVD. Back:   Symmetric, no curvature. ROM normal. No CVA tenderness.    Lungs: Clear to auscultation bilaterally. Heart:  Regular rate and rhythm, S1, S2 normal, no murmur, click, rub or gallop. Abdomen:   Soft, surgical site appears clean    Extremities: Extremities normal, atraumatic, no cyanosis or edema. Pulses: 2+ and symmetric all extremities. Skin: Skin color, texture, turgor normal. No rashes or lesions   Lymph nodes: Cervical, supraclavicular, and axillary nodes normal.   Neurologic: CNII-XII intact. Normal strength, sensation and reflexes throughout. Lab/Data Review:    Recent Results (from the past 24 hour(s))   GLUCOSE, POC    Collection Time: 07/28/18 11:05 AM   Result Value Ref Range    Glucose (POC) 193 (H) 65 - 100 mg/dL   GLUCOSE, POC    Collection Time: 07/28/18  4:26 PM   Result Value Ref Range    Glucose (POC) 202 (H) 65 - 100 mg/dL   HEMOGLOBIN A1C WITH EAG    Collection Time: 07/28/18  5:55 PM   Result Value Ref Range    Hemoglobin A1c 12.3 (H) 4.8 - 6.0 %    Est. average glucose 306 mg/dL   GLUCOSE, POC    Collection Time: 07/28/18  9:13 PM   Result Value Ref Range    Glucose (POC) 225 (H) 65 - 100 mg/dL   GLUCOSE, POC    Collection Time: 07/29/18  5:53 AM   Result Value Ref Range    Glucose (POC) 204 (H) 65 - 100 mg/dL       Assessment/ Plan:     Principal Problem:    Colostomy in place [facility]) (7/27/2018)      35 y/o female with pmh of diabetes,hx of necrotizig fascitis requiring colostomy curently admitted for the reversal of colostomy. Hospitalist service consulted for the management of DM. Patient was diagnoed with DM about a year ago and has been on SSI at home, not on any OHA. She reports taking the insulin only when her sugars > 200. She denies any hx of stroke, heart attack or any renal dysfunction. No neuropathy or retinopathy either from hx. Her BG has been mostly in the 150-250 range here. Type 2 Diabetes Mellitus: Without any complications, currently on SSI. As PO intake is poor will continue SSI and add HS coverage.  Sugar mostly between 180-220. A1C 12.3 reflecting poor control. Only on SSI at home.  Will add oral agents/long acting insulin as  tolerated once her intake improves.      s/p colostomy reversal: Surgery following      Morbid Obesity: Counseled on weight loss which will help with her DM control      DVT Prophylaxis: On SCD's     Signed By: Tereza Benites MD     July 29, 2018

## 2018-08-13 PROBLEM — E66.01 SEVERE OBESITY (BMI 35.0-39.9): Status: ACTIVE | Noted: 2018-08-13

## 2019-10-16 NOTE — ED TRIAGE NOTES
Chief Complaint   Patient presents with   • Headache     x 5 days   • Ear Pain     Lt ear pain   • fatigue   • Fever     Patient is accompanied to immediate care with self.    SUBJECTIVE   Jasmine is a 30 year old female who presents with complaints of a possible ear infection.  Per pt, started with complains of left ear pain with left sided headache and left TMJ pain all started at the same time 4 days.  The pains comes and go.  There is no constant headache.  It does not feel as severe as her migraine headache therefore feels the headache is from either ear infection or TMJ issues.  She has had such headache with ear inf/ TMJ inp past.  She has appt with her chiro tomorrow for her TMJ.  She is flying in 2 days so wants to rule out ear infection.      -Other associated symptoms: felt slightly feverish few days ago but not now. She had nausea a few days ago but gone now.   -Denies significant nasal symptoms, cough, nausea, vomiting, diarrhea, vision changes, dizziness. .    -Any drainage from ears: no  -Any swimming: no  -Any ear trauma: no  -Any decreased hearing: more amplified hearing in left    Pt has tried aleve and tylenol not helping much.      ROS- negative for chest pains, shortness of breath, GI symptoms.    Pertinent Medical History--   -Any history of frequent ear infections:  Yes as child but does get them as an adult  -Any recent ear infection in the past 6 weeks: no    Past medical History :   Env allergies  Patient Active Problem List   Diagnosis   • Anxiety disorder   • ADHD (attention deficit hyperactivity disorder), combined type   • Migraine       Allergy: ALLERGIES:  Penicillins and Mustard seed   (food or med)    Current Outpatient Medications   Medication Sig Dispense Refill   • Omeprazole (PRILOSEC PO)      • ondansetron (ZOFRAN ODT) 8 MG disintegrating tablet Take 8 mg by mouth.     • dicyclomine (BENTYL) 20 MG tablet Take 20 mg by mouth.       No current facility-administered medications  Patient arrives to the ER via POV escorted by friend. Patient states she was evaluated by [facility] urgent care yesterday for constipation. Patient was prescribed medications and has been having bowel movements since. Patient states ever since she began going to the bathroom she has had right sided back pain. for this visit.        Social History: nonsmoker    OBJECTIVE  Visit Vitals  /70   Pulse 87   Temp 99.3 °F (37.4 °C) (Tympanic)   Resp 16   LMP 09/19/2019   SpO2 99%     General appearance: no apparent distress  Eyes: no conjunctival injection; PERRLA; EOMI;  no photophobia    Ear RIGHT: normal external ear; canal normal with no swelling or pains; TM normal  Ear LEFT:  Normal external ear; canal normal with no swelling or pains: TM without erythema but is retracted;  SOME PAIN WITH PALPATION OVER THE RIGHT TMJ WITH NO REDNESS AT SITE;  PAIN WITH OPEN / CLOSING JAW    Nose: normal turbinates; normal mucosa    Throat: pink and moist without edema, erythema or exudates  Neck: normal, supple, no adenopathy and no meningeal signs  Heart: regular rate and rhythm  Lungs: lungs clear to auscultation without crackles, ronchi, or wheezes.  NEURO- CN II- XII grossly intact; DTR +2/4 UE/LE bilat symmetric; Motor intact UE/LE bilat symmetric; No focal sensory changes bilat. No ataxia. No focal deficits for this patient.    ASSESSMENT:  Left ear pain due to TMJ or eustachian tube dysfunction  Left sided headache due to TMJ pain  Allergic rhinitis     PLAN--  FOR THE EAR COMPLAINT---  --Can use otc analgesics if tolerated as discussed if tolerated for pain.  START MEDROL.  No ibuprofen products while on steroid.    --discussed diagnosis. This is not an infection. For this recommend trying any of these individually or in combination (if tolerated):   *antihistamine such as claritin/ allergra or zyrtec (Only if tolerated)   *decongestant (if tolerated such as sudafed- NOT recommend in patient  with blood pressure concerns/ heart disease);   *nasal spray such as flonase or nasonex (iftolerated and if recommended  by doctor).       --the ear should unplug with improved hearing and feel better over a week or so. If not improving then can recheck the ear with your doctor or ENT for further discussion an treatment options.       HOWEVER-- If symptoms change -- such as the ear becomes a pain, onset fever or new concerns then recheck sooner with pcp or ENT.    For the headache--   -Discussed headaches in general and patient's particular symptoms.  Based on our discussion the headache appears to be TMJ or ear pain related.  For now will try medrol    We discussed DANGER SIGNS AND SYMPTOMS OF HEADACHES such as dizziness, vision change, numbness/ tingling/ weakness to extremities, nausea, vomiting, lethargy, high fevers etc and what to be aware of.  If such concerns and if onset of such symptoms of concern then the patient will go immediately to the Emergency Room.      If headache does not worsen but does NOT improve with treatments discussed or medication prescribed then recommend follow up with the primary care doctor for discussion of further medication options and/ or  further outpatient workup of headaches such as CT imaging if warranted or seeing a neurologist.    Patient  expresses understanding of treatment plan and follow up recommendations discussed above and during visit.      Herve Parikh DO  10/15/19

## 2020-02-22 NOTE — PROGRESS NOTES
Marginal BP this evening, recent BP 90/42 MAP 50 CVP 6. 250cc NS fluid challenge administered, BP responded to 100/46 MAP 62 CVP 8. Continuous IVF infusing at 150 mL/hr. Will continue to monitor closely. Patient with one episode green emesis over side of bed, 2 mg IV zofran administered. Oral temp 100.5, 650 mg tylenol PO administered. 19

## 2020-07-26 NOTE — PROGRESS NOTES
Problem: Mobility Impaired (Adult and Pediatric)  Goal: *Acute Goals and Plan of Care (Insert Text)  STG:  (1.)Ms. Roya Lagos will move from supine to sit and sit to supine , scoot up and down and roll side to side with CONTACT GUARD ASSIST within 3 day(s). (2.)Ms. Roya Lagos will transfer from bed to chair and chair to bed with STAND BY ASSIST using the least restrictive device within 3 day(s). (3.)Ms. Roya Lagos will ambulate with CONTACT GUARD ASSIST for 100 feet with the least restrictive device within 3 day(s). LTG:  (1.)Ms. Roya Lagos will move from supine to sit and sit to supine , scoot up and down and roll side to side in bed with SUPERVISION within 7 day(s). (2.)Ms. Roya Lagos will transfer from bed to chair and chair to bed with SUPERVISION using the least restrictive device within 7 day(s). (3.)Ms. Roya Lagos will ambulate with STAND BY ASSIST for 500+ feet with the least restrictive device within 7 day(s). ________________________________________________________________________________________________      PHYSICAL THERAPY: Daily Note, Treatment Day: 1st and AM 5/26/2017  INPATIENT: Hospital Day: 11  Payor: BLUE CROSS / [place] [facility] / Product Type: PPO /      NAME/AGE/GENDER: Lorri Ball is a 29 y.o. female    PRIMARY DIAGNOSIS: DKA (diabetic ketoacidoses) ([facility] [address]  Abscess  WOUND RIGHT BUTTOCK DKA (diabetic ketoacidoses) ([facility] [address] DKA (diabetic ketoacidoses) ([facility])  Procedure(s) (LRB):  DEBRIDEMENT RIGHT BUTTOCKS WITH DRESSING CHANGE (Right)  4 Days Post-Op  ICD-10: Treatment Diagnosis:       · Generalized Muscle Weakness (M62.81)  · Difficulty in walking, Not elsewhere classified (R26.2)   Precaution/Allergies:  Review of patient's allergies indicates no known allergies. ASSESSMENT:      Ms. Roya Lagos is supine in bed upon contact and agreeable to trying to walk. She required minimal assist to sit up and scoot to the EOB.   She stood into the walker with minimal assist and an elevated bed. She walked about 50 feet with the walker. She reports no real pain but leg is heavy with edema. She used the bathroom independently and left in the chair with lunch. Excellent progress. Left a walker in her room so she could continue to walk with family and staff. This section established at most recent assessment   PROBLEM LIST (Impairments causing functional limitations):  1. Decreased Strength  2. Decreased ADL/Functional Activities  3. Decreased Ambulation Ability/Technique  4. Decreased Balance  5. Increased Pain  6. Decreased Activity Tolerance    INTERVENTIONS PLANNED: (Benefits and precautions of physical therapy have been discussed with the patient.)  1. Balance Exercise  2. Bed Mobility  3. Family Education  4. Gait Training  5. Neuromuscular Re-education/Strengthening  6. Therapeutic Activites  7. Therapeutic Exercise/Strengthening  8. Group Therapy      TREATMENT PLAN: Frequency/Duration: 3 times a week for duration of hospital stay  Rehabilitation Potential For Stated Goals: GOOD      RECOMMENDED REHABILITATION/EQUIPMENT: (at time of discharge pending progress): Continue Skilled Therapy and HHPT versus Rehab- dependent on pt progress. HISTORY:   History of Present Injury/Illness (Reason for Referral):  See H&P below  34yo F with no significant PMH presented with worsening Rt Flank pain, nausea, multiple episodes of vomiting and weakness. She was seen here in ER 1 week ago with back pain and diagnosed with Sciatica and given NSAIDs which she took without much relief. Yesterday her pain worsened and she took extra pain meds with about 4 glasses of Wine per . She then started having vomiting and abd discomfort that continued today and she came to ER.  Found to be in DKA in ER with Na 115, K 6.0, LA 7.4, Bicarb 9, Ph 7.22, Cr 2.51 and Ck > 4000, WBC >40k, Hgb 10.8 with elevated LFTs Per  they took a trip to [place] by car and pt has been complaining for rt flank pain since they returned last week. Also she was seen 3 days ago at GYN office and blood work in care everywhere showing +ve serology for HSV 1& 2 as well as Chlamydia. Per GYN note she had worrisome excoriated lesions around her vagina/Vulva. She was started on Insulin gtt and Abx, cultures were sent in ER and Hospitalist asked to admit. Past Medical History/Comorbidities:   Ms. Sridhar Melgar  has a past medical history of Ill-defined condition. Ms. Sridhar Melgar  has a past surgical history that includes other surgical.  Social History/Living Environment:   Home Environment: Private residence  # Steps to Enter: 0  One/Two Story Residence: Two story, live on 1st floor  Living Alone: No  Support Systems: Spouse/Significant Other/Partner  Patient Expects to be Discharged to[de-identified] Private residence  Current DME Used/Available at Home: None  Tub or Shower Type: Tub/Shower combination  Prior Level of Function/Work/Activity:  Lives with , independent with ADLs and gait, drives      Number of Personal Factors/Comorbidities that affect the Plan of Care: 1-2: MODERATE COMPLEXITY   EXAMINATION:   Most Recent Physical Functioning:   Gross Assessment:                  Posture:     Balance:  Standing: Impaired  Standing - Static: Fair  Standing - Dynamic : Fair Bed Mobility:  Supine to Sit: Minimum assistance  Wheelchair Mobility:     Transfers:  Sit to Stand: Contact guard assistance;Minimum assistance  Gait:     Distance (ft): 50 Feet (ft)  Assistive Device: Walker, rolling  Ambulation - Level of Assistance: Stand-by asssistance;Contact guard assistance       Body Structures Involved:  1. Heart  2. Lungs  3. Bones  4. Joints  5. Muscles Body Functions Affected:  1. Sensory/Pain  2. Cardio  3. Respiratory  4. Neuromusculoskeletal  5. Movement Related  6. Skin Related Activities and Participation Affected:  1. General Tasks and Demands  2. Mobility  3. Self Care  4. Domestic Life  5.  Interpersonal Interactions and Relationships  6. Community, Social and [place]   Number of elements that affect the Plan of Care: 4+: HIGH COMPLEXITY   CLINICAL PRESENTATION:   Presentation: Stable and uncomplicated: LOW COMPLEXITY   CLINICAL DECISION MAKIN [facility] Inpatient Short Form  How much difficulty does the patient currently have. .. Unable A Lot A Little None   1. Turning over in bed (including adjusting bedclothes, sheets and blankets)? [ ] 1   [ ] 2   [X] 3   [ ] 4   2. Sitting down on and standing up from a chair with arms ( e.g., wheelchair, bedside commode, etc.)   [ ] 1   [ ] 2   [X] 3   [ ] 4   3. Moving from lying on back to sitting on the side of the bed? [ ] 1   [ ] 2   [X] 3   [ ] 4   How much help from another person does the patient currently need. .. Total A Lot A Little None   4. Moving to and from a bed to a chair (including a wheelchair)? [ ] 1   [ ] 2   [X] 3   [ ] 4   5. Need to walk in hospital room? [ ] 1   [X] 2   [ ] 3   [ ] 4   6. Climbing 3-5 steps with a railing? [ ] 1   [X] 2   [ ] 3   [ ] 4   © , Trustees of [address] [place] [address] [address] [address], under license to [employer]. All rights reserved    Score:  Initial: 16 Most Recent: X (Date: -- )     Interpretation of Tool:  Represents activities that are increasingly more difficult (i.e. Bed mobility, Transfers, Gait).        Score 24 23 22-20 19-15 14-10 9-7 6       Modifier CH CI CJ CK CL CM CN         · Mobility - Walking and Moving Around:               - CURRENT STATUS:    CK - 40%-59% impaired, limited or restricted               - GOAL STATUS:           CJ - 20%-39% impaired, limited or restricted               - D/C STATUS:                       ---------------To be determined---------------  Payor[place] BLUE CR[place] / [place] [facility] / Product Type: PPO /       Medical Necessity:     · Patient is expected to demonstrate progress in strength, balance, coordination and functional technique to decrease assistance required with gait and functional mobility. Reason for Services/Other Comments:  · Patient continues to require skilled intervention due to decreased strength, decreased balance, decreased functional tolerance, decreased cardiopulmonary endurance affecting participation in basic ADLs and functional tasks. Use of outcome tool(s) and clinical judgement create a POC that gives a: Clear prediction of patient's progress: LOW COMPLEXITY                 TREATMENT:   (In addition to Assessment/Re-Assessment sessions the following treatments were rendered)   Pre-treatment Symptoms/Complaints:  No complaints-  Pain: Initial:   Pain Intensity 1: 0  Post Session:  0/10      Therapeutic Activity: (    25 minutes): Therapeutic activities including Bed transfers, Chair transfers, Toilet transfers and Ambulation on level ground to improve mobility, strength, balance and endurance. Required minimal   to promote static and dynamic balance in standing with moderate use of the walker. Braces/Orthotics/Lines/Etc:   · IV  Treatment/Session Assessment:    · Response to Treatment:  Pt participated well with PT and motivated to move  · Interdisciplinary Collaboration:  · Physical Therapy Assistant and Registered Nurse  · After treatment position/precautions:  · Up in chair, Bed/Chair-wheels locked, Bed in low position and Call light within reach  · Compliance with Program/Exercises: Will assess as treatment progresses. · Recommendations/Intent for next treatment session: \"Next visit will focus on advancements to more challenging activities and reduction in assistance provided\".   Total Treatment Duration:  PT Patient Time In/Time Out  Time In: 1130  Time Out: [address], [facility] [address]

## 2020-10-05 NOTE — PROGRESS NOTES
Night shift CNA reported that pt's urinary output was about 200 mL despite having NS running in at 200mL/hr. Bladder scan reveals less than 20mL in bladder so pt does not appear to be retaining. No c/o of abdominal discomfort. Pt reports that she has been having this problem with swelling \"since the surgery. \" has had albumin and lasix dose given in the past but no orders currently. fluids paused at this moment. will contact primary for further action. 1000 surgery providers to review chart and update nurse with further actions. Will continue to monitor pt. Refill declined, should be addressed by new PCP.   Refill requested for:       lisinopril (ZESTRIL) 40 MG tablet 90 tablet 0 7/1/2020     Sig: TAKE 1 TABLET BY MOUTH  DAILY        Last office visit:    09/14/2020 Follow-up    Please advise on refills.   operating room

## 2021-02-23 NOTE — H&P (VIEW-ONLY)
History and Physical    Patient: Piyush Lewis MRN: [id number]  SSN: [id number]    YOB: 1985  Age: 29 y.o. Sex: female      Subjective:      29year old female admitted with DKA, rhabdomyolysis, NALDO, pancreatitis, lactic acidosis, leukocytosis, and sepsis. On CT, she was found to have a large pelvic process (mass vs abscess) with tracking into right buttock. She improved some with supportive care, fluid resuscitation, and antibiotics. She had an IR drain placed into her pelvic fluid collection.      She was taken to the OR 5/19/17 for I&D of necrotic buttock fat. I found tracking into the extraperitoneal pelvic fluid collection. There was no connection to rectum. She was taken 5/22/17 for repeat debridement with much improved appearance of wound. Wound seemed to be improving, but on VAC change 6/1, there was stool in the wound, consistent with rectal wall breakdown. Due to fevers and worsening WBC, she had repeat CT done, showing extensive gas and inflammation in the right buttock and thigh.      Taken for wound exploration, debridement, suture repair of rectum x2, and lap diverting end-loop colostomy 6/2/17. I could not find tracking into the thigh process seen on CT imaging. Ortho has seen patient for ongoing thigh process. Patient with uneventful weekend. Reports some buttock, leg, and abdominal pain. Tolerated some diet. Minimal serous fluid out of ostomy thus far. No n/v. Wound dressing not changed over weekend        Past Medical History:   Diagnosis Date    Ill-defined condition     herpes     Past Surgical History:   Procedure Laterality Date    HX OTHER SURGICAL      pilonidal cyst      No family history on file. Social History   Substance Use Topics    Smoking status: Never Smoker    Smokeless tobacco: Not on file    Alcohol use Yes      Comment: occasional      Prior to Admission medications    Medication Sig Start Date End Date Taking?  Authorizing Provider   0.9% sodium chloride solution 100 mL/hr by IntraVENous route continuous. 6/5/17   Francis Dennis NP   acetaminophen (TYLENOL) 325 mg tablet Take 2 Tabs by mouth every four (4) hours as needed for Fever (for fever greater than 100.4 F). 6/5/17   Francis Dennis NP   bisacodyl (DULCOLAX) 5 mg EC tablet Take 1 Tab by mouth daily as needed for Constipation. 6/5/17   Erenest Daingerfield, NP   fluconazole in 0.9% NaCl (DIFLUCAN) 400 mg/200 mL IVPB 200 mL by IntraVENous route every twenty-four (24) hours. 6/5/17   Francis Dennis NP   HEPARIN SODIUM,PORCINE (HEPARIN, PORCINE,) 5,000 unit/mL injection 1 mL by SubCUTAneous route every eight (8) hours. 6/5/17   Francis Dennis NP   insulin lispro (HUMALOG) 100 unit/mL injection Very Insulin Resistant  For Blood Sugar (mg/dL) of:              Less than 150 =   0 units  150 -199 =   3 units  200 -249 =   6 units  250 -299 =   9 units  300 -349 =   12 units  350 and above =   15 units and Call Physician  Initiate Hypoglycemic protocol if blood glucose is <70 mg/dL. 6/5/17   Francis Dennis NP   meropenem 500 mg, ADDaptor 1 Device IVPB 500 mg by IntraVENous route every six (6) hours. 6/5/17   Francis Dennis NP   morphine 2 mg/mL injection 1 mL by IntraVENous route every three (3) hours as needed. Max Daily Amount: 16 mg. 6/5/17   Francis Dennis NP   morphine 10 mg/ml soln 0.5 mL by IntraVENous route every four (4) hours as needed. Max Daily Amount: 30 mg. 6/5/17   Francis Dennis NP   ondansetron (ZOFRAN) 4 mg/2 mL soln 2 mL by IntraVENous route every four (4) hours as needed. 6/5/17   Francis Dennis NP   oxyCODONE IR (ROXICODONE) 10 mg tab immediate release tablet Take 1 Tab by mouth every four (4) hours as needed. Max Daily Amount: 60 mg. 6/5/17   Francis Dennis NP   oxyCODONE IR (ROXICODONE) 5 mg immediate release tablet Take 1 Tab by mouth every four (4) hours as needed.  Max Daily Amount: 30 mg. 6/5/17   Francis Dennis NP   sodium chloride 0.9 % soln 10 mL with promethazine 25 mg/mL soln 12.5 mg 12.5 mg by IntraVENous route every six (6) hours as needed. 6/5/17   Navya Issa NP        No Known Allergies    Review of Systems:  A comprehensive review of systems was negative except for that written in the History of Present Illness. Objective:     Vitals:    06/06/17 1337   BP: 138/71   Pulse: (!) 131   Resp: 18   Temp: 99.4 °F (37.4 °C)   SpO2: 97%   Weight: 197 lb 8.5 oz (89.6 kg)   Height: 5' 2\" (1.575 m)        Physical Exam:  General:  Alert, cooperative, no distress, appears stated age. Eyes:  Conjunctivae/corneas clear. PERRL, EOMs intact. Fundi benign   Ears:  Normal TMs and external ear canals both ears. Nose: Nares normal. Septum midline. Mucosa normal. No drainage or sinus tenderness. Mouth/Throat: Lips, mucosa, and tongue normal. Teeth and gums normal.   Neck: Supple, symmetrical, trachea midline, no adenopathy, thyroid: no enlargment/tenderness/nodules, no carotid bruit and no JVD. Back:   Symmetric, no curvature. ROM normal. No CVA tenderness. Lungs:   Clear to auscultation bilaterally. Heart:  Regular rate and rhythm, S1, S2 normal, no murmur, click, rub or gallop. Abdomen:   soft, mildly tender, nondistended. No peritoneal signs, no rebound, no guarding. Stoma in LLQ is pink, healthy, and budded. Minimal jay fluid in bag.     Buttock--dressing removed. Appears that dressing not changed over weekend. There is again some grayish purulence in the base of the wound. Superficially, the wound looks great with excellent granulation tissue and no necrotic areas. Widely spreading to examine the deeper component shows some continued grayish fluid drainage. Wound packed with gauze today as I would like to clean it up a little before placing VAC again     Extremities: Extremities normal, atraumatic, no cyanosis or edema. Pulses: 2+ and symmetric all extremities.    Skin: Skin color, texture, turgor normal. No rashes or lesions   Lymph nodes: Cervical, supraclavicular, and axillary nodes normal.   Neurologic: CNII-XII intact. Normal strength, sensation and reflexes throughout.                Recent Labs      17  0425 17  0812 17  0746   WBC 5.8 26.9* 26.2*   HGB 9.0* 8.6* 8.2*   HCT 27.6* 26.4* 25.3*   * 442 415               Recent Labs      17  0425 17  0812 17  0746    140 139   K 3.5 3.8 4.1   * 103 100   CO2 20* 25 28   BUN 9 6 7   CREA 0.97 0.34* 0.37*         No results for input(s): AML, LPSE in the last 72 hours.     No results for input(s): PTP, INR, APTT in the last 72 hours.     No lab exists for component: INREXT, INREXT  Last lactate 1.4  Last CRP 18.3  RPR NR  Pelvic fluid culture from IR 17--beta hemolytic strep  AFB smear 17--negative  HSV culture 17--negative  Blood cultures 17--GPR x2 bottles  Blood cultures 17--no growth x 5 days  Blood cultures 17--no growth 5 days  Blood cultures 17--no growth x14 hrs  UA 17--1+ bacteria, neg LE/nitr--cultures with 100k candida glabrata  OR tissue culture 17--beta hemolytic strep  Above labs reviewed by me.     IMAGIN17--CT abd/pelvis: \"IMPRESSION: Large incompletely characterized pelvic mass with apparent involvement/extension into the right gluteal muscles. The findings presumably represent a neoplastic process. Pelvic MRI may be beneficial for further delineation as it relates to other pelvic anatomy. Preferably this would be done with IV contrast.\"  17--CT chest/abd/pelvis: \"IMPRESSION: Indeterminate perirectal mass with extension of heterogeneous, low-attenuation soft tissue into the right buttock an asymmetric edema and muscular swelling and the right hemipelvis. Right inguinal adenopathy is present. This may be a perirectal abscess or necrotic tumor with extrapelvic extension. \"  17--CT guided IR drain placement:   17--MRI pelvis--\"IMPRESSION: Status post right perianal/rectal abscess drainage.  The extensive remaining abscess findings are detailed above. \"  6/1/17--CT abd/pelv--\"IMPRESSION: Extensive complex air/fluid collection involving the right gluteal musculature extending into the right thigh, consistent with myositis and possible gangrene. Probably associated with perforation of the right lateral wall of the inferior rectum.  \"      Assessment:   Pelvic abscess   S/p IR drain 5/18/17   S/p operative I&D, debridement of necrotic tissue 5/19/17   S/p operative I&D, debridement of necrotic tissue 5/22/17   S/p operative I&D, debridement, suture repair of rectum x2, and lap diverting end-loop colostomy 6/2/17  Sepsis, due to above--improved  Recurrent fevers--resolved  Leukocytosis, due to above--resolved  DKA--improved  Rhabdomyolysis--resolved  NALDO--resolved  Pancreatitis--resolved  Anemia--   S/p transfusion 2 units PRBC 5/18/17   S/p transfusion 2 units PRBC 5/31/17  Hypernatremia--resolved  Elevated LFTs--resolved  Hypophosphatemia--resolved       Plan:     --to OR for RIGHT leg exploration, possible I&D      Signed By: Marlin Daily MD     June 6, 2017 No

## 2021-06-14 NOTE — ANESTHESIA PREPROCEDURE EVALUATION
Anesthetic History   No history of anesthetic complications            Review of Systems / Medical History  Patient summary reviewed and pertinent labs reviewed    Pulmonary          Smoker         Neuro/Psych   Within defined limits           Cardiovascular  Within defined limits                Exercise tolerance: >4 METS  Comments: No cardiac history   GI/Hepatic/Renal  Within defined limits             Comments: H/o alcoholic pancreatitis Endo/Other    Diabetes (h/o DKA): poorly controlled, using insulin    Anemia     Other Findings   Comments: H/o dka with rectal abcess that has tracted to the side of her thigh, she has been septic. Hr in the 130's right now. Anesthetic History   No history of anesthetic complications            Review of Systems / Medical History  Patient summary reviewed and pertinent labs reviewed    Pulmonary  Within defined limits                 Neuro/Psych   Within defined limits           Cardiovascular  Within defined limits                Exercise tolerance: >4 METS  Comments: No cardiac history   GI/Hepatic/Renal  Within defined limits             Comments: H/o alcoholic pancreatitis Endo/Other    Diabetes (h/o DKA): poorly controlled, using insulin    Anemia     Other Findings   Comments: H/o dka with rectal abcess that has tracted to the side of her thigh, she has been septic. Hr in the 130's right now.                       Physical Exam    Airway  Mallampati: III  TM Distance: 4 - 6 cm  Neck ROM: normal range of motion        Cardiovascular    Rhythm: regular  Rate: normal         Dental    Dentition: Poor dentition     Pulmonary  Breath sounds clear to auscultation               Abdominal  GI exam deferred       Other Findings            Anesthetic Plan    ASA: 3  Anesthesia type: general          Induction: Intravenous  Anesthetic plan and risks discussed with: Patient and Family                   Anesthetic Plan    ASA: 3  Anesthesia type: general            Anesthetic plan and risks discussed with: Patient English

## 2022-06-10 NOTE — ANESTHESIA PREPROCEDURE EVALUATION
Anesthetic History   No history of anesthetic complications            Review of Systems / Medical History  Patient summary reviewed and pertinent labs reviewed    Pulmonary  Within defined limits                 Neuro/Psych   Within defined limits           Cardiovascular  Within defined limits                Exercise tolerance: >4 METS  Comments: No cardiac history   GI/Hepatic/Renal  Within defined limits              Endo/Other    Diabetes: poorly controlled, using insulin         Other Findings   Comments: DKA resolved  Rhabdomyosarcoma  Sepsis  Chlamydia  Pelvic Mass           Physical Exam    Airway  Mallampati: III  TM Distance: 4 - 6 cm  Neck ROM: normal range of motion        Cardiovascular    Rhythm: regular  Rate: normal         Dental    Dentition: Poor dentition     Pulmonary  Breath sounds clear to auscultation               Abdominal  GI exam deferred       Other Findings            Anesthetic Plan    ASA: 3  Anesthesia type: general          Induction: Intravenous  Anesthetic plan and risks discussed with: Patient
PACU ANESTHESIA ADMISSION NOTE      Procedure: D and C  Post op diagnosis:  missed      ____  Intubated  TV:______       Rate: ______      FiO2: ______    __x__  Patent Airway    __x__  Full return of protective reflexes    __x__  Full recovery from anesthesia / back to baseline status    Vitals:  T(C): 37.2 (06-10-22 @ 11:13), Max: 37.2 (06-10-22 @ 11:13)  HR: 71 (06-10-22 @ 11:13) (71 - 71)  BP: 112/68 (06-10-22 @ 11:13) (112/68 - 112/68)  RR: 16 (06-10-22 @ 11:13) (16 - 16)  SpO2: 97% (06-10-22 @ 11:13) (97% - 97%)    Mental Status:  __x__ Awake   ___x__ Alert   _____ Drowsy   _____ Sedated    Nausea/Vomiting:  __x__ NO  ______Yes,   See Post - Op Orders          Pain Scale (0-10):  _0___    Treatment: ____ None    __x__ See Post - Op/PCA Orders    Post - Operative Fluids:   ____ Oral   __x__ See Post - Op Orders    Plan: Discharge:   __x__Home       _____Floor     _____Critical Care    _____  Other:_________________    Comments: Patient had smooth intraoperative event, no anesthesia complication.  PACU Vital signs: HR:    88        BP:    112    /    69      RR:    12         O2 Sat:     99  %     Temp 98F

## 2023-03-09 NOTE — CONSULTS
Hospitalist Consult    NAME:  Junior Clayton   Age:  34 y.o.  :   1985   MRN:   [id number]  PCP: None  Consulting MD:  Treatment Team: Attending Provider: Katelynn Courtney DO; Consulting Provider: Bessie Blunt MD    No chief complaint on file. HPI:   33 y/o female with pmh of diabetes,hx of necrotizig fascitis requiring colostomy curently admitted for the reversal of colostomy. Hospitalist service consulted for the management of DM. Patient was diagnoed with DM about a year ago and has been on SSI at home, not on any OHA. She reports taking the insulin only when her sugars > 200. She denies any hx of stroke, heart attack or any renal dysfunction. No neuropathy or retinopathy either from hx. Her BG has been mostly in the 150-250 range here. Patient started on diet today and does not have a good appetite.           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 120-140: pt denies episodes of hypo: A1c unknown        Past Surgical History:   Procedure Laterality Date    HX COLOSTOMY  2017    HX COLOSTOMY      HX OTHER SURGICAL      pilonidal cyst, multiple times    HX OTHER SURGICAL  2017-2017    I&D's x5        Family History   Problem Relation Age of Onset    No Known Problems Mother     Diabetes Father        Social History     Social History Narrative        Social History   Substance Use Topics    Smoking status: Never Smoker    Smokeless tobacco: Never Used    Alcohol use No        History   Drug Use No         No Known Allergies    Prior to Admission medications    Medication Sig Start Date End Date Taking? Authorizing Provider   insulin lispro (HUMALOG) 100 unit/mL injection Very Insulin Resistant  For Blood Sugar (mg/dL) of:              Less than 150 =   0 units  150 -199 =   3 units  200 -249 =   6 units  250 -299 =   9 units  300 -349 =   12 units  350 and above =   15 units and Call Physician  Initiate Hypoglycemic protocol if blood glucose is <70 mg/dL. 6/5/17  Yes Bee Anton NP           Review of Systems    Constitutional: well nourished, obese female in NAD  Eyes: Negative for visual disturbances, negative for photophobia  Ears, nose, mouth, throat, and face: negative for swallowing disturbances, no chronic sinus congestion, no recurrent headaches  Respiratory: no SOB, LIRA or hemoptysis.  No chronic cough  Cardiovascular: no CP, palpitations or SOB  Gastrointestinal: s/p colostomy reversal   Genitourinary:no urgency, frequency or dysuria, no nocturia  Integument/breast: no skin rashes, lesions  Hematologic/lymphatic: negative for known bleeding disorders  Musculoskeletal:negative for joint pain, joint tenderness  Neurological: negative for dizziness lightheadedness, syncopal or presyncopal events, no seizure or h/o CVA  Behavioral/Psych: no depression, anxiety or suicidal ideation  Endocrine: negative for polydipsea, polyuria or intolerance to heat or cold  Allergic/Immunologic: negative for allergic responses      Objective:     Visit Vitals    /73    Pulse (!) 102    Temp 98.2 °F (36.8 °C)    Resp 20    Ht 5' 2\" (1.575 m)    Wt 97.1 kg (214 lb)    SpO2 99%    BMI 39.14 kg/m2           07/26 1901 - 07/28 0700  In: 1885 [I.V.:1885]  Out: 225 [Urine:150]    Data Review:   Recent Results (from the past 24 hour(s))   GLUCOSE, POC    Collection Time: 07/27/18  4:32 PM   Result Value Ref Range    Glucose (POC) 208 (H) 65 - 100 mg/dL   GLUCOSE, POC    Collection Time: 07/27/18  9:45 PM   Result Value Ref Range    Glucose (POC) 265 (H) 65 - 100 mg/dL   GLUCOSE, POC    Collection Time: 07/28/18  6:38 AM   Result Value Ref Range    Glucose (POC) 196 (H) 65 - 100 mg/dL   GLUCOSE, POC    Collection Time: 07/28/18 11:05 AM   Result Value Ref Range    Glucose (POC) 193 (H) 65 - 100 mg/dL       Physical Exam:     General:  Alert, cooperative, no distress, appears stated age. Eyes:  Conjunctivae/corneas clear. PERRL, EOMs intact. Fundi benign   Ears:  Normal TMs and external ear canals both ears. Nose: Nares normal. Septum midline. Mucosa normal. No drainage or sinus tenderness. Mouth/Throat: Lips, mucosa, and tongue normal. Teeth and gums normal.   Neck: Supple, symmetrical, trachea midline, no adenopathy, thyroid: no enlargment/tenderness/nodules, no carotid bruit and no JVD. Back:   Symmetric, no curvature. ROM normal. No CVA tenderness. Lungs:   Clear to auscultation bilaterally. Heart:  Regular rate and rhythm, S1, S2 normal, no murmur, click, rub or gallop. Abdomen:   Surgical site with dressing looks clean , tenderness on palpation   Extremities: Extremities normal, atraumatic, no cyanosis or edema. Pulses: 2+ and symmetric all extremities. Skin: Skin color, texture, turgor normal. No rashes or lesions   Lymph nodes: Cervical, supraclavicular, and axillary nodes normal.   Neurologic: CNII-XII intact. Normal strength, sensation and reflexes throughout. Assessment and Plan     Principal Problem:    Colostomy in place [facility]) (7/27/2018)      Type 2 Diabetes Mellitus: Without any complications, currently on SSI. As PO intake is poor will continue SSI. Will get A1C. Only on SSI at home.  Will add oral agents/long acting insulin as  tolerated based on her A1C    s/p colostomy reversal: Surgery following     Morbid Obesity: Counseled on weight loss which will help with her DM control     DVT Prophylaxis: On SCD s   Signed By: Katlin Morales MD   July 28, 2018 No change

## 2023-11-13 NOTE — ADDENDUM NOTE
Addendum  created 06/07/17 1038 by Jerome Grider, DELORES    Anesthesia Intra Flowsheets edited same name as above

## 2024-03-27 NOTE — DISCHARGE SUMMARY
Discharge Note    Parul Bae  Admission date:  7/31/2017  Discharge date:      Admitting Diagnosis:  Perirectal abscess [K61.1]; Abscess [L02.91]    Discharge Diagnoses:    Hospital Problems  Date Reviewed: 6/6/2017          Codes Class Noted POA    * (Principal)Abscess of hip, right ICD-10-CM: L02.415  ICD-9-CM: 682.6  7/31/2017 Unknown              Consultations: None    Procedures/studies:    Procedure(s):  PERIRECTAL WOUND CLOSURE  INCISION AND DRAINAGE RIGHT HIP ABSCESS              Hospital Course: Patient admitted after I and D of Right hip abscess and closure of perirectal wound. Home wound vac arranged with home health. No complaints. Hospital course uneventful. Physical Exam:   GEN: well developed and in no acute distress   HEENT: PERRL, EOMI, no alar flaring or epistaxis, oral mucosa moist without cyanosis,   NECK: no JVD, no retractions, no thyromegaly or masses,   LUNGS: resp even/unlab on room air   HEART: RRR with no M,G,R;  ABDOMEN: soft with no tenderness, no peritoneal signs, rebound, or guarding  EXTREMITIES: warm with no cyanosis, no upper or lower extremity edema  SKIN: no jaundice; rashes, sores, or ecchymoses  NEURO:  Awake, alert, oriented x3, no distress, nonfocal exam.    No results for input(s): WBC, HGB, HCT, PLT, HGBEXT, HCTEXT, PLTEXT in the last 72 hours. No results for input(s): NA, K, CL, CO2, BUN, CREA, MG, PHOS, TROIQ, INR, BNPP in the last 72 hours. No lab exists for component: TROIP, INREXT    Discharge Medications:   Current Discharge Medication List      START taking these medications    Details   !! oxyCODONE-acetaminophen (PERCOCET) 5-325 mg per tablet Take 2 Tabs by mouth every four (4) hours as needed for Pain. Max Daily Amount: 12 Tabs. Qty: 40 Tab, Refills: 0       !! - Potential duplicate medications found. Please discuss with provider.       CONTINUE these medications which have CHANGED    Details   amoxicillin-clavulanate (AUGMENTIN) 875-125 mg per tablet Take 1 Tab by mouth every twelve (12) hours for 7 days. Qty: 14 Tab, Refills: 0         CONTINUE these medications which have NOT CHANGED    Details   !! oxyCODONE-acetaminophen (PERCOCET) 5-325 mg per tablet Take 2 Tabs by mouth every four (4) hours as needed for Pain. Max Daily Amount: 12 Tabs. Qty: 40 Tab, Refills: 0      magnesium oxide (MAG-OX) 400 mg tablet Take 400 mg by mouth daily. furosemide (LASIX) 40 mg tablet Take 40 mg by mouth daily. ibuprofen 200 mg cap Take  by mouth as needed. bisacodyl (DULCOLAX) 5 mg EC tablet Take 1 Tab by mouth daily as needed for Constipation. Qty: 1 Tab, Refills: 0      insulin lispro (HUMALOG) 100 unit/mL injection Very Insulin Resistant  For Blood Sugar (mg/dL) of:              Less than 150 =   0 units  150 -199 =   3 units  200 -249 =   6 units  250 -299 =   9 units  300 -349 =   12 units  350 and above =   15 units and Call Physician  Initiate Hypoglycemic protocol if blood glucose is <70 mg/dL. Qty: 1 Vial, Refills: 0       !! - Potential duplicate medications found. Please discuss with provider.           Diet: Regular  Activity: As tolerated  Follow-up: 2 weeks    Phuong Gaitan,  Discharged

## 2024-07-18 NOTE — WOUND CARE
Noted Dr. Mike Herrera note to reeval for wound vac tomorrow. Call to Dr. Mike Herrera to clarify, ok to attempt application today if time allows. Took supplies into room, patient states \" he said we could try tomorrow, I would rather wait\". Will hold vac application today.
Noted wound vac off per Dr. Lacey Penny will reevaluate Monday may attempt to reapply wound vac. Per patient report the seal was maintain through first stool however was lost after subsequent BM which was loose. Will see with Dr. Lacey Penny Monday. Nurse updated to daily dressing change. Will follow.
Patient seen for right buttock assessment. Patient premedicated by primary nurse with Morphine. Wound has multiple turns within and complicated irregular shape to wound base. Larger inside than outside opening. Spoke with Dr Kelsey Reyes after and will meet him in early am 6:30 for possible VAC placement. Discussed with patient, she has had VAC on thigh several years ago and is familiar with them.
Patient seen this am with surgeon. Stool in wound tract. VAC held, dry gauze dressing placed per verbal order. Will have diverting colostomy tomorrow. Home skills kit given to patient, discussed colostomy cares. She requested ostomy visitor, called Kiko Alexandre. to come visit today. Stoma site marking for LLQ colostomy. Will continue to follow and attempt to restart VAC on Monday per discussion with surgeon.
Patient seen with Dr Mia Ledesma. Packed deeper portions of right buttock/perianal wound together for best access. 2 pieces of white foam and 2 black foam pieces total. Tracked to right hip for tract pad. Used piece of eakins ring to seal around anus. Will try VAC again and if seal is not maintained, was instructed to resume daily dry kerlix packing dressings. Patient and  updated, answered all questions. Will continue to follow and adjust treatment as needed.
Patient seen with Dr Zonia Gallagher for right buttock wound. He explored and debrided with colostomy placement  Last week. No ready for VAC as drainage still dark brown surgeon needs to monitor per discussion. Packed with kerlix roll. Hard to fully pack as 2 rolls were used in OR setting but getting access at bedside is more difficult. Noted events for right thigh and orthopedic involvement. Complex wound, will continue to follow and assist as needed. Started ostomy teaching today. Stoma pink and budded, no stool at this time. Jannette Shah
Patient seen with Dr Zonia Gallagher for wound evaluation and plan. Patient positioned prone for better view. Shown wound tracts and white foam packed together. It is close to anus and in moist moving fold so seal may be difficult to maintain. Paste strip used to help seal this area. Noted that there is 2 openings, smaller one not visible when positioned on her side. It is one cavity and the skin bridge is breaking down and may not survive. Tract pad placed on right hip and ABD placed over wound dressing to help keep some friction off dressing when she moved her bottom in bed/chair. If seal does not stay, received order to resume daily dry Kerlix packing. Updated patient and answered all of her questions. More complicated wound and would need LTAC or rehab to help manage.
Patient sitting in chair, VAC sealed and doing well. Noted possible [facility] bed. Will see in am with Dr Atilio Fernandez unless she transfers today.
Detail Level: Detailed
Lesion Type: Abscess
Method: 11 blade
Curette: No
Anesthesia Type: 1% lidocaine with epinephrine
Size Of Lesion In Cm (Optional But May Be Required For Some Insurances): 0
Wound Care: Petrolatum
Dressing: dry sterile dressing
Epidermal Sutures: 4-0 Ethilon
Epidermal Closure: simple interrupted
Suture Text: The incision was partially closed with
Preparation Text: The area was prepped in the usual clean fashion.
Curette Text (Optional): After the contents were expressed a curette was used to partially remove the cyst wall.
Consent was obtained and risks were reviewed including but not limited to delayed wound healing, infection, need for multiple I and D's, and pain.
Render Postcare In Note?: Yes
Post-Care Instructions: I reviewed with the patient in detail post-care instructions. Patient should keep wound covered and call the office should any redness, pain, swelling or worsening occur.

## 2024-11-13 NOTE — PERIOP NOTES
88 TRANSFER - IN REPORT:    Verbal report received from Willy Zarate RN on Geetha Kaur  being received from 4th floor for ordered procedure      Report consisted of patients Situation, Background, Assessment and   Recommendations(SBAR). Information from the following report(s) SBAR, Procedure Summary, Intake/Output, MAR and Recent Results was reviewed with the receiving nurse. Opportunity for questions and clarification was provided. Assessment completed upon patients arrival to unit and care assumed.

## (undated) DEVICE — SYR BULB 60ML IRRIGATION -- CONVERT TO ITEM 116413

## (undated) DEVICE — DRAPE TWL SURG 16X26IN BLU ORB04] ALLCARE INC]

## (undated) DEVICE — ABDOMINAL PAD: Brand: DERMACEA

## (undated) DEVICE — SYR IRR CATH TIP LR ADPT 70ML -- CONVERT TO ITEM 363120

## (undated) DEVICE — NDL SPNE QNCKE 18GX3.5IN LF --

## (undated) DEVICE — Device

## (undated) DEVICE — UNIVERSAL DRAPES: Brand: MEDLINE INDUSTRIES, INC.

## (undated) DEVICE — TRAY CATH 16F DRN BG LTX -- CONVERT TO ITEM 363158

## (undated) DEVICE — SHEET, T, LAPAROTOMY, STERILE: Brand: MEDLINE

## (undated) DEVICE — PENROSE DRAIN 12" X 1/2": Brand: CARDINAL HEALTH

## (undated) DEVICE — UNIVERSAL FIXATION CANNULA: Brand: VERSAONE

## (undated) DEVICE — PENROSE TUBING RADIOPAQUE: Brand: ARGYLE

## (undated) DEVICE — VAC WHITEFOAM DRESSING LARGE: Brand: V.A.C. WHITEFOAM™

## (undated) DEVICE — LEGGINGS, PAIR, 31X48, STERILE: Brand: MEDLINE

## (undated) DEVICE — [HIGH FLOW INSUFFLATOR,  DO NOT USE IF PACKAGE IS DAMAGED,  KEEP DRY,  KEEP AWAY FROM SUNLIGHT,  PROTECT FROM HEAT AND RADIOACTIVE SOURCES.]: Brand: PNEUMOSURE

## (undated) DEVICE — LAP CHOLE: Brand: MEDLINE INDUSTRIES, INC.

## (undated) DEVICE — AMD ANTIMICROBIAL GAUZE SPONGES,12 PLY USP TYPE VII, 0.2% POLYHEXAMETHYLENE BIGUANIDE HCI (PHMB): Brand: CURITY

## (undated) DEVICE — TROCAR LAP L100MM DIA5MM BLDELSS W/ STBL SL ENDOPATH XCEL

## (undated) DEVICE — SOLUTION IV 1000ML 0.9% SOD CHL

## (undated) DEVICE — HANDPIECE LAP L23MM DIA5MM VES SEAL CUT CRV JAW PSTL GRP

## (undated) DEVICE — SUTURE PERMAHAND SZ 3-0 L18IN NONABSORBABLE BLK L26MM SH C013D

## (undated) DEVICE — MEDI-VAC YANKAUER SUCTION HANDLE W/BULBOUS TIP: Brand: CARDINAL HEALTH

## (undated) DEVICE — SOLUTION IRRIG 3000ML 0.9% SOD CHL FLX CONT 0797208] ICU MEDICAL INC]

## (undated) DEVICE — SUTURE PDS II SZ 1 L36IN ABSRB VLT L48MM CTX 1/2 CIR Z371T

## (undated) DEVICE — SUTURE VCRL SZ 2-0 L36IN ABSRB UD L36MM CT-1 1/2 CIR J945H

## (undated) DEVICE — INTENDED FOR TISSUE SEPARATION, AND OTHER PROCEDURES THAT REQUIRE A SHARP SURGICAL BLADE TO PUNCTURE OR CUT.: Brand: BARD-PARKER SAFETY BLADES SIZE 15, STERILE

## (undated) DEVICE — AMD ANTIMICROBIAL BANDAGE ROLL,6 PLY: Brand: KERLIX

## (undated) DEVICE — GOWN,REINFORCED,POLY,AURORA,XXLARGE,STR: Brand: MEDLINE

## (undated) DEVICE — FAN SPRAY KIT: Brand: PULSAVAC®

## (undated) DEVICE — SUTURE MCRYL SZ 4-0 L27IN ABSRB UD L19MM PS-2 1/2 CIR PRIM Y426H

## (undated) DEVICE — (D)PREP SKN CHLRAPRP APPL 26ML -- CONVERT TO ITEM 371833

## (undated) DEVICE — SCOPE RECT LED W INSUFFLATOR

## (undated) DEVICE — BUTTON SWITCH PENCIL BLADE ELECTRODE, HOLSTER: Brand: EDGE

## (undated) DEVICE — STAPLER WITH DST SERIES TECHNOLOGY: Brand: GIA

## (undated) DEVICE — NEEDLE HYPO 21GA L1.5IN GRN POLYPR HUB S STL THN WALL IV

## (undated) DEVICE — SPONGE LAP 18X18IN STRL -- 5/PK

## (undated) DEVICE — MASTISOL ADHESIVE LIQ 2/3ML

## (undated) DEVICE — STAPLER INT L60MM THK38MM BLU TI RELD DISPOSABLE DST SER GIA

## (undated) DEVICE — TOWEL SURG W16XL26IN BLU NONFENESTRATED NONSTERILE 400 PER CA

## (undated) DEVICE — REM POLYHESIVE ADULT PATIENT RETURN ELECTRODE: Brand: VALLEYLAB

## (undated) DEVICE — BLUNT TROCAR WITH THREADED ANCHOR: Brand: VERSAONE

## (undated) DEVICE — KENDALL SCD EXPRESS SLEEVES, KNEE LENGTH, LARGE: Brand: KENDALL SCD

## (undated) DEVICE — 2000CC GUARDIAN II: Brand: GUARDIAN

## (undated) DEVICE — TRAY PREP DRY W/ PREM GLV 2 APPL 6 SPNG 2 UNDPD 1 OVERWRAP

## (undated) DEVICE — COLOSTOMY/ILEOSTOMY KIT, FLEXWEAR: Brand: NEW IMAGE

## (undated) DEVICE — SUTURE ETHLN SZ 2-0 L18IN NONABSORBABLE BLK L26MM PS 3/8 585H

## (undated) DEVICE — SUTURE PERMAHAND SZ 2-0 L18IN NONABSORBABLE BLK L26MM PS 1588H

## (undated) DEVICE — BASIC SINGLE BASIN-LF: Brand: MEDLINE INDUSTRIES, INC.

## (undated) DEVICE — 3000CC GUARDIAN II: Brand: GUARDIAN

## (undated) DEVICE — JELLY LUBRICATING 10GM PREFIL SYR LUBE

## (undated) DEVICE — SUTURE PERMAHAND SZ 2-0 L12X18IN NONABSORBABLE BLK SILK A185H

## (undated) DEVICE — SOL ANTI-FOG 6ML MEDC -- MEDICHOICE - CONVERT TO 358427

## (undated) DEVICE — GOWN,REINF,POLY,ECL,PP SLV,XL: Brand: MEDLINE

## (undated) DEVICE — SUTURE SZ 0 27IN 5/8 CIR UR-6  TAPER PT VIOLET ABSRB VICRYL J603H

## (undated) DEVICE — LOADING UNIT WITH DST SERIES TECHNOLOGY: Brand: GIA

## (undated) DEVICE — VISUALIZATION SYSTEM: Brand: CLEARIFY

## (undated) DEVICE — SUT SLK 2-0SH 30IN BLK --

## (undated) DEVICE — INTENDED FOR TISSUE SEPARATION, AND OTHER PROCEDURES THAT REQUIRE A SHARP SURGICAL BLADE TO PUNCTURE OR CUT.: Brand: BARD-PARKER SAFETY BLADES SIZE 10, STERILE

## (undated) DEVICE — SLIM BODY SKIN STAPLER: Brand: APPOSE ULC

## (undated) DEVICE — SURGICAL PROCEDURE PACK BASIC ST FRANCIS

## (undated) DEVICE — TRAY CATH 16F URIN MTR LTX -- CONVERT TO ITEM 363111

## (undated) DEVICE — PURSE STRING DEVICE: Brand: PURSTRING

## (undated) DEVICE — MEDI-VAC NON-CONDUCTIVE SUCTION TUBING: Brand: CARDINAL HEALTH

## (undated) DEVICE — BLADELESS OPTICAL TROCAR WITH FIXATION CANNULA: Brand: VERSAPORT

## (undated) DEVICE — DERMABOND SKIN ADH 0.7ML -- DERMABOND ADVANCED 12/BX

## (undated) DEVICE — DRAPE SHT 3 QTR PROXIMA 53X77 --

## (undated) DEVICE — ELECTRODE ES AD DISPER HYDRGEL THN FOAM ADH SCALLOPED EDGE

## (undated) DEVICE — NEEDLE HYPO 21GA L1.5IN INTRAMUSCULAR S STL LATCH BVL UP

## (undated) DEVICE — JELLY,LUBE,STERILE,FLIP TOP,TUBE,4-OZ: Brand: MEDLINE

## (undated) DEVICE — SUTURE VCRL SZ 3-0 L27IN ABSRB UD L26MM SH 1/2 CIR J416H

## (undated) DEVICE — HANDPIECE SET WITH COAXIAL HIGH FLOW TIP AND SUCTION TUBE: Brand: INTERPULSE

## (undated) DEVICE — SUTURE ETHLN SZ 3-0 L30IN NONABSORBABLE BLK L36MM FSLX 3/8 1673BH

## (undated) DEVICE — 48" PROBE COVER W/GEL, ULTRASOUND, STERILE: Brand: SITE-RITE

## (undated) DEVICE — SYRINGE NDL 25GA 1ML L5/8IN BLU PLAS NDL S STL SHLD HYPO

## (undated) DEVICE — TELFA NON-ADHERENT ABSORBENT DRESSING: Brand: TELFA

## (undated) DEVICE — SUTURE ETHLN SZ 2-0 L30IN NONABSORBABLE BLK L36MM FSLX 3/8 1674H

## (undated) DEVICE — DRESSING NEG PRSS M 18X12.5X3.3CM POLYUR FOR WND THER VAC

## (undated) DEVICE — SUTURE VCRL SZ 3-0 L18IN ABSRB UD L26MM SH 1/2 CIR J864D

## (undated) DEVICE — DRAPE,UNDERBUTTOCKS,PCH,STERILE: Brand: MEDLINE

## (undated) DEVICE — 2, DISPOSABLE SUCTION/IRRIGATOR WITHOUT DISPOSABLE TIP: Brand: STRYKEFLOW

## (undated) DEVICE — KENDALL SCD EXPRESS SLEEVES, KNEE LENGTH, MEDIUM: Brand: KENDALL SCD

## (undated) DEVICE — (D)STRIP SKN CLSR 0.5X4IN WHT --

## (undated) DEVICE — BLUNT TIP LAPAROSCOPIC SEALER/DIVIDER: Brand: LIGASURE